# Patient Record
Sex: FEMALE | Race: OTHER | HISPANIC OR LATINO | Employment: FULL TIME | URBAN - METROPOLITAN AREA
[De-identification: names, ages, dates, MRNs, and addresses within clinical notes are randomized per-mention and may not be internally consistent; named-entity substitution may affect disease eponyms.]

---

## 2017-03-22 ENCOUNTER — APPOINTMENT (EMERGENCY)
Dept: RADIOLOGY | Facility: HOSPITAL | Age: 42
End: 2017-03-22
Payer: COMMERCIAL

## 2017-03-22 ENCOUNTER — HOSPITAL ENCOUNTER (EMERGENCY)
Facility: HOSPITAL | Age: 42
Discharge: HOME | End: 2017-03-22
Admitting: EMERGENCY MEDICINE
Payer: COMMERCIAL

## 2017-03-22 VITALS
WEIGHT: 213 LBS | SYSTOLIC BLOOD PRESSURE: 126 MMHG | DIASTOLIC BLOOD PRESSURE: 88 MMHG | BODY MASS INDEX: 38.96 KG/M2 | HEART RATE: 96 BPM | RESPIRATION RATE: 16 BRPM | TEMPERATURE: 97.6 F | OXYGEN SATURATION: 100 %

## 2017-03-22 DIAGNOSIS — M25.511 SHOULDER PAIN, RIGHT: Primary | ICD-10-CM

## 2017-03-22 PROCEDURE — 99283 EMERGENCY DEPT VISIT LOW MDM: CPT

## 2017-03-22 PROCEDURE — 73030 X-RAY EXAM OF SHOULDER: CPT

## 2017-03-22 RX ORDER — NAPROXEN 500 MG/1
500 TABLET ORAL 2 TIMES DAILY WITH MEALS
Qty: 20 TABLET | Refills: 0 | Status: SHIPPED | OUTPATIENT
Start: 2017-03-22 | End: 2017-07-10 | Stop reason: ALTCHOICE

## 2017-03-24 ENCOUNTER — ALLSCRIPTS OFFICE VISIT (OUTPATIENT)
Dept: OTHER | Facility: OTHER | Age: 42
End: 2017-03-24

## 2017-03-24 DIAGNOSIS — M75.81 OTHER SHOULDER LESIONS, RIGHT SHOULDER: ICD-10-CM

## 2017-03-24 DIAGNOSIS — M75.21 BICIPITAL TENDINITIS OF RIGHT SHOULDER: ICD-10-CM

## 2017-03-28 ENCOUNTER — TRANSCRIBE ORDERS (OUTPATIENT)
Dept: ADMINISTRATIVE | Facility: HOSPITAL | Age: 42
End: 2017-03-28

## 2017-03-28 DIAGNOSIS — M75.21 BICIPITAL TENDINITIS OF RIGHT SHOULDER: Primary | ICD-10-CM

## 2017-03-28 DIAGNOSIS — M77.8 TENDINITIS OF RIGHT SHOULDER: ICD-10-CM

## 2017-03-28 DIAGNOSIS — S39.92XA BACK INJURY, INITIAL ENCOUNTER: ICD-10-CM

## 2017-04-21 ENCOUNTER — APPOINTMENT (OUTPATIENT)
Dept: URGENT CARE | Age: 42
End: 2017-04-21
Payer: OTHER MISCELLANEOUS

## 2017-04-21 ENCOUNTER — HOSPITAL ENCOUNTER (OUTPATIENT)
Dept: RADIOLOGY | Age: 42
Discharge: HOME | End: 2017-04-21
Payer: OTHER MISCELLANEOUS

## 2017-04-21 DIAGNOSIS — S39.92XA BACK INJURY, INITIAL ENCOUNTER: ICD-10-CM

## 2017-04-21 PROCEDURE — 73502 X-RAY EXAM HIP UNI 2-3 VIEWS: CPT

## 2017-04-21 PROCEDURE — G0383 LEV 4 HOSP TYPE B ED VISIT: HCPCS | Performed by: PREVENTIVE MEDICINE

## 2017-04-21 PROCEDURE — 72100 X-RAY EXAM L-S SPINE 2/3 VWS: CPT

## 2017-04-21 PROCEDURE — 72220 X-RAY EXAM SACRUM TAILBONE: CPT

## 2017-04-21 PROCEDURE — 99284 EMERGENCY DEPT VISIT MOD MDM: CPT | Performed by: PREVENTIVE MEDICINE

## 2017-04-22 ENCOUNTER — APPOINTMENT (OUTPATIENT)
Dept: URGENT CARE | Age: 42
End: 2017-04-22
Payer: OTHER MISCELLANEOUS

## 2017-04-22 PROCEDURE — 99213 OFFICE O/P EST LOW 20 MIN: CPT | Performed by: PREVENTIVE MEDICINE

## 2017-04-28 ENCOUNTER — APPOINTMENT (OUTPATIENT)
Dept: URGENT CARE | Age: 42
End: 2017-04-28
Payer: OTHER MISCELLANEOUS

## 2017-04-28 PROCEDURE — 99213 OFFICE O/P EST LOW 20 MIN: CPT | Performed by: PREVENTIVE MEDICINE

## 2017-05-08 ENCOUNTER — APPOINTMENT (OUTPATIENT)
Dept: URGENT CARE | Age: 42
End: 2017-05-08
Payer: OTHER MISCELLANEOUS

## 2017-05-08 PROCEDURE — 99213 OFFICE O/P EST LOW 20 MIN: CPT | Performed by: PREVENTIVE MEDICINE

## 2017-06-01 ENCOUNTER — HOSPITAL ENCOUNTER (OUTPATIENT)
Dept: RADIOLOGY | Age: 42
Discharge: HOME | End: 2017-06-01
Attending: PREVENTIVE MEDICINE
Payer: OTHER MISCELLANEOUS

## 2017-06-01 ENCOUNTER — APPOINTMENT (OUTPATIENT)
Dept: URGENT CARE | Age: 42
End: 2017-06-01
Payer: OTHER MISCELLANEOUS

## 2017-06-01 ENCOUNTER — TRANSCRIBE ORDERS (OUTPATIENT)
Dept: URGENT CARE | Age: 42
End: 2017-06-01

## 2017-06-01 DIAGNOSIS — T14.90XA INJURY: ICD-10-CM

## 2017-06-01 DIAGNOSIS — T14.90XA INJURY: Primary | ICD-10-CM

## 2017-06-01 PROCEDURE — 72040 X-RAY EXAM NECK SPINE 2-3 VW: CPT

## 2017-06-01 PROCEDURE — 99213 OFFICE O/P EST LOW 20 MIN: CPT | Performed by: PREVENTIVE MEDICINE

## 2017-06-20 ENCOUNTER — APPOINTMENT (OUTPATIENT)
Dept: URGENT CARE | Age: 42
End: 2017-06-20
Payer: OTHER MISCELLANEOUS

## 2017-06-20 PROCEDURE — 99213 OFFICE O/P EST LOW 20 MIN: CPT | Performed by: PREVENTIVE MEDICINE

## 2017-07-10 ENCOUNTER — HOSPITAL ENCOUNTER (EMERGENCY)
Facility: HOSPITAL | Age: 42
Discharge: HOME | End: 2017-07-10
Attending: EMERGENCY MEDICINE | Admitting: EMERGENCY MEDICINE
Payer: OTHER MISCELLANEOUS

## 2017-07-10 VITALS
TEMPERATURE: 98 F | WEIGHT: 210.1 LBS | RESPIRATION RATE: 16 BRPM | HEART RATE: 88 BPM | BODY MASS INDEX: 38.43 KG/M2 | SYSTOLIC BLOOD PRESSURE: 140 MMHG | OXYGEN SATURATION: 95 % | DIASTOLIC BLOOD PRESSURE: 63 MMHG

## 2017-07-10 DIAGNOSIS — M54.50 ACUTE LOW BACK PAIN: Primary | ICD-10-CM

## 2017-07-10 PROCEDURE — 96374 THER/PROPH/DIAG INJ IV PUSH: CPT

## 2017-07-10 PROCEDURE — 96372 THER/PROPH/DIAG INJ SC/IM: CPT

## 2017-07-10 PROCEDURE — 99283 EMERGENCY DEPT VISIT LOW MDM: CPT

## 2017-07-10 RX ORDER — LIDOCAINE 50 MG/G
1 PATCH TOPICAL ONCE
Status: DISCONTINUED | OUTPATIENT
Start: 2017-07-10 | End: 2017-07-10 | Stop reason: HOSPADM

## 2017-07-10 RX ORDER — DIAZEPAM 5 MG/1
10 TABLET ORAL ONCE
Status: COMPLETED | OUTPATIENT
Start: 2017-07-10 | End: 2017-07-10

## 2017-07-10 RX ORDER — TOPIRAMATE 25 MG/1
25 CAPSULE, COATED PELLETS ORAL 2 TIMES DAILY
COMMUNITY
End: 2018-11-27

## 2017-07-10 RX ORDER — KETOROLAC TROMETHAMINE 30 MG/ML
30 INJECTION, SOLUTION INTRAMUSCULAR; INTRAVENOUS ONCE
Status: COMPLETED | OUTPATIENT
Start: 2017-07-10 | End: 2017-07-10

## 2017-07-10 RX ADMIN — KETOROLAC TROMETHAMINE 30 MG: 30 INJECTION, SOLUTION INTRAMUSCULAR at 10:06

## 2017-07-10 RX ADMIN — DIAZEPAM 10 MG: 5 TABLET ORAL at 10:11

## 2017-07-10 RX ADMIN — HYDROMORPHONE HYDROCHLORIDE 1 MG: 1 INJECTION, SOLUTION INTRAMUSCULAR; INTRAVENOUS; SUBCUTANEOUS at 10:08

## 2017-07-10 RX ADMIN — LIDOCAINE 1 PATCH: 50 PATCH CUTANEOUS at 10:09

## 2017-07-10 RX ADMIN — HYDROMORPHONE HYDROCHLORIDE 1 MG: 1 INJECTION, SOLUTION INTRAMUSCULAR; INTRAVENOUS; SUBCUTANEOUS at 12:18

## 2018-01-11 ENCOUNTER — HOSPITAL ENCOUNTER (EMERGENCY)
Facility: HOSPITAL | Age: 43
Discharge: HOME | End: 2018-01-11
Attending: EMERGENCY MEDICINE | Admitting: EMERGENCY MEDICINE
Payer: COMMERCIAL

## 2018-01-11 ENCOUNTER — APPOINTMENT (EMERGENCY)
Dept: CT IMAGING | Facility: HOSPITAL | Age: 43
End: 2018-01-11
Payer: COMMERCIAL

## 2018-01-11 VITALS
WEIGHT: 217 LBS | DIASTOLIC BLOOD PRESSURE: 60 MMHG | HEART RATE: 96 BPM | TEMPERATURE: 97.7 F | SYSTOLIC BLOOD PRESSURE: 126 MMHG | BODY MASS INDEX: 39.69 KG/M2 | OXYGEN SATURATION: 97 % | RESPIRATION RATE: 18 BRPM

## 2018-01-11 DIAGNOSIS — D72.829 LEUKOCYTOSIS: ICD-10-CM

## 2018-01-11 DIAGNOSIS — K59.00 CONSTIPATION: Primary | ICD-10-CM

## 2018-01-11 LAB
ALBUMIN SERPL BCP-MCNC: 3.9 G/DL (ref 3.5–5)
ALP SERPL-CCNC: 103 U/L (ref 46–116)
ALT SERPL W P-5'-P-CCNC: 30 U/L (ref 12–78)
ANION GAP SERPL CALCULATED.3IONS-SCNC: 8 MMOL/L (ref 4–13)
APTT PPP: 26 SECONDS (ref 23–35)
AST SERPL W P-5'-P-CCNC: 13 U/L (ref 5–45)
BACTERIA UR QL AUTO: ABNORMAL /HPF
BASOPHILS # BLD MANUAL: 0 THOUSAND/UL (ref 0–0.1)
BASOPHILS NFR MAR MANUAL: 0 % (ref 0–1)
BILIRUB SERPL-MCNC: 0.14 MG/DL (ref 0.2–1)
BILIRUB UR QL STRIP: NEGATIVE
BUN SERPL-MCNC: 17 MG/DL (ref 5–25)
CALCIUM SERPL-MCNC: 9.4 MG/DL (ref 8.3–10.1)
CHLORIDE SERPL-SCNC: 104 MMOL/L (ref 100–108)
CLARITY UR: CLEAR
CO2 SERPL-SCNC: 27 MMOL/L (ref 21–32)
COLOR UR: YELLOW
CREAT SERPL-MCNC: 0.73 MG/DL (ref 0.6–1.3)
EOSINOPHIL # BLD MANUAL: 0.18 THOUSAND/UL (ref 0–0.4)
EOSINOPHIL NFR BLD MANUAL: 1 % (ref 0–6)
ERYTHROCYTE [DISTWIDTH] IN BLOOD BY AUTOMATED COUNT: 13.5 % (ref 11.6–15.1)
EXT PREG TEST URINE: NEGATIVE
GFR SERPL CREATININE-BSD FRML MDRD: 103 ML/MIN/1.73SQ M
GLUCOSE SERPL-MCNC: 100 MG/DL (ref 65–140)
GLUCOSE UR STRIP-MCNC: NEGATIVE MG/DL
HCT VFR BLD AUTO: 44.4 % (ref 34.8–46.1)
HGB BLD-MCNC: 14.9 G/DL (ref 11.5–15.4)
HGB UR QL STRIP.AUTO: ABNORMAL
INR PPP: 0.95 (ref 0.86–1.16)
KETONES UR STRIP-MCNC: NEGATIVE MG/DL
LACTATE SERPL-SCNC: 1.4 MMOL/L (ref 0.5–2)
LEUKOCYTE ESTERASE UR QL STRIP: NEGATIVE
LIPASE SERPL-CCNC: 206 U/L (ref 73–393)
LYMPHOCYTES # BLD AUTO: 18 % (ref 14–44)
LYMPHOCYTES # BLD AUTO: 3.22 THOUSAND/UL (ref 0.6–4.47)
MCH RBC QN AUTO: 29.1 PG (ref 26.8–34.3)
MCHC RBC AUTO-ENTMCNC: 33.6 G/DL (ref 31.4–37.4)
MCV RBC AUTO: 87 FL (ref 82–98)
MONOCYTES # BLD AUTO: 0.89 THOUSAND/UL (ref 0–1.22)
MONOCYTES NFR BLD: 5 % (ref 4–12)
NEUTROPHILS # BLD MANUAL: 13.58 THOUSAND/UL (ref 1.85–7.62)
NEUTS SEG NFR BLD AUTO: 76 % (ref 43–75)
NITRITE UR QL STRIP: NEGATIVE
NON-SQ EPI CELLS URNS QL MICRO: ABNORMAL /HPF
NRBC BLD AUTO-RTO: 0 /100 WBCS
PH UR STRIP.AUTO: 5.5 [PH] (ref 4.5–8)
PLATELET # BLD AUTO: 379 THOUSANDS/UL (ref 149–390)
PLATELET BLD QL SMEAR: ADEQUATE
PMV BLD AUTO: 10.4 FL (ref 8.9–12.7)
POTASSIUM SERPL-SCNC: 3.6 MMOL/L (ref 3.5–5.3)
PROT SERPL-MCNC: 7.7 G/DL (ref 6.4–8.2)
PROT UR STRIP-MCNC: NEGATIVE MG/DL
PROTHROMBIN TIME: 12.7 SECONDS (ref 12.1–14.4)
RBC # BLD AUTO: 5.12 MILLION/UL (ref 3.81–5.12)
RBC #/AREA URNS AUTO: ABNORMAL /HPF
RBC MORPH BLD: NORMAL
SODIUM SERPL-SCNC: 139 MMOL/L (ref 136–145)
SP GR UR STRIP.AUTO: >=1.03 (ref 1–1.03)
TOTAL CELLS COUNTED SPEC: 100
UROBILINOGEN UR QL STRIP.AUTO: 0.2 E.U./DL
WBC # BLD AUTO: 17.87 THOUSAND/UL (ref 4.31–10.16)
WBC #/AREA URNS AUTO: ABNORMAL /HPF

## 2018-01-11 PROCEDURE — 36415 COLL VENOUS BLD VENIPUNCTURE: CPT | Performed by: EMERGENCY MEDICINE

## 2018-01-11 PROCEDURE — 85730 THROMBOPLASTIN TIME PARTIAL: CPT | Performed by: EMERGENCY MEDICINE

## 2018-01-11 PROCEDURE — 81025 URINE PREGNANCY TEST: CPT | Performed by: EMERGENCY MEDICINE

## 2018-01-11 PROCEDURE — 85007 BL SMEAR W/DIFF WBC COUNT: CPT | Performed by: EMERGENCY MEDICINE

## 2018-01-11 PROCEDURE — 80053 COMPREHEN METABOLIC PANEL: CPT | Performed by: EMERGENCY MEDICINE

## 2018-01-11 PROCEDURE — 74177 CT ABD & PELVIS W/CONTRAST: CPT

## 2018-01-11 PROCEDURE — 96361 HYDRATE IV INFUSION ADD-ON: CPT

## 2018-01-11 PROCEDURE — 83605 ASSAY OF LACTIC ACID: CPT | Performed by: EMERGENCY MEDICINE

## 2018-01-11 PROCEDURE — 83690 ASSAY OF LIPASE: CPT | Performed by: EMERGENCY MEDICINE

## 2018-01-11 PROCEDURE — 99284 EMERGENCY DEPT VISIT MOD MDM: CPT

## 2018-01-11 PROCEDURE — 85027 COMPLETE CBC AUTOMATED: CPT | Performed by: EMERGENCY MEDICINE

## 2018-01-11 PROCEDURE — 85610 PROTHROMBIN TIME: CPT | Performed by: EMERGENCY MEDICINE

## 2018-01-11 PROCEDURE — 81001 URINALYSIS AUTO W/SCOPE: CPT

## 2018-01-11 PROCEDURE — 81002 URINALYSIS NONAUTO W/O SCOPE: CPT | Performed by: EMERGENCY MEDICINE

## 2018-01-11 PROCEDURE — 96360 HYDRATION IV INFUSION INIT: CPT

## 2018-01-11 RX ORDER — MAGNESIUM CARB/ALUMINUM HYDROX 105-160MG
296 TABLET,CHEWABLE ORAL ONCE
Qty: 296 ML | Refills: 0 | Status: SHIPPED | OUTPATIENT
Start: 2018-01-11 | End: 2018-11-27

## 2018-01-11 RX ADMIN — SODIUM CHLORIDE 1000 ML: 0.9 INJECTION, SOLUTION INTRAVENOUS at 16:11

## 2018-01-11 RX ADMIN — IOHEXOL 100 ML: 350 INJECTION, SOLUTION INTRAVENOUS at 17:24

## 2018-01-11 NOTE — ED ATTENDING ATTESTATION
Rainer Rosario DO, saw and evaluated the patient  I have discussed the patient with the resident/non-physician practitioner and agree with the resident's/non-physician practitioner's findings, Plan of Care, and MDM as documented in the resident's/non-physician practitioner's note, except where noted  All available labs and Radiology studies were reviewed  At this point I agree with the current assessment done in the Emergency Department  I have conducted an independent evaluation of this patient a history and physical is as follows:      Critical Care Time  CritCare Time    Procedures 20-year-old female presents to emergency depart with a 2 week history of constipation  Patient states that she has not had a bowel movement at all in the last 2 weeks  She complains of crampy abdominal pain and nausea but no vomiting  No fevers or chills  She has a history of multiple abdominal surgeries and feels that her rectal surgery in 2015 May have caused her issues with constipation  She did try over-the-counter meds without relief  On exam patient is awake and alert and in no distress  Mucous membranes are moist  Heart is regular without murmur  Lungs are clear  Abdomen is obese, soft with diffuse mild tenderness without rebound or guarding  No hernia  Bowel sounds are decreased  Skin is warm and dry, normal color no rash

## 2018-01-11 NOTE — ED PROVIDER NOTES
History  Chief Complaint   Patient presents with    Constipation     pt reports no BM for 2 weeks, had some type of rectal procedure done and doctor referred here for possible obstruction  pt c/o pain and nausea  Patient is a 72-year-old female with a past medical history significant for multiple bladder sling, posterior colporrhaphy and return perineorraphy, status post appendectomy and cholecystectomy who presents with constipation x2 weeks  Patient reports that over the last 2 weeks she has been unable to pass gas and have a bowel movement  Also reports increasing abdominal distension with worsening discomfort  Reports the pain as generalized, pressure, 10/10 intensity, nonradiating, constant and worsening  Reports that earlier today, she thought she felt a lump coming from her vagina and she thought that was her stool pushing out words  Patient has tried multiple stool softeners, laxatives with no relief  Complains of nausea, but no vomiting  Has been unable to eat, but is able to tolerate fluids  Assessment and plan: Constipation  R/o obstruction  Labs to evaluate for leukocytosis, anemia, electrolyte abnormalities, LFTs  CT abd/pelvis  If no obstruction, will give soap suds enema and then give bowel prep for home  Prior to Admission Medications   Prescriptions Last Dose Informant Patient Reported? Taking? LORazepam (ATIVAN) 0 5 mg tablet   Yes No   Sig: Take 0 5 mg by mouth as needed     albuterol (PROVENTIL HFA,VENTOLIN HFA) 90 mcg/act inhaler   Yes No   Sig: Inhale 2 puffs every 6 (six) hours as needed for wheezing Indications: patient unsure of correct dosage/frequency  gabapentin (NEURONTIN) 300 mg capsule   Yes No   Sig: Take 300 mg by mouth 3 (three) times a day Indications: patient unsure of correct frequency  oxyCODONE-acetaminophen (PERCOCET) 5-325 mg per tablet   No No   Sig: Take 1 tablet by mouth every 4 (four) hours as needed for moderate pain   Max Daily Amount: 6 tablets   topiramate (TOPAMAX) 25 mg sprinkle capsule   Yes No   Sig: Take 25 mg by mouth 2 (two) times a day      Facility-Administered Medications: None       Past Medical History:   Diagnosis Date    Asthma     Migraine     Psychiatric disorder     depression, anxiety    Seizures ([facility] [address] )        Past Surgical History:   Procedure Laterality Date    APPENDECTOMY      BLADDER SURGERY      CHOLECYSTECTOMY      HYSTERECTOMY         History reviewed  No pertinent family history  I have reviewed and agree with the history as documented  Social History   Substance Use Topics    Smoking status: Current Every Day Smoker     Packs/day: 0 25     Types: Cigarettes    Smokeless tobacco: Never Used    Alcohol use No        Review of Systems   Constitutional: Positive for appetite change  Negative for chills and fever  HENT: Negative for congestion and rhinorrhea  Eyes: Negative for photophobia and visual disturbance  Respiratory: Negative for cough and shortness of breath  Cardiovascular: Negative for chest pain and palpitations  Gastrointestinal: Positive for abdominal distention, abdominal pain, constipation and nausea  Negative for anal bleeding, blood in stool, diarrhea and vomiting  Genitourinary: Negative for dysuria and hematuria  Musculoskeletal: Positive for back pain  Negative for neck pain and neck stiffness  Skin: Negative for pallor and rash  Neurological: Negative for dizziness, weakness, light-headedness, numbness and headaches         Physical Exam  ED Triage Vitals [01/11/18 1548]   Temperature Pulse Respirations Blood Pressure SpO2   97 7 °F (36 5 °C) 102 18 139/71 98 %      Temp Source Heart Rate Source Patient Position - Orthostatic VS BP Location FiO2 (%)   Temporal -- -- -- --      Pain Score       Worst Possible Pain           Orthostatic Vital Signs  Vitals:    01/11/18 1548 01/11/18 1745 01/11/18 1800 01/11/18 1815   BP: 139/71 126/60     Pulse: 102  90 96 Physical Exam   Constitutional: She is oriented to person, place, and time  She appears well-developed and well-nourished  No distress  HENT:   Head: Normocephalic and atraumatic  Right Ear: External ear normal    Left Ear: External ear normal    Nose: Nose normal    Mouth/Throat: Oropharynx is clear and moist  No oropharyngeal exudate  Eyes: Conjunctivae and EOM are normal  Pupils are equal, round, and reactive to light  Neck: Normal range of motion  Neck supple  Cardiovascular: Regular rhythm, normal heart sounds and intact distal pulses  Exam reveals no gallop and no friction rub  No murmur heard  Mildly tachycardic   Pulmonary/Chest: Effort normal and breath sounds normal  No respiratory distress  She has no wheezes  She has no rales  She exhibits no tenderness  Abdominal: Soft  Bowel sounds are normal  She exhibits distension (minimal amount of distention)  She exhibits no mass  There is tenderness (minimally TTP diffusely)  There is no rebound and no guarding  No hernia  Genitourinary:   Genitourinary Comments: No vaginal prolapse   Musculoskeletal: Normal range of motion  She exhibits no edema or tenderness  Neurological: She is alert and oriented to person, place, and time  She has normal reflexes  Moves all 4 extremities    Skin: Skin is warm and dry  Capillary refill takes less than 2 seconds  No rash noted  She is not diaphoretic  No erythema  No pallor  Psychiatric: She has a normal mood and affect  Her behavior is normal    Nursing note and vitals reviewed        ED Medications  Medications   sodium chloride 0 9 % bolus 1,000 mL (0 mL Intravenous Stopped 1/11/18 1845)   iohexol (OMNIPAQUE) 350 MG/ML injection (MULTI-DOSE) 100 mL (100 mL Intravenous Given 1/11/18 1724)       Diagnostic Studies  Results Reviewed     Procedure Component Value Units Date/Time    Lactic acid x2 Q2H [[id number]]  (Normal) Collected:  01/11/18 1653    Lab Status:  Final result Specimen:  Blood from Hand, Left Updated:  01/11/18 1721     LACTIC ACID 1 4 mmol/L     Narrative:         Result may be elevated if tourniquet was used during collection  Protime-INR [[id number]]  (Normal) Collected:  01/11/18 1650    Lab Status:  Final result Specimen:  Blood from Arm, Right Updated:  01/11/18 1720     Protime 12 7 seconds      INR 0 95    APTT [[id number]]  (Normal) Collected:  01/11/18 1650    Lab Status:  Final result Specimen:  Blood from Arm, Right Updated:  01/11/18 1720     PTT 26 seconds     Narrative: Therapeutic Heparin Range = 60-90 seconds    POCT pregnancy, urine [[id number]]  (Normal) Resulted:  01/11/18 1656    Lab Status:  Final result Updated:  01/11/18 1656     EXT PREG TEST UR (Ref: Negative) NEGATIVE    CBC and differential [[id number]]  (Abnormal) Collected:  01/11/18 1610    Lab Status:  Final result Specimen:  Blood from Arm, Right Updated:  01/11/18 1649     WBC 17 87 (H) Thousand/uL      RBC 5 12 Million/uL      Hemoglobin 14 9 g/dL      Hematocrit 44 4 %      MCV 87 fL      MCH 29 1 pg      MCHC 33 6 g/dL      RDW 13 5 %      MPV 10 4 fL      Platelets 654 Thousands/uL      nRBC 0 /100 WBCs     Narrative: This is an appended report  These results have been appended to a previously verified report      POCT urinalysis dipstick [[id number]]  (Abnormal) Resulted:  01/11/18 1648    Lab Status:  Final result Specimen:  Urine Updated:  01/11/18 1648    Comprehensive metabolic panel [[id number]]  (Abnormal) Collected:  01/11/18 1610    Lab Status:  Final result Specimen:  Blood from Arm, Right Updated:  01/11/18 1636     Sodium 139 mmol/L      Potassium 3 6 mmol/L      Chloride 104 mmol/L      CO2 27 mmol/L      Anion Gap 8 mmol/L      BUN 17 mg/dL      Creatinine 0 73 mg/dL      Glucose 100 mg/dL      Calcium 9 4 mg/dL      AST 13 U/L      ALT 30 U/L      Alkaline Phosphatase 103 U/L      Total Protein 7 7 g/dL      Albumin 3 9 g/dL      Total Bilirubin 0 14 (L) mg/dL      eGFR 103 ml/min/1 73sq m     Narrative:         National Kidney Disease Education Program recommendations are as follows:  GFR calculation is accurate only with a steady state creatinine  Chronic Kidney disease less than 60 ml/min/1 73 sq  meters  Kidney failure less than 15 ml/min/1 73 sq  meters  Lipase [[id number]]  (Normal) Collected:  01/11/18 1610    Lab Status:  Final result Specimen:  Blood from Arm, Right Updated:  01/11/18 1636     Lipase 206 u/L     Urine Microscopic [[id number]]  (Abnormal) Collected:  01/11/18 1557    Lab Status:  Final result Specimen:  Urine from Urine, Clean Catch Updated:  01/11/18 1629     RBC, UA None Seen /hpf      WBC, UA 0-1 (A) /hpf      Epithelial Cells Occasional /hpf      Bacteria, UA Occasional /hpf     ED Urine Macroscopic [[id number]]  (Abnormal) Collected:  01/11/18 1557    Lab Status:  Final result Specimen:  Urine Updated:  01/11/18 1559     Color, UA Yellow     Clarity, UA Clear     pH, UA 5 5     Leukocytes, UA Negative     Nitrite, UA Negative     Protein, UA Negative mg/dl      Glucose, UA Negative mg/dl      Ketones, UA Negative mg/dl      Urobilinogen, UA 0 2 E U /dl      Bilirubin, UA Negative     Blood, UA Small (A)     Specific Gravity, UA >=1 030    Narrative:       [facility] RESULT                 CT abdomen pelvis with contrast   Final Result by Nadya Boston MD (01/11 1745)      No acute abdominal or pelvic abnormality  No bowel obstruction  Workstation performed: [id number]               Procedures  Procedures      Phone Consults  ED Phone Contact    ED Course  ED Course as of Jan 12 0013   Thu Jan 11, 2018   6394 Patient reassessed s/p soap suds enema  She was able to have a large BM and feels much improved  Will discharge with mag citrate prescription and discussed return precautions with patient                            Initial Sepsis Screening     Row Name 01/11/18 2409                Is the patient's history suggestive of a new or worsening infection? (!)  Yes (Proceed)  -LK        Suspected source of infection acute abdominal infection  -LK        Are two or more of the following signs & symptoms of infection both present and new to the patient? (!)  Yes (Proceed)  -LK        Indicate SIRS criteria Tachycardia > 90 bpm;Leukocytosis (WBC > 60620 IJL)  -LK        If the answer is yes to both questions, suspicion of sepsis is present  --        If severe sepsis is present AND tissue hypoperfusion perists in the hour after fluid resuscitation or lactate > 4, the patient meets criteria for SEPTIC SHOCK  --        Are any of the following organ dysfunction criteria present within 6 hours of suspected infection and SIRS criteria that are NOT considered to be chronic conditions? (!)  Yes  -LK        Organ dysfunction  --        Date of presentation of severe sepsis  --        Time of presentation of severe sepsis  --        Tissue hypoperfusion persists in the hour after crystalloid fluid administration, evidenced, by either:  --        Was hypotension present within one hour of the conclusion of crystalloid fluid administration?  --        Date of presentation of septic shock  --        Time of presentation of septic shock  --          User Key  (r) = Recorded By, (t) = Taken By, (c) = Cosigned By    [facility] [address] Name Provider Type    RAGHAVENDRA Jo DO Physician                  MDM  CritCare Time    Disposition  Final diagnoses:   Constipation   Leukocytosis     Time reflects when diagnosis was documented in both MDM as applicable and the Disposition within this note     Time User Action Codes Description Comment    1/11/2018  6:51 PM Kt Suazo [K59 00] Constipation     1/11/2018  6:52 PM Breann Hunter [[id number]] Leukocytosis       ED Disposition     ED Disposition Condition Comment    Discharge  [address] discharge to home/self care      Condition at discharge: Good        Follow-up Information     Follow up With Specialties Details Why Contact Info Additional Information    Washington Lao MD Internal Medicine Schedule an appointment as soon as possible for a visit in 3 days for re-evaluation [facility] [address] Emergency Medicine Go to for re-evaluation, As needed, If symptoms worsen [address] [place] [address], [address], [place], [address]        Discharge Medication List as of 1/11/2018  6:53 PM      START taking these medications    Details   magnesium citrate (CITROMA) 1 745 g/30 mL oral solution Take 296 mL by mouth once for 1 dose, Starting Thu 1/11/2018, Print         CONTINUE these medications which have NOT CHANGED    Details   albuterol (PROVENTIL HFA,VENTOLIN HFA) 90 mcg/act inhaler Inhale 2 puffs every 6 (six) hours as needed for wheezing Indications: patient unsure of correct dosage/frequency  , Until Discontinued, Historical Med      gabapentin (NEURONTIN) 300 mg capsule Take 300 mg by mouth 3 (three) times a day Indications: patient unsure of correct frequency  , Until Discontinued, Historical Med      LORazepam (ATIVAN) 0 5 mg tablet Take 0 5 mg by mouth as needed  , Historical Med      oxyCODONE-acetaminophen (PERCOCET) 5-325 mg per tablet Take 1 tablet by mouth every 4 (four) hours as needed for moderate pain  Max Daily Amount: 6 tablets, Starting 5/8/2016, Until Discontinued, Print      topiramate (TOPAMAX) 25 mg sprinkle capsule Take 25 mg by mouth 2 (two) times a day, Historical Med           No discharge procedures on file  ED Provider  Attending physically available and evaluated Glenisshawna Priscilla HAGEN managed the patient along with the ED Attending      Electronically Signed by         Rhianna Birmingham DO  01/12/18 0013

## 2018-01-11 NOTE — SEPSIS NOTE
Sepsis Note   Radha Zapata 40 y o  female MRN: [id number]  Unit/Bed#: ED 20 Encounter: [id number]            Initial Sepsis Screening     Row Name 01/11/18 4385                Is the patient's history suggestive of a new or worsening infection? (!)  Yes (Proceed)  -LK        Suspected source of infection acute abdominal infection  -LK        Are two or more of the following signs & symptoms of infection both present and new to the patient? (!)  Yes (Proceed)  -LK        Indicate SIRS criteria Tachycardia > 90 bpm;Leukocytosis (WBC > [address]7768 IJL)  -LK        If the answer is yes to both questions, suspicion of sepsis is present  --        If severe sepsis is present AND tissue hypoperfusion perists in the hour after fluid resuscitation or lactate > 4, the patient meets criteria for SEPTIC SHOCK  --        Are any of the following organ dysfunction criteria present within 6 hours of suspected infection and SIRS criteria that are NOT considered to be chronic conditions?  (!)  Yes  -LK        Organ dysfunction  --        Date of presentation of severe sepsis  --        Time of presentation of severe sepsis  --        Tissue hypoperfusion persists in the hour after crystalloid fluid administration, evidenced, by either:  --        Was hypotension present within one hour of the conclusion of crystalloid fluid administration?  --        Date of presentation of septic shock  --        Time of presentation of septic shock  --          User Key  (r) = Recorded By, (t) = Taken By, (c) = Cosigned By    [address] Name Provider Type    RAGHAVENDRA [place],  Physician

## 2018-01-11 NOTE — DISCHARGE INSTRUCTIONS
Leukocytosis   WHAT YOU NEED TO KNOW:   Leukocytosis is a condition that causes you to have too many white blood cells (WBC)  WBCs are part of your immune system and help fight infections and diseases  DISCHARGE INSTRUCTIONS:   Medicines:   · Medicines  may be given to decrease inflammation or treat an infection  You may also be given medicine to decrease acid levels in your body or urine  · Take your medicine as directed  Contact your healthcare provider if you think your medicine is not helping or if you have side effects  Tell him of her if you are allergic to any medicine  Keep a list of the medicines, vitamins, and herbs you take  Include the amounts, and when and why you take them  Bring the list or the pill bottles to follow-up visits  Carry your medicine list with you in case of an emergency  Follow up with your healthcare provider as directed: You may need more tests or treatment  You may also be referred to a specialist  Write down your questions so you remember to ask them during your visits  Do not smoke: If you smoke, it is never too late to quit  Ask for information about how to stop smoking if you need help  Contact your healthcare provider or specialist if:   · You have a fever  · You bruise or bleed easily  · You are nauseated, lose weight without trying, or have a poor appetite  · You feel weak, tired, or sick  · You are a man and you have a painful erection that lasts longer than usual      · You have questions or concerns about your condition or care  Return to the emergency department if:   · You have any of the following signs of a stroke:     ¨ Part of your face droops or is numb    ¨ Weakness in an arm or leg    ¨ Confusion or difficulty speaking    ¨ Dizziness, a severe headache, or vision loss    · You have chest pain or trouble breathing  · You have bleeding that does not stop  · You have new pain or tingling in your arms, legs, or abdomen      · You have sudden back pain  © 2017 [address]  Information is for End User's use only and may not be sold, redistributed or otherwise used for commercial purposes  All illustrations and images included in CareNotes® are the copyrighted property of A [employer] M , [employer]  or [employer]  The above information is an  only  It is not intended as medical advice for individual conditions or treatments  Talk to your doctor, nurse or pharmacist before following any medical regimen to see if it is safe and effective for you  Constipation   WHAT YOU NEED TO KNOW:   Constipation is when you have hard, dry bowel movements, or you go longer than usual between bowel movements  DISCHARGE INSTRUCTIONS:   Return to the emergency department if:   · You have blood in your bowel movements  · You have a fever and abdominal pain with the constipation  Contact your healthcare provider if:   · Your constipation gets worse  · You start to vomit  · You have questions or concerns about your condition or care  Medicines:   · Medicine or a fiber supplement  may help make your bowel movement softer  A laxative may help relax and loosen your intestines to help you have a bowel movement  You may also be given medicine to increase fluid in your intestines  The fluid may help move bowel movements through your intestines  · Take your medicine as directed  Contact your healthcare provider if you think your medicine is not helping or if you have side effects  Tell him of her if you are allergic to any medicine  Keep a list of the medicines, vitamins, and herbs you take  Include the amounts, and when and why you take them  Bring the list or the pill bottles to follow-up visits  Carry your medicine list with you in case of an emergency  Manage your constipation:   · Drink liquids as directed  You may need to drink extra liquids to help soften and move your bowels   Ask how much liquid to drink each day and which liquids are best for you  · Eat high-fiber foods  This may help decrease constipation by adding bulk to your bowel movements  High-fiber foods include fruit, vegetables, whole-grain breads and cereals, and beans  Your healthcare provider or dietitian can help you create a high-fiber meal plan  · Exercise regularly  Regular physical activity can help stimulate your intestines  Ask which exercises are best for you  · Schedule a time each day to have a bowel movement  This may help train your body to have regular bowel movements  Bend forward while you are on the toilet to help move the bowel movement out  Sit on the toilet for at least 10 minutes, even if you do not have a bowel movement  Follow up with your healthcare provider as directed:  Write down your questions so you remember to ask them during your visits  © 2017 [address] [employer] Information is for End User's use only and may not be sold, redistributed or otherwise used for commercial purposes  All illustrations and images included in CareNotes® are the copyrighted property of A D [employer] M , Inc  or [address]  The above information is an  only  It is not intended as medical advice for individual conditions or treatments  Talk to your doctor, nurse or pharmacist before following any medical regimen to see if it is safe and effective for you

## 2018-01-14 VITALS
BODY MASS INDEX: 39.93 KG/M2 | HEIGHT: 62 IN | DIASTOLIC BLOOD PRESSURE: 85 MMHG | WEIGHT: 217 LBS | HEART RATE: 85 BPM | SYSTOLIC BLOOD PRESSURE: 135 MMHG

## 2018-06-07 LAB — PREGNANCY TEST URINE (HISTORICAL): NEGATIVE

## 2018-09-04 PROBLEM — F17.200 TOBACCO DEPENDENCE SYNDROME: Status: ACTIVE | Noted: 2018-09-04

## 2018-09-04 PROBLEM — Z98.84 S/P LAPAROSCOPIC SLEEVE GASTRECTOMY: Status: ACTIVE | Noted: 2018-09-04

## 2018-09-04 PROBLEM — K91.2 POSTSURGICAL MALABSORPTION: Status: ACTIVE | Noted: 2018-09-04

## 2018-09-04 PROBLEM — K59.00 CONSTIPATION: Status: ACTIVE | Noted: 2018-04-13

## 2018-09-04 RX ORDER — MELOXICAM 7.5 MG/1
7.5 TABLET ORAL
COMMUNITY
End: 2018-12-03 | Stop reason: HOSPADM

## 2018-09-04 RX ORDER — CARISOPRODOL 350 MG/1
TABLET ORAL 3 TIMES DAILY
COMMUNITY
End: 2018-11-27

## 2018-09-04 RX ORDER — LACTULOSE 10 G/10G
10 SOLUTION ORAL
COMMUNITY
End: 2018-11-27

## 2018-09-04 RX ORDER — NICOTINE 21 MG/24HR
1 PATCH, TRANSDERMAL 24 HOURS TRANSDERMAL EVERY 24 HOURS
COMMUNITY
Start: 2018-08-06 | End: 2018-09-05

## 2018-09-04 RX ORDER — NICOTINE POLACRILEX 2 MG
5 GUM BUCCAL
COMMUNITY
End: 2018-12-03 | Stop reason: HOSPADM

## 2018-09-04 RX ORDER — OYSTER SHELL CALCIUM WITH VITAMIN D 500; 200 MG/1; [IU]/1
1 TABLET, FILM COATED ORAL
COMMUNITY
End: 2018-11-27

## 2018-09-04 RX ORDER — OMEPRAZOLE 40 MG/1
40 CAPSULE, DELAYED RELEASE ORAL
COMMUNITY
Start: 2018-07-10 | End: 2018-11-27

## 2018-09-04 RX ORDER — HYDROMORPHONE HYDROCHLORIDE 2 MG/1
TABLET ORAL
COMMUNITY
Start: 2018-01-26 | End: 2018-11-27

## 2018-09-04 NOTE — PROGRESS NOTES
Assessment/Plan:    S/P laparoscopic sleeve gastrectomy  -s/p Vertical Sleeve Gastrectomy with Dr Piper De La Fuente on 01/22/18  Patient is doing well with her weight loss  EWL is 49 8%, which places the patient ahead of schedule for expected post surgical weight loss at this time  · They are tolerating a regular diet without issues  · The patient attempts to eat protein first, but is not always getting 60g of protein daily  · Reports following the 30/60 minute rule with liquids  Limited fluid intake - drinking about 40 ounces of fluid per day, and on occasion will drink carbonated beverages  Advised increasing fluid intake to at least 64oz  daily  · Ulcer Risk assessment: Elevated risk  · The patient is avoiding NSAIDS, smoking daily, and reports drinking occasional alcohol  Discussion about strict AVOIDANCE of alcohol, NSAIDS, and nicotine of all forms   · Limited structured exercise  Discussion about increasing exercise and physical activity as tolerated  Postsurgical malabsorption  -At risk for malabsorption of vitamins/minerals secondary to malabsorption and restriction of intake from bariatric surgery  -NOT Currently taking adequate postop bariatric surgery vitamin supplementation: takes no vitamins d/t taste intolerance; has tried capsules and gummies and disliked both  -Last set of bariatric labs completed on February 2018 and showed very low vitamin D (9), low B1  -Rx  For Vitamin D 50,000IU 2x/week with food  -Advised patient to f/u with RD for additional support but to try liquid vitamins/mineral supplements  Patient agreeable to this plan  -Next set of bariatric labs ordered for approximately 2 weeks  -Patient received education about the importance of adhering to a lifelong supplementation regimen to avoid vitamin/mineral deficiencies     Constipation  -s/p hemorrhoidectomy April 2018  -Likely r/t chronic narcotic use for pain management    Advised patient to:  -Increase fluids to at least 64oz  Fluids daily  -Slowly increase fiber rich non-starchy vegetables as tolerated  -Trial of probiotics (food source and supplements discussed)  -Prunes or prune juice  -Increase physical activity as tolerated  -Use colace and/or senna prn    Tobacco dependence syndrome  -Patient reports smoking 4 cigarettes daily and uses nicotine patches and gum  -Advised patient f/u with PCP for additional support  -Stressed the importance of absolute smoking cessation  Patient ready and actively trying to quit   -Advised her to continue with omeprazole daily while smoking       Diagnoses and all orders for this visit:    S/P laparoscopic sleeve gastrectomy  -     ergocalciferol (VITAMIN D2) 50,000 units; Take 1 capsule (50,000 Units total) by mouth 2 (two) times a week with meals  -     CBC and differential; Future  -     Comprehensive metabolic panel; Future  -     Copper Level; Future  -     Ferritin; Future  -     Folate; Future  -     Iron; Future  -     PTH, intact; Future  -     Vitamin A; Future  -     Vitamin B1, whole blood; Future  -     Vitamin B12; Future  -     Vitamin D 25 hydroxy; Future  -     Zinc; Future    Postsurgical malabsorption  -     ergocalciferol (VITAMIN D2) 50,000 units; Take 1 capsule (50,000 Units total) by mouth 2 (two) times a week with meals  -     CBC and differential; Future  -     Comprehensive metabolic panel; Future  -     Copper Level; Future  -     Ferritin; Future  -     Folate; Future  -     Iron; Future  -     PTH, intact; Future  -     Vitamin A; Future  -     Vitamin B1, whole blood; Future  -     Vitamin B12; Future  -     Vitamin D 25 hydroxy; Future  -     Zinc; Future    Constipation, unspecified constipation type    Vitamin D deficiency  -     ergocalciferol (VITAMIN D2) 50,000 units;  Take 1 capsule (50,000 Units total) by mouth 2 (two) times a week with meals    Tobacco dependence syndrome    Other orders  -     bisacodyl (ALOPHEN) 5 mg EC tablet; take 2 tablet by oral route  every day as needed  -     Biotin 1 MG CAPS; Take 5 mg by mouth  -     calcium-vitamin D (OSCAL 500 + D) 500 mg-200 units per tablet; Take 1 tablet by mouth  -     cyanocobalamin ([employer]S VITAMIN B-12) 1000 MCG tablet; Take 1,000 mcg by mouth  -     HYDROmorphone (DILAUDID) 2 mg tablet; take 1 tablet by oral route  every 5 - 6 hours as needed  -     lactulose (CEPHULAC) 10 g packet; Take 10 g by mouth  -     meloxicam (MOBIC) 7 5 mg tablet; Take 7 5 mg by mouth  -     Multiple Vitamins-Minerals (MULTIVITAMIN ADULT EXTRA C PO); Take 1 tablet by mouth  -     nicotine (NICODERM CQ) 14 mg/24hr TD 24 hr patch; Place 1 patch on the skin every 24 hours  -     omeprazole (PriLOSEC) 40 MG capsule; Take 40 mg by mouth  -     carisoprodol (SOMA) 350 mg tablet; 3 (three) times a day  -     methocarbamol (ROBAXIN) 500 mg tablet; methocarbamol 500 mg tablet  -     nicotine polacrilex (COMMIT) 2 MG lozenge; nicotine (polacrilex) 2 mg buccal lozenge  -     Omeprazole 20 MG TBEC; omeprazole 20 mg capsule,delayed release  -     oxyCODONE (ROXICODONE) 10 MG TABS; Per pt 15mg  -     traMADol (ULTRAM) 50 mg tablet; every 6 (six) hours          Subjective:      Patient ID: Makenzie López is a 40 y o  female  -s/p Vertical Sleeve Gastrectomy with Dr Deena Pineda on 01/22/18  Presents to the office today for routine follow up  Tolerating diet without issues; denies N/V, dysphagia, reflux  Overall doing Well with weight loss  Admits to smoking and not taking vitamins  Initial: 214 1lbs  Current: 162 2lbs  EWL: 49 8% (Weight loss is on schedule at this post surgical period )  [place]: current  Current BMI is Body mass index is 29 67 kg/m²  The following portions of the patient's history were reviewed and updated as appropriate: allergies, current medications, past family history, past medical history, past social history, past surgical history and problem list     Review of Systems   Constitutional: Negative for chills and fever   Unexpected weight change: planned weight loss  HENT: Negative for trouble swallowing  Respiratory: Negative for cough and shortness of breath  Cardiovascular: Negative for chest pain and palpitations  Gastrointestinal: Positive for constipation  Negative for abdominal pain, diarrhea, nausea and vomiting  Neurological: Negative for dizziness  Psychiatric/Behavioral:        Anxiety and depression - seen by PCP         Objective:      /78 (BP Location: Left arm, Patient Position: Sitting, Cuff Size: Adult)   Pulse 95   Temp 97 8 °F (36 6 °C)   Ht 5' 2" (1 575 m)   Wt 73 6 kg (162 lb 3 2 oz)   BMI 29 67 kg/m²           Physical Exam   Constitutional: She is oriented to person, place, and time  She appears well-developed and well-nourished  No distress  HENT:   Head: Normocephalic and atraumatic  Eyes: Pupils are equal, round, and reactive to light  No scleral icterus  Cardiovascular: Normal rate, regular rhythm and normal heart sounds  Pulmonary/Chest: Effort normal and breath sounds normal  No respiratory distress  Abdominal: Soft  Bowel sounds are normal  She exhibits no distension  There is no tenderness  No incisional hernias appreciated   Musculoskeletal: She exhibits no edema  Neurological: She is alert and oriented to person, place, and time  Skin: Skin is warm and dry  Psychiatric: She has a normal mood and affect  Nursing note and vitals reviewed  GOALS:   · Continued/Maintain healthy weight loss with good nutrition intakes  · Adequate hydration with at least 64oz  fluid intake  · Normal vitamin and mineral levels  · Exercise as tolerated  · Quit smoking! Continue with omeprazole daily while smoking  · Start taking vitamins, especially vitamin D prescription (take with food) 2x/week  Try liquid vitamins and calcium     BARRIERS: none identified    · Follow-up in 6 months   We kindly ask that your arrive 15 minutes before your scheduled appointment time with your provider to allow our staff to room you, get your vital signs and update your chart  · Follow diet as discussed  · Get lab work done in the next 2 weeks  You have been given a lab slip today  Please call the office if you need a replacement  It is recommended to check with your insurance BEFORE getting labs done to make sure they are covered by your policy  Also, please check with your PCP and other providers before getting labs to avoid duplicate labs  Make sure to HOLD any multivitamins that may contain biotin and any biotin supplements FOR 5 DAYS before any labs since it can affect the results  · Follow vitamin and mineral recommendations as reviewed with you  · Call our office if you have any problems with abdominal pain especially associated with fever, chills, nausea, vomiting or any other concerns  · All  Post-bariatric surgery patients should be aware that very small quantities of any alcohol can cause impairment and it is very possible not to feel the effect  The effect can be in the system for several hours  It is also a stomach irritant  · It is advised to AVOID alcohol, Nonsteroidal antiinflammatory drugs (NSAIDS) and nicotine of all forms   Any of these can cause stomach irritation/pain

## 2018-09-04 NOTE — ASSESSMENT & PLAN NOTE
-s/p Vertical Sleeve Gastrectomy with Dr Ollie Jara on 01/22/18  Patient is doing well with her weight loss  EWL is 49 8%, which places the patient ahead of schedule for expected post surgical weight loss at this time  · They are tolerating a regular diet without issues  · The patient attempts to eat protein first, but is not always getting 60g of protein daily  · Reports following the 30/60 minute rule with liquids  Limited fluid intake - drinking about 40 ounces of fluid per day, and on occasion will drink carbonated beverages  Advised increasing fluid intake to at least 64oz  daily  · Ulcer Risk assessment: Elevated risk  · The patient is avoiding NSAIDS, smoking daily, and reports drinking occasional alcohol  Discussion about strict AVOIDANCE of alcohol, NSAIDS, and nicotine of all forms   · Limited structured exercise  Discussion about increasing exercise and physical activity as tolerated

## 2018-09-04 NOTE — ASSESSMENT & PLAN NOTE
-At risk for malabsorption of vitamins/minerals secondary to malabsorption and restriction of intake from bariatric surgery  -NOT Currently taking adequate postop bariatric surgery vitamin supplementation: takes no vitamins d/t taste intolerance; has tried capsules and gummies and disliked both  -Last set of bariatric labs completed on February 2018 and showed very low vitamin D (9), low B1  -Rx  For Vitamin D 50,000IU 2x/week with food  -Advised patient to f/u with RD for additional support but to try liquid vitamins/mineral supplements  Patient agreeable to this plan    -Next set of bariatric labs ordered for approximately 2 weeks  -Patient received education about the importance of adhering to a lifelong supplementation regimen to avoid vitamin/mineral deficiencies

## 2018-09-04 NOTE — ASSESSMENT & PLAN NOTE
-s/p hemorrhoidectomy April 2018  -Likely r/t chronic narcotic use for pain management    Advised patient to:  -Increase fluids to at least 64oz   Fluids daily  -Slowly increase fiber rich non-starchy vegetables as tolerated  -Trial of probiotics (food source and supplements discussed)  -Prunes or prune juice  -Increase physical activity as tolerated  -Use colace and/or senna prn

## 2018-09-05 ENCOUNTER — OFFICE VISIT (OUTPATIENT)
Dept: BARIATRICS | Facility: CLINIC | Age: 43
End: 2018-09-05
Payer: COMMERCIAL

## 2018-09-05 VITALS
SYSTOLIC BLOOD PRESSURE: 130 MMHG | DIASTOLIC BLOOD PRESSURE: 78 MMHG | TEMPERATURE: 97.8 F | BODY MASS INDEX: 29.85 KG/M2 | WEIGHT: 162.2 LBS | HEART RATE: 95 BPM | HEIGHT: 62 IN

## 2018-09-05 DIAGNOSIS — E55.9 VITAMIN D DEFICIENCY: ICD-10-CM

## 2018-09-05 DIAGNOSIS — Z98.84 S/P LAPAROSCOPIC SLEEVE GASTRECTOMY: Primary | ICD-10-CM

## 2018-09-05 DIAGNOSIS — K91.2 POSTSURGICAL MALABSORPTION: ICD-10-CM

## 2018-09-05 DIAGNOSIS — K59.00 CONSTIPATION, UNSPECIFIED CONSTIPATION TYPE: ICD-10-CM

## 2018-09-05 DIAGNOSIS — F17.200 TOBACCO DEPENDENCE SYNDROME: ICD-10-CM

## 2018-09-05 PROCEDURE — 99214 OFFICE O/P EST MOD 30 MIN: CPT | Performed by: PHYSICIAN ASSISTANT

## 2018-09-05 RX ORDER — ERGOCALCIFEROL 1.25 MG/1
50000 CAPSULE ORAL
Qty: 24 CAPSULE | Refills: 0 | Status: SHIPPED | OUTPATIENT
Start: 2018-09-06 | End: 2018-11-27

## 2018-09-05 RX ORDER — OXYCODONE HYDROCHLORIDE 10 MG/1
TABLET ORAL
COMMUNITY
End: 2018-12-03 | Stop reason: HOSPADM

## 2018-09-05 RX ORDER — TRAMADOL HYDROCHLORIDE 50 MG/1
TABLET ORAL EVERY 6 HOURS
COMMUNITY
End: 2018-11-27

## 2018-09-05 RX ORDER — METHOCARBAMOL 500 MG/1
TABLET, FILM COATED ORAL
COMMUNITY
End: 2018-11-27

## 2018-09-05 RX ORDER — NICOTINE POLACRILEX 4 MG/1
GUM, CHEWING ORAL
COMMUNITY
End: 2018-11-27

## 2018-09-05 RX ORDER — POLYETHYLENE GLYCOL 3350 17 G
POWDER IN PACKET (EA) ORAL
COMMUNITY
End: 2018-11-27

## 2018-09-05 NOTE — PATIENT INSTRUCTIONS
GOALS:   · Continued/Maintain healthy weight loss with good nutrition intakes  · Adequate hydration with at least 64oz  fluid intake  · Normal vitamin and mineral levels  · Exercise as tolerated  · Quit smoking! Continue with omeprazole daily while smoking  · Start taking vitamins, especially vitamin D prescription (take with food) 2x/week  Try liquid vitamins and calcium     BARRIERS: none identified    · Follow-up in 6 months  We kindly ask that your arrive 15 minutes before your scheduled appointment time with your provider to allow our staff to room you, get your vital signs and update your chart  · Follow diet as discussed  · Get lab work done in the next 2 weeks  You have been given a lab slip today  Please call the office if you need a replacement  It is recommended to check with your insurance BEFORE getting labs done to make sure they are covered by your policy  Also, please check with your PCP and other providers before getting labs to avoid duplicate labs  Make sure to HOLD any multivitamins that may contain biotin and any biotin supplements FOR 5 DAYS before any labs since it can affect the results  · Follow vitamin and mineral recommendations as reviewed with you  · Call our office if you have any problems with abdominal pain especially associated with fever, chills, nausea, vomiting or any other concerns  · All  Post-bariatric surgery patients should be aware that very small quantities of any alcohol can cause impairment and it is very possible not to feel the effect  The effect can be in the system for several hours  It is also a stomach irritant  · It is advised to AVOID alcohol, Nonsteroidal antiinflammatory drugs (NSAIDS) and nicotine of all forms   Any of these can cause stomach irritation/pain

## 2018-09-05 NOTE — ASSESSMENT & PLAN NOTE
-Patient reports smoking 4 cigarettes daily and uses nicotine patches and gum  -Advised patient f/u with PCP for additional support  -Stressed the importance of absolute smoking cessation   Patient ready and actively trying to quit   -Advised her to continue with omeprazole daily while smoking

## 2018-09-10 ENCOUNTER — HOSPITAL ENCOUNTER (EMERGENCY)
Facility: HOSPITAL | Age: 43
Discharge: HOME | End: 2018-09-10
Attending: EMERGENCY MEDICINE | Admitting: EMERGENCY MEDICINE
Payer: COMMERCIAL

## 2018-09-10 ENCOUNTER — APPOINTMENT (EMERGENCY)
Dept: RADIOLOGY | Facility: HOSPITAL | Age: 43
End: 2018-09-10
Payer: COMMERCIAL

## 2018-09-10 VITALS
TEMPERATURE: 97.9 F | HEIGHT: 62 IN | OXYGEN SATURATION: 100 % | HEART RATE: 99 BPM | WEIGHT: 167.11 LBS | SYSTOLIC BLOOD PRESSURE: 152 MMHG | RESPIRATION RATE: 18 BRPM | BODY MASS INDEX: 30.75 KG/M2 | DIASTOLIC BLOOD PRESSURE: 100 MMHG

## 2018-09-10 DIAGNOSIS — S61.219A FINGER LACERATION: Primary | ICD-10-CM

## 2018-09-10 PROCEDURE — 90715 TDAP VACCINE 7 YRS/> IM: CPT

## 2018-09-10 PROCEDURE — 90471 IMMUNIZATION ADMIN: CPT

## 2018-09-10 PROCEDURE — 73140 X-RAY EXAM OF FINGER(S): CPT

## 2018-09-10 PROCEDURE — 99283 EMERGENCY DEPT VISIT LOW MDM: CPT

## 2018-09-10 RX ADMIN — TETANUS TOXOID, REDUCED DIPHTHERIA TOXOID AND ACELLULAR PERTUSSIS VACCINE, ADSORBED 0.5 ML: 5; 2.5; 8; 8; 2.5 SUSPENSION INTRAMUSCULAR at 20:27

## 2018-09-10 NOTE — ED PROVIDER NOTES
History  Chief Complaint   Patient presents with   Kerrie Gorman broke on door when patient was pushing on it, lac to right hand     This is a 41-year-old female patient who went to close her glass door and the glass broke lacerating the base of her right 3rd digit palmar side  It is superficial bleeding is controlled  Does not appear to have foreign body but will have x-ray  Last tetanus was greater than 5 years will be updated today  No numbness or tingling pain with range of motion but able no apparent tendon injury of the finger  Prior to Admission Medications   Prescriptions Last Dose Informant Patient Reported? Taking? Biotin 1 MG CAPS   Yes No   Sig: Take 5 mg by mouth   HYDROmorphone (DILAUDID) 2 mg tablet   Yes No   Sig: take 1 tablet by oral route  every 5 - 6 hours as needed   LORazepam (ATIVAN) 0 5 mg tablet   Yes No   Sig: Take 0 5 mg by mouth as needed     Multiple Vitamins-Minerals (MULTIVITAMIN ADULT EXTRA C PO)   Yes No   Sig: Take 1 tablet by mouth   Omeprazole 20 MG TBEC   Yes No   Sig: omeprazole 20 mg capsule,delayed release   albuterol (PROVENTIL HFA,VENTOLIN HFA) 90 mcg/act inhaler   Yes No   Sig: Inhale 2 puffs every 6 (six) hours as needed for wheezing Indications: patient unsure of correct dosage/frequency  bisacodyl (ALOPHEN) 5 mg EC tablet   Yes No   Sig: take 2 tablet by oral route  every day as needed   calcium-vitamin D (OSCAL 500 + D) 500 mg-200 units per tablet   Yes No   Sig: Take 1 tablet by mouth   carisoprodol (SOMA) 350 mg tablet   Yes No   Sig: 3 (three) times a day   cyanocobalamin (CVS VITAMIN B-12) 1000 MCG tablet   Yes No   Sig: Take 1,000 mcg by mouth   ergocalciferol (VITAMIN D2) 50,000 units   No No   Sig: Take 1 capsule (50,000 Units total) by mouth 2 (two) times a week with meals   gabapentin (NEURONTIN) 300 mg capsule   Yes No   Sig: Take 300 mg by mouth 3 (three) times a day Indications: patient unsure of correct frequency     lactulose (CEPHULAC) 10 g packet   Yes No   Sig: Take 10 g by mouth   magnesium citrate (CITROMA) 1 745 g/30 mL oral solution   No No   Sig: Take 296 mL by mouth once for 1 dose   meloxicam (MOBIC) 7 5 mg tablet   Yes No   Sig: Take 7 5 mg by mouth   methocarbamol (ROBAXIN) 500 mg tablet   Yes No   Sig: methocarbamol 500 mg tablet   nicotine polacrilex (COMMIT) 2 MG lozenge   Yes No   Sig: nicotine (polacrilex) 2 mg buccal lozenge   omeprazole (PriLOSEC) 40 MG capsule   Yes No   Sig: Take 40 mg by mouth   oxyCODONE (ROXICODONE) 10 MG TABS   Yes No   Sig: Per pt 15mg   topiramate (TOPAMAX) 25 mg sprinkle capsule   Yes No   Sig: Take 25 mg by mouth 2 (two) times a day   traMADol (ULTRAM) 50 mg tablet   Yes No   Sig: every 6 (six) hours      Facility-Administered Medications: None       Past Medical History:   Diagnosis Date    Asthma     Migraine     Psychiatric disorder     depression, anxiety    Seizures ([facility] [address] )        Past Surgical History:   Procedure Laterality Date    APPENDECTOMY      BLADDER SURGERY      CHOLECYSTECTOMY      GASTRECTOMY      HYSTERECTOMY         Family History   Problem Relation Age of Onset    Diabetes Mother         MELLITUS    Diabetes Father         MELLITUS    Diabetes Other         MELLITUS     I have reviewed and agree with the history as documented  Social History   Substance Use Topics    Smoking status: Former Smoker     Packs/day: 0 00    Smokeless tobacco: Never Used      Comment: on nicotine patch    Alcohol use Yes      Comment: ocassionally        Review of Systems   All other systems reviewed and are negative  Physical Exam  Physical Exam   Constitutional: She appears well-developed and well-nourished  HENT:   Head: Normocephalic and atraumatic  Right Ear: External ear normal    Left Ear: External ear normal    Nose: Nose normal    Mouth/Throat: Oropharynx is clear and moist    Eyes: Conjunctivae are normal  Pupils are equal, round, and reactive to light     Neck: Normal range of motion  Neck supple  Cardiovascular: Normal rate and regular rhythm  Pulmonary/Chest: Effort normal and breath sounds normal    Abdominal: Soft  Bowel sounds are normal  There is no tenderness  Musculoskeletal: Normal range of motion  Hands:  Neurological: She is alert  Skin: Skin is warm  Psychiatric: She has a normal mood and affect  Her behavior is normal    Nursing note and vitals reviewed  Vital Signs  ED Triage Vitals [09/10/18 1955]   Temperature Pulse Respirations Blood Pressure SpO2   97 9 °F (36 6 °C) 99 18 152/100 100 %      Temp Source Heart Rate Source Patient Position - Orthostatic VS BP Location FiO2 (%)   Temporal Monitor Standing Left arm --      Pain Score       8           Vitals:    09/10/18 1955   BP: 152/100   Pulse: 99   Patient Position - Orthostatic VS: Standing       Visual Acuity      ED Medications  Medications   tetanus-diphtheria-acellular pertussis (BOOSTRIX) IM injection 0 5 mL (0 5 mL Intramuscular Given 9/10/18 2027)       Diagnostic Studies  Results Reviewed     None                 XR finger third digit-middle RIGHT    (Results Pending)              Procedures  Lac Repair  Date/Time: 9/10/2018 8:35 PM  Performed by: Matilda Hemphill  Authorized by: Matilda Hemphill   Consent: Verbal consent obtained  Consent given by: patient  Patient understanding: patient states understanding of the procedure being performed  Patient identity confirmed: verbally with patient  Location: right base of 3rd digit  Laceration length: 0 5 cm  Foreign bodies: no foreign bodies  Tendon involvement: none  Nerve involvement: none  Vascular damage: no    Sedation:  Patient sedated: no      Procedure Details:  Irrigation solution: saline  Amount of cleaning: standard  Skin closure: glue  Technique: simple  Approximation: close  Approximation difficulty: simple  Dressing: band aid    Patient tolerance: Patient tolerated the procedure well with no immediate complications             Phone Contacts  ED Phone Contact    ED Course                               MDM  CritCare Time    Disposition  Final diagnoses:   Finger laceration     Time reflects when diagnosis was documented in both MDM as applicable and the Disposition within this note     Time User Action Codes Description Comment    9/10/2018  8:37 PM Sukh Anant Mendez Add [[id number]] Finger laceration       ED Disposition     ED Disposition Condition Comment    Discharge  [address] discharge to home/self care  Condition at discharge: Good        Follow-up Information     Follow up With Specialties Details Why Contact Info    Janey Medel MD Internal Medicine Schedule an appointment as soon as possible for a visit  [address] [place] [id number]  [phone]            Patient's Medications   Discharge Prescriptions    No medications on file     No discharge procedures on file      ED Provider  Electronically Signed by           Manish Abebe PA-C  09/10/18 2037       Manish Abebe PA-C  09/10/18 2038

## 2018-09-11 NOTE — DISCHARGE INSTRUCTIONS
Skin Adhesive Care   WHAT YOU NEED TO KNOW:   Skin adhesive is medical glue used to close wounds  It is a substitute for staples and stitches  Skin adhesive wound closures take less time and do not require anesthesia  You have less pain and a lower risk of infection than with staples or stitches  Skin adhesive will fall off after the wound is healed  DISCHARGE INSTRUCTIONS:   Self-care:   · Keep your wound clean and dry  for 1 to 5 days  You can shower 24 hours after the skin adhesive is applied  Lightly pat your wound dry after you shower  · Do not soak  your wound in water, such as in a bath or hot tub  · Do not scrub  your wound or pick at the adhesive  This can make your wound reopen  · Do not apply ointments  to your wound  These include antibiotic and other ointments that contain petroleum jelly  These products will remove skin adhesive and reopen your wound  Follow up with your healthcare provider as directed:  Write down your questions so you remember to ask them during your visits  Contact your healthcare provider if:   · You have a fever  · Your wound is red and warm to touch  · You have questions or concerns about your condition or care  Return to the emergency department if:   · Your wound has fluid draining from it  · Your wound opens  © 2017 [address] Information is for End User's use only and may not be sold, redistributed or otherwise used for commercial purposes  All illustrations and images included in CareNotes® are the copyrighted property of [employer]  or Mat Leong  The above information is an  only  It is not intended as medical advice for individual conditions or treatments  Talk to your doctor, nurse or pharmacist before following any medical regimen to see if it is safe and effective for you

## 2018-11-27 ENCOUNTER — HOSPITAL ENCOUNTER (INPATIENT)
Facility: HOSPITAL | Age: 43
LOS: 6 days | Discharge: HOME | DRG: 753 | End: 2018-12-03
Attending: EMERGENCY MEDICINE | Admitting: PSYCHIATRY & NEUROLOGY
Payer: COMMERCIAL

## 2018-11-27 DIAGNOSIS — Z98.84 S/P LAPAROSCOPIC SLEEVE GASTRECTOMY: ICD-10-CM

## 2018-11-27 DIAGNOSIS — F32.A DEPRESSION WITH SUICIDAL IDEATION: ICD-10-CM

## 2018-11-27 DIAGNOSIS — F31.81 BIPOLAR 2 DISORDER (HCC): Primary | Chronic | ICD-10-CM

## 2018-11-27 DIAGNOSIS — R45.851 DEPRESSION WITH SUICIDAL IDEATION: ICD-10-CM

## 2018-11-27 PROBLEM — Z86.69 HISTORY OF MIGRAINE: Status: ACTIVE | Noted: 2018-11-27

## 2018-11-27 PROBLEM — F32.9 MAJOR DEPRESSIVE DISORDER: Status: ACTIVE | Noted: 2018-11-27

## 2018-11-27 PROBLEM — M54.50 LOW BACK PAIN: Status: ACTIVE | Noted: 2018-11-27

## 2018-11-27 LAB
ALBUMIN SERPL BCP-MCNC: 4.7 G/DL (ref 3–5.2)
ALP SERPL-CCNC: 101 U/L (ref 43–122)
ALT SERPL W P-5'-P-CCNC: 27 U/L (ref 9–52)
AMPHETAMINES SERPL QL SCN: NEGATIVE
ANION GAP SERPL CALCULATED.3IONS-SCNC: 11 MMOL/L (ref 5–14)
APAP SERPL-MCNC: <10 UG/ML (ref 10–30)
AST SERPL W P-5'-P-CCNC: 25 U/L (ref 14–36)
BACTERIA UR QL AUTO: ABNORMAL /HPF
BARBITURATES UR QL: NEGATIVE
BASOPHILS # BLD AUTO: 0.1 THOUSANDS/ΜL (ref 0–0.1)
BASOPHILS NFR BLD AUTO: 1 % (ref 0–1)
BENZODIAZ UR QL: NEGATIVE
BILIRUB SERPL-MCNC: 0.2 MG/DL
BILIRUB UR QL STRIP: NEGATIVE
BUN SERPL-MCNC: 9 MG/DL (ref 5–25)
CALCIUM SERPL-MCNC: 9.9 MG/DL (ref 8.4–10.2)
CHLORIDE SERPL-SCNC: 108 MMOL/L (ref 97–108)
CLARITY UR: CLEAR
CO2 SERPL-SCNC: 28 MMOL/L (ref 22–30)
COCAINE UR QL: NEGATIVE
COLOR UR: YELLOW
CREAT SERPL-MCNC: 0.79 MG/DL (ref 0.6–1.2)
EOSINOPHIL # BLD AUTO: 0.1 THOUSAND/ΜL (ref 0–0.4)
EOSINOPHIL NFR BLD AUTO: 1 % (ref 0–6)
ERYTHROCYTE [DISTWIDTH] IN BLOOD BY AUTOMATED COUNT: 16.6 %
ETHANOL SERPL-MCNC: 217 MG/DL (ref 0–10)
ETHANOL SERPL-MCNC: 84 MG/DL (ref 0–10)
EXT PREG TEST URINE: NEGATIVE
GFR SERPL CREATININE-BSD FRML MDRD: 93 ML/MIN/1.73SQ M
GLUCOSE SERPL-MCNC: 108 MG/DL (ref 70–99)
GLUCOSE UR STRIP-MCNC: NEGATIVE MG/DL
HCT VFR BLD AUTO: 46.2 % (ref 36–46)
HGB BLD-MCNC: 14.6 G/DL (ref 12–16)
HGB UR QL STRIP.AUTO: 25
KETONES UR STRIP-MCNC: ABNORMAL MG/DL
LEUKOCYTE ESTERASE UR QL STRIP: NEGATIVE
LYMPHOCYTES # BLD AUTO: 3.7 THOUSANDS/ΜL (ref 0.5–4)
LYMPHOCYTES NFR BLD AUTO: 32 % (ref 20–50)
MCH RBC QN AUTO: 25.6 PG (ref 26–34)
MCHC RBC AUTO-ENTMCNC: 31.5 G/DL (ref 31–36)
MCV RBC AUTO: 81 FL (ref 80–100)
METHADONE UR QL: NEGATIVE
MONOCYTES # BLD AUTO: 0.4 THOUSAND/ΜL (ref 0.2–0.9)
MONOCYTES NFR BLD AUTO: 4 % (ref 1–10)
MUCOUS THREADS UR QL AUTO: ABNORMAL
NEUTROPHILS # BLD AUTO: 7 THOUSANDS/ΜL (ref 1.8–7.8)
NEUTS SEG NFR BLD AUTO: 62 % (ref 45–65)
NITRITE UR QL STRIP: NEGATIVE
NON-SQ EPI CELLS URNS QL MICRO: ABNORMAL /HPF
OPIATES UR QL SCN: NEGATIVE
OTHER STN SPEC: ABNORMAL
PCP UR QL: NEGATIVE
PH UR STRIP.AUTO: 6 [PH] (ref 4.5–8)
PLATELET # BLD AUTO: 524 THOUSANDS/UL (ref 150–450)
PMV BLD AUTO: 8.2 FL (ref 8.9–12.7)
POTASSIUM SERPL-SCNC: 4.8 MMOL/L (ref 3.6–5)
PROT SERPL-MCNC: 7.9 G/DL (ref 5.9–8.4)
PROT UR STRIP-MCNC: ABNORMAL MG/DL
RBC # BLD AUTO: 5.69 MILLION/UL (ref 4–5.2)
RBC #/AREA URNS AUTO: ABNORMAL /HPF
SALICYLATES SERPL-MCNC: <1 MG/DL (ref 10–30)
SODIUM SERPL-SCNC: 147 MMOL/L (ref 137–147)
SP GR UR STRIP.AUTO: 1.02 (ref 1–1.04)
THC UR QL: NEGATIVE
UROBILINOGEN UA: NEGATIVE MG/DL
WBC # BLD AUTO: 11.3 THOUSAND/UL (ref 4.5–11)
WBC #/AREA URNS AUTO: ABNORMAL /HPF

## 2018-11-27 PROCEDURE — 99253 IP/OBS CNSLTJ NEW/EST LOW 45: CPT | Performed by: INTERNAL MEDICINE

## 2018-11-27 PROCEDURE — 80053 COMPREHEN METABOLIC PANEL: CPT | Performed by: EMERGENCY MEDICINE

## 2018-11-27 PROCEDURE — 81001 URINALYSIS AUTO W/SCOPE: CPT | Performed by: EMERGENCY MEDICINE

## 2018-11-27 PROCEDURE — 85025 COMPLETE CBC W/AUTO DIFF WBC: CPT | Performed by: EMERGENCY MEDICINE

## 2018-11-27 PROCEDURE — 80329 ANALGESICS NON-OPIOID 1 OR 2: CPT | Performed by: EMERGENCY MEDICINE

## 2018-11-27 PROCEDURE — 99285 EMERGENCY DEPT VISIT HI MDM: CPT

## 2018-11-27 PROCEDURE — 96372 THER/PROPH/DIAG INJ SC/IM: CPT

## 2018-11-27 PROCEDURE — 81025 URINE PREGNANCY TEST: CPT | Performed by: EMERGENCY MEDICINE

## 2018-11-27 PROCEDURE — 36415 COLL VENOUS BLD VENIPUNCTURE: CPT | Performed by: EMERGENCY MEDICINE

## 2018-11-27 PROCEDURE — 80307 DRUG TEST PRSMV CHEM ANLYZR: CPT | Performed by: EMERGENCY MEDICINE

## 2018-11-27 PROCEDURE — 80320 DRUG SCREEN QUANTALCOHOLS: CPT | Performed by: EMERGENCY MEDICINE

## 2018-11-27 RX ORDER — MELATONIN
1000 2 TIMES DAILY
COMMUNITY
End: 2018-12-03 | Stop reason: HOSPADM

## 2018-11-27 RX ORDER — NICOTINE 21 MG/24HR
1 PATCH, TRANSDERMAL 24 HOURS TRANSDERMAL DAILY
Status: DISCONTINUED | OUTPATIENT
Start: 2018-11-28 | End: 2018-11-29

## 2018-11-27 RX ORDER — LANOLIN ALCOHOL/MO/W.PET/CERES
250 CREAM (GRAM) TOPICAL
Status: DISCONTINUED | OUTPATIENT
Start: 2018-11-27 | End: 2018-11-27 | Stop reason: SINTOL

## 2018-11-27 RX ORDER — LANOLIN ALCOHOL/MO/W.PET/CERES
6 CREAM (GRAM) TOPICAL
Status: DISCONTINUED | OUTPATIENT
Start: 2018-11-27 | End: 2018-12-03 | Stop reason: HOSPADM

## 2018-11-27 RX ORDER — ALBUTEROL SULFATE 90 UG/1
2 AEROSOL, METERED RESPIRATORY (INHALATION) EVERY 6 HOURS PRN
Status: DISCONTINUED | OUTPATIENT
Start: 2018-11-27 | End: 2018-12-03 | Stop reason: HOSPADM

## 2018-11-27 RX ORDER — OLANZAPINE 10 MG/1
10 INJECTION, POWDER, LYOPHILIZED, FOR SOLUTION INTRAMUSCULAR ONCE
Status: COMPLETED | OUTPATIENT
Start: 2018-11-27 | End: 2018-11-27

## 2018-11-27 RX ORDER — BENZTROPINE MESYLATE 1 MG/1
1 TABLET ORAL EVERY 4 HOURS PRN
Status: DISCONTINUED | OUTPATIENT
Start: 2018-11-27 | End: 2018-12-03 | Stop reason: HOSPADM

## 2018-11-27 RX ORDER — OLANZAPINE 5 MG/1
5 TABLET ORAL EVERY 4 HOURS PRN
Status: DISCONTINUED | OUTPATIENT
Start: 2018-11-27 | End: 2018-12-03 | Stop reason: HOSPADM

## 2018-11-27 RX ORDER — SUMATRIPTAN 6 MG/.5ML
6 INJECTION, SOLUTION SUBCUTANEOUS ONCE AS NEEDED
Status: DISCONTINUED | OUTPATIENT
Start: 2018-11-27 | End: 2018-12-03 | Stop reason: HOSPADM

## 2018-11-27 RX ORDER — MELOXICAM 15 MG/1
7.5 TABLET ORAL DAILY
Status: DISCONTINUED | OUTPATIENT
Start: 2018-11-28 | End: 2018-12-03 | Stop reason: HOSPADM

## 2018-11-27 RX ORDER — PANTOPRAZOLE SODIUM 40 MG/1
40 TABLET, DELAYED RELEASE ORAL
Status: DISCONTINUED | OUTPATIENT
Start: 2018-11-28 | End: 2018-12-03 | Stop reason: HOSPADM

## 2018-11-27 RX ORDER — OXYCODONE HYDROCHLORIDE 5 MG/1
5 TABLET ORAL EVERY 6 HOURS PRN
Status: DISCONTINUED | OUTPATIENT
Start: 2018-11-27 | End: 2018-12-03 | Stop reason: HOSPADM

## 2018-11-27 RX ORDER — LORAZEPAM 0.5 MG/1
0.5 TABLET ORAL ONCE
Status: DISCONTINUED | OUTPATIENT
Start: 2018-11-27 | End: 2018-12-03 | Stop reason: HOSPADM

## 2018-11-27 RX ORDER — OLANZAPINE 10 MG/1
10 INJECTION, POWDER, LYOPHILIZED, FOR SOLUTION INTRAMUSCULAR EVERY 4 HOURS PRN
Status: DISCONTINUED | OUTPATIENT
Start: 2018-11-27 | End: 2018-12-03 | Stop reason: HOSPADM

## 2018-11-27 RX ORDER — TRAZODONE HYDROCHLORIDE 50 MG/1
50 TABLET ORAL
Status: DISCONTINUED | OUTPATIENT
Start: 2018-11-27 | End: 2018-12-03 | Stop reason: HOSPADM

## 2018-11-27 RX ORDER — MULTIVIT WITH IRON,MINERALS
15 LIQUID (ML) ORAL DAILY
Status: DISCONTINUED | OUTPATIENT
Start: 2018-11-28 | End: 2018-12-03 | Stop reason: HOSPADM

## 2018-11-27 RX ORDER — DIPHENHYDRAMINE HYDROCHLORIDE 50 MG/ML
50 INJECTION INTRAMUSCULAR; INTRAVENOUS EVERY 4 HOURS PRN
Status: DISCONTINUED | OUTPATIENT
Start: 2018-11-27 | End: 2018-12-03 | Stop reason: HOSPADM

## 2018-11-27 RX ORDER — OXYCODONE HYDROCHLORIDE 15 MG/1
15 TABLET ORAL EVERY 4 HOURS PRN
COMMUNITY
Start: 2018-11-27 | End: 2018-12-03 | Stop reason: HOSPADM

## 2018-11-27 RX ORDER — OXYCODONE HYDROCHLORIDE 5 MG/1
15 TABLET ORAL EVERY 4 HOURS PRN
Status: DISCONTINUED | OUTPATIENT
Start: 2018-11-27 | End: 2018-12-01

## 2018-11-27 RX ORDER — OMEPRAZOLE 40 MG/1
40 CAPSULE, DELAYED RELEASE ORAL 2 TIMES DAILY
COMMUNITY
Start: 2018-11-27 | End: 2018-12-03 | Stop reason: HOSPADM

## 2018-11-27 RX ORDER — SUMATRIPTAN 50 MG/1
100 TABLET, FILM COATED ORAL 2 TIMES DAILY PRN
Status: DISCONTINUED | OUTPATIENT
Start: 2018-11-27 | End: 2018-12-03 | Stop reason: HOSPADM

## 2018-11-27 RX ORDER — MELATONIN
1000 2 TIMES DAILY
Status: DISCONTINUED | OUTPATIENT
Start: 2018-11-27 | End: 2018-12-03 | Stop reason: HOSPADM

## 2018-11-27 RX ORDER — ACETAMINOPHEN 325 MG/1
650 TABLET ORAL EVERY 6 HOURS PRN
Status: DISCONTINUED | OUTPATIENT
Start: 2018-11-27 | End: 2018-12-03 | Stop reason: HOSPADM

## 2018-11-27 RX ORDER — HYDROXYZINE 50 MG/1
50 TABLET, FILM COATED ORAL EVERY 4 HOURS PRN
Status: DISCONTINUED | OUTPATIENT
Start: 2018-11-27 | End: 2018-12-03 | Stop reason: HOSPADM

## 2018-11-27 RX ORDER — IBUPROFEN 400 MG/1
400 TABLET ORAL EVERY 6 HOURS PRN
Status: DISCONTINUED | OUTPATIENT
Start: 2018-11-27 | End: 2018-12-01

## 2018-11-27 RX ORDER — HYDROCHLOROTHIAZIDE 25 MG/1
25 TABLET ORAL DAILY
COMMUNITY
End: 2018-12-03 | Stop reason: HOSPADM

## 2018-11-27 RX ORDER — SUMATRIPTAN 100 MG/1
100 TABLET, FILM COATED ORAL 2 TIMES DAILY PRN
COMMUNITY
Start: 2018-11-27 | End: 2018-12-03 | Stop reason: HOSPADM

## 2018-11-27 RX ORDER — HYDROCHLOROTHIAZIDE 25 MG/1
25 TABLET ORAL DAILY
Status: DISCONTINUED | OUTPATIENT
Start: 2018-11-28 | End: 2018-12-03 | Stop reason: HOSPADM

## 2018-11-27 RX ADMIN — WATER: 1 INJECTION INTRAMUSCULAR; INTRAVENOUS; SUBCUTANEOUS at 07:44

## 2018-11-27 RX ADMIN — OLANZAPINE 10 MG: 10 INJECTION, POWDER, LYOPHILIZED, FOR SOLUTION INTRAMUSCULAR at 07:40
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
## 2018-11-27 NOTE — ED NOTES
Insurance Authorization:   Phone call placed to [employer]   Phone number: [phone]     Spoke to Saira Alberts      2  days approved  Level of care: *inpatient mental health  Review on **Wednes 11/28  Authorization # *to be given on admission

## 2018-11-27 NOTE — ED NOTES
Patient met with Dr Teena Martin and agreed to sign a 201 for inpatient admission  She was agreeable  Contacted her  Valentine Montano ([phone]) and notified him of admission

## 2018-11-27 NOTE — ASSESSMENT & PLAN NOTE
Patient lower lumbar pain work related injury on disability seen and followed by pain management with a orthopedic surgeon planning for back surgery  Patient multiple pain management and also muscle relaxant  She is to quit smoking to go for back surgery she is still smoking  Also complain some numbness and tingling in the lower extremity  Does improve with medication

## 2018-11-27 NOTE — ED NOTES
Patient is accepted at 1600  Patient is accepted by Diane Ibarra, covering for Dr Beverly Blanco   ent may go to the floor at **  Nurse report is to be called to [phone] prior to patient transfer

## 2018-11-27 NOTE — ASSESSMENT & PLAN NOTE
Patient described she has a depressed anemia does not drink daily basis but because of that he drank do abnormal behavior brought in ER and admitted in psych unit management per psychiatrist discussed with the patient with alcohol abuse denied drinking no evidence of DTs management per psychiatrist
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
## 2018-11-27 NOTE — ASSESSMENT & PLAN NOTE
Patient long history of migraine takes daily basis medication also pain does not really then takes amitriptyline injection  Will continue her p o   Medication

## 2018-11-27 NOTE — ASSESSMENT & PLAN NOTE
Patient had laparoscopy sleeve gastrectomy at [facility] lost okay denied any GI symptoms taking vitamins 75 lb tolerating food

## 2018-11-27 NOTE — ED NOTES
Patient was hospitalized in the past, maybe 20 yrs ago, but not currently on any medications      She is currently not in treatment and is hesitant to take psych meds  that make her feel groggy

## 2018-11-28 PROBLEM — F31.81 BIPOLAR 2 DISORDER (HCC): Status: ACTIVE | Noted: 2018-11-28

## 2018-11-28 PROBLEM — F33.2 MDD (MAJOR DEPRESSIVE DISORDER), RECURRENT SEVERE, WITHOUT PSYCHOSIS (HCC): Status: ACTIVE | Noted: 2018-11-28

## 2018-11-28 RX ORDER — LORAZEPAM 1 MG/1
2 TABLET ORAL EVERY 4 HOURS PRN
Status: DISCONTINUED | OUTPATIENT
Start: 2018-11-28 | End: 2018-11-30

## 2018-11-28 RX ORDER — LORAZEPAM 1 MG/1
1 TABLET ORAL EVERY 4 HOURS PRN
Status: DISCONTINUED | OUTPATIENT
Start: 2018-11-28 | End: 2018-11-30

## 2018-11-28 RX ORDER — CARBAMAZEPINE 200 MG/1
100 TABLET ORAL 2 TIMES DAILY
Status: DISCONTINUED | OUTPATIENT
Start: 2018-11-28 | End: 2018-11-29

## 2018-11-28 RX ADMIN — OXYCODONE HYDROCHLORIDE 15 MG: 5 TABLET ORAL at 08:41

## 2018-11-28 RX ADMIN — MULTIVITAMIN 15 ML: LIQUID ORAL at 08:43

## 2018-11-28 RX ADMIN — OXYCODONE HYDROCHLORIDE 5 MG: 5 TABLET ORAL at 16:39

## 2018-11-28 RX ADMIN — TRAZODONE HYDROCHLORIDE 50 MG: 50 TABLET ORAL at 21:14

## 2018-11-28 RX ADMIN — MELOXICAM 7.5 MG: 15 TABLET ORAL at 08:42

## 2018-11-28 RX ADMIN — HYDROXYZINE HYDROCHLORIDE 50 MG: 50 TABLET, FILM COATED ORAL at 23:30

## 2018-11-28 RX ADMIN — OXYCODONE HYDROCHLORIDE 15 MG: 5 TABLET ORAL at 21:13

## 2018-11-28 RX ADMIN — VITAMIN D, TAB 1000IU (100/BT) 1000 UNITS: 25 TAB at 08:42

## 2018-11-28 RX ADMIN — HYDROCHLOROTHIAZIDE 25 MG: 25 TABLET ORAL at 08:43

## 2018-11-28 RX ADMIN — CARBAMAZEPINE 100 MG: 200 TABLET ORAL at 17:03

## 2018-11-28 RX ADMIN — HYDROXYZINE HYDROCHLORIDE 50 MG: 50 TABLET, FILM COATED ORAL at 12:19

## 2018-11-28 RX ADMIN — PANTOPRAZOLE SODIUM 40 MG: 40 TABLET, DELAYED RELEASE ORAL at 06:46

## 2018-11-28 RX ADMIN — CARBAMAZEPINE 100 MG: 200 TABLET ORAL at 08:46

## 2018-11-28 RX ADMIN — VITAMIN D, TAB 1000IU (100/BT) 1000 UNITS: 25 TAB at 17:04

## 2018-11-28 NOTE — SOCIAL WORK
Worker called pt's    with no questions, just hoping pt is alright   plans to visit and bring some belongings tonight
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
## 2018-11-28 NOTE — PLAN OF CARE
DISCHARGE PLANNING     Discharge to home or other facility with appropriate resources Progressing        Ineffective Coping     Cooperates with admission process Progressing     Identifies ineffective coping skills Progressing     Identifies healthy coping skills Progressing     Demonstrates healthy coping skills Progressing     Participates in unit activities Progressing     Patient/Family participate in treatment and DC plans Progressing     Patient/Family verbalizes awareness of resources Progressing     Understands least restrictive measures Progressing     Free from restraint events Progressing        Risk for Violence/Aggression Toward Others     Treatment Goal: Refrain from acts of violence/aggression during length of stay, and demonstrate improved impulse control at the time of discharge Progressing     Verbalize thoughts and feelings Progressing     Refrain from harming others Progressing     Refrain from destructive acts on the environment or property Progressing     Control angry outbursts Progressing     Attend and participate in unit activities, including therapeutic, recreational, and educational groups Progressing     Identify appropriate positive anger management techniques Progressing
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
## 2018-11-28 NOTE — PLAN OF CARE
Problem: SELF CARE DEFICIT  Goal: Return ADL status to a safe level of function  INTERVENTIONS:  María Elena Dos Santos MPS, ATR-BC  - Encourage to attend and participate in art therapy   - Provide means incorporate new coping strategies and self care  - Explore concepts of alternative perspective, awareness  and healthy expression through discussion and exercising properties of art materials and process      Outcome: Progressing  Despite language barrier, patient began attending art therapy with limited participation; attentive
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
## 2018-11-28 NOTE — PLAN OF CARE
Problem: DISCHARGE PLANNING  Goal: Discharge to home or other facility with appropriate resources  INTERVENTIONS:  - Identify barriers to discharge w/patient and caregiver  - Deny SI, AH and depression  Symptoms will resolve or diminish upon discharge  - Comply with meds, attend 75% of group therapy, and identify positive coping skills  - Arrange for needed discharge resources and transportation as appropriate  - Identify discharge learning needs (meds, outpatient mental health services)  - Arrange for interpretive services to assist at discharge as needed  - Refer to Case Management Department for coordinating discharge planning if the patient needs post-hospital services based on physician/advanced practitioner order or complex needs related to functional status, cognitive ability, or social support system   Outcome: Progressing  Worker contacted pt's    with no questions, just hoping pt is okay   visiting pt tonight and bringing some belongings

## 2018-11-28 NOTE — PROGRESS NOTES
11/28/18 1000   Activity/Group Checklist   Group Other (Comment)  (Surviving Trauma / Education, Open Discussion)   Attendance Attended   Attendance Duration (min) Greater than 60   Interactions Interacted appropriately  (Despite language barrier, patient was attentive)   Affect/Mood Appropriate   Goals Achieved Able to listen to others

## 2018-11-28 NOTE — PROGRESS NOTES
Clinical Pharmacy Note: Carbamazepine    Marilyn Valle is a 44 y o  female who presents with auditory hallucinations and homicidal ideation  Assessment/Plan:    Carbamazepine indication: seizure disorder  Andrew Adrian is currently taking 100 mg carbamazepine tablet twice daily  Last carbamazepine level concentration is UNKNOWN  It is recommended to take steady state trough after 4 days of therapy (12/2)    Patient specific Drug Interactions: Thiazide and Thiazide-Like Diuretics may enhance the adverse/toxic effect of CarBAMazepine  Specifically, there may be an increased risk for hyponatremia  Severity Moderate Reliability Rating Fair     Pharmacy Recommendations:  Please assess steady state level, may be ordered for 12/2 with AM labs  Patient is at particular risk of hyponatremia, please monitor  CBC should be repeated with elevated platelets, WBC and RDW  Monitoring:    Therapeutic carbamazepine levels studied for epilepsy are 4-12 mg/L  It is recommended to target this serum range to prevent toxicity and achieve symptom resolvement  Common side effects include: upset stomach, dry mouth, constipation, dizziness, drowsiness  Carbamazepine may cause hyponatremia and agranulocytosis so monitor baseline and weekly sodium levels and complete blood count  Assess for worsening depressive symptoms, mild rash, and severe rash with blistering of the skin and mucus membranes  If a severe rash occurs, discontinue carbamazepine and assess for Patel-Thomas Syndrome  Carbamazepine has several drug interactions and may decrease the effectiveness of: oral contraceptives, antipsychotics, anticonvulsants, calcium channel blockers, benzodiazepines, antibiotics, statins, HIV medications, anticoagulations, tricyclic antidepressants, and MAO-Is  Recommend to reassess carbamazepine level if liver function or mental status changes      Carbamazepine:  No results found for: CARBAMAZEPIN    Sodium:    0  Lab Value Date/Time   SODIUM 147 11/27/2018 0751   SODIUM 139 01/11/2018 1610   SODIUM 140 01/14/2016 2026       Complete Blood Count:    0  Lab Value Date/Time   WBC 11 30 (H) 11/27/2018 0751   WBC 17 87 (H) 01/11/2018 1610   WBC 14 36 (H) 01/14/2016 2026   WBC 8 18 07/11/2015 0615   WBC 11 20 (H) 07/10/2015 0316   WBC 11 48 (H) 09/14/2014 1410       0  Lab Value Date/Time   HGB 14 6 11/27/2018 0751   HGB 14 9 01/11/2018 1610   HGB 16 0 (H) 01/14/2016 2026   HGB 13 5 07/11/2015 0615   HGB 14 2 07/10/2015 0316   HGB 13 2 09/14/2014 1410       0  Lab Value Date/Time   HCT 46 2 (H) 11/27/2018 0751   HCT 44 4 01/11/2018 1610   HCT 47 1 (H) 01/14/2016 2026   HCT 41 2 07/11/2015 0615   HCT 41 9 07/10/2015 0316   HCT 40 4 09/14/2014 1410       0  Lab Value Date/Time    (H) 11/27/2018 0751    01/11/2018 1610    01/14/2016 2026    07/11/2015 0615    07/10/2015 0316    09/14/2014 1410       0  Lab Value Date/Time   MCV 81 11/27/2018 0751   MCV 87 01/11/2018 1610   MCV 86 1 01/14/2016 2026   MCV 85 07/11/2015 0615   MCV 85 07/10/2015 0316   MCV 87 09/14/2014 1410       0  Lab Value Date/Time   MCH 25 6 (L) 11/27/2018 0751   MCH 29 1 01/11/2018 1610   MCH 29 3 01/14/2016 2026   MCH 28 0 07/11/2015 0615   MCH 28 6 07/10/2015 0316   MCH 28 4 09/14/2014 1410       0  Lab Value Date/Time   MCHC 31 5 11/27/2018 0751   MCHC 33 6 01/11/2018 1610   MCHC 34 0 01/14/2016 2026   MCHC 32 8 07/11/2015 0615   MCHC 33 9 07/10/2015 0316   MCHC 32 7 09/14/2014 1410       0  Lab Value Date/Time   RDW 16 6 (H) 11/27/2018 0751   RDW 13 5 01/11/2018 1610   RDW 14 1 01/14/2016 2026   RDW 14 8 07/11/2015 0615   RDW 14 8 07/10/2015 0316   RDW 14 7 09/14/2014 1410       0  Lab Value Date/Time   NEUTROABS 7 00 11/27/2018 0751   NEUTROABS 8 77 (H) 01/14/2016 2026   NEUTROABS 6 94 07/10/2015 0316   NEUTROABS 6 74 03/26/2014 1339       0  Lab Value Date/Time   LYMPHSABS 3 70 11/27/2018 0751   LYMPHSABS 4 21 01/14/2016 2026 LYMPHSABS 3 36 07/10/2015 0316   LYMPHSABS 3 21 03/26/2014 1339       0  Lab Value Date/Time   MONOSABS 0 40 11/27/2018 0751   MONOSABS 1 04 01/14/2016 2026   MONOSABS 0 56 07/10/2015 0316   MONOSABS 0 54 03/26/2014 1339       0  Lab Value Date/Time   EOSABS 0 10 11/27/2018 0751   EOSABS 0 18 01/11/2018 1610   EOSABS 0 20 01/14/2016 2026   EOSABS 0 22 07/10/2015 0316   EOSABS 0 11 03/26/2014 1339       0  Lab Value Date/Time   BASOSABS 0 10 11/27/2018 0751   BASOSABS 0 00 01/11/2018 1610   BASOSABS 0 09 01/14/2016 2026   BASOSABS 0 11 (H) 07/10/2015 0316   BASOSABS 0 11 (H) 03/26/2014 1339         Liver Function:    0  Lab Value Date/Time   ALB 4 7 11/27/2018 0751   ALB 3 9 01/11/2018 1610   ALB 3 1 (L) 07/11/2015 0615   ALB 3 2 (L) 07/10/2015 0316   ALB 3 3 (L) 09/14/2014 1410       0  Lab Value Date/Time   TBILI 0 20 11/27/2018 0751   TBILI 0 14 (L) 01/11/2018 1610       0  Lab Value Date/Time   AST 25 11/27/2018 0751   AST 13 01/11/2018 1610    (H) 07/11/2015 0615   AST 71 (H) 07/10/2015 0316   AST 63 (H) 09/14/2014 1410       0  Lab Value Date/Time   ALT 27 11/27/2018 0751   ALT 30 01/11/2018 1610    (H) 07/11/2015 0615   ALT 76 (H) 07/10/2015 0316    (H) 09/14/2014 1410       0  Lab Value Date/Time   INR 0 95 01/11/2018 1650       Pharmacy will continue to follow patient with team     Electronically signed by: Jose Aldana, Pharmacist

## 2018-11-28 NOTE — NURSING NOTE
Received patient at 1900 and patient has appeared to be sleeping since beginning of shift  Patient did not get up for evening snack but staff reports that she ate dinner  Will continue to monitor closely on assault and suicide precautions

## 2018-11-28 NOTE — DISCHARGE INSTR - OTHER ORDERS
If you are experiencing a mental health emergency, you may call the [id number] [facility] 24 hours a day, 7 days per week at [phone]  In [facility], call [phone]  When you need someone to listen, the [facility] is available for 16 hours a day, 7 days a week, from the time of 7-10am and 2pm-2am   It is not available from the hours of 2am-6am and 10am-2pm  A representative can be reached at [phone]

## 2018-11-29 RX ORDER — CARBAMAZEPINE 200 MG/1
200 TABLET ORAL 2 TIMES DAILY
Status: DISCONTINUED | OUTPATIENT
Start: 2018-11-29 | End: 2018-11-30

## 2018-11-29 RX ADMIN — MELOXICAM 7.5 MG: 15 TABLET ORAL at 08:56

## 2018-11-29 RX ADMIN — VITAMIN D, TAB 1000IU (100/BT) 1000 UNITS: 25 TAB at 17:23

## 2018-11-29 RX ADMIN — HYDROCHLOROTHIAZIDE 25 MG: 25 TABLET ORAL at 08:57

## 2018-11-29 RX ADMIN — PANTOPRAZOLE SODIUM 40 MG: 40 TABLET, DELAYED RELEASE ORAL at 06:38

## 2018-11-29 RX ADMIN — VITAMIN D, TAB 1000IU (100/BT) 1000 UNITS: 25 TAB at 08:57

## 2018-11-29 RX ADMIN — OLANZAPINE 5 MG: 5 TABLET, FILM COATED ORAL at 15:21

## 2018-11-29 RX ADMIN — CARBAMAZEPINE 100 MG: 200 TABLET ORAL at 08:58

## 2018-11-29 RX ADMIN — NICOTINE POLACRILEX 2 MG: 2 GUM, CHEWING ORAL at 09:16

## 2018-11-29 RX ADMIN — OXYCODONE HYDROCHLORIDE 15 MG: 5 TABLET ORAL at 15:21

## 2018-11-29 RX ADMIN — CARBAMAZEPINE 200 MG: 200 TABLET ORAL at 17:23

## 2018-11-29 RX ADMIN — NICOTINE POLACRILEX 2 MG: 2 GUM, CHEWING ORAL at 15:21

## 2018-11-29 RX ADMIN — OXYCODONE HYDROCHLORIDE 5 MG: 5 TABLET ORAL at 09:01

## 2018-11-29 NOTE — PLAN OF CARE
Problem: Ineffective Coping  Goal: Cooperates with admission process  Interventions:   - Complete admission process   Outcome: Progressing    Goal: Identifies ineffective coping skills  Outcome: Progressing    Goal: Identifies healthy coping skills  Outcome: Progressing    Goal: Demonstrates healthy coping skills  Outcome: Progressing    Goal: Participates in unit activities  Interventions:  - Provide therapeutic environment   - Provide required programming   - Redirect inappropriate behaviors    Outcome: Progressing    Goal: Patient/Family participate in treatment and DC plans  Interventions:  - Provide therapeutic environment   Outcome: Progressing    Goal: Patient/Family verbalizes awareness of resources  Outcome: Progressing    Goal: Understands least restrictive measures  Interventions:  - Utilize least restrictive behavior   Outcome: Progressing    Goal: Free from restraint events  - Utilize least restrictive measures   - Provide behavioral interventions   - Redirect inappropriate behaviors    Outcome: Progressing      Problem: Risk for Violence/Aggression Toward Others  Goal: Treatment Goal: Refrain from acts of violence/aggression during length of stay, and demonstrate improved impulse control at the time of discharge  Outcome: Progressing    Goal: Verbalize thoughts and feelings  Interventions:  - Assess and re-assess patient's level of risk, every waking shift  - Engage patient in 1:1 interactions, daily, for a minimum of 15 minutes   - Allow patient to express feelings and frustrations in a safe and non-threatening manner   - Establish rapport/trust with patient    Outcome: Progressing    Goal: Refrain from harming others  Outcome: Progressing    Goal: Refrain from destructive acts on the environment or property  Outcome: Progressing    Goal: Control angry outbursts  Interventions:  - Monitor patient closely, per order  - Ensure early verbal de-escalation  - Monitor prn medication needs  - Set reasonable/therapeutic limits, outline behavioral expectations, and consequences   - Provide a non-threatening milieu, utilizing the least restrictive interventions    Outcome: Progressing    Goal: Attend and participate in unit activities, including therapeutic, recreational, and educational groups  Interventions:  - Provide therapeutic and educational activities daily, encourage attendance and participation, and document same in the medical record    Outcome: Progressing    Goal: Identify appropriate positive anger management techniques  Interventions:  - Offer anger management and coping skills groups   - Staff will provide positive feedback for appropriate anger control   Outcome: Progressing      Problem: DISCHARGE PLANNING  Goal: Discharge to home or other facility with appropriate resources  INTERVENTIONS:  - Identify barriers to discharge w/patient and caregiver  - Deny SI, AH and depression  Symptoms will resolve or diminish upon discharge  - Comply with meds, attend 75% of group therapy, and identify positive coping skills  - Arrange for needed discharge resources and transportation as appropriate  - Identify discharge learning needs (meds, outpatient mental health services)  - Arrange for interpretive services to assist at discharge as needed  - Refer to Case Management Department for coordinating discharge planning if the patient needs post-hospital services based on physician/advanced practitioner order or complex needs related to functional status, cognitive ability, or social support system   Outcome: Progressing      Comments: Patient has been cooperative with treatment with no angry outbursts

## 2018-11-29 NOTE — PROGRESS NOTES
Psychiatry Progress Note    Subjective: Interval History     Patient reports that she continues to have ongoing depression and anxiety  Patient also reports that she is feeling irritable and angry  Patient reports that she is trying to be optimistic about her recovery however still hopeless at times  Patient denying any thoughts to harm herself or anyone else this time  Patient denying any psychosis  Patient reports that she slept well last evening  Patient reports that she tolerated the initiation of Tegretol with no adverse effects        Current medications:    Current Facility-Administered Medications:     acetaminophen (TYLENOL) tablet 650 mg, 650 mg, Oral, Q6H PRN, Satnam Rucker PA-C    albuterol (PROVENTIL HFA,VENTOLIN HFA) inhaler 2 puff, 2 puff, Inhalation, Q6H PRN, Merry Jackson MD    benztropine (COGENTIN) tablet 1 mg, 1 mg, Oral, Q4H PRN, Satnam Rucker PA-C    carBAMazepine (TEGretol) tablet 200 mg, 200 mg, Oral, BID, Satnam Rucker PA-C    cholecalciferol (VITAMIN D3) tablet 1,000 Units, 1,000 Units, Oral, BID, Merry Jackson MD, 1,000 Units at 11/29/18 0857    diphenhydrAMINE (BENADRYL) injection 50 mg, 50 mg, Intramuscular, Q4H PRN, Satnam Rucker PA-C    hydrochlorothiazide (HYDRODIURIL) tablet 25 mg, 25 mg, Oral, Daily, Merry Jackson MD, 25 mg at 11/29/18 0857    hydrOXYzine HCL (ATARAX) tablet 50 mg, 50 mg, Oral, Q4H PRN, Satnam Rucker PA-C, 50 mg at 11/28/18 2330    ibuprofen (MOTRIN) tablet 400 mg, 400 mg, Oral, Q6H PRN, Satnam Rucker PA-C    LORazepam (ATIVAN) tablet 0 5 mg, 0 5 mg, Oral, Once, Jyotsna Salguero MD, Stopped at 11/27/18 0800    LORazepam (ATIVAN) tablet 1 mg, 1 mg, Oral, Q4H PRN, Satnam Rucker PA-C    LORazepam (ATIVAN) tablet 2 mg, 2 mg, Oral, Q4H PRN, Satnam Rucker PA-C    melatonin tablet 6 mg, 6 mg, Oral, HS PRN, Satnam Rucker PA-C    meloxicam (MOBIC) tablet 7 5 mg, 7 5 mg, Oral, Daily, Merry Jackson, MD, 7 5 mg at 11/29/18 0856    multivitamin with iron-minerals liquid 15 mL, 15 mL, Oral, Daily, Nadiya Parnell MD, 15 mL at 11/28/18 0843    nicotine (NICODERM CQ) 21 mg/24 hr TD 24 hr patch 1 patch, 1 patch, Transdermal, Daily, Twan Banerjee PA-C    nicotine polacrilex (NICORETTE) gum 2 mg, 2 mg, Oral, Q4H PRN, Twan Banerjee PA-C, 2 mg at 11/29/18 0916    OLANZapine (ZyPREXA) IM injection 10 mg, 10 mg, Intramuscular, Q4H PRN, Twan Banerjee PA-C    OLANZapine (ZyPREXA) tablet 5 mg, 5 mg, Oral, Q4H PRN, Twan Banerjee PA-C    oxyCODONE (ROXICODONE) IR tablet 15 mg, 15 mg, Oral, Q4H PRN, Nadiya Parnell MD, 15 mg at 11/28/18 2113    oxyCODONE (ROXICODONE) IR tablet 5 mg, 5 mg, Oral, Q6H PRN, Nadiya Parnell MD, 5 mg at 11/29/18 0901    pantoprazole (PROTONIX) EC tablet 40 mg, 40 mg, Oral, Early Morning, Nadiya Parnell MD, 40 mg at 11/29/18 2717    SUMAtriptan (IMITREX) subcutaneous injection 6 mg, 6 mg, Subcutaneous, Once PRN, Ronel Morelos MD    SUMAtriptan (IMITREX) tablet 100 mg, 100 mg, Oral, BID PRN, Nadiya Parnell MD    traZODone (DESYREL) tablet 50 mg, 50 mg, Oral, HS PRN, Twan Banerjee PA-C, 50 mg at 11/28/18 2114    Current Problem List:    Patient Active Problem List   Diagnosis    S/P laparoscopic sleeve gastrectomy    Postsurgical malabsorption    Constipation    Tobacco dependence syndrome    Low back pain    History of migraine    Major depressive disorder    MDD (major depressive disorder), recurrent severe, without psychosis ([facility] [address] [facility]    [facility]       Problem list reviewed 11/29/18     Objective:     Vital Signs:  Vitals:    11/29/18 0100 11/29/18 0811 11/29/18 0814 11/29/18 0824   BP: 91/56 120/83 115/76 114/76   BP Location:  Left arm Left arm Left arm   Pulse: 68 80 80 (!) 108   Resp:  16  16   Temp:  (!) 97 3 °F (36 3 °C)  97 9 °F (36 6 °C)   TempSrc:  Temporal  Temporal   SpO2:  98%  98%   Weight:       Height: Appearance:  age appropriate and casually dressed   Behavior:  normal   Speech:  normal volume   Mood:  depressed   Affect:  constricted   Thought Process:  normal   Thought Content:  normal   Perceptual Disturbances: None   Risk Potential: none   Sensorium:  person, place and time   Cognition:  intact   Consciousness:  alert and awake    Attention: attention span and concentration were age appropriate   Intellect: average   Insight:  limited   Judgment: limited      Motor Activity: no abnormal movements       Behavior over the last 24 hours:  unchanged  Sleep: normal  Appetite: normal  Medication side effects: No  ROS: no complaints      I/O Past 24 hours:  No intake/output data recorded  No intake/output data recorded  Labs:  Reviewed 11/29/18    Assessment / Plan:     [facility]    Recommended Treatment:      Medication changes:  1) increase Tegretol 200 mg b i d     Non-pharmacological treatments  1) Continue with group therapy, milieu therapy and occupational therapy  Safety  1) Safety/communication plan established targeting dynamic risk factors above  2) Risks, benefits, and possible side effects of medications explained to patient and patient verbalizes understanding  Counseling / Coordination of Care    Total floor / unit time spent today 20 minutes  Greater than 50% of total time was spent with the patient and / or family counseling and / or coordination of care  A description of the counseling / coordination of care  Patient's Rights, confidentiality and exceptions to confidentiality, use of automated medical record, [facility] staff access to medical record, and consent to treatment reviewed      Eliana Wilcox PA-C

## 2018-11-29 NOTE — SOCIAL WORK
Worker contacted [employer] regarding [facility]  Worker left message for Vanita Lisa (GED program counselor) and for Wili Marquis (GED )

## 2018-11-29 NOTE — PROGRESS NOTES
Went to see patient to discuss the literature on codependency but she was sleeping  Called [facility]ENN[facility] again to schedule her aftercare and received voicemail again

## 2018-11-29 NOTE — PLAN OF CARE
Ineffective Coping     Cooperates with admission process Progressing     Identifies ineffective coping skills Progressing     Identifies healthy coping skills Progressing     Demonstrates healthy coping skills Progressing     Participates in unit activities Progressing     Patient/Family participate in treatment and DC plans Progressing     Patient/Family verbalizes awareness of resources Progressing     Understands least restrictive measures Progressing     Free from restraint events Progressing        Risk for Violence/Aggression Toward Others     Treatment Goal: Refrain from acts of violence/aggression during length of stay, and demonstrate improved impulse control at the time of discharge Progressing     Verbalize thoughts and feelings Progressing     Refrain from harming others Progressing     Refrain from destructive acts on the environment or property Progressing     Control angry outbursts Progressing     Attend and participate in unit activities, including therapeutic, recreational, and educational groups Progressing     Identify appropriate positive anger management techniques Progressing          Risk for Violence/Aggression Toward Others     Treatment Goal: Refrain from acts of violence/aggression during length of stay, and demonstrate improved impulse control at the time of discharge Progressing     Verbalize thoughts and feelings Progressing     Refrain from harming others Progressing     Refrain from destructive acts on the environment or property Progressing     Control angry outbursts Progressing     Attend and participate in unit activities, including therapeutic, recreational, and educational groups Progressing     Identify appropriate positive anger management techniques Progressing        Patient is working toward goals  She is med compliant and is visible on the unit  She was observed interaction with her peers  Patient has not been attending groups   Staff will continue to monitor for her safety and responses to treatment plan

## 2018-11-29 NOTE — PLAN OF CARE
Problem: DISCHARGE PLANNING  Goal: Discharge to home or other facility with appropriate resources  INTERVENTIONS:  - Identify barriers to discharge w/patient and caregiver  - Deny SI, AH and depression  Symptoms will resolve or diminish upon discharge  - Comply with meds, attend 75% of group therapy, and identify positive coping skills  - Arrange for needed discharge resources and transportation as appropriate  - Identify discharge learning needs (meds, outpatient mental health services)  - Arrange for interpretive services to assist at discharge as needed  - Refer to Case Management Department for coordinating discharge planning if the patient needs post-hospital services based on physician/advanced practitioner order or complex needs related to functional status, cognitive ability, or social support system   Outcome: Progressing  Worker called [address] [facility]   Left voicemail for GED program counselor and GED  regarding pt's enrollment in the program

## 2018-11-29 NOTE — NURSING NOTE
Received patient at 1900  Patient was cooperative and highly social with her peers during the evening  Patient reported anxiety but reported that symptoms had improved significantly since taking her medication earlier  Patient denied currently having any suicidal ideations  Patient was compliant with medications  Patient reported ongoing 7/10 back pain and was administered PRN Roxicodone 15 mg (3 tablets) at 2113  Patient also administered PRN Trazadone 50 mg for sleep at 2114  Patient administered PRN Atarax 50 mg for itching at 2330  Will continue to monitor closely on suicide and seizure precautions

## 2018-11-29 NOTE — PROGRESS NOTES
Called and left a message at [facility] to please return my call to schedule her aftercare appointment

## 2018-11-29 NOTE — NURSING NOTE
Patient was awake until ~0000 socializing with a male peer  Patient was administered PRN Atarax 50 mg for anxiety and itching at 2330  Patient has appeared to sleep through the remainder of the night  Will continue to monitor closely on suicide and seizure precautions

## 2018-11-30 PROBLEM — M54.16 LUMBAR RADICULOPATHY: Status: ACTIVE | Noted: 2017-08-31

## 2018-11-30 PROBLEM — M48.061 SPINAL STENOSIS OF LUMBAR REGION: Status: ACTIVE | Noted: 2017-10-12

## 2018-11-30 PROBLEM — M75.21 BICIPITAL TENDONITIS OF SHOULDER, RIGHT: Status: ACTIVE | Noted: 2017-03-24

## 2018-11-30 PROBLEM — M48.061 LUMBAR STENOSIS: Status: ACTIVE | Noted: 2017-08-31

## 2018-11-30 PROBLEM — F52.9 FEMALE SEXUAL DYSFUNCTION: Status: ACTIVE | Noted: 2017-09-11

## 2018-11-30 PROBLEM — M79.2 NEURALGIA: Status: ACTIVE | Noted: 2018-11-30

## 2018-11-30 PROBLEM — F31.81 BIPOLAR 2 DISORDER (HCC): Chronic | Status: ACTIVE | Noted: 2018-11-28

## 2018-11-30 PROBLEM — M75.81 TENDINITIS OF RIGHT ROTATOR CUFF: Status: ACTIVE | Noted: 2017-03-24

## 2018-11-30 PROBLEM — E66.01 MORBID OBESITY (HCC): Status: ACTIVE | Noted: 2017-08-17

## 2018-11-30 PROBLEM — Z87.442 HISTORY OF KIDNEY STONES: Status: ACTIVE | Noted: 2017-04-07

## 2018-11-30 PROBLEM — F33.2 MDD (MAJOR DEPRESSIVE DISORDER), RECURRENT SEVERE, WITHOUT PSYCHOSIS (HCC): Chronic | Status: ACTIVE | Noted: 2018-11-28

## 2018-11-30 PROBLEM — L25.9 CONTACT DERMATITIS: Status: ACTIVE | Noted: 2018-11-30

## 2018-11-30 PROBLEM — N32.81 OAB (OVERACTIVE BLADDER): Status: ACTIVE | Noted: 2017-09-06

## 2018-11-30 RX ORDER — CARBAMAZEPINE 200 MG/1
200 TABLET ORAL 3 TIMES DAILY
Status: DISCONTINUED | OUTPATIENT
Start: 2018-11-30 | End: 2018-12-03 | Stop reason: HOSPADM

## 2018-11-30 RX ORDER — BISACODYL 10 MG
10 SUPPOSITORY, RECTAL RECTAL DAILY PRN
Status: DISCONTINUED | OUTPATIENT
Start: 2018-11-30 | End: 2018-12-02

## 2018-11-30 RX ADMIN — PANTOPRAZOLE SODIUM 40 MG: 40 TABLET, DELAYED RELEASE ORAL at 06:34

## 2018-11-30 RX ADMIN — CARBAMAZEPINE 200 MG: 200 TABLET ORAL at 08:34

## 2018-11-30 RX ADMIN — OXYCODONE HYDROCHLORIDE 5 MG: 5 TABLET ORAL at 01:41

## 2018-11-30 RX ADMIN — OXYCODONE HYDROCHLORIDE 15 MG: 5 TABLET ORAL at 08:37

## 2018-11-30 RX ADMIN — OXYCODONE HYDROCHLORIDE 15 MG: 5 TABLET ORAL at 17:30

## 2018-11-30 RX ADMIN — MELOXICAM 7.5 MG: 15 TABLET ORAL at 08:33

## 2018-11-30 RX ADMIN — BISACODYL 5 MG: 5 TABLET, COATED ORAL at 08:39

## 2018-11-30 RX ADMIN — NICOTINE POLACRILEX 2 MG: 2 GUM, CHEWING ORAL at 19:48

## 2018-11-30 RX ADMIN — OXYCODONE HYDROCHLORIDE 15 MG: 5 TABLET ORAL at 21:26

## 2018-11-30 RX ADMIN — CARBAMAZEPINE 200 MG: 200 TABLET ORAL at 21:26

## 2018-11-30 RX ADMIN — HYDROCHLOROTHIAZIDE 25 MG: 25 TABLET ORAL at 08:34

## 2018-11-30 RX ADMIN — VITAMIN D, TAB 1000IU (100/BT) 1000 UNITS: 25 TAB at 17:25

## 2018-11-30 RX ADMIN — NICOTINE POLACRILEX 2 MG: 2 GUM, CHEWING ORAL at 08:51

## 2018-11-30 RX ADMIN — CARBAMAZEPINE 200 MG: 200 TABLET ORAL at 16:19

## 2018-11-30 RX ADMIN — MULTIVITAMIN 15 ML: LIQUID ORAL at 08:36

## 2018-11-30 RX ADMIN — SUMATRIPTAN SUCCINATE 100 MG: 50 TABLET ORAL at 08:36

## 2018-11-30 RX ADMIN — VITAMIN D, TAB 1000IU (100/BT) 1000 UNITS: 25 TAB at 08:34

## 2018-11-30 NOTE — PLAN OF CARE
Problem: Ineffective Coping  Goal: Cooperates with admission process  Interventions:   - Complete admission process   Outcome: Progressing    Goal: Identifies ineffective coping skills  Outcome: Progressing    Goal: Identifies healthy coping skills  Outcome: Progressing    Goal: Demonstrates healthy coping skills  Outcome: Progressing    Goal: Participates in unit activities  Interventions:  - Provide therapeutic environment   - Provide required programming   - Redirect inappropriate behaviors    Outcome: Progressing    Goal: Patient/Family participate in treatment and DC plans  Interventions:  - Provide therapeutic environment   Outcome: Progressing    Goal: Patient/Family verbalizes awareness of resources  Outcome: Progressing    Goal: Understands least restrictive measures  Interventions:  - Utilize least restrictive behavior   Outcome: Progressing    Goal: Free from restraint events  - Utilize least restrictive measures   - Provide behavioral interventions   - Redirect inappropriate behaviors    Outcome: Progressing      Problem: Risk for Violence/Aggression Toward Others  Goal: Treatment Goal: Refrain from acts of violence/aggression during length of stay, and demonstrate improved impulse control at the time of discharge  Outcome: Progressing    Goal: Verbalize thoughts and feelings  Interventions:  - Assess and re-assess patient's level of risk, every waking shift  - Engage patient in 1:1 interactions, daily, for a minimum of 15 minutes   - Allow patient to express feelings and frustrations in a safe and non-threatening manner   - Establish rapport/trust with patient    Outcome: Progressing    Goal: Refrain from harming others  Outcome: Progressing    Goal: Refrain from destructive acts on the environment or property  Outcome: Progressing    Goal: Control angry outbursts  Interventions:  - Monitor patient closely, per order  - Ensure early verbal de-escalation  - Monitor prn medication needs  - Set reasonable/therapeutic limits, outline behavioral expectations, and consequences   - Provide a non-threatening milieu, utilizing the least restrictive interventions    Outcome: Progressing    Goal: Attend and participate in unit activities, including therapeutic, recreational, and educational groups  Interventions:  - Provide therapeutic and educational activities daily, encourage attendance and participation, and document same in the medical record    Outcome: Progressing    Goal: Identify appropriate positive anger management techniques  Interventions:  - Offer anger management and coping skills groups   - Staff will provide positive feedback for appropriate anger control   Outcome: Progressing      Comments: Patient has been cooperative and social and verbalizes feels  Patient not endorsing any suicidal ideations

## 2018-11-30 NOTE — SOCIAL WORK
Nathaly Nayak from [facility] returned worker's call  Pt may stay in current session if pt returns to class Tuesday or Wednesday  If pt is unable to return to class early next week, pt will have to resume session late January  Nathaly Nayak reports hospitalization letter is unnecessary

## 2018-11-30 NOTE — PROGRESS NOTES
Patient is showing no signs of alcohol withdrawal at this time  She has her ups and  Downs   Attends groups once in a while

## 2018-11-30 NOTE — PROGRESS NOTES
Pt attended positive coping skills group  Pt blunt affect and slight negativity/irritabity in expression  Pt respectful of peers  Pt engaged in positive coping skills discussion  Pt able to identify positive coping skills and listened  Continue to provide therapeutic group support

## 2018-11-30 NOTE — PROGRESS NOTES
Psychiatry Progress Note    Subjective: Interval History     Patient reports that she did not sleep well last evening  Patient upset she reports she woke up and found her roommate standing over her watching her sleep  Patient expressing that this made her feel uncomfortable  Patient reports that she continues to have ongoing mood lability  Patient reports that she is still feeling depressed and angry yesterday  Patient reports that she feels she needs to dosing of medication during the afternoon  Patient reports that her agitation was causing her to have thoughts of wanting to harm staff yesterday she felt that they were being disrespectful  Patient denying any thoughts to harm any specific person at this time  Patient denying any thoughts to harm herself  Patient with no psychosis  Patient with report of constipation today  Reports that she is not had a bowel movement in the past 3 days since her admission        Current medications:    Current Facility-Administered Medications:     acetaminophen (TYLENOL) tablet 650 mg, 650 mg, Oral, Q6H PRN, Ashly Woodson PA-C    albuterol (PROVENTIL HFA,VENTOLIN HFA) inhaler 2 puff, 2 puff, Inhalation, Q6H PRN, Sally Ruvalcaba MD    benztropine (COGENTIN) tablet 1 mg, 1 mg, Oral, Q4H PRN, Ashly Woodson PA-C    bisacodyl (DULCOLAX) EC tablet 5 mg, 5 mg, Oral, Once, Ashly Woodson PA-C    bisacodyl (DULCOLAX) rectal suppository 10 mg, 10 mg, Rectal, Daily PRN, Ashly Woodson PA-C    carBAMazepine (TEGretol) tablet 200 mg, 200 mg, Oral, TID, Ashly Woodson PA-C    cholecalciferol (VITAMIN D3) tablet 1,000 Units, 1,000 Units, Oral, BID, Sally Ruvalcaba MD, 1,000 Units at 11/29/18 1723    diphenhydrAMINE (BENADRYL) injection 50 mg, 50 mg, Intramuscular, Q4H PRN, Ashly Woodson PA-C    hydrochlorothiazide (HYDRODIURIL) tablet 25 mg, 25 mg, Oral, Daily, Sally Ruvalcaba MD, 25 mg at 11/29/18 1957    hydrOXYzine HCL (ATARAX) tablet 50 mg, 50 mg, Oral, Q4H PRN, MARS Batista-C, 50 mg at 11/28/18 2330    ibuprofen (MOTRIN) tablet 400 mg, 400 mg, Oral, Q6H PRN, CARLOS BatistaC    LORazepam (ATIVAN) tablet 0 5 mg, 0 5 mg, Oral, Once, Rita Blas MD, Stopped at 11/27/18 0800    melatonin tablet 6 mg, 6 mg, Oral, HS PRN, CARLOS BatistaC    meloxicam (MOBIC) tablet 7 5 mg, 7 5 mg, Oral, Daily, Tao Mohan MD, 7 5 mg at 11/29/18 0856    multivitamin with iron-minerals liquid 15 mL, 15 mL, Oral, Daily, Tao Mohan MD, 15 mL at 11/28/18 0843    nicotine polacrilex (NICORETTE) gum 2 mg, 2 mg, Oral, Q4H PRN, CARLOS BatistaC, 2 mg at 11/29/18 1521    OLANZapine (ZyPREXA) IM injection 10 mg, 10 mg, Intramuscular, Q4H PRN, CARLOS BatistaC    OLANZapine (ZyPREXA) tablet 5 mg, 5 mg, Oral, Q4H PRN, MARS Batista-C, 5 mg at 11/29/18 1521    oxyCODONE (ROXICODONE) IR tablet 15 mg, 15 mg, Oral, Q4H PRN, Tao Mohan MD, 15 mg at 11/29/18 1521    oxyCODONE (ROXICODONE) IR tablet 5 mg, 5 mg, Oral, Q6H PRN, Tao Mohan MD, 5 mg at 11/30/18 0141    pantoprazole (PROTONIX) EC tablet 40 mg, 40 mg, Oral, Early Morning, Tao Mohan MD, 40 mg at 11/30/18 0634    SUMAtriptan (IMITREX) subcutaneous injection 6 mg, 6 mg, Subcutaneous, Once PRN, Ronel Morelos MD    SUMAtriptan (IMITREX) tablet 100 mg, 100 mg, Oral, BID PRN, Tao Mohan MD    traZODone (DESYREL) tablet 50 mg, 50 mg, Oral, HS PRN, Jamey Cobos PA-C, 50 mg at 11/28/18 2110    Current Problem List:    Patient Active Problem List   Diagnosis    S/P laparoscopic sleeve gastrectomy    Postsurgical malabsorption    Constipation    Tobacco dependence syndrome    Low back pain    History of migraine    Major depressive disorder    MDD (major depressive disorder), recurrent severe, without psychosis ([facility] [address] [facility]    Bipolar 2 disorder ([facility] )       Problem list reviewed 11/30/18     Objective:     Vital Signs:  Vitals: 11/29/18 1430 11/29/18 2030 11/30/18 0726 11/30/18 0727   BP: 118/78 111/76 109/76 120/74   BP Location:   Left arm Left arm   Pulse: 102 80 75 82   Resp:   16 16   Temp:   (!) 97 2 °F (36 2 °C)    TempSrc:   Temporal    SpO2:   98%    Weight:       Height:             Appearance:  age appropriate and casually dressed   Behavior:  normal   Speech:  normal volume   Mood:  depressed   Affect:  constricted   Thought Process:  normal   Thought Content:  normal   Perceptual Disturbances: None   Risk Potential: none   Sensorium:  person, place and time   Cognition:  intact   Consciousness:  alert and awake    Attention: attention span and concentration were age appropriate   Intellect: average   Insight:  limited   Judgment: limited      Motor Activity: no abnormal movements       Behavior over the last 24 hours:  unchanged  Sleep: normal  Appetite: normal  Medication side effects: No  ROS: no complaints      I/O Past 24 hours:  No intake/output data recorded  No intake/output data recorded  Labs:  Reviewed 11/30/18    Assessment / Plan:     [facility]    Recommended Treatment:      Medication changes:  1) increase Tegretol 200 mg t i d   Trial Dulcolax  Non-pharmacological treatments  1) Continue with group therapy, milieu therapy and occupational therapy  Safety  1) Safety/communication plan established targeting dynamic risk factors above  2) Risks, benefits, and possible side effects of medications explained to patient and patient verbalizes understanding  Counseling / Coordination of Care    Total floor / unit time spent today 20 minutes  Greater than 50% of total time was spent with the patient and / or family counseling and / or coordination of care  A description of the counseling / coordination of care       Patient's Rights, confidentiality and exceptions to confidentiality, use of automated medical record, [facility] staff access to medical record, and consent to treatment reviewed      Nancie Momin PA-C

## 2018-11-30 NOTE — NURSING NOTE
Patient appeared to sleep until ~0130  Patient got out of bed to report 8/10 back pain  Patient was administered PRN Roxicodone 5 mg for pain at 0141 by another nurse  Patient returned to bed and has appeared to sleep for the remainder of the night  Patient has demonstrated no signs or symptoms of acute alcohol withdrawal  Will continue to monitor closely on suicide and seizure precautions

## 2018-11-30 NOTE — NURSING NOTE
Received patient at 1900  Patient was cooperative and social with peers during the evening  Patient reported that she feels her anxiety symptoms are poorly controlled by her scheduled medications  Patient reported that PRN Zyprexa 5 mg that she had earlier helped her feel better and reported currently feeling "mellow " Patient reported depression but denied SI/HI  Patient was compliant with her medications  Patient has appeared to be sleeping for past hour  Will continue to monitor closely on suicide and seizure precautions

## 2018-11-30 NOTE — PLAN OF CARE
DISCHARGE PLANNING     Discharge to home or other facility with appropriate resources Progressing        Ineffective Coping     Cooperates with admission process Progressing     Identifies ineffective coping skills Progressing     Identifies healthy coping skills Progressing     Demonstrates healthy coping skills Progressing     Participates in unit activities Progressing     Patient/Family participate in treatment and DC plans Progressing     Patient/Family verbalizes awareness of resources Progressing     Understands least restrictive measures Progressing     Free from restraint events Progressing        Risk for Violence/Aggression Toward Others     Treatment Goal: Refrain from acts of violence/aggression during length of stay, and demonstrate improved impulse control at the time of discharge Progressing     Verbalize thoughts and feelings Progressing     Refrain from harming others Progressing     Refrain from destructive acts on the environment or property Progressing     Control angry outbursts Progressing     Attend and participate in unit activities, including therapeutic, recreational, and educational groups Progressing     Identify appropriate positive anger management techniques Progressing        SELF CARE DEFICIT     Return ADL status to a safe level of function Progressing

## 2018-11-30 NOTE — PLAN OF CARE
Problem: DISCHARGE PLANNING  Goal: Discharge to home or other facility with appropriate resources  INTERVENTIONS:  - Identify barriers to discharge w/patient and caregiver  - Deny SI, AH and depression  Symptoms will resolve or diminish upon discharge  - Comply with meds, attend 75% of group therapy, and identify positive coping skills  - Arrange for needed discharge resources and transportation as appropriate  - Identify discharge learning needs (meds, outpatient mental health services)  - Arrange for interpretive services to assist at discharge as needed  - Refer to Case Management Department for coordinating discharge planning if the patient needs post-hospital services based on physician/advanced practitioner order or complex needs related to functional status, cognitive ability, or social support system   Outcome: Progressing  Worker spoke with Ashley Rios from [facility]  She explained that if pt can resume classes on Tuesday or Wednesday she will be able to continue with current session  If pt is unable to return early next week, pt will have to resume at the end of January  Ashley Rios reports a hospitalization letter is unnecessary

## 2018-11-30 NOTE — PROGRESS NOTES
Patient  Is  Med and meal  Compliant    She attends groups and is social  With her peers  At times she expresses that she still don't feel right    Patient is always in the halls or is seen watching T V  visible    Most of the time

## 2018-12-01 PROCEDURE — 99232 SBSQ HOSP IP/OBS MODERATE 35: CPT | Performed by: PSYCHIATRY & NEUROLOGY

## 2018-12-01 RX ORDER — SODIUM PHOSPHATE, DIBASIC AND SODIUM PHOSPHATE, MONOBASIC 7; 19 G/133ML; G/133ML
1 ENEMA RECTAL ONCE AS NEEDED
Status: COMPLETED | OUTPATIENT
Start: 2018-12-01 | End: 2018-12-01

## 2018-12-01 RX ORDER — OXYCODONE HYDROCHLORIDE 5 MG/1
15 TABLET ORAL EVERY 4 HOURS PRN
Status: DISCONTINUED | OUTPATIENT
Start: 2018-12-01 | End: 2018-12-03 | Stop reason: HOSPADM

## 2018-12-01 RX ADMIN — MELOXICAM 7.5 MG: 15 TABLET ORAL at 08:08

## 2018-12-01 RX ADMIN — CARBAMAZEPINE 200 MG: 200 TABLET ORAL at 08:09

## 2018-12-01 RX ADMIN — TRAZODONE HYDROCHLORIDE 50 MG: 50 TABLET ORAL at 01:10

## 2018-12-01 RX ADMIN — VITAMIN D, TAB 1000IU (100/BT) 1000 UNITS: 25 TAB at 08:07

## 2018-12-01 RX ADMIN — SUMATRIPTAN SUCCINATE 100 MG: 50 TABLET ORAL at 12:40

## 2018-12-01 RX ADMIN — TRAZODONE HYDROCHLORIDE 50 MG: 50 TABLET ORAL at 20:23

## 2018-12-01 RX ADMIN — SODIUM PHOSPHATE, DIBASIC AND SODIUM PHOSPHATE, MONOBASIC 1 ENEMA: 7; 19 ENEMA RECTAL at 17:31

## 2018-12-01 RX ADMIN — OXYCODONE HYDROCHLORIDE 5 MG: 5 TABLET ORAL at 08:39

## 2018-12-01 RX ADMIN — OXYCODONE HYDROCHLORIDE 15 MG: 5 TABLET ORAL at 17:21

## 2018-12-01 RX ADMIN — PANTOPRAZOLE SODIUM 40 MG: 40 TABLET, DELAYED RELEASE ORAL at 05:49

## 2018-12-01 RX ADMIN — CARBAMAZEPINE 200 MG: 200 TABLET ORAL at 16:00

## 2018-12-01 RX ADMIN — OXYCODONE HYDROCHLORIDE 15 MG: 5 TABLET ORAL at 12:42

## 2018-12-01 RX ADMIN — OXYCODONE HYDROCHLORIDE 15 MG: 5 TABLET ORAL at 22:51

## 2018-12-01 RX ADMIN — NICOTINE POLACRILEX 2 MG: 2 GUM, CHEWING ORAL at 08:40

## 2018-12-01 RX ADMIN — NICOTINE POLACRILEX 2 MG: 2 GUM, CHEWING ORAL at 01:12

## 2018-12-01 RX ADMIN — VITAMIN D, TAB 1000IU (100/BT) 1000 UNITS: 25 TAB at 16:01

## 2018-12-01 RX ADMIN — NICOTINE POLACRILEX 2 MG: 2 GUM, CHEWING ORAL at 17:21

## 2018-12-01 RX ADMIN — CARBAMAZEPINE 200 MG: 200 TABLET ORAL at 20:23

## 2018-12-01 RX ADMIN — MULTIVITAMIN 15 ML: LIQUID ORAL at 08:09

## 2018-12-01 RX ADMIN — NICOTINE POLACRILEX 2 MG: 2 GUM, CHEWING ORAL at 12:46

## 2018-12-01 RX ADMIN — NICOTINE POLACRILEX 2 MG: 2 GUM, CHEWING ORAL at 21:35

## 2018-12-01 NOTE — PROGRESS NOTES
Patient has been visible on the unit  Med and meal compliant  Social with select peers  Will continue to monitor

## 2018-12-01 NOTE — PROGRESS NOTES
Pt remains pleasant and cooperative throughout the day  Pt is med/meal compliant  Pt is insisted that she be ordered dulcolax PO because she says she has not had a bowel movement since admission  Pt is ordered dulcolax suppository was says that it does not work for her  She says she will also be okay with getting an enema  No other issues to report  Will continue to monitor

## 2018-12-01 NOTE — PROGRESS NOTES
12/01/18 1400   Activity/Group Checklist   Group Other (Comment)  (Art Therapy Group / OPEN STUDIO)   Attendance Attended   Attendance Duration (min) 46-60   Interactions Interacted appropriately   Affect/Mood Appropriate   Goals Achieved Able to listen to others; Able to engage in interactions

## 2018-12-01 NOTE — PROGRESS NOTES
Progress Note - [address] 44 y o  female MRN: [id number]   Unit/Bed#: [facility] 377-01 Encounter: [id number]    Behavior over the last 24 hours: slowly improving  Lexi Guy still feels anxious and is somatically preoccupied , but otherwise reports that depressive symptoms are less prominent  Calm and cooperative during the session  Compliant with medications  Attends group therapy  Social with peers    Sleep: slept better  Appetite: decreased  Medication side effects: No   ROS: reports constipation and vomiting, denies any shortness of breath or chest pain    Mental Status Evaluation:    Appearance:  dressed in hospital attire   Behavior:  pleasant, cooperative   Speech:  normal rate and volume   Mood:  anxious, less depressed   Affect:  brighter   Thought Process:  organized, goal directed   Associations: intact associations   Thought Content:  no overt delusions   Perceptual Disturbances: no auditory hallucinations, no visual hallucinations   Risk Potential: Suicidal ideation - None at present  Homicidal ideation - None at present  Potential for aggression - Not at present   Sensorium:  oriented to person, place and time/date   Memory:  recent and remote memory grossly intact   Consciousness:  alert and awake   Attention: attention span and concentration are improving   Insight:  improving and moderate   Judgment: improving and moderate   Gait/Station: normal gait/station and normal balance   Motor Activity: no abnormal movements     Vital signs in last 24 hours:    Temp:  [96 9 °F (36 1 °C)] 96 9 °F (36 1 °C)  HR:  [87-95] 95  Resp:  [18] 18  BP: (105-107)/(73-83) 107/83    Laboratory results:  I have personally reviewed all pertinent laboratory/tests results      Most Recent Labs:   Lab Results   Component Value Date    WBC 11 30 (H) 11/27/2018    RBC 5 69 (H) 11/27/2018    HGB 14 6 11/27/2018    HCT 46 2 (H) 11/27/2018     (H) 11/27/2018    RDW 16 6 (H) 11/27/2018    NEUTROABS 7 00 11/27/2018    SODIUM 147 11/27/2018    K 4 8 11/27/2018     11/27/2018    CO2 28 11/27/2018    BUN 9 11/27/2018    CREATININE 0 79 11/27/2018    GLUC 108 (H) 11/27/2018    CALCIUM 9 9 11/27/2018    AST 25 11/27/2018    ALT 27 11/27/2018    ALKPHOS 101 11/27/2018    TP 7 9 11/27/2018    ALB 4 7 11/27/2018    TBILI 0 20 11/27/2018   Drug Screen:   Lab Results   Component Value Date    AMPMETHUR Negative 11/27/2018    BARBTUR Negative 11/27/2018    BDZUR Negative 11/27/2018    THCUR Negative 11/27/2018    COCAINEUR Negative 11/27/2018    METHADONEUR Negative 11/27/2018    OPIATEUR Negative 11/27/2018    PCPUR Negative 11/27/2018       Progress Toward Goals: progressing, still anxious, less depressed, not suicidal    Assessment/Plan   Principal Problem:    Bipolar 2 disorder ([facility] [address] )  Active Problems:    S/P laparoscopic sleeve gastrectomy    Low back pain    History of migraine    Recommended Treatment:     Planned medication and treatment changes: All current active medications have been reviewed    Encourage group therapy, milieu therapy and occupational therapy  Behavioral Health checks every 7 minutes  Check Tegretol level, CBC/diff, lipid profile, hemoglobin A1C, TSH and RPR in 2 days     Continue current medications:    Current Facility-Administered Medications:  acetaminophen 650 mg Oral Q6H PRN Zygmunt Harrisburg, PA-C   albuterol 2 puff Inhalation Q6H PRN Ron Oliva MD   benztropine 1 mg Oral Q4H PRN Zygmunt Harrisburg, PA-C   bisacodyl 10 mg Rectal Daily PRN Zygmunt Harrisburg, PA-C   carBAMazepine 200 mg Oral TID Zygmunt Harrisburg, PA-C   cholecalciferol 1,000 Units Oral BID Ron Oliva MD   diphenhydrAMINE 50 mg Intramuscular Q4H PRN Zygmunt Harrisburg, PA-C   hydrochlorothiazide 25 mg Oral Daily Ron Oliva MD   hydrOXYzine HCL 50 mg Oral Q4H PRN Zygmunt Harrisburg, PA-C   ibuprofen 400 mg Oral Q6H PRN Zygmunt Harrisburg, PA-C   LORazepam 0 5 mg Oral Once Franchesca Reyes MD   melatonin 6 mg Oral HS PRN Birdaramis Rucker, PA-C   meloxicam 7 5 mg Oral Daily Merry Jackson MD   multivitamin with iron-minerals 15 mL Oral Daily Merry Jackson MD   nicotine polacrilex 2 mg Oral Q4H PRN Birdaramis Sanzack, PA-C   OLANZapine 10 mg Intramuscular Q4H PRN Birdaramis Sanzack, PA-C   OLANZapine 5 mg Oral Q4H PRN Satnam Rucker, PA-C   oxyCODONE 15 mg Oral Q4H PRN Merry Jackson MD   oxyCODONE 5 mg Oral Q6H PRN Merry Jackson MD   pantoprazole 40 mg Oral Early Morning Merry Jackson MD   SUMAtriptan 6 mg Subcutaneous Once PRN Ronel Cho MD   SUMAtriptan 100 mg Oral BID PRN Merry Jackson MD   traZODone 50 mg Oral HS PRN Satnam Rucker, PA-C       Risks / Benefits of Treatment:    Risks, benefits, and possible side effects of medications explained to patient and patient verbalizes understanding and agreement for treatment  Risks of medications in pregnancy explained if female patient  Patient verbalizes understanding and agrees to notify her doctor if she becomes pregnant  Counseling / Coordination of Care:    Patient's progress reviewed with nursing staff  Medications, treatment progress and treatment plan reviewed with patient      Jason Moreno MD 12/01/18

## 2018-12-01 NOTE — NURSING NOTE
Patient woke up at 0110 requesting her Trazadone due to the fact a new roommate was admitted late and disturbed her sleep  Minimally upset but agreed to go back to sleep with her ep aid  Patient in bed at this time and appears to be sleeping  Eyes closed and chest movements noted  Offered no complaints  Patient did not wake up to request any PRN medications during the night

## 2018-12-01 NOTE — PROGRESS NOTES
Patient threatening another female peer on the unit due to "her ass hanging out of her pants " Advised pt that this is inappropriate  Will monitor

## 2018-12-01 NOTE — PLAN OF CARE
Problem: Ineffective Coping  Goal: Cooperates with admission process  Interventions:   - Complete admission process   Outcome: Progressing    Goal: Identifies ineffective coping skills  Outcome: Progressing    Goal: Identifies healthy coping skills  Outcome: Progressing    Goal: Demonstrates healthy coping skills  Outcome: Progressing    Goal: Participates in unit activities  Interventions:  - Provide therapeutic environment   - Provide required programming   - Redirect inappropriate behaviors    Outcome: Progressing    Goal: Patient/Family participate in treatment and DC plans  Interventions:  - Provide therapeutic environment   Outcome: Progressing    Goal: Patient/Family verbalizes awareness of resources  Outcome: Progressing    Goal: Understands least restrictive measures  Interventions:  - Utilize least restrictive behavior   Outcome: Progressing    Goal: Free from restraint events  - Utilize least restrictive measures   - Provide behavioral interventions   - Redirect inappropriate behaviors    Outcome: Progressing      Problem: Risk for Violence/Aggression Toward Others  Goal: Treatment Goal: Refrain from acts of violence/aggression during length of stay, and demonstrate improved impulse control at the time of discharge  Outcome: Progressing    Goal: Verbalize thoughts and feelings  Interventions:  - Assess and re-assess patient's level of risk, every waking shift  - Engage patient in 1:1 interactions, daily, for a minimum of 15 minutes   - Allow patient to express feelings and frustrations in a safe and non-threatening manner   - Establish rapport/trust with patient    Outcome: Progressing    Goal: Refrain from harming others  Outcome: Progressing    Goal: Refrain from destructive acts on the environment or property  Outcome: Progressing    Goal: Control angry outbursts  Interventions:  - Monitor patient closely, per order  - Ensure early verbal de-escalation  - Monitor prn medication needs  - Set reasonable/therapeutic limits, outline behavioral expectations, and consequences   - Provide a non-threatening milieu, utilizing the least restrictive interventions    Outcome: Progressing    Goal: Attend and participate in unit activities, including therapeutic, recreational, and educational groups  Interventions:  - Provide therapeutic and educational activities daily, encourage attendance and participation, and document same in the medical record    Outcome: Progressing    Goal: Identify appropriate positive anger management techniques  Interventions:  - Offer anger management and coping skills groups   - Staff will provide positive feedback for appropriate anger control   Outcome: Progressing      Comments: Patient withdrawn to her room upon approach  Patient reports vomiting times 1 this morning  Unwitnessed  Patient denies anxiety, depression, S/HI,A/VH and reports 7/10 back pain and requested her PRN Oxy at this time  Given as requested  Patient has a 72 hour notice expiring 12/4/18  Patient expressing want to "leave today" Patient advised to speak with the doctor this morning  Will continue to monitor and provide therapeutic support

## 2018-12-02 PROCEDURE — 99232 SBSQ HOSP IP/OBS MODERATE 35: CPT | Performed by: PSYCHIATRY & NEUROLOGY

## 2018-12-02 RX ORDER — LANOLIN ALCOHOL/MO/W.PET/CERES
3 CREAM (GRAM) TOPICAL
Status: DISCONTINUED | OUTPATIENT
Start: 2018-12-02 | End: 2018-12-03 | Stop reason: HOSPADM

## 2018-12-02 RX ORDER — DOCUSATE SODIUM 100 MG/1
100 CAPSULE, LIQUID FILLED ORAL 2 TIMES DAILY
Status: DISCONTINUED | OUTPATIENT
Start: 2018-12-02 | End: 2018-12-02

## 2018-12-02 RX ADMIN — OXYCODONE HYDROCHLORIDE 15 MG: 5 TABLET ORAL at 21:06

## 2018-12-02 RX ADMIN — CARBAMAZEPINE 200 MG: 200 TABLET ORAL at 21:05

## 2018-12-02 RX ADMIN — OXYCODONE HYDROCHLORIDE 15 MG: 5 TABLET ORAL at 08:30

## 2018-12-02 RX ADMIN — BISACODYL 10 MG: 5 TABLET, COATED ORAL at 12:23

## 2018-12-02 RX ADMIN — VITAMIN D, TAB 1000IU (100/BT) 1000 UNITS: 25 TAB at 07:23

## 2018-12-02 RX ADMIN — NICOTINE POLACRILEX 2 MG: 2 GUM, CHEWING ORAL at 14:36

## 2018-12-02 RX ADMIN — OXYCODONE HYDROCHLORIDE 15 MG: 5 TABLET ORAL at 16:17

## 2018-12-02 RX ADMIN — MELOXICAM 7.5 MG: 15 TABLET ORAL at 08:03

## 2018-12-02 RX ADMIN — CARBAMAZEPINE 200 MG: 200 TABLET ORAL at 16:16

## 2018-12-02 RX ADMIN — NICOTINE POLACRILEX 2 MG: 2 GUM, CHEWING ORAL at 08:04

## 2018-12-02 RX ADMIN — PANTOPRAZOLE SODIUM 40 MG: 40 TABLET, DELAYED RELEASE ORAL at 06:53

## 2018-12-02 RX ADMIN — CARBAMAZEPINE 200 MG: 200 TABLET ORAL at 08:03

## 2018-12-02 RX ADMIN — OXYCODONE HYDROCHLORIDE 5 MG: 5 TABLET ORAL at 12:23

## 2018-12-02 RX ADMIN — SUMATRIPTAN SUCCINATE 100 MG: 50 TABLET ORAL at 08:31

## 2018-12-02 RX ADMIN — VITAMIN D, TAB 1000IU (100/BT) 1000 UNITS: 25 TAB at 16:16

## 2018-12-02 RX ADMIN — MELATONIN TAB 3 MG 3 MG: 3 TAB at 21:05

## 2018-12-02 RX ADMIN — NICOTINE POLACRILEX 2 MG: 2 GUM, CHEWING ORAL at 21:07

## 2018-12-02 NOTE — PLAN OF CARE
Problem: Ineffective Coping  Goal: Cooperates with admission process  Interventions:   - Complete admission process   Outcome: Progressing    Goal: Identifies ineffective coping skills  Outcome: Progressing    Goal: Identifies healthy coping skills  Outcome: Progressing    Goal: Demonstrates healthy coping skills  Outcome: Progressing    Goal: Participates in unit activities  Interventions:  - Provide therapeutic environment   - Provide required programming   - Redirect inappropriate behaviors    Outcome: Progressing  Attends groups  Goal: Patient/Family participate in treatment and DC plans  Interventions:  - Provide therapeutic environment   Outcome: Progressing    Goal: Patient/Family verbalizes awareness of resources  Outcome: Progressing    Goal: Understands least restrictive measures  Interventions:  - Utilize least restrictive behavior   Outcome: Progressing    Goal: Free from restraint events  - Utilize least restrictive measures   - Provide behavioral interventions   - Redirect inappropriate behaviors    Outcome: Progressing      Problem: Risk for Violence/Aggression Toward Others  Goal: Treatment Goal: Refrain from acts of violence/aggression during length of stay, and demonstrate improved impulse control at the time of discharge  Outcome: Progressing    Goal: Verbalize thoughts and feelings  Interventions:  - Assess and re-assess patient's level of risk, every waking shift  - Engage patient in 1:1 interactions, daily, for a minimum of 15 minutes   - Allow patient to express feelings and frustrations in a safe and non-threatening manner   - Establish rapport/trust with patient    Outcome: Progressing    Goal: Refrain from harming others  Outcome: Progressing    Goal: Refrain from destructive acts on the environment or property  Outcome: Progressing    Goal: Control angry outbursts  Interventions:  - Monitor patient closely, per order  - Ensure early verbal de-escalation  - Monitor prn medication needs  - Set reasonable/therapeutic limits, outline behavioral expectations, and consequences   - Provide a non-threatening milieu, utilizing the least restrictive interventions    Outcome: Progressing    Goal: Attend and participate in unit activities, including therapeutic, recreational, and educational groups  Interventions:  - Provide therapeutic and educational activities daily, encourage attendance and participation, and document same in the medical record    Outcome: Progressing  Attends groups  Goal: Identify appropriate positive anger management techniques  Interventions:  - Offer anger management and coping skills groups   - Staff will provide positive feedback for appropriate anger control   Outcome: Progressing      Comments: Patient reports 2/4 anxiety, 4/10 depression due "to the weather" and 8/10 neck and back pain  Denies S/HI, A/VH at this time  Patient requested imitrex for headache, Oxy for back pain and nicotine gum replacement  Given as requested  Med and meal compliant  Needy and demanding at times  Patient has a 72 hour pending expiration 12/4/18  Visible and social on the unit  Attended spirituality group this morning  Will continue to monitor and provide therapeutic support

## 2018-12-02 NOTE — NURSING NOTE
Patient reported she only had a small bm after her enema and would like tablets to help her go  Patient in bed at this time and appears to be sleeping  Eyes closed and chest movements noted  Offered no complaints  Patient did not wake up to request any PRN medications during the night

## 2018-12-02 NOTE — NURSING NOTE
Patient requested her enema after visiting hours was over and did it independently  Patient pleasant and cooperative with staff  Patient reported she will request additional prune juice and let RN know if she has any results tonight

## 2018-12-02 NOTE — PROGRESS NOTES
Progress Note - [address] 44 y o  female MRN: [id number]   Unit/Bed#: [facility] 377-01 Encounter: [id number]    Behavior over the last 24 hours: limited improvement  [facility] states she feels less depressed, but still has anxiety symptoms and somatic complaints  She also states she feels irritated and "aggravated" today  Compliant with medications  Attends some groups  Signed 72 hour notice yesterday  Sleep: slept off and on, frequent awakenings  Appetite: decreased  Medication side effects: No   ROS: reports constipation and headache, vomiting is resolved, denies any shortness of breath or chest pain    Mental Status Evaluation:    Appearance:  casually dressed   Behavior:  cooperative   Speech:  normal rate and volume   Mood:  anxious, somewhat irritable   Affect:  constricted   Thought Process:  organized, goal directed   Associations: intact associations   Thought Content:  no overt delusions   Perceptual Disturbances: no auditory hallucinations, no visual hallucinations   Risk Potential: Suicidal ideation - None  Homicidal ideation - None  Potential for aggression - Not at present   Sensorium:  oriented to person, place and time/date   Memory:  recent and remote memory grossly intact   Consciousness:  alert and awake   Attention: attention span and concentration appear shorter than expected for age   Insight:  partial   Judgment: partial   Gait/Station: normal gait/station and normal balance   Motor Activity: no abnormal movements     Vital signs in last 24 hours:    Temp:  [97 4 °F (36 3 °C)] 97 4 °F (36 3 °C)  HR:  [89-93] 93  Resp:  [18] 18  BP: (109-115)/(53-64) 115/53    Laboratory results:  I have personally reviewed all pertinent laboratory/tests results      Progress Toward Goals: limited progress, still anxious, somewhat irritable, not suicidal, working on coping skills    Assessment/Plan   Principal Problem:    Bipolar 2 disorder ([facility] )  Active Problems:    S/P laparoscopic sleeve gastrectomy    Low back pain    History of migraine    Recommended Treatment:     Planned medication and treatment changes: All current active medications have been reviewed  Encourage group therapy, milieu therapy and occupational therapy  Behavioral Health checks every 7 minutes  Will observe if safe for discharge once 72 hour notice expires  Add Melatonin 3 mg at bedtime to help with sleep  Dulcolax PRN added for constipation  Check Tegretol level, CBC/diff, lipid profile, hemoglobin A1C, TSH and RPR in the morning     Continue all other medications:    Current Facility-Administered Medications:  acetaminophen 650 mg Oral Q6H PRN Luvenia Mustache, PA-C   albuterol 2 puff Inhalation Q6H PRN Tao Mohan MD   benztropine 1 mg Oral Q4H PRN Luvenia Mustache, PA-C   bisacodyl 10 mg Oral Daily PRN Ayaka Mckeon MD   carBAMazepine 200 mg Oral TID Luvenia Mustache, PA-C   cholecalciferol 1,000 Units Oral BID Tao Mohan MD   diphenhydrAMINE 50 mg Intramuscular Q4H PRN Luvenia Mustache, PA-C   hydrochlorothiazide 25 mg Oral Daily Tao Mohan MD   hydrOXYzine HCL 50 mg Oral Q4H PRN Luvenia Mustache, PA-C   LORazepam 0 5 mg Oral Once Rita Blas MD   melatonin 3 mg Oral HS Ayaka Mckeon MD   melatonin 6 mg Oral HS PRN Luvenia Mustache, PA-C   meloxicam 7 5 mg Oral Daily Tao Mohan MD   multivitamin with iron-minerals 15 mL Oral Daily Tao Mohan MD   nicotine polacrilex 2 mg Oral Q4H PRN Luvenia Mustache, PA-C   OLANZapine 10 mg Intramuscular Q4H PRN Luvenia Mustache, PA-C   OLANZapine 5 mg Oral Q4H PRN Luvenia Mustache, PA-C   oxyCODONE 15 mg Oral Q4H PRN Ayaka Mckeon MD   oxyCODONE 5 mg Oral Q6H PRN Tao Mohan MD   pantoprazole 40 mg Oral Early Morning Tao Mohan MD   SUMAtriptan 6 mg Subcutaneous Once PRN Ronel Morelos MD   SUMAtriptan 100 mg Oral BID PRN Tao Mohan MD   traZODone 50 mg Oral HS PRN Luvenia Mustache, PA-C Risks / Benefits of Treatment:    Risks, benefits, and possible side effects of medications explained to patient and patient verbalizes understanding and agreement for treatment  Counseling / Coordination of Care:    Patient's progress reviewed with nursing staff  Medications, treatment progress and treatment plan reviewed with patient  Medication changes discussed with patient      Daniela Daley MD 12/02/18

## 2018-12-03 VITALS
SYSTOLIC BLOOD PRESSURE: 116 MMHG | OXYGEN SATURATION: 98 % | BODY MASS INDEX: 29.63 KG/M2 | TEMPERATURE: 97 F | DIASTOLIC BLOOD PRESSURE: 79 MMHG | WEIGHT: 161 LBS | RESPIRATION RATE: 16 BRPM | HEART RATE: 89 BPM | HEIGHT: 62 IN

## 2018-12-03 LAB
BASOPHILS # BLD AUTO: 0.1 THOUSANDS/ΜL (ref 0–0.1)
BASOPHILS NFR BLD AUTO: 1 % (ref 0–1)
CARBAMAZEPINE SERPL-MCNC: 10.1 UG/ML (ref 4–12)
CHOLEST SERPL-MCNC: 179 MG/DL
EOSINOPHIL # BLD AUTO: 0.1 THOUSAND/ΜL (ref 0–0.4)
EOSINOPHIL NFR BLD AUTO: 2 % (ref 0–6)
ERYTHROCYTE [DISTWIDTH] IN BLOOD BY AUTOMATED COUNT: 16.5 %
EST. AVERAGE GLUCOSE BLD GHB EST-MCNC: 120 MG/DL
HBA1C MFR BLD: 5.8 % (ref 4.2–6.3)
HCT VFR BLD AUTO: 40.3 % (ref 36–46)
HDLC SERPL-MCNC: 46 MG/DL (ref 40–59)
HGB BLD-MCNC: 12.6 G/DL (ref 12–16)
LDLC SERPL CALC-MCNC: 115 MG/DL
LYMPHOCYTES # BLD AUTO: 3.1 THOUSANDS/ΜL (ref 0.5–4)
LYMPHOCYTES NFR BLD AUTO: 46 % (ref 20–50)
MCH RBC QN AUTO: 25.6 PG (ref 26–34)
MCHC RBC AUTO-ENTMCNC: 31.1 G/DL (ref 31–36)
MCV RBC AUTO: 82 FL (ref 80–100)
MONOCYTES # BLD AUTO: 0.5 THOUSAND/ΜL (ref 0.2–0.9)
MONOCYTES NFR BLD AUTO: 8 % (ref 1–10)
NEUTROPHILS # BLD AUTO: 2.9 THOUSANDS/ΜL (ref 1.8–7.8)
NEUTS SEG NFR BLD AUTO: 43 % (ref 45–65)
NONHDLC SERPL-MCNC: 133 MG/DL
PLATELET # BLD AUTO: 405 THOUSANDS/UL (ref 150–450)
PMV BLD AUTO: 8.5 FL (ref 8.9–12.7)
RBC # BLD AUTO: 4.91 MILLION/UL (ref 4–5.2)
TRIGL SERPL-MCNC: 88 MG/DL
TSH SERPL DL<=0.05 MIU/L-ACNC: 0.68 UIU/ML (ref 0.47–4.68)
WBC # BLD AUTO: 6.7 THOUSAND/UL (ref 4.5–11)

## 2018-12-03 PROCEDURE — 86592 SYPHILIS TEST NON-TREP QUAL: CPT | Performed by: PSYCHIATRY & NEUROLOGY

## 2018-12-03 PROCEDURE — 83036 HEMOGLOBIN GLYCOSYLATED A1C: CPT | Performed by: PSYCHIATRY & NEUROLOGY

## 2018-12-03 PROCEDURE — 85025 COMPLETE CBC W/AUTO DIFF WBC: CPT | Performed by: PSYCHIATRY & NEUROLOGY

## 2018-12-03 PROCEDURE — 84443 ASSAY THYROID STIM HORMONE: CPT | Performed by: PSYCHIATRY & NEUROLOGY

## 2018-12-03 PROCEDURE — 80061 LIPID PANEL: CPT | Performed by: PSYCHIATRY & NEUROLOGY

## 2018-12-03 PROCEDURE — 80156 ASSAY CARBAMAZEPINE TOTAL: CPT | Performed by: PHYSICIAN ASSISTANT

## 2018-12-03 RX ORDER — MELOXICAM 7.5 MG/1
7.5 TABLET ORAL DAILY
Qty: 15 TABLET | Refills: 1 | Status: SHIPPED | OUTPATIENT
Start: 2018-12-03

## 2018-12-03 RX ORDER — CARBAMAZEPINE 200 MG/1
200 TABLET ORAL 3 TIMES DAILY
Qty: 45 TABLET | Refills: 1 | Status: SHIPPED | OUTPATIENT
Start: 2018-12-03

## 2018-12-03 RX ORDER — PANTOPRAZOLE SODIUM 40 MG/1
40 TABLET, DELAYED RELEASE ORAL
Qty: 15 TABLET | Refills: 1 | Status: SHIPPED | OUTPATIENT
Start: 2018-12-04 | End: 2019-01-02 | Stop reason: HOSPADM

## 2018-12-03 RX ORDER — HYDROCHLOROTHIAZIDE 25 MG/1
25 TABLET ORAL DAILY
Qty: 15 TABLET | Refills: 1 | Status: SHIPPED | OUTPATIENT
Start: 2018-12-03

## 2018-12-03 RX ADMIN — PANTOPRAZOLE SODIUM 40 MG: 40 TABLET, DELAYED RELEASE ORAL at 06:57

## 2018-12-03 RX ADMIN — OXYCODONE HYDROCHLORIDE 15 MG: 5 TABLET ORAL at 11:09

## 2018-12-03 RX ADMIN — BISACODYL 10 MG: 5 TABLET, COATED ORAL at 08:48

## 2018-12-03 RX ADMIN — NICOTINE POLACRILEX 2 MG: 2 GUM, CHEWING ORAL at 08:14

## 2018-12-03 RX ADMIN — MELOXICAM 7.5 MG: 15 TABLET ORAL at 08:02

## 2018-12-03 RX ADMIN — VITAMIN D, TAB 1000IU (100/BT) 1000 UNITS: 25 TAB at 08:02

## 2018-12-03 RX ADMIN — CARBAMAZEPINE 200 MG: 200 TABLET ORAL at 08:02

## 2018-12-03 RX ADMIN — SUMATRIPTAN SUCCINATE 100 MG: 50 TABLET ORAL at 11:07

## 2018-12-03 RX ADMIN — HYDROCHLOROTHIAZIDE 25 MG: 25 TABLET ORAL at 08:10

## 2018-12-03 NOTE — PLAN OF CARE
Problem: DISCHARGE PLANNING  Goal: Discharge to home or other facility with appropriate resources  INTERVENTIONS:  - Identify barriers to discharge w/patient and caregiver  - Deny SI, AH and depression   Symptoms will resolve or diminish upon discharge  - Comply with meds, attend 75% of group therapy, and identify positive coping skills  - Arrange for needed discharge resources and transportation as appropriate  - Identify discharge learning needs (meds, outpatient mental health services)  - Arrange for interpretive services to assist at discharge as needed  - Refer to Case Management Department for coordinating discharge planning if the patient needs post-hospital services based on physician/advanced practitioner order or complex needs related to functional status, cognitive ability, or social support system   Outcome: Progressing  Worker scheduled outpatient intake at [facility] on 12/6 at 9:30AM

## 2018-12-03 NOTE — PLAN OF CARE
Problem: DISCHARGE PLANNING  Goal: Discharge to home or other facility with appropriate resources  INTERVENTIONS:  - Identify barriers to discharge w/patient and caregiver  - Deny SI, AH and depression  Symptoms will resolve or diminish upon discharge  - Comply with meds, attend 75% of group therapy, and identify positive coping skills  - Arrange for needed discharge resources and transportation as appropriate  - Identify discharge learning needs (meds, outpatient mental health services)  - Arrange for interpretive services to assist at discharge as needed  - Refer to Case Management Department for coordinating discharge planning if the patient needs post-hospital services based on physician/advanced practitioner order or complex needs related to functional status, cognitive ability, or social support system   Outcome: Completed Date Met: 12/03/18  Pt to be discharged today  Pt denies SI/HI, AH/VH  Pt oriented x3  Pt to return home  Pt will be picked up by her   Pt to follow up with Ka[facility] on 12/6 at 9:30AM  Pt's meds filled at [address] [facility]

## 2018-12-03 NOTE — NURSING NOTE
Patient visible and cooperative all evening  Patient pleasant upon approach and comes to staff to have her needs met  Patient did report she has not gone to the bathroom to have a bowel movement other than a small hard stool yesterday   She reported feeling nauseous but was passing flatus and was hoping to go in the am

## 2018-12-03 NOTE — DISCHARGE SUMMARY
Psychiatric Discharge Summary    Medical Record Number: [id number]  Encounter: [id number]    Discharge diagnosis:  Bipolar 2 disorder ([place] [address] )    Secondary diagnoses:  Problem List     * (Principal)Bipolar 2 disorder ([place] [address] ) (Chronic)    S/P laparoscopic sleeve gastrectomy    Postsurgical malabsorption    Constipation    Tobacco dependence syndrome    Low back pain    History of migraine    Bicipital tendonitis of shoulder, right    Bowel dysfunction    Overview Signed 11/30/2018 10:00 PM by Delilah Harris MD     Last Assessment & Plan:   Reports she is having trouble with bowel movements, has gone 2-3 weeks without one    She has tried multiple treatments for constipation, has not seen any improvement    Scheduled to have a colonoscopy next month to further evaluate         Chronic lumbar radiculopathy    Chronic vaginitis    Overview Signed 11/30/2018 10:00 PM by Delilah Harris MD     Last Assessment & Plan:   Vaginal affirm done today    Patient will follow up with GYN about treatment of BV         Contact dermatitis    Convulsions ([facility] )    Overview Signed 11/30/2018 10:00 PM by Delilah Harris MD     Last Assessment & Plan:   Febrile seizure ([address] 106)         Female sexual dysfunction    History of kidney stones    Hypertonicity of bladder    Overview Signed 11/30/2018 10:00 PM by Delilah Harris MD     Last Assessment & Plan:   Pt has consultation with [facility] scheduled for may 2016  Until then, she was rec'd to restart ditropan for the urge component of her incontinence  I again urged her to undergo lifestyle modifications  Today during the visit she had a 20 oz Redbull  I rec'd her to cut down on caffeine and smoking  She agrees  I also rec'd her to maintain her appt with [facility]  Incomplete emptying of bladder    Overview Signed 11/30/2018 10:00 PM by Delilah Harris MD     Last Assessment & Plan:   Normal PVR today           Lumbar radiculopathy    Overview Signed 11/30/2018 10:00 PM by Vidal Lee MD     Overview:   Added automatically from request for surgery [id number]         Lumbar stenosis    Overview Signed 11/30/2018 10:00 PM by Vidal Lee MD     Overview:   Added automatically from request for surgery [id number]         Migraine with aura and without status migrainosus, not intractable    Mild intermittent asthma without complication    Mixed urge and stress incontinence    Overview Signed 11/30/2018 10:00 PM by Vidal Lee MD     Overview:   S/p sling for stress aspect of incontinence    Last Assessment & Plan:   Patient catheterized for urine specimen and PVR  Testing procedure reviewed with risks and benefits  Consent signed  UDS completed using aseptic technique  Urine dip: negative  Urine pregnancy test: n/a    Initial uroflow: 42 9cc            Catheterized: 20cc  Pessary: n/a  UDS  Instilled: 176cc                  Voided: 116 9cc  Negative detrusor overactivity  Detrusor pressure bob to a high of 83 3 cm/H20 during micturition  Positive stress tests  Able to void with catheter in place  Patient advised to schedule CT urogram to further evaluate her kidneys in the setting of elevated detrusor pressures noted during micturition, she was to complete this for [facility] workup as well which was never performed           Morbid obesity ([address] )    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    Neuralgia    OAB (overactive bladder)    Overview Signed 11/30/2018 10:00 PM by Vidal Lee MD     Last Assessment & Plan:   Recent worsening of urgency in the past week, we will follow up the urine tests    Patient reports a recent back injury before the onset of her symptoms    Patient advised to decrease bladder irritants and monitor fluid intake    Will reevaluate her symptoms when her urine is clear and her colonoscopy is done, if she is still experiencing bothersome OAB symptoms she will consider restarting oxybutynin or another OAB medication either during the day or at night    Return to the office in 3 months for a follow up/medication check with [facility]         Spinal stenosis of lumbar region    Overview Signed 11/30/2018 10:00 PM by Deya Carroll MD     Overview:   Added automatically from request for surgery [id number]         Tendinitis of right rotator cuff    Overview Signed 11/30/2018 10:00 PM by Deya Carroll MD     Transitioned From: Shoulder pain         Vitamin D deficiency          HPI:     Patient is a 71-year-old female with history of bipolar disorder who was admitted to the [address] [place] [address] on a 201 status due to mood lability and depression  Patient was having increased episodes of agitation and depression  Patient was starting to have suicidal thoughts  As result patient was deemed medically stable was then transferred for inpatient psychiatric treatment  Brief Hospital Course: Following admission to the unit patient was initiated on Tegretol to help stabilize her mood  Patient was tolerating the initiation of medication well however patient was reporting that only b i d  Dosing was not effective enough and decreasing her irritability  Patient was however indicating decrease in her depressive symptoms and have was no longer having any suicidal thoughts  Patient's Tegretol dosing was up titrated to 200 mg t i d  And this was found effective to help stabilize her mood  Patient was no longer having any mood lability  Patient was no longer irritable or depressed  Patient was with an appropriate affect  Patient was continuing to show no improvement  Patient was with no psychosis  Patient was with good sleep and appetite  Patient did report constipation during her hospitalization however does report a long history of experiencing such secondary to a hemorrhoidectomy  Patient was provided supportive measures  Patient signed a 72 hour notice    On December 3, 2018 patient was evaluated and deemed appropriate for discharge on this date to continue her care on an outpatient basis  Patient was educated on her psychotropic medications are regards to medications in the uses as well as potential adverse effects  Patient verbalizes consent  Patient was assessed at the time of discharge was deemed to be a low suicide risk  Patient was identifying that her family or her protective factors  Patient consented that she had any exacerbation of her symptoms she would merely contact Emergency Medical Services or return back to emergency department        Condition on discharge:     Improved    Medications Upon Discharge:       Current Facility-Administered Medications:     carBAMazepine (TEGretol) tablet 200 mg, 200 mg, Oral, TID, Jenni Vega PA-C, 200 mg at 12/02/18 2105    hydrochlorothiazide (HYDRODIURIL) tablet 25 mg, 25 mg, Oral, Daily, Juliano Magallon MD, 25 mg at 11/30/18 0834    meloxicam (MOBIC) tablet 7 5 mg, 7 5 mg, Oral, Daily, Juliano Magallon MD, 7 5 mg at 12/02/18 0803    pantoprazole (PROTONIX) EC tablet 40 mg, 40 mg, Oral, Early Morning, Juliano Magallon MD, 40 mg at 12/03/18 [facility]

## 2018-12-03 NOTE — PROGRESS NOTES
12/03/18 1000   Activity/Group Checklist   Group (Recovery group)   Attendance Attended   Attendance Duration (min) 46-60   Interactions Interacted appropriately   Affect/Mood Appropriate   Goals Achieved Verbalized increased hopefulness; Able to manage/cope with feelings; Able to reflect/comment on own behavior;Able to engage in interactions; Able to listen to others; Able to self-disclose; Identified distorted thoughts/beliefs   Patient came to group late on resentments the number one killer of addicts  She independently did her resentment list identifying herself and   She noted that she now recognizes she has a problem with alcohol which she did not believe before treatment  She also feels abandoned by her   She plans to utilize her aftercare provider for counseling

## 2018-12-03 NOTE — SOCIAL WORK
Pt to be discharged today  Pt denies SI/HI, AH/VH  Pt oriented x3  Pt to follow up with Kaity Katz on 12/6 at 9:30AM  Pt to return home  Pt's  will pick pt up  Pt completed discharge safety crisis plan and signed discharge tx plan  Pt's meds filled at [facility]  14-Jun-2017 10:48

## 2018-12-03 NOTE — PROGRESS NOTES
Patient irritable with a staff member this morning  Patient redirectable  Patient "looking forward to discharge today " Med and meal compliant  Will continue to monitor and provide therapeutic support,

## 2018-12-03 NOTE — NURSING NOTE
Patient and Vlda Watts reviewed discharge notes and patient aware of next dose due and outpatient appointments scheduled  Patient denies all psych symptoms at time of discharge  Denies SI  Patient has all paperwork, medications and belongings in possession at time of discharge  Patient being discharged to home and and is traveling by private vehicle  And is being discharged to home  Escorted off the unit by unit staff

## 2018-12-04 LAB — RPR SER QL: NORMAL

## 2019-01-02 ENCOUNTER — HOSPITAL ENCOUNTER (EMERGENCY)
Facility: HOSPITAL | Age: 44
Discharge: HOME | End: 2019-01-02
Attending: EMERGENCY MEDICINE | Admitting: EMERGENCY MEDICINE
Payer: COMMERCIAL

## 2019-01-02 ENCOUNTER — APPOINTMENT (EMERGENCY)
Dept: CT IMAGING | Facility: HOSPITAL | Age: 44
End: 2019-01-02
Payer: COMMERCIAL

## 2019-01-02 VITALS
TEMPERATURE: 97.7 F | OXYGEN SATURATION: 99 % | DIASTOLIC BLOOD PRESSURE: 78 MMHG | WEIGHT: 159.44 LBS | BODY MASS INDEX: 29.16 KG/M2 | RESPIRATION RATE: 18 BRPM | HEART RATE: 87 BPM | SYSTOLIC BLOOD PRESSURE: 112 MMHG

## 2019-01-02 DIAGNOSIS — K52.9 GASTROENTERITIS: Primary | ICD-10-CM

## 2019-01-02 DIAGNOSIS — N39.0 UTI (URINARY TRACT INFECTION): ICD-10-CM

## 2019-01-02 LAB
ALBUMIN SERPL BCP-MCNC: 4.4 G/DL (ref 3–5.2)
ALP SERPL-CCNC: 94 U/L (ref 43–122)
ALT SERPL W P-5'-P-CCNC: 33 U/L (ref 9–52)
ANION GAP SERPL CALCULATED.3IONS-SCNC: 9 MMOL/L (ref 5–14)
AST SERPL W P-5'-P-CCNC: 29 U/L (ref 14–36)
BACTERIA UR QL AUTO: ABNORMAL /HPF
BASOPHILS # BLD AUTO: 0 THOUSANDS/ΜL (ref 0–0.1)
BASOPHILS NFR BLD AUTO: 1 % (ref 0–1)
BILIRUB SERPL-MCNC: 0.4 MG/DL
BILIRUB UR QL STRIP: ABNORMAL
BUN SERPL-MCNC: 12 MG/DL (ref 5–25)
CALCIUM SERPL-MCNC: 9.4 MG/DL (ref 8.4–10.2)
CHLORIDE SERPL-SCNC: 104 MMOL/L (ref 97–108)
CLARITY UR: ABNORMAL
CO2 SERPL-SCNC: 27 MMOL/L (ref 22–30)
COLOR UR: ABNORMAL
CREAT SERPL-MCNC: 0.55 MG/DL (ref 0.6–1.2)
EOSINOPHIL # BLD AUTO: 0 THOUSAND/ΜL (ref 0–0.4)
EOSINOPHIL NFR BLD AUTO: 1 % (ref 0–6)
ERYTHROCYTE [DISTWIDTH] IN BLOOD BY AUTOMATED COUNT: 17.4 %
EXT PREG TEST URINE: NEGATIVE
GFR SERPL CREATININE-BSD FRML MDRD: 116 ML/MIN/1.73SQ M
GLUCOSE SERPL-MCNC: 99 MG/DL (ref 70–99)
GLUCOSE UR STRIP-MCNC: NEGATIVE MG/DL
HCT VFR BLD AUTO: 45 % (ref 36–46)
HGB BLD-MCNC: 14.5 G/DL (ref 12–16)
HGB UR QL STRIP.AUTO: 50
KETONES UR STRIP-MCNC: ABNORMAL MG/DL
LEUKOCYTE ESTERASE UR QL STRIP: 25
LIPASE SERPL-CCNC: 49 U/L (ref 23–300)
LYMPHOCYTES # BLD AUTO: 1 THOUSANDS/ΜL (ref 0.5–4)
LYMPHOCYTES NFR BLD AUTO: 22 % (ref 25–45)
MCH RBC QN AUTO: 26.3 PG (ref 26–34)
MCHC RBC AUTO-ENTMCNC: 32.3 G/DL (ref 31–36)
MCV RBC AUTO: 81 FL (ref 80–100)
MONOCYTES # BLD AUTO: 0.2 THOUSAND/ΜL (ref 0.2–0.9)
MONOCYTES NFR BLD AUTO: 5 % (ref 1–10)
MUCOUS THREADS UR QL AUTO: ABNORMAL
NEUTROPHILS # BLD AUTO: 3.4 THOUSANDS/ΜL (ref 1.8–7.8)
NEUTS SEG NFR BLD AUTO: 72 % (ref 45–65)
NITRITE UR QL STRIP: NEGATIVE
NON-SQ EPI CELLS URNS QL MICRO: ABNORMAL /HPF
PH UR STRIP.AUTO: 6 [PH] (ref 4.5–8)
PLATELET # BLD AUTO: 282 THOUSANDS/UL (ref 150–450)
PMV BLD AUTO: 8.5 FL (ref 8.9–12.7)
POTASSIUM SERPL-SCNC: 4 MMOL/L (ref 3.6–5)
PROT SERPL-MCNC: 7.5 G/DL (ref 5.9–8.4)
PROT UR STRIP-MCNC: ABNORMAL MG/DL
RBC # BLD AUTO: 5.52 MILLION/UL (ref 4–5.2)
RBC #/AREA URNS AUTO: ABNORMAL /HPF
SODIUM SERPL-SCNC: 140 MMOL/L (ref 137–147)
SP GR UR STRIP.AUTO: 1.02 (ref 1–1.04)
TROPONIN I SERPL-MCNC: <0.01 NG/ML (ref 0–0.03)
UROBILINOGEN UA: 8 MG/DL
WBC # BLD AUTO: 4.7 THOUSAND/UL (ref 4.5–11)
WBC #/AREA URNS AUTO: ABNORMAL /HPF

## 2019-01-02 PROCEDURE — 81001 URINALYSIS AUTO W/SCOPE: CPT | Performed by: PHYSICIAN ASSISTANT

## 2019-01-02 PROCEDURE — 96374 THER/PROPH/DIAG INJ IV PUSH: CPT

## 2019-01-02 PROCEDURE — 36415 COLL VENOUS BLD VENIPUNCTURE: CPT | Performed by: PHYSICIAN ASSISTANT

## 2019-01-02 PROCEDURE — 99284 EMERGENCY DEPT VISIT MOD MDM: CPT

## 2019-01-02 PROCEDURE — 96375 TX/PRO/DX INJ NEW DRUG ADDON: CPT

## 2019-01-02 PROCEDURE — 81025 URINE PREGNANCY TEST: CPT | Performed by: PHYSICIAN ASSISTANT

## 2019-01-02 PROCEDURE — 74177 CT ABD & PELVIS W/CONTRAST: CPT

## 2019-01-02 PROCEDURE — 83690 ASSAY OF LIPASE: CPT | Performed by: PHYSICIAN ASSISTANT

## 2019-01-02 PROCEDURE — 84484 ASSAY OF TROPONIN QUANT: CPT | Performed by: PHYSICIAN ASSISTANT

## 2019-01-02 PROCEDURE — 81003 URINALYSIS AUTO W/O SCOPE: CPT | Performed by: PHYSICIAN ASSISTANT

## 2019-01-02 PROCEDURE — 93005 ELECTROCARDIOGRAM TRACING: CPT

## 2019-01-02 PROCEDURE — 80053 COMPREHEN METABOLIC PANEL: CPT | Performed by: PHYSICIAN ASSISTANT

## 2019-01-02 PROCEDURE — 85025 COMPLETE CBC W/AUTO DIFF WBC: CPT | Performed by: PHYSICIAN ASSISTANT

## 2019-01-02 PROCEDURE — 96361 HYDRATE IV INFUSION ADD-ON: CPT

## 2019-01-02 RX ORDER — NITROFURANTOIN 25; 75 MG/1; MG/1
100 CAPSULE ORAL 2 TIMES DAILY
Qty: 10 CAPSULE | Refills: 0 | Status: SHIPPED | OUTPATIENT
Start: 2019-01-02

## 2019-01-02 RX ORDER — SODIUM CHLORIDE 9 MG/ML
250 INJECTION, SOLUTION INTRAVENOUS CONTINUOUS
Status: DISCONTINUED | OUTPATIENT
Start: 2019-01-02 | End: 2019-01-02 | Stop reason: HOSPADM

## 2019-01-02 RX ORDER — ONDANSETRON 4 MG/1
4 TABLET, FILM COATED ORAL EVERY 6 HOURS
Qty: 12 TABLET | Refills: 0 | Status: SHIPPED | OUTPATIENT
Start: 2019-01-02

## 2019-01-02 RX ORDER — OXYCODONE HYDROCHLORIDE 15 MG/1
15 TABLET, FILM COATED, EXTENDED RELEASE ORAL EVERY 12 HOURS SCHEDULED
COMMUNITY

## 2019-01-02 RX ORDER — FAMOTIDINE 20 MG/1
20 TABLET, FILM COATED ORAL 2 TIMES DAILY
Qty: 30 TABLET | Refills: 0 | Status: SHIPPED | OUTPATIENT
Start: 2019-01-02

## 2019-01-02 RX ORDER — AMITRIPTYLINE HYDROCHLORIDE 100 MG/1
100 TABLET, FILM COATED ORAL
COMMUNITY

## 2019-01-02 RX ORDER — ONDANSETRON 2 MG/ML
4 INJECTION INTRAMUSCULAR; INTRAVENOUS ONCE
Status: COMPLETED | OUTPATIENT
Start: 2019-01-02 | End: 2019-01-02

## 2019-01-02 RX ORDER — SUCRALFATE 1 G/1
1 TABLET ORAL 4 TIMES DAILY
Qty: 40 TABLET | Refills: 0 | Status: SHIPPED | OUTPATIENT
Start: 2019-01-02

## 2019-01-02 RX ORDER — OMEPRAZOLE 40 MG/1
40 CAPSULE, DELAYED RELEASE ORAL DAILY
COMMUNITY

## 2019-01-02 RX ADMIN — SODIUM CHLORIDE 250 ML/HR: 0.9 INJECTION, SOLUTION INTRAVENOUS at 14:00

## 2019-01-02 RX ADMIN — MORPHINE SULFATE 2 MG: 2 INJECTION, SOLUTION INTRAMUSCULAR; INTRAVENOUS at 14:37

## 2019-01-02 RX ADMIN — FAMOTIDINE 20 MG: 10 INJECTION INTRAVENOUS at 14:32

## 2019-01-02 RX ADMIN — IOHEXOL 50 ML: 240 INJECTION, SOLUTION INTRATHECAL; INTRAVASCULAR; INTRAVENOUS; ORAL at 15:02

## 2019-01-02 RX ADMIN — IOHEXOL 100 ML: 350 INJECTION, SOLUTION INTRAVENOUS at 17:33

## 2019-01-02 RX ADMIN — ONDANSETRON HYDROCHLORIDE 4 MG: 2 INJECTION, SOLUTION INTRAMUSCULAR; INTRAVENOUS at 14:01

## 2019-01-02 NOTE — ED PROVIDER NOTES
History  Chief Complaint   Patient presents with    Abdominal Pain       History provided by:  Patient   used: No    Medical Problem   Location:  Pt with upset stomach nausea vomiting diarrhea stomach cramping   Severity:  Moderate  Onset quality:  Gradual  Duration:  2 days  Timing:  Constant  Progression:  Unchanged  Chronicity:  New  Associated symptoms: abdominal pain, diarrhea, nausea and vomiting    Associated symptoms: no chest pain, no congestion, no cough, no ear pain, no fatigue, no fever, no headaches, no loss of consciousness, no myalgias, no rash, no rhinorrhea, no shortness of breath, no sore throat and no wheezing        Prior to Admission Medications   Prescriptions Last Dose Informant Patient Reported? Taking? Multiple Vitamins-Minerals (MULTIVITAMIN ADULT EXTRA C PO)   Yes Yes   Sig: Take 1 tablet by mouth   albuterol (PROVENTIL HFA,VENTOLIN HFA) 90 mcg/act inhaler   Yes No   Sig: Inhale 2 puffs every 6 (six) hours as needed for wheezing Indications: patient unsure of correct dosage/frequency     amitriptyline (ELAVIL) 100 mg tablet  Self Yes Yes   Sig: Take 100 mg by mouth daily at bedtime   carBAMazepine (TEGretol) 200 mg tablet Not Taking at Unknown time  No No   Sig: Take 1 tablet (200 mg total) by mouth 3 (three) times a day   Patient not taking: Reported on 1/2/2019    cholecalciferol 1000 units tablet   No Yes   Sig: Take 1 tablet (1,000 Units total) by mouth 2 (two) times a day   hydrochlorothiazide (HYDRODIURIL) 25 mg tablet Not Taking at Unknown time  No No   Sig: Take 1 tablet (25 mg total) by mouth daily   Patient not taking: Reported on 1/2/2019    meloxicam (MOBIC) 7 5 mg tablet   No Yes   Sig: Take 1 tablet (7 5 mg total) by mouth daily   omeprazole (PriLOSEC) 40 MG capsule  Self Yes Yes   Sig: Take 40 mg by mouth daily   oxyCODONE (OxyCONTIN) 15 mg 12 hr tablet  Self Yes Yes   Sig: Take 15 mg by mouth every 12 (twelve) hours      Facility-Administered Medications: None       Past Medical History:   Diagnosis Date    Asthma     Bipolar disorder ([address] )     Migraine     Psychiatric disorder     PTSD (post-traumatic stress disorder)     Seizures (HCC)        Past Surgical History:   Procedure Laterality Date    APPENDECTOMY      BLADDER SURGERY      CHOLECYSTECTOMY      GASTRECTOMY      gastric sleeve    GASTRIC RESTRICTION SURGERY      gastric sleeve in Jan 22 2018    HYSTERECTOMY      TUBAL LIGATION         Family History   Problem Relation Age of Onset    Diabetes Mother         MELLITUS    Diabetes Father         MELLITUS    Diabetes Other         MELLITUS     I have reviewed and agree with the history as documented  Social History   Substance Use Topics    Smoking status: Current Every Day Smoker    Smokeless tobacco: Never Used      Comment: on nicotine patch    Alcohol use Yes      Comment: ocassionally        Review of Systems   Constitutional: Negative  Negative for fatigue and fever  HENT: Negative  Negative for congestion, ear pain, rhinorrhea and sore throat  Eyes: Negative  Respiratory: Negative  Negative for cough, shortness of breath and wheezing  Cardiovascular: Negative  Negative for chest pain  Gastrointestinal: Positive for abdominal pain, diarrhea, nausea and vomiting  Endocrine: Negative  Genitourinary: Negative  Musculoskeletal: Negative  Negative for myalgias  Skin: Negative  Negative for rash  Allergic/Immunologic: Negative  Neurological: Negative  Negative for loss of consciousness and headaches  Hematological: Negative  Psychiatric/Behavioral: Negative  All other systems reviewed and are negative  Physical Exam  Physical Exam   Constitutional: She is oriented to person, place, and time  She appears well-developed and well-nourished  HENT:   Head: Normocephalic and atraumatic     Right Ear: External ear normal    Left Ear: External ear normal    Nose: Nose normal  Mouth/Throat: Oropharynx is clear and moist    Eyes: Pupils are equal, round, and reactive to light  Conjunctivae and EOM are normal    Neck: Normal range of motion  Neck supple  Cardiovascular: Normal rate, regular rhythm, normal heart sounds and intact distal pulses  Pulmonary/Chest: Effort normal and breath sounds normal    Abdominal: Soft  Bowel sounds are normal  There is tenderness  There is no rebound and no guarding  midepigatric and suprapubic tenderness    Musculoskeletal: Normal range of motion  Neurological: She is alert and oriented to person, place, and time  Psychiatric: She has a normal mood and affect  Her behavior is normal    Nursing note and vitals reviewed        Vital Signs  ED Triage Vitals [01/02/19 1315]   Temperature Pulse Respirations Blood Pressure SpO2   97 7 °F (36 5 °C) 78 20 123/88 98 %      Temp Source Heart Rate Source Patient Position - Orthostatic VS BP Location FiO2 (%)   Tympanic Monitor Sitting Left arm --      Pain Score       8           Vitals:    01/02/19 1315 01/02/19 1439 01/02/19 1722 01/02/19 1846   BP: 123/88 126/75 120/76 112/78   Pulse: 78 79 88 87   Patient Position - Orthostatic VS: Sitting Lying Lying Lying       Visual Acuity      ED Medications  Medications   ondansetron (ZOFRAN) injection 4 mg (4 mg Intravenous Given 1/2/19 1401)   morphine injection 2 mg (2 mg Intravenous Given 1/2/19 1437)   famotidine (PEPCID) injection 20 mg (20 mg Intravenous Given 1/2/19 1432)   iohexol (OMNIPAQUE) 240 MG/ML solution 50 mL (50 mL Oral Given 1/2/19 1502)   iohexol (OMNIPAQUE) 350 MG/ML injection (MULTI-DOSE) 100 mL (100 mL Intravenous Given 1/2/19 1733)       Diagnostic Studies  Results Reviewed     Procedure Component Value Units Date/Time    Troponin I [[id number]]  (Normal) Collected:  01/02/19 1400    Lab Status:  Final result Specimen:  Blood from Arm, Right Updated:  01/02/19 1434     Troponin I <0 01 ng/mL     Lipase [[id number]]  (Normal) Collected: 01/02/19 1400    Lab Status:  Final result Specimen:  Blood from Arm, Right Updated:  01/02/19 1425     Lipase 49 u/L     Comprehensive metabolic panel [[id number]]  (Abnormal) Collected:  01/02/19 1400    Lab Status:  Final result Specimen:  Blood from Arm, Right Updated:  01/02/19 1425     Sodium 140 mmol/L      Potassium 4 0 mmol/L      Chloride 104 mmol/L      CO2 27 mmol/L      ANION GAP 9 mmol/L      BUN 12 mg/dL      Creatinine 0 55 (L) mg/dL      Glucose 99 mg/dL      Calcium 9 4 mg/dL      AST 29 U/L      ALT 33 U/L      Alkaline Phosphatase 94 U/L      Total Protein 7 5 g/dL      Albumin 4 4 g/dL      Total Bilirubin 0 40 mg/dL      eGFR 116 ml/min/1 73sq m     Narrative:         National Kidney Disease Education Program recommendations are as follows:  GFR calculation is accurate only with a steady state creatinine  Chronic Kidney disease less than 60 ml/min/1 73 sq  meters  Kidney failure less than 15 ml/min/1 73 sq  meters      Urine Microscopic [[id number]]  (Abnormal) Collected:  01/02/19 1400    Lab Status:  Final result Specimen:  Urine from Urine, Clean Catch Updated:  01/02/19 1421     RBC, UA 4-10 (A) /hpf      WBC, UA 1-2 (A) /hpf      Epithelial Cells Moderate (A) /hpf      Bacteria, UA Occasional /hpf      MUCUS THREADS Moderate (A)    UA w Reflex to Microscopic w Reflex to Culture [[id number]]  (Abnormal) Collected:  01/02/19 1400    Lab Status:  Final result Specimen:  Urine from Urine, Clean Catch Updated:  01/02/19 1415     Color, UA Brown (A)     Clarity, UA Slightly Cloudy (A)     Specific Gravity, UA 1 020     pH, UA 6 0     Leukocytes, UA 25 0 (A)     Nitrite, UA Negative     Protein, UA 30 (1+) (A) mg/dl      Glucose, UA Negative mg/dl      Ketones, UA 5 (Trace) (A) mg/dl      Bilirubin, UA 1 mg/dL (A)     Blood, UA 50 0 (A)     UROBILINOGEN UA 8 0 (A) mg/dL     CBC and differential [[id number]]  (Abnormal) Collected:  01/02/19 1400    Lab Status:  Final result Specimen:  Blood from Arm, Right Updated:  01/02/19 1414     WBC 4 70 Thousand/uL      RBC 5 52 (H) Million/uL      Hemoglobin 14 5 g/dL      Hematocrit 45 0 %      MCV 81 fL      MCH 26 3 pg      MCHC 32 3 g/dL      RDW 17 4 (H) %      MPV 8 5 (L) fL      Platelets 316 Thousands/uL      Neutrophils Relative 72 (H) %      Lymphocytes Relative 22 (L) %      Monocytes Relative 5 %      Eosinophils Relative 1 %      Basophils Relative 1 %      Neutrophils Absolute 3 40 Thousands/µL      Lymphocytes Absolute 1 00 Thousands/µL      Monocytes Absolute 0 20 Thousand/µL      Eosinophils Absolute 0 00 Thousand/µL      Basophils Absolute 0 00 Thousands/µL     Stool Enteric Bacterial Panel by PCR [[id number]] Updated:  01/02/19 1406    Lab Status:  No result Specimen:  Stool from Rectum     POCT pregnancy, urine [[id number]]  (Normal) Resulted:  01/02/19 1344    Lab Status:  Edited Result - FINAL Updated:  01/02/19 1345     EXT PREG TEST UR (Ref: Negative) negative                 CT abdomen pelvis with contrast   Final Result by Gunnar Fontaine MD (01/02 1744)      No acute intra-abdominal abnormality  Workstation performed: [id number]                    Procedures  Procedures       Phone Contacts  ED Phone Contact    ED Course                               MDM  CritCare Time    Disposition  Final diagnoses:   Gastroenteritis   UTI (urinary tract infection)     Time reflects when diagnosis was documented in both MDM as applicable and the Disposition within this note     Time User Action Codes Description Comment    1/2/2019  6:32 PM Jerrylevon Goodman  Add [K52 9] Gastroenteritis     1/2/2019  6:32 PM La Carrera Latosha  Add [N39 0] UTI (urinary tract infection)       ED Disposition     ED Disposition Condition Comment    Discharge  Michelle Soriano discharge to home/self care      Condition at discharge: Good        Follow-up Information     Follow up With Specialties Details Why [address] MD Family Medicine Schedule an appointment as soon as possible for a visit  [address] [facility] [address]             Discharge Medication List as of 1/2/2019  6:33 PM      START taking these medications    Details   famotidine (PEPCID) 20 mg tablet Take 1 tablet (20 mg total) by mouth 2 (two) times a day, Starting Wed 1/2/2019, Print      nitrofurantoin (MACROBID) 100 mg capsule Take 1 capsule (100 mg total) by mouth 2 (two) times a day, Starting Wed 1/2/2019, Print      ondansetron (ZOFRAN) 4 mg tablet Take 1 tablet (4 mg total) by mouth every 6 (six) hours, Starting Wed 1/2/2019, Print      sucralfate (CARAFATE) 1 g tablet Take 1 tablet (1 g total) by mouth 4 (four) times a day, Starting Wed 1/2/2019, Print         CONTINUE these medications which have NOT CHANGED    Details   amitriptyline (ELAVIL) 100 mg tablet Take 100 mg by mouth daily at bedtime, Historical Med      cholecalciferol 1000 units tablet Take 1 tablet (1,000 Units total) by mouth 2 (two) times a day, Starting Mon 12/3/2018, Print      meloxicam (MOBIC) 7 5 mg tablet Take 1 tablet (7 5 mg total) by mouth daily, Starting Mon 12/3/2018, Print      Multiple Vitamins-Minerals (MULTIVITAMIN ADULT EXTRA C PO) Take 1 tablet by mouth, Historical Med      omeprazole (PriLOSEC) 40 MG capsule Take 40 mg by mouth daily, Historical Med      oxyCODONE (OxyCONTIN) 15 mg 12 hr tablet Take 15 mg by mouth every 12 (twelve) hours, Historical Med      albuterol (PROVENTIL HFA,VENTOLIN HFA) 90 mcg/act inhaler Inhale 2 puffs every 6 (six) hours as needed for wheezing Indications: patient unsure of correct dosage/frequency  , Until Discontinued, Historical Med      carBAMazepine (TEGretol) 200 mg tablet Take 1 tablet (200 mg total) by mouth 3 (three) times a day, Starting Mon 12/3/2018, Print      hydrochlorothiazide (HYDRODIURIL) 25 mg tablet Take 1 tablet (25 mg total) by mouth daily, Starting Mon 12/3/2018, Print           No discharge procedures on file      ED Provider  Electronically Signed by           Camille Blanco PA-C  01/02/19 2379

## 2019-01-02 NOTE — DISCHARGE INSTRUCTIONS
Gastroenteritis   WHAT YOU NEED TO KNOW:   Gastroenteritis, or stomach flu, is an infection of the stomach and intestines  DISCHARGE INSTRUCTIONS:   Call 911 for any of the following:   · You have trouble breathing or a very fast pulse  Seek care immediately if:   · You see blood in your diarrhea  · You cannot stop vomiting  · You have not urinated for 12 hours  · You feel like you are going to faint  Contact your healthcare provider if:   · You have a fever  · You continue to vomit or have diarrhea, even after treatment  · You see worms in your diarrhea  · Your mouth or eyes are dry  You are not urinating as much or as often  · You have questions or concerns about your condition or care  Medicines:   · Medicines  may be given to stop vomiting or diarrhea, decrease abdominal cramps, or treat an infection  · Take your medicine as directed  Contact your healthcare provider if you think your medicine is not helping or if you have side effects  Tell him or her if you are allergic to any medicine  Keep a list of the medicines, vitamins, and herbs you take  Include the amounts, and when and why you take them  Bring the list or the pill bottles to follow-up visits  Carry your medicine list with you in case of an emergency  Manage your symptoms:   · Drink liquids as directed  Ask your healthcare provider how much liquid to drink each day, and which liquids are best for you  You may also need to drink an oral rehydration solution (ORS)  An ORS has the right amounts of sugar, salt, and minerals in water to replace body fluids  · Eat bland foods  When you feel hungry, begin eating soft, bland foods  Examples are bananas, clear soup, potatoes, and applesauce  Do not have dairy products, alcohol, sugary drinks, or drinks with caffeine until you feel better  · Rest as much as possible  Slowly start to do more each day when you begin to feel better    Prevent the spread of gastroenteritis:  Gastroenteritis can spread easily  Keep yourself, your family, and your surroundings clean to help prevent the spread of gastroenteritis:  · Wash your hands often  Use soap and water  Wash your hands after you use the bathroom, change a child's diapers, or sneeze  Wash your hands before you prepare or eat food  · Clean surfaces and do laundry often  Wash your clothes and towels separately from the rest of the laundry  Clean surfaces in your home with antibacterial  or bleach  · Clean food thoroughly and cook safely  Wash raw vegetables before you cook  Cook meat, fish, and eggs fully  Do not use the same dishes for raw meat as you do for other foods  Refrigerate any leftover food immediately  · Be aware when you camp or travel  Drink only clean water  Do not drink from rivers or lakes unless you purify or boil the water first  When you travel, drink bottled water and do not add ice  Do not eat fruit that has not been peeled  Do not eat raw fish or meat that is not fully cooked  Follow up with your healthcare provider as directed:  Write down your questions so you remember to ask them during your visits  © 2017 [address] Information is for End User's use only and may not be sold, redistributed or otherwise used for commercial purposes  All illustrations and images included in CareNotes® are the copyrighted property of [employer]  or [employer]  The above information is an  only  It is not intended as medical advice for individual conditions or treatments  Talk to your doctor, nurse or pharmacist before following any medical regimen to see if it is safe and effective for you  Urinary Tract Infection in Women, Ambulatory Care   GENERAL INFORMATION:   A urinary tract infection (UTI)  is caused by bacteria that get inside your urinary tract  Most bacteria that enter your urinary tract are expelled when you urinate   If the bacteria stay in your urinary tract, you may get an infection  Your urinary tract includes your kidneys, ureters, bladder, and urethra  Urine is made in your kidneys, and it flows from the ureters to the bladder  Urine leaves the bladder through the urethra  A UTI is more common in your lower urinary tract, which includes your bladder and urethra  Common symptoms include the following:   · Urinating more often or waking from sleep to urinate    · Pain or burning when you urinate    · Pain or pressure in your lower abdomen     · Urine that smells bad    · Blood in your urine    · Leaking urine  Seek immediate care for the following symptoms:   · Urinating very little or not at all    · Vomiting    · Shaking chills with a fever    · Side or back pain that gets worse  Treatment for a UTI  may include medicines to treat a bacterial infection  You may also need medicines to decrease pain and burning, or decrease the urge to urinate often  Prevent a UTI:   · Urinate when you feel the urge  Do not hold your urine  Urinate as soon as you feel you have to  · Drink liquids as directed  Ask how much liquid to drink each day and which liquids are best for you  You may need to drink more fluids than usual to help flush out the bacteria  Do not drink alcohol, caffeine, and citrus juices  These can irritate your bladder and increase your symptoms  · Apply heat  on your abdomen for 20 to 30 minutes every 2 hours for as many days as directed  Heat helps decrease discomfort and pressure in your bladder  Follow up with your healthcare provider as directed:  Write down your questions so you remember to ask them during your visits  CARE AGREEMENT:   You have the right to help plan your care  Learn about your health condition and how it may be treated  Discuss treatment options with your caregivers to decide what care you want to receive  You always have the right to refuse treatment   The above information is an  only  It is not intended as medical advice for individual conditions or treatments  Talk to your doctor, nurse or pharmacist before following any medical regimen to see if it is safe and effective for you  © 2014 [address] is for End User's use only and may not be sold, redistributed or otherwise used for commercial purposes  All illustrations and images included in CareNotes® are the copyrighted property of [employer]  or [employer]

## 2019-01-02 NOTE — ED TRIAGE NOTES
Reports she had gastric sleeve done in Jan 22,2018 done by Justyna Lopez  Reports since Monday she has been having abd pain, n/v/d   Denies fevers but reports chills

## 2019-01-03 LAB
ATRIAL RATE: 93 BPM
P AXIS: 58 DEGREES
PR INTERVAL: 132 MS
QRS AXIS: 56 DEGREES
QRSD INTERVAL: 88 MS
QT INTERVAL: 356 MS
QTC INTERVAL: 442 MS
T WAVE AXIS: 54 DEGREES
VENTRICULAR RATE: 93 BPM

## 2019-01-03 PROCEDURE — 93010 ELECTROCARDIOGRAM REPORT: CPT | Performed by: INTERNAL MEDICINE

## 2019-06-24 ENCOUNTER — APPOINTMENT (EMERGENCY)
Dept: CT IMAGING | Facility: HOSPITAL | Age: 44
End: 2019-06-24
Payer: COMMERCIAL

## 2019-06-24 ENCOUNTER — APPOINTMENT (EMERGENCY)
Dept: RADIOLOGY | Facility: HOSPITAL | Age: 44
End: 2019-06-24
Payer: COMMERCIAL

## 2019-06-24 ENCOUNTER — HOSPITAL ENCOUNTER (EMERGENCY)
Facility: HOSPITAL | Age: 44
Discharge: HOME | End: 2019-06-24
Attending: EMERGENCY MEDICINE | Admitting: EMERGENCY MEDICINE
Payer: COMMERCIAL

## 2019-06-24 VITALS
RESPIRATION RATE: 14 BRPM | TEMPERATURE: 97.7 F | HEART RATE: 70 BPM | OXYGEN SATURATION: 99 % | WEIGHT: 152 LBS | BODY MASS INDEX: 27.8 KG/M2 | SYSTOLIC BLOOD PRESSURE: 120 MMHG | DIASTOLIC BLOOD PRESSURE: 72 MMHG

## 2019-06-24 DIAGNOSIS — M54.9 BACK PAIN: ICD-10-CM
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
## 2019-06-24 DIAGNOSIS — R55 SYNCOPE: Primary | ICD-10-CM

## 2019-06-24 DIAGNOSIS — S63.501A SPRAIN OF RIGHT WRIST, INITIAL ENCOUNTER: ICD-10-CM

## 2019-06-24 DIAGNOSIS — S33.5XXA SPRAIN OF LOW BACK, INITIAL ENCOUNTER: ICD-10-CM

## 2019-06-24 LAB
ALBUMIN SERPL BCP-MCNC: 4.4 G/DL (ref 3–5.2)
ALP SERPL-CCNC: 76 U/L (ref 43–122)
ALT SERPL W P-5'-P-CCNC: 26 U/L (ref 9–52)
ANION GAP SERPL CALCULATED.3IONS-SCNC: 7 MMOL/L (ref 5–14)
APTT PPP: 25 SECONDS (ref 23–34)
AST SERPL W P-5'-P-CCNC: 22 U/L (ref 14–36)
BACTERIA UR QL AUTO: ABNORMAL /HPF
BASOPHILS # BLD AUTO: 0.1 THOUSANDS/ΜL (ref 0–0.1)
BASOPHILS NFR BLD AUTO: 1 % (ref 0–1)
BILIRUB SERPL-MCNC: 0.5 MG/DL
BILIRUB UR QL STRIP: NEGATIVE
BUN SERPL-MCNC: 16 MG/DL (ref 5–25)
CALCIUM SERPL-MCNC: 10 MG/DL (ref 8.4–10.2)
CHLORIDE SERPL-SCNC: 104 MMOL/L (ref 97–108)
CK SERPL-CCNC: 65 U/L (ref 30–135)
CLARITY UR: ABNORMAL
CO2 SERPL-SCNC: 30 MMOL/L (ref 22–30)
COLOR UR: ABNORMAL
CREAT SERPL-MCNC: 0.76 MG/DL (ref 0.6–1.2)
EOSINOPHIL # BLD AUTO: 0.1 THOUSAND/ΜL (ref 0–0.4)
EOSINOPHIL NFR BLD AUTO: 1 % (ref 0–6)
ERYTHROCYTE [DISTWIDTH] IN BLOOD BY AUTOMATED COUNT: 16.4 %
ETHANOL SERPL-MCNC: <10 MG/DL (ref 0–10)
EXT PREG TEST URINE: NEGATIVE
EXT. CONTROL ED NAV: NORMAL
GFR SERPL CREATININE-BSD FRML MDRD: 97 ML/MIN/1.73SQ M
GLUCOSE SERPL-MCNC: 80 MG/DL (ref 70–99)
GLUCOSE UR STRIP-MCNC: NEGATIVE MG/DL
HCT VFR BLD AUTO: 43.3 % (ref 36–46)
HGB BLD-MCNC: 14.1 G/DL (ref 12–16)
HGB UR QL STRIP.AUTO: 150
INR PPP: 1.04 (ref 0.89–1.1)
KETONES UR STRIP-MCNC: NEGATIVE MG/DL
LACTATE SERPL-SCNC: 1.9 MMOL/L (ref 0.7–2)
LACTATE SERPL-SCNC: 2.4 MMOL/L (ref 0.7–2)
LEUKOCYTE ESTERASE UR QL STRIP: 25
LYMPHOCYTES # BLD AUTO: 2 THOUSANDS/ΜL (ref 0.5–4)
LYMPHOCYTES NFR BLD AUTO: 17 % (ref 25–45)
MAGNESIUM SERPL-MCNC: 2.1 MG/DL (ref 1.6–2.3)
MCH RBC QN AUTO: 26.8 PG (ref 26–34)
MCHC RBC AUTO-ENTMCNC: 32.5 G/DL (ref 31–36)
MCV RBC AUTO: 82 FL (ref 80–100)
MONOCYTES # BLD AUTO: 0.7 THOUSAND/ΜL (ref 0.2–0.9)
MONOCYTES NFR BLD AUTO: 6 % (ref 1–10)
MUCOUS THREADS UR QL AUTO: ABNORMAL
NEUTROPHILS # BLD AUTO: 8.7 THOUSANDS/ΜL (ref 1.8–7.8)
NEUTS SEG NFR BLD AUTO: 75 % (ref 45–65)
NITRITE UR QL STRIP: NEGATIVE
NON-SQ EPI CELLS URNS QL MICRO: ABNORMAL /HPF
PH UR STRIP.AUTO: 6 [PH]
PLATELET # BLD AUTO: 416 THOUSANDS/UL (ref 150–450)
PMV BLD AUTO: 8.2 FL (ref 8.9–12.7)
POTASSIUM SERPL-SCNC: 4.4 MMOL/L (ref 3.6–5)
PROLACTIN SERPL-MCNC: 4.6 NG/ML
PROT SERPL-MCNC: 7.6 G/DL (ref 5.9–8.4)
PROT UR STRIP-MCNC: ABNORMAL MG/DL
PROTHROMBIN TIME: 11 SECONDS (ref 9.5–11.6)
RBC # BLD AUTO: 5.26 MILLION/UL (ref 4–5.2)
RBC #/AREA URNS AUTO: ABNORMAL /HPF
SODIUM SERPL-SCNC: 141 MMOL/L (ref 137–147)
SP GR UR STRIP.AUTO: 1.02 (ref 1–1.04)
TROPONIN I SERPL-MCNC: <0.01 NG/ML (ref 0–0.03)
TROPONIN I SERPL-MCNC: <0.01 NG/ML (ref 0–0.03)
UROBILINOGEN UA: NEGATIVE MG/DL
WBC # BLD AUTO: 11.6 THOUSAND/UL (ref 4.5–11)
WBC #/AREA URNS AUTO: ABNORMAL /HPF

## 2019-06-24 PROCEDURE — 82550 ASSAY OF CK (CPK): CPT | Performed by: EMERGENCY MEDICINE

## 2019-06-24 PROCEDURE — 85610 PROTHROMBIN TIME: CPT | Performed by: EMERGENCY MEDICINE

## 2019-06-24 PROCEDURE — 85025 COMPLETE CBC W/AUTO DIFF WBC: CPT | Performed by: EMERGENCY MEDICINE

## 2019-06-24 PROCEDURE — 36415 COLL VENOUS BLD VENIPUNCTURE: CPT | Performed by: EMERGENCY MEDICINE

## 2019-06-24 PROCEDURE — 96361 HYDRATE IV INFUSION ADD-ON: CPT

## 2019-06-24 PROCEDURE — 93005 ELECTROCARDIOGRAM TRACING: CPT

## 2019-06-24 PROCEDURE — 84484 ASSAY OF TROPONIN QUANT: CPT | Performed by: EMERGENCY MEDICINE

## 2019-06-24 PROCEDURE — 99284 EMERGENCY DEPT VISIT MOD MDM: CPT | Performed by: EMERGENCY MEDICINE

## 2019-06-24 PROCEDURE — 96374 THER/PROPH/DIAG INJ IV PUSH: CPT

## 2019-06-24 PROCEDURE — 85730 THROMBOPLASTIN TIME PARTIAL: CPT | Performed by: EMERGENCY MEDICINE

## 2019-06-24 PROCEDURE — 81025 URINE PREGNANCY TEST: CPT | Performed by: EMERGENCY MEDICINE

## 2019-06-24 PROCEDURE — 80320 DRUG SCREEN QUANTALCOHOLS: CPT | Performed by: EMERGENCY MEDICINE

## 2019-06-24 PROCEDURE — 99285 EMERGENCY DEPT VISIT HI MDM: CPT

## 2019-06-24 PROCEDURE — 73090 X-RAY EXAM OF FOREARM: CPT

## 2019-06-24 PROCEDURE — 83605 ASSAY OF LACTIC ACID: CPT | Performed by: EMERGENCY MEDICINE

## 2019-06-24 PROCEDURE — 70486 CT MAXILLOFACIAL W/O DYE: CPT

## 2019-06-24 PROCEDURE — 71260 CT THORAX DX C+: CPT

## 2019-06-24 PROCEDURE — 74177 CT ABD & PELVIS W/CONTRAST: CPT

## 2019-06-24 PROCEDURE — 70450 CT HEAD/BRAIN W/O DYE: CPT

## 2019-06-24 PROCEDURE — 81001 URINALYSIS AUTO W/SCOPE: CPT | Performed by: EMERGENCY MEDICINE

## 2019-06-24 PROCEDURE — 72125 CT NECK SPINE W/O DYE: CPT

## 2019-06-24 PROCEDURE — 84146 ASSAY OF PROLACTIN: CPT | Performed by: EMERGENCY MEDICINE

## 2019-06-24 PROCEDURE — 83735 ASSAY OF MAGNESIUM: CPT | Performed by: EMERGENCY MEDICINE

## 2019-06-24 PROCEDURE — 80053 COMPREHEN METABOLIC PANEL: CPT | Performed by: EMERGENCY MEDICINE

## 2019-06-24 RX ORDER — LIDOCAINE 50 MG/G
1 PATCH TOPICAL DAILY
Qty: 4 PATCH | Refills: 0 | Status: SHIPPED | OUTPATIENT
Start: 2019-06-24 | End: 2019-06-28

## 2019-06-24 RX ORDER — FENTANYL CITRATE 50 UG/ML
50 INJECTION, SOLUTION INTRAMUSCULAR; INTRAVENOUS
Status: DISCONTINUED | OUTPATIENT
Start: 2019-06-24 | End: 2019-06-24 | Stop reason: HOSPADM

## 2019-06-24 RX ORDER — OXYCODONE HYDROCHLORIDE AND ACETAMINOPHEN 5; 325 MG/1; MG/1
1 TABLET ORAL EVERY 8 HOURS PRN
Qty: 10 TABLET | Refills: 0 | Status: SHIPPED | OUTPATIENT
Start: 2019-06-24 | End: 2019-06-27

## 2019-06-24 RX ORDER — DIAZEPAM 5 MG/1
5 TABLET ORAL ONCE
Status: COMPLETED | OUTPATIENT
Start: 2019-06-24 | End: 2019-06-24

## 2019-06-24 RX ORDER — DIAZEPAM 5 MG/1
5 TABLET ORAL 2 TIMES DAILY
Qty: 8 TABLET | Refills: 0 | Status: SHIPPED | OUTPATIENT
Start: 2019-06-24 | End: 2019-06-28

## 2019-06-24 RX ORDER — KETOROLAC TROMETHAMINE 30 MG/ML
30 INJECTION, SOLUTION INTRAMUSCULAR; INTRAVENOUS ONCE
Status: COMPLETED | OUTPATIENT
Start: 2019-06-24 | End: 2019-06-24

## 2019-06-24 RX ADMIN — SODIUM CHLORIDE 1000 ML: 9 INJECTION, SOLUTION INTRAVENOUS at 12:08

## 2019-06-24 RX ADMIN — IOHEXOL 100 ML: 350 INJECTION, SOLUTION INTRAVENOUS at 12:39

## 2019-06-24 RX ADMIN — KETOROLAC TROMETHAMINE 30 MG: 30 INJECTION, SOLUTION INTRAMUSCULAR; INTRAVENOUS at 11:23

## 2019-06-24 RX ADMIN — SODIUM CHLORIDE 1000 ML: 9 INJECTION, SOLUTION INTRAVENOUS at 11:23

## 2019-06-24 RX ADMIN — DIAZEPAM 5 MG: 5 TABLET ORAL at 13:43

## 2019-06-25 LAB
ATRIAL RATE: 76 BPM
ATRIAL RATE: 88 BPM
P AXIS: 51 DEGREES
P AXIS: 61 DEGREES
PR INTERVAL: 130 MS
PR INTERVAL: 130 MS
QRS AXIS: 58 DEGREES
QRS AXIS: 69 DEGREES
QRSD INTERVAL: 80 MS
QRSD INTERVAL: 88 MS
QT INTERVAL: 342 MS
QT INTERVAL: 382 MS
QTC INTERVAL: 413 MS
QTC INTERVAL: 429 MS
T WAVE AXIS: 34 DEGREES
T WAVE AXIS: 35 DEGREES
VENTRICULAR RATE: 76 BPM
VENTRICULAR RATE: 88 BPM

## 2019-06-25 PROCEDURE — 93010 ELECTROCARDIOGRAM REPORT: CPT | Performed by: INTERNAL MEDICINE

## 2019-09-09 ENCOUNTER — HOSPITAL ENCOUNTER (EMERGENCY)
Facility: HOSPITAL | Age: 44
Discharge: HOME | End: 2019-09-10
Attending: EMERGENCY MEDICINE | Admitting: EMERGENCY MEDICINE
Payer: COMMERCIAL

## 2019-09-09 DIAGNOSIS — H00.019 STYE EXTERNAL: ICD-10-CM

## 2019-09-09 DIAGNOSIS — G43.909 MIGRAINE: Primary | ICD-10-CM

## 2019-09-09 PROCEDURE — 99283 EMERGENCY DEPT VISIT LOW MDM: CPT

## 2019-09-09 RX ORDER — TETRACAINE HYDROCHLORIDE 5 MG/ML
1 SOLUTION OPHTHALMIC ONCE
Status: DISCONTINUED | OUTPATIENT
Start: 2019-09-10 | End: 2019-09-10

## 2019-09-10 VITALS
HEART RATE: 66 BPM | WEIGHT: 160 LBS | DIASTOLIC BLOOD PRESSURE: 56 MMHG | BODY MASS INDEX: 29.26 KG/M2 | OXYGEN SATURATION: 97 % | RESPIRATION RATE: 16 BRPM | TEMPERATURE: 97.6 F | SYSTOLIC BLOOD PRESSURE: 104 MMHG

## 2019-09-10 LAB
BACTERIA UR QL AUTO: ABNORMAL /HPF
BILIRUB UR QL STRIP: NEGATIVE
CAOX CRY URNS QL MICRO: ABNORMAL /HPF
CARBAMAZEPINE SERPL-MCNC: <3 UG/ML (ref 4–12)
CLARITY UR: ABNORMAL
COLOR UR: YELLOW
EXT PREG TEST URINE: NEGATIVE
EXT. CONTROL ED NAV: NORMAL
GLUCOSE UR STRIP-MCNC: NEGATIVE MG/DL
HGB UR QL STRIP.AUTO: 25
KETONES UR STRIP-MCNC: NEGATIVE MG/DL
LEUKOCYTE ESTERASE UR QL STRIP: NEGATIVE
MUCOUS THREADS UR QL AUTO: ABNORMAL
NITRITE UR QL STRIP: NEGATIVE
NON-SQ EPI CELLS URNS QL MICRO: ABNORMAL /HPF
PH UR STRIP.AUTO: 5 [PH]
PROT UR STRIP-MCNC: NEGATIVE MG/DL
RBC #/AREA URNS AUTO: ABNORMAL /HPF
SP GR UR STRIP.AUTO: 1.03 (ref 1–1.04)
UROBILINOGEN UA: 1 MG/DL
WBC #/AREA URNS AUTO: ABNORMAL /HPF

## 2019-09-10 PROCEDURE — 81001 URINALYSIS AUTO W/SCOPE: CPT | Performed by: EMERGENCY MEDICINE

## 2019-09-10 PROCEDURE — 96374 THER/PROPH/DIAG INJ IV PUSH: CPT

## 2019-09-10 PROCEDURE — 80156 ASSAY CARBAMAZEPINE TOTAL: CPT | Performed by: EMERGENCY MEDICINE

## 2019-09-10 PROCEDURE — 81003 URINALYSIS AUTO W/O SCOPE: CPT | Performed by: EMERGENCY MEDICINE

## 2019-09-10 PROCEDURE — 36415 COLL VENOUS BLD VENIPUNCTURE: CPT | Performed by: EMERGENCY MEDICINE

## 2019-09-10 PROCEDURE — 99284 EMERGENCY DEPT VISIT MOD MDM: CPT | Performed by: EMERGENCY MEDICINE

## 2019-09-10 PROCEDURE — 96375 TX/PRO/DX INJ NEW DRUG ADDON: CPT

## 2019-09-10 PROCEDURE — 96361 HYDRATE IV INFUSION ADD-ON: CPT

## 2019-09-10 PROCEDURE — 81025 URINE PREGNANCY TEST: CPT | Performed by: EMERGENCY MEDICINE

## 2019-09-10 RX ORDER — METOCLOPRAMIDE HYDROCHLORIDE 5 MG/ML
10 INJECTION INTRAMUSCULAR; INTRAVENOUS ONCE
Status: COMPLETED | OUTPATIENT
Start: 2019-09-10 | End: 2019-09-10

## 2019-09-10 RX ORDER — KETOROLAC TROMETHAMINE 30 MG/ML
15 INJECTION, SOLUTION INTRAMUSCULAR; INTRAVENOUS ONCE
Status: COMPLETED | OUTPATIENT
Start: 2019-09-10 | End: 2019-09-10

## 2019-09-10 RX ORDER — BUTALBITAL, ACETAMINOPHEN AND CAFFEINE 50; 325; 40 MG/1; MG/1; MG/1
1 TABLET ORAL ONCE
Status: COMPLETED | OUTPATIENT
Start: 2019-09-10 | End: 2019-09-10

## 2019-09-10 RX ORDER — DIPHENHYDRAMINE HYDROCHLORIDE 50 MG/ML
25 INJECTION INTRAMUSCULAR; INTRAVENOUS ONCE
Status: COMPLETED | OUTPATIENT
Start: 2019-09-10 | End: 2019-09-10

## 2019-09-10 RX ADMIN — DIPHENHYDRAMINE HYDROCHLORIDE 25 MG: 50 INJECTION, SOLUTION INTRAMUSCULAR; INTRAVENOUS at 01:44

## 2019-09-10 RX ADMIN — SODIUM CHLORIDE 1000 ML: 0.9 INJECTION, SOLUTION INTRAVENOUS at 01:43

## 2019-09-10 RX ADMIN — METOCLOPRAMIDE 10 MG: 5 INJECTION, SOLUTION INTRAMUSCULAR; INTRAVENOUS at 01:44

## 2019-09-10 RX ADMIN — KETOROLAC TROMETHAMINE 15 MG: 30 INJECTION, SOLUTION INTRAMUSCULAR at 01:43

## 2019-09-10 RX ADMIN — BUTALBITAL, ACETAMINOPHEN, AND CAFFEINE 1 TABLET: 50; 325; 40 TABLET ORAL at 01:44

## 2019-09-10 NOTE — ED PROVIDER NOTES
History  Chief Complaint   Patient presents with    Headache     x3 days  pt reports N/V and photosensitivity with HA   left eye also swollen and painful  42-year-old female past medical history of migraines currently on amitriptyline, does use sumatriptan as abortive therapy presents for evaluation of 3 days of typical migraine which started gradually, she states that the migraine started behind her left eye, radiated to the back of the head, she did take her amitriptyline and tried using sumatriptan injection yesterday the continued to have pain  Associated with photophobia, nausea and intermittent blurry vision which is typical for her migraines otherwise no weakness numbness or tingling  No double vision  No trouble ambulating  No fevers or chills, neck pain  Patient also noted swelling on her left upper eyelid this morning she has had styes in the past with similar presentation  No vision changes currently  No drainage from the swelling  Did not take any medications prior to arrival          Prior to Admission Medications   Prescriptions Last Dose Informant Patient Reported? Taking? Multiple Vitamins-Minerals (MULTIVITAMIN ADULT EXTRA C PO)   Yes No   Sig: Take 1 tablet by mouth   albuterol (PROVENTIL HFA,VENTOLIN HFA) 90 mcg/act inhaler   Yes No   Sig: Inhale 2 puffs every 6 (six) hours as needed for wheezing Indications: patient unsure of correct dosage/frequency     amitriptyline (ELAVIL) 100 mg tablet  Self Yes No   Sig: Take 100 mg by mouth daily at bedtime   carBAMazepine (TEGretol) 200 mg tablet   No No   Sig: Take 1 tablet (200 mg total) by mouth 3 (three) times a day   Patient not taking: Reported on 1/2/2019    cholecalciferol 1000 units tablet   No No   Sig: Take 1 tablet (1,000 Units total) by mouth 2 (two) times a day   diazepam (VALIUM) 5 mg tablet   No No   Sig: Take 1 tablet (5 mg total) by mouth 2 (two) times a day for 4 days   famotidine (PEPCID) 20 mg tablet   No No   Sig: Take 1 tablet (20 mg total) by mouth 2 (two) times a day   hydrochlorothiazide (HYDRODIURIL) 25 mg tablet   No No   Sig: Take 1 tablet (25 mg total) by mouth daily   Patient not taking: Reported on 1/2/2019    lidocaine (LIDODERM) 5 %   No No   Sig: Apply 1 patch topically daily for 4 days Remove & Discard patch within 12 hours or as directed by MD   meloxicam (MOBIC) 7 5 mg tablet   No No   Sig: Take 1 tablet (7 5 mg total) by mouth daily   nitrofurantoin (MACROBID) 100 mg capsule   No No   Sig: Take 1 capsule (100 mg total) by mouth 2 (two) times a day   omeprazole (PriLOSEC) 40 MG capsule  Self Yes No   Sig: Take 40 mg by mouth daily   ondansetron (ZOFRAN) 4 mg tablet   No No   Sig: Take 1 tablet (4 mg total) by mouth every 6 (six) hours   oxyCODONE (OxyCONTIN) 15 mg 12 hr tablet  Self Yes No   Sig: Take 15 mg by mouth every 12 (twelve) hours   sucralfate (CARAFATE) 1 g tablet   No No   Sig: Take 1 tablet (1 g total) by mouth 4 (four) times a day      Facility-Administered Medications: None       Past Medical History:   Diagnosis Date    Asthma     Bipolar disorder (HCC)     Migraine     Psychiatric disorder     PTSD (post-traumatic stress disorder)     Seizures (HCC)        Past Surgical History:   Procedure Laterality Date    APPENDECTOMY      BLADDER SURGERY      CHOLECYSTECTOMY      GASTRECTOMY      gastric sleeve    GASTRIC RESTRICTION SURGERY      gastric sleeve in Jan 22 2018    HYSTERECTOMY      TUBAL LIGATION         Family History   Problem Relation Age of Onset    Diabetes Mother         MELLITUS    Diabetes Father         MELLITUS    Diabetes Other         MELLITUS     I have reviewed and agree with the history as documented      Social History     Tobacco Use    Smoking status: Current Every Day Smoker     Packs/day: 0 50    Smokeless tobacco: Never Used   Substance Use Topics    Alcohol use: Yes     Comment: ocassionally    Drug use: No        Review of Systems   Constitutional: Negative for appetite change and fever  HENT: Negative for rhinorrhea and sore throat  Eyes: Positive for photophobia  Negative for visual disturbance  Eyelid swelling   Respiratory: Negative for cough, chest tightness and wheezing  Cardiovascular: Negative for chest pain, palpitations and leg swelling  Gastrointestinal: Positive for nausea and vomiting  Negative for abdominal distention, abdominal pain, blood in stool, constipation and diarrhea  Genitourinary: Negative for dysuria, flank pain, frequency, hematuria and urgency  Musculoskeletal: Negative for back pain  Skin: Negative for rash  Neurological: Positive for light-headedness  Negative for dizziness, weakness and headaches  All other systems reviewed and are negative  Physical Exam  Physical Exam   Constitutional: She is oriented to person, place, and time  She appears well-developed and well-nourished  HENT:   Head: Normocephalic and atraumatic  Eyes: Pupils are equal, round, and reactive to light  EOM are normal    Conjunctiva clear bilaterally, pupils equal round and reactive, no nystagmus, there is tender erythematous nodule on the mid portion of the left upper lid otherwise no ecchymosis, no discharge, no obvious foreign body   Neck: Normal range of motion  Neck supple  Cardiovascular: Normal rate and regular rhythm  Exam reveals no gallop and no friction rub  No murmur heard  Pulmonary/Chest: Effort normal  She has no wheezes  She has no rales  She exhibits no tenderness  Abdominal: Soft  She exhibits no distension and no mass  There is no rebound and no guarding  Neurological: She is alert and oriented to person, place, and time  No cranial nerve deficit or sensory deficit  She exhibits normal muscle tone  Coordination normal    Muscle strength 5/5 bilateral upper lower extremity, normal speech normal gait   Skin: Skin is warm and dry  Psychiatric: She has a normal mood and affect     Nursing note and vitals reviewed        Vital Signs  ED Triage Vitals [09/09/19 2337]   Temperature Pulse Respirations Blood Pressure SpO2   97 6 °F (36 4 °C) 94 14 122/78 98 %      Temp Source Heart Rate Source Patient Position - Orthostatic VS BP Location FiO2 (%)   Tympanic Monitor Sitting Left arm --      Pain Score       --           Vitals:    09/09/19 2337 09/10/19 0308   BP: 122/78 104/56   Pulse: 94 66   Patient Position - Orthostatic VS: Sitting Lying         Visual Acuity      ED Medications  Medications   sodium chloride 0 9 % bolus 1,000 mL (0 mL Intravenous Stopped 9/10/19 0308)   metoclopramide (REGLAN) injection 10 mg (10 mg Intravenous Given 9/10/19 0144)   diphenhydrAMINE (BENADRYL) injection 25 mg (25 mg Intravenous Given 9/10/19 0144)   butalbital-acetaminophen-caffeine (FIORICET,ESGIC) -40 mg per tablet 1 tablet (1 tablet Oral Given 9/10/19 0144)   ketorolac (TORADOL) injection 15 mg (15 mg Intravenous Given 9/10/19 0143)       Diagnostic Studies  Results Reviewed     Procedure Component Value Units Date/Time    POCT pregnancy, urine [[id number]]  (Normal) Resulted:  09/10/19 0130    Lab Status:  Final result Updated:  09/10/19 0233     EXT PREG TEST UR (Ref: Negative) Negative     Control Valid    Carbamazepine level, total [[id number]]  (Abnormal) Collected:  09/10/19 0155    Lab Status:  Final result Specimen:  Blood from Arm, Right Updated:  09/10/19 0215     Carbamazepine Lvl <3 0 ug/mL     Urine Microscopic [[id number]]  (Abnormal) Collected:  09/10/19 0153    Lab Status:  Final result Specimen:  Urine, Clean Catch Updated:  09/10/19 0214     RBC, UA 0-1 /hpf      WBC, UA 1-2 /hpf      Epithelial Cells Occasional /hpf      Bacteria, UA Occasional /hpf      Ca Oxalate Cyn, UA Occasional /hpf      MUCUS THREADS Occasional    UA w Reflex to Microscopic w Reflex to Culture [[id number]]  (Abnormal) Collected:  09/10/19 0153    Lab Status:  Final result Specimen:  Urine, Clean Catch Updated:  09/10/19 0403 Color, UA Yellow     Clarity, UA Slightly Cloudy     Specific Stella, UA 1 030     pH, UA 5 0     Leukocytes, UA Negative     Nitrite, UA Negative     Protein, UA Negative mg/dl      Glucose, UA Negative mg/dl      Ketones, UA Negative mg/dl      Bilirubin, UA Negative     Blood, UA 25 0     UROBILINOGEN UA 1 0 mg/dL                  No orders to display              Procedures  Procedures       ED Course  ED Course as of Sep 10 0349   Tue Sep 10, 2019   0238 Resting comfortably, no acute complaints      0238 CARBAMAZEPINE LEVEL(!): <3 0   0258 Resting comfortably, no acute distress                                  MDM  Number of Diagnoses or Management Options  Diagnosis management comments: 52-year-old female with past medical history of migraines presents for evaluation of typical migraine symptoms, she also has a stye on her left eye, will check visual acuity, treat symptomatically and re-evaluate      Disposition  Final diagnoses:   Migraine   Stye external     Time reflects when diagnosis was documented in both MDM as applicable and the Disposition within this note     Time User Action Codes Description Comment    9/10/2019  2:58 AM Kenji Vidal Add [G43 909] Migraine     9/10/2019  2:58 AM Kenji Vidal Add [C96 [id number]] Stye external       ED Disposition     ED Disposition Condition Date/Time Comment    Discharge Stable Tue Sep 10, 2019  2:58 AM Genna Raya discharge to home/self care              Follow-up Information     Follow up With Specialties Details Why Contact Info    Navneet Alexander MD Internal Medicine Schedule an appointment as soon as possible for a visit in 3 days  [address] [place] [address]  [phone]      [facility] Emergency Department Emergency Medicine  If symptoms worsen [address] [address] [phone]          Patient's Medications   Discharge Prescriptions    No medications on file     No discharge procedures on file     ED Provider  Electronically Signed by           Saurav Rasheed MD  09/10/19 1373

## 2019-09-10 NOTE — DISCHARGE INSTRUCTIONS
Please follow-up with the primary care provider for further care, if symptoms worsen please return to the emergency department    Please use warm compresses for your stye, if redness or pain increases or you developed visual changes please return to the emergency department

## 2019-09-15 ENCOUNTER — APPOINTMENT (EMERGENCY)
Dept: RADIOLOGY | Facility: HOSPITAL | Age: 44
End: 2019-09-15
Payer: COMMERCIAL

## 2019-09-15 ENCOUNTER — HOSPITAL ENCOUNTER (EMERGENCY)
Facility: HOSPITAL | Age: 44
Discharge: HOME | End: 2019-09-15
Attending: EMERGENCY MEDICINE | Admitting: EMERGENCY MEDICINE
Payer: COMMERCIAL

## 2019-09-15 VITALS
SYSTOLIC BLOOD PRESSURE: 145 MMHG | OXYGEN SATURATION: 96 % | RESPIRATION RATE: 20 BRPM | TEMPERATURE: 98.7 F | DIASTOLIC BLOOD PRESSURE: 86 MMHG | HEART RATE: 98 BPM

## 2019-09-15 DIAGNOSIS — J06.9 VIRAL UPPER RESPIRATORY TRACT INFECTION: Primary | ICD-10-CM

## 2019-09-15 PROCEDURE — 93005 ELECTROCARDIOGRAM TRACING: CPT

## 2019-09-15 PROCEDURE — 71046 X-RAY EXAM CHEST 2 VIEWS: CPT

## 2019-09-15 PROCEDURE — 99285 EMERGENCY DEPT VISIT HI MDM: CPT

## 2019-09-15 PROCEDURE — 99285 EMERGENCY DEPT VISIT HI MDM: CPT | Performed by: EMERGENCY MEDICINE

## 2019-09-15 RX ORDER — PROMETHAZINE HYDROCHLORIDE AND CODEINE PHOSPHATE 6.25; 1 MG/5ML; MG/5ML
5 SYRUP ORAL EVERY 4 HOURS PRN
Qty: 120 ML | Refills: 0 | Status: SHIPPED | OUTPATIENT
Start: 2019-09-15 | End: 2019-09-25

## 2019-09-15 NOTE — ED PROVIDER NOTES
History  Chief Complaint   Patient presents with    Cough     cares for patient who was admitted for bronchitis  states has developed cough, uri s/s and constant chest pain, worse w/cough  sore throat  taking nitrofurantoin for uti   states did not complete it "because I felt better, then I started taking it again to see if it would help my cough"    Chest Pain     59-year-old female with a history of asthma, bipolar depression, migraine headaches presents to the emergency department with a 3 day history of cough  She states the cough has become productive yellow sputum  No fevers or chills  She also has nasal congestion  She has chest pain when she coughs  She states she has been taking over-the-counter remedies without relief  She was exposed to someone ill with similar symptoms  History provided by:  Patient   used: No    Cough   Cough characteristics:  Productive  Sputum characteristics:  Yellow  Severity:  Unable to specify  Onset quality:  Gradual  Duration:  3 days  Timing:  Intermittent  Progression:  Unchanged  Chronicity:  New  Smoker: yes    Context: sick contacts    Relieved by:  Nothing  Worsened by:  Nothing  Ineffective treatments:  Cough suppressants  Associated symptoms: no chest pain, no chills, no diaphoresis, no ear fullness, no ear pain, no eye discharge, no fever, no headaches, no myalgias, no rash, no rhinorrhea, no shortness of breath, no sinus congestion, no sore throat and no wheezing        Prior to Admission Medications   Prescriptions Last Dose Informant Patient Reported? Taking? Multiple Vitamins-Minerals (MULTIVITAMIN ADULT EXTRA C PO)   Yes No   Sig: Take 1 tablet by mouth   albuterol (PROVENTIL HFA,VENTOLIN HFA) 90 mcg/act inhaler   Yes No   Sig: Inhale 2 puffs every 6 (six) hours as needed for wheezing Indications: patient unsure of correct dosage/frequency     amitriptyline (ELAVIL) 100 mg tablet  Self Yes No   Sig: Take 100 mg by mouth daily at bedtime   carBAMazepine (TEGretol) 200 mg tablet   No No   Sig: Take 1 tablet (200 mg total) by mouth 3 (three) times a day   Patient not taking: Reported on 1/2/2019    cholecalciferol 1000 units tablet   No No   Sig: Take 1 tablet (1,000 Units total) by mouth 2 (two) times a day   diazepam (VALIUM) 5 mg tablet   No No   Sig: Take 1 tablet (5 mg total) by mouth 2 (two) times a day for 4 days   famotidine (PEPCID) 20 mg tablet   No No   Sig: Take 1 tablet (20 mg total) by mouth 2 (two) times a day   hydrochlorothiazide (HYDRODIURIL) 25 mg tablet   No No   Sig: Take 1 tablet (25 mg total) by mouth daily   Patient not taking: Reported on 1/2/2019    lidocaine (LIDODERM) 5 %   No No   Sig: Apply 1 patch topically daily for 4 days Remove & Discard patch within 12 hours or as directed by MD   meloxicam (MOBIC) 7 5 mg tablet   No No   Sig: Take 1 tablet (7 5 mg total) by mouth daily   nitrofurantoin (MACROBID) 100 mg capsule   No No   Sig: Take 1 capsule (100 mg total) by mouth 2 (two) times a day   omeprazole (PriLOSEC) 40 MG capsule  Self Yes No   Sig: Take 40 mg by mouth daily   ondansetron (ZOFRAN) 4 mg tablet   No No   Sig: Take 1 tablet (4 mg total) by mouth every 6 (six) hours   oxyCODONE (OxyCONTIN) 15 mg 12 hr tablet  Self Yes No   Sig: Take 15 mg by mouth every 12 (twelve) hours   sucralfate (CARAFATE) 1 g tablet   No No   Sig: Take 1 tablet (1 g total) by mouth 4 (four) times a day      Facility-Administered Medications: None       Past Medical History:   Diagnosis Date    Asthma     Bipolar disorder (HCC)     Migraine     Psychiatric disorder     PTSD (post-traumatic stress disorder)     Seizures (HCC)        Past Surgical History:   Procedure Laterality Date    APPENDECTOMY      BLADDER SURGERY      CHOLECYSTECTOMY      GASTRECTOMY      gastric sleeve    GASTRIC RESTRICTION SURGERY      gastric sleeve in Jan 22 2018    HYSTERECTOMY      TUBAL LIGATION         Family History   Problem Relation Age of Onset    Diabetes Mother         MELLITUS    Diabetes Father         MELLITUS    Diabetes Other         MELLITUS     I have reviewed and agree with the history as documented  Social History     Tobacco Use    Smoking status: Current Every Day Smoker     Packs/day: 0 50    Smokeless tobacco: Never Used   Substance Use Topics    Alcohol use: Yes     Comment: ocassionally    Drug use: No        Review of Systems   Constitutional: Negative  Negative for chills, diaphoresis, fatigue and fever  HENT: Negative  Negative for congestion, ear pain, rhinorrhea and sore throat  Eyes: Negative  Negative for discharge, redness and itching  Respiratory: Negative  Negative for apnea, cough, chest tightness, shortness of breath and wheezing  Cardiovascular: Negative for chest pain, palpitations and leg swelling  Gastrointestinal: Negative  Negative for abdominal pain  Endocrine: Negative  Genitourinary: Negative  Negative for flank pain, frequency and urgency  Musculoskeletal: Negative  Negative for back pain and myalgias  Skin: Negative  Negative for rash  Allergic/Immunologic: Negative  Neurological: Negative  Negative for dizziness, syncope, weakness, light-headedness, numbness and headaches  Hematological: Negative  All other systems reviewed and are negative  Physical Exam  Physical Exam   Constitutional: She is oriented to person, place, and time  She appears well-developed and well-nourished  Non-toxic appearance  She does not have a sickly appearance  She does not appear ill  No distress  HENT:   Head: Normocephalic and atraumatic  Right Ear: Tympanic membrane and external ear normal    Left Ear: Tympanic membrane and external ear normal    Nose: Nose normal    Mouth/Throat: Oropharynx is clear and moist  No oropharyngeal exudate  Eyes: Pupils are equal, round, and reactive to light  Conjunctivae and EOM are normal  Right eye exhibits no discharge   Left eye exhibits hordeolum  Left eye exhibits no discharge  No scleral icterus  Neck: Normal range of motion  Neck supple  No JVD present  No tracheal deviation present  No thyromegaly present  Cardiovascular: Normal rate, regular rhythm and normal heart sounds  Exam reveals no gallop and no friction rub  No murmur heard  Pulmonary/Chest: Effort normal and breath sounds normal  No stridor  No respiratory distress  She has no wheezes  She has no rales  She exhibits no tenderness  Abdominal: Soft  Bowel sounds are normal  She exhibits no distension and no mass  There is no tenderness  No hernia  Lymphadenopathy:     She has no cervical adenopathy  Neurological: She is alert and oriented to person, place, and time  She has normal reflexes  She exhibits normal muscle tone  Skin: Skin is warm and dry  No rash noted  She is not diaphoretic  No erythema  No pallor  Psychiatric: She has a normal mood and affect  Nursing note and vitals reviewed  Vital Signs  ED Triage Vitals [09/15/19 1332]   Temperature Pulse Respirations Blood Pressure SpO2   98 7 °F (37 1 °C) 98 20 145/86 96 %      Temp src Heart Rate Source Patient Position - Orthostatic VS BP Location FiO2 (%)   -- -- -- -- --      Pain Score       --           Vitals:    09/15/19 1332   BP: 145/86   Pulse: 98         Visual Acuity      ED Medications  Medications - No data to display    Diagnostic Studies  Results Reviewed     None                 XR chest 2 views    (Results Pending)              Procedures  Procedures       ED Course                               MDM  Number of Diagnoses or Management Options  Diagnosis management comments: 42-year-old female presents with 3 day history of URI symptoms  No fevers  She has chest pain when she coughs only  She has been taking over-the-counter remedies without relief  She was exposed to someone with similar symptoms  On exam she does not appear acutely ill but does have a dry cough in the room  Her lungs are clear  She does have a stye to the left upper lid  Will do chest x-ray to rule out pneumonia  Will treat symptomatically for acute viral URI  Amount and/or Complexity of Data Reviewed  Tests in the radiology section of CPT®: ordered and reviewed        Disposition  Final diagnoses:   None     ED Disposition     None      Follow-up Information    None         Patient's Medications   Discharge Prescriptions    No medications on file     No discharge procedures on file      ED Provider  Electronically Signed by           Larissa Vergara DO  09/16/19 9627

## 2019-09-15 NOTE — ED PROCEDURE NOTE
PROCEDURE  ECG 12 Lead Documentation Only  Date/Time: 9/15/2019 2:15 PM  Performed by: Arthur Cat DO  Authorized by: Arthur Cat DO     Indications / Diagnosis:  Chest pain with cough  ECG reviewed by me, the ED Provider: yes    Patient location:  ED  Interpretation:     Interpretation: normal    Rate:     ECG rate assessment: normal    Rhythm:     Rhythm: sinus rhythm    Ectopy:     Ectopy: none    Conduction:     Conduction: normal    ST segments:     ST segments:  Normal         Arthur Cat DO  09/15/19 1415

## 2019-09-16 LAB
ATRIAL RATE: 93 BPM
P AXIS: 63 DEGREES
PR INTERVAL: 138 MS
QRS AXIS: 69 DEGREES
QRSD INTERVAL: 92 MS
QT INTERVAL: 350 MS
QTC INTERVAL: 435 MS
T WAVE AXIS: 37 DEGREES
VENTRICULAR RATE: 93 BPM

## 2019-09-16 PROCEDURE — 93010 ELECTROCARDIOGRAM REPORT: CPT | Performed by: INTERNAL MEDICINE

## 2019-09-17 ENCOUNTER — APPOINTMENT (EMERGENCY)
Dept: RADIOLOGY | Facility: HOSPITAL | Age: 44
End: 2019-09-17
Payer: COMMERCIAL

## 2019-09-17 ENCOUNTER — HOSPITAL ENCOUNTER (EMERGENCY)
Facility: HOSPITAL | Age: 44
Discharge: HOME | End: 2019-09-18
Attending: EMERGENCY MEDICINE | Admitting: EMERGENCY MEDICINE
Payer: COMMERCIAL

## 2019-09-17 DIAGNOSIS — J40 BRONCHITIS: ICD-10-CM

## 2019-09-17 DIAGNOSIS — H00.019 HORDEOLUM EXTERNUM: Primary | ICD-10-CM

## 2019-09-17 LAB
ANION GAP SERPL CALCULATED.3IONS-SCNC: 6 MMOL/L (ref 5–14)
BASOPHILS # BLD AUTO: 0.1 THOUSANDS/ΜL (ref 0–0.1)
BASOPHILS NFR BLD AUTO: 1 % (ref 0–1)
BUN SERPL-MCNC: 14 MG/DL (ref 5–25)
CALCIUM SERPL-MCNC: 9.1 MG/DL (ref 8.4–10.2)
CHLORIDE SERPL-SCNC: 105 MMOL/L (ref 97–108)
CO2 SERPL-SCNC: 28 MMOL/L (ref 22–30)
CREAT SERPL-MCNC: 0.57 MG/DL (ref 0.6–1.2)
EOSINOPHIL # BLD AUTO: 0.2 THOUSAND/ΜL (ref 0–0.4)
EOSINOPHIL NFR BLD AUTO: 2 % (ref 0–6)
ERYTHROCYTE [DISTWIDTH] IN BLOOD BY AUTOMATED COUNT: 15.9 %
GFR SERPL CREATININE-BSD FRML MDRD: 115 ML/MIN/1.73SQ M
GLUCOSE SERPL-MCNC: 91 MG/DL (ref 70–99)
HCT VFR BLD AUTO: 39.6 % (ref 36–46)
HGB BLD-MCNC: 12.9 G/DL (ref 12–16)
LYMPHOCYTES # BLD AUTO: 3.5 THOUSANDS/ΜL (ref 0.5–4)
LYMPHOCYTES NFR BLD AUTO: 35 % (ref 25–45)
MCH RBC QN AUTO: 27.6 PG (ref 26–34)
MCHC RBC AUTO-ENTMCNC: 32.4 G/DL (ref 31–36)
MCV RBC AUTO: 85 FL (ref 80–100)
MONOCYTES # BLD AUTO: 0.5 THOUSAND/ΜL (ref 0.2–0.9)
MONOCYTES NFR BLD AUTO: 5 % (ref 1–10)
NEUTROPHILS # BLD AUTO: 5.6 THOUSANDS/ΜL (ref 1.8–7.8)
NEUTS SEG NFR BLD AUTO: 57 % (ref 45–65)
PLATELET # BLD AUTO: 385 THOUSANDS/UL (ref 150–450)
PMV BLD AUTO: 8.3 FL (ref 8.9–12.7)
POTASSIUM SERPL-SCNC: 4.2 MMOL/L (ref 3.6–5)
RBC # BLD AUTO: 4.66 MILLION/UL (ref 4–5.2)
SODIUM SERPL-SCNC: 139 MMOL/L (ref 137–147)
WBC # BLD AUTO: 9.8 THOUSAND/UL (ref 4.5–11)

## 2019-09-17 PROCEDURE — 85025 COMPLETE CBC W/AUTO DIFF WBC: CPT | Performed by: EMERGENCY MEDICINE

## 2019-09-17 PROCEDURE — 80048 BASIC METABOLIC PNL TOTAL CA: CPT | Performed by: EMERGENCY MEDICINE

## 2019-09-17 PROCEDURE — 71046 X-RAY EXAM CHEST 2 VIEWS: CPT

## 2019-09-17 PROCEDURE — 99285 EMERGENCY DEPT VISIT HI MDM: CPT | Performed by: EMERGENCY MEDICINE

## 2019-09-17 PROCEDURE — 99284 EMERGENCY DEPT VISIT MOD MDM: CPT

## 2019-09-17 PROCEDURE — 94640 AIRWAY INHALATION TREATMENT: CPT

## 2019-09-17 PROCEDURE — 36415 COLL VENOUS BLD VENIPUNCTURE: CPT | Performed by: EMERGENCY MEDICINE

## 2019-09-17 PROCEDURE — 96374 THER/PROPH/DIAG INJ IV PUSH: CPT

## 2019-09-17 RX ORDER — BENZONATATE 100 MG/1
100 CAPSULE ORAL ONCE
Status: COMPLETED | OUTPATIENT
Start: 2019-09-17 | End: 2019-09-17

## 2019-09-17 RX ORDER — ALBUTEROL SULFATE 90 UG/1
2 AEROSOL, METERED RESPIRATORY (INHALATION) ONCE
Status: COMPLETED | OUTPATIENT
Start: 2019-09-17 | End: 2019-09-17

## 2019-09-17 RX ORDER — KETOROLAC TROMETHAMINE 30 MG/ML
15 INJECTION, SOLUTION INTRAMUSCULAR; INTRAVENOUS ONCE
Status: COMPLETED | OUTPATIENT
Start: 2019-09-17 | End: 2019-09-17

## 2019-09-17 RX ORDER — ALBUTEROL SULFATE 2.5 MG/3ML
5 SOLUTION RESPIRATORY (INHALATION) ONCE
Status: COMPLETED | OUTPATIENT
Start: 2019-09-17 | End: 2019-09-17

## 2019-09-17 RX ADMIN — ALBUTEROL SULFATE 5 MG: 2.5 SOLUTION RESPIRATORY (INHALATION) at 23:33

## 2019-09-17 RX ADMIN — KETOROLAC TROMETHAMINE 15 MG: 30 INJECTION, SOLUTION INTRAMUSCULAR at 23:40

## 2019-09-17 RX ADMIN — ALBUTEROL SULFATE 2 PUFF: 90 AEROSOL, METERED RESPIRATORY (INHALATION) at 23:31

## 2019-09-17 RX ADMIN — BENZONATATE 100 MG: 100 CAPSULE ORAL at 23:30

## 2019-09-18 VITALS
BODY MASS INDEX: 29.15 KG/M2 | HEART RATE: 94 BPM | TEMPERATURE: 97.5 F | OXYGEN SATURATION: 99 % | RESPIRATION RATE: 20 BRPM | DIASTOLIC BLOOD PRESSURE: 101 MMHG | SYSTOLIC BLOOD PRESSURE: 146 MMHG | WEIGHT: 159.4 LBS

## 2019-09-18 RX ORDER — AZITHROMYCIN 250 MG/1
TABLET, FILM COATED ORAL
Qty: 6 TABLET | Refills: 0 | Status: SHIPPED | OUTPATIENT
Start: 2019-09-18 | End: 2019-09-22

## 2019-09-18 RX ORDER — FLUTICASONE PROPIONATE 50 MCG
1 SPRAY, SUSPENSION (ML) NASAL DAILY
Qty: 16 G | Refills: 0 | Status: SHIPPED | OUTPATIENT
Start: 2019-09-18

## 2019-09-18 RX ORDER — ALBUTEROL SULFATE 90 UG/1
2 AEROSOL, METERED RESPIRATORY (INHALATION) EVERY 4 HOURS PRN
Qty: 1 INHALER | Refills: 0 | Status: SHIPPED | OUTPATIENT
Start: 2019-09-18

## 2019-09-18 RX ORDER — AZITHROMYCIN 250 MG/1
500 TABLET, FILM COATED ORAL ONCE
Status: COMPLETED | OUTPATIENT
Start: 2019-09-18 | End: 2019-09-18

## 2019-09-18 RX ORDER — BENZONATATE 100 MG/1
100 CAPSULE ORAL 2 TIMES DAILY PRN
Qty: 14 CAPSULE | Refills: 0 | Status: SHIPPED | OUTPATIENT
Start: 2019-09-18

## 2019-09-18 RX ORDER — ERYTHROMYCIN 5 MG/G
0.5 OINTMENT OPHTHALMIC ONCE
Status: COMPLETED | OUTPATIENT
Start: 2019-09-18 | End: 2019-09-18

## 2019-09-18 RX ORDER — ALBUTEROL SULFATE 2.5 MG/3ML
2.5 SOLUTION RESPIRATORY (INHALATION) EVERY 6 HOURS PRN
Qty: 75 ML | Refills: 0 | Status: SHIPPED | OUTPATIENT
Start: 2019-09-18

## 2019-09-18 RX ADMIN — ERYTHROMYCIN 0.5 INCH: 5 OINTMENT OPHTHALMIC at 00:58

## 2019-09-18 RX ADMIN — AZITHROMYCIN 500 MG: 250 TABLET, FILM COATED ORAL at 00:58

## 2019-09-18 RX ADMIN — DEXAMETHASONE SODIUM PHOSPHATE 10 MG: 10 INJECTION, SOLUTION INTRAMUSCULAR; INTRAVENOUS at 00:58

## 2019-09-18 NOTE — DISCHARGE INSTRUCTIONS
Please follow up with Ophthalmology for further care, if symptoms worsen please return to the emergency department    Please follow-up with the primary care provider for further care, if symptoms worsen please return to the emergency department

## 2019-09-18 NOTE — ED PROVIDER NOTES
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 who was recently diagnosed with bronchitis and pneumonia  No fevers at home though does complain of chills and has been taking ibuprofen and Tylenol for pain  Afebrile in the emergency department  Also complaining of chest wall pain around her entire chest wall which is worse with cough, no pleuritic chest pain  She also has a stye on her left upper eyelid which has been there for approximately 1 and half weeks, has been increasing in size, was advised to use warm soaks and a warmed potato to place over the stye and now states that it got larger  She states that it bothers her because she can see in her field of vision  Otherwise no eye pain, no pain with ocular movements, no drainage from the stye          Prior to Admission Medications   Prescriptions Last Dose Informant Patient Reported? Taking? Multiple Vitamins-Minerals (MULTIVITAMIN ADULT EXTRA C PO)   Yes No   Sig: Take 1 tablet by mouth   albuterol (PROVENTIL HFA,VENTOLIN HFA) 90 mcg/act inhaler   Yes No   Sig: Inhale 2 puffs every 6 (six) hours as needed for wheezing Indications: patient unsure of correct dosage/frequency     amitriptyline (ELAVIL) 100 mg tablet  Self Yes No   Sig: Take 100 mg by mouth daily at bedtime   carBAMazepine (TEGretol) 200 mg tablet   No No   Sig: Take 1 tablet (200 mg total) by mouth 3 (three) times a day   Patient not taking: Reported on 1/2/2019    cholecalciferol 1000 units tablet   No No   Sig: Take 1 tablet (1,000 Units total) by mouth 2 (two) times a day   diazepam (VALIUM) 5 mg tablet   No No   Sig: Take 1 tablet (5 mg total) by mouth 2 (two) times a day for 4 days   famotidine (PEPCID) 20 mg tablet   No No   Sig: Take 1 tablet (20 mg total) by mouth 2 (two) times a day   hydrochlorothiazide (HYDRODIURIL) 25 mg tablet   No No   Sig: Take 1 tablet (25 mg total) by mouth daily   Patient not taking: Reported on 1/2/2019    lidocaine (LIDODERM) 5 %   No No   Sig: Apply 1 patch topically daily for 4 days Remove & Discard patch within 12 hours or as directed by MD   meloxicam (MOBIC) 7 5 mg tablet   No No   Sig: Take 1 tablet (7 5 mg total) by mouth daily   nitrofurantoin (MACROBID) 100 mg capsule   No No   Sig: Take 1 capsule (100 mg total) by mouth 2 (two) times a day   omeprazole (PriLOSEC) 40 MG capsule  Self Yes No   Sig: Take 40 mg by mouth daily   ondansetron (ZOFRAN) 4 mg tablet   No No   Sig: Take 1 tablet (4 mg total) by mouth every 6 (six) hours   oxyCODONE (OxyCONTIN) 15 mg 12 hr tablet  Self Yes No   Sig: Take 15 mg by mouth every 12 (twelve) hours   promethazine-codeine (PHENERGAN WITH CODEINE) 6 25-10 mg/5 mL syrup   No No   Sig: Take 5 mL by mouth every 4 (four) hours as needed for cough for up to 10 days   sucralfate (CARAFATE) 1 g tablet   No No   Sig: Take 1 tablet (1 g total) by mouth 4 (four) times a day      Facility-Administered Medications: None       Past Medical History:   Diagnosis Date    Asthma     Bipolar disorder (HCC)     Migraine     Psychiatric disorder     PTSD (post-traumatic stress disorder)     Seizures (HCC)        Past Surgical History:   Procedure Laterality Date    APPENDECTOMY      BLADDER SURGERY      CHOLECYSTECTOMY      GASTRECTOMY      gastric sleeve    GASTRIC RESTRICTION SURGERY      gastric sleeve in Jan 22 2018    HYSTERECTOMY      TUBAL LIGATION         Family History   Problem Relation Age of Onset    Diabetes Mother         MELLITUS  Diabetes Father         MELLITUS    Diabetes Other         MELLITUS     I have reviewed and agree with the history as documented  Social History     Tobacco Use    Smoking status: Current Every Day Smoker     Packs/day: 0 50    Smokeless tobacco: Never Used   Substance Use Topics    Alcohol use: Yes     Comment: ocassionally    Drug use: No        Review of Systems   Constitutional: Positive for chills  Negative for appetite change and fever  HENT: Positive for congestion, rhinorrhea and sore throat  Eyes: Negative for photophobia and visual disturbance  Respiratory: Positive for cough, shortness of breath and wheezing  Negative for chest tightness  Cardiovascular: Positive for chest pain  Negative for palpitations and leg swelling  Gastrointestinal: Negative for abdominal distention, abdominal pain, blood in stool, constipation and diarrhea  Genitourinary: Negative for dysuria, flank pain, frequency, hematuria and urgency  Musculoskeletal: Negative for back pain  Skin: Negative for rash  Neurological: Negative for dizziness, weakness and headaches  All other systems reviewed and are negative  Physical Exam  Physical Exam   Constitutional: She is oriented to person, place, and time  She appears well-developed and well-nourished  HENT:   Head: Normocephalic and atraumatic  Mild pharyngeal erythema without exudate    Tenderness to palpation over frontal and maxillary sinuses left greater than right    Hordeolum  of moderate size at the eyelid margin of the upper left eyelid  No erythema, no conjunctival injection, extraocular movements intact   Eyes: Pupils are equal, round, and reactive to light  EOM are normal    Neck: Normal range of motion  Neck supple  Cardiovascular: Normal rate and regular rhythm  Exam reveals no gallop and no friction rub  No murmur heard  Pulmonary/Chest: Effort normal  She has wheezes  She has no rales  She exhibits no tenderness     Right greater than left   Abdominal: Soft  She exhibits no distension and no mass  There is no tenderness  There is no rebound and no guarding  Neurological: She is alert and oriented to person, place, and time  No cranial nerve deficit  Skin: Skin is warm and dry  Psychiatric: She has a normal mood and affect  Nursing note and vitals reviewed        Vital Signs  ED Triage Vitals   Temperature Pulse Respirations Blood Pressure SpO2   09/17/19 2237 09/17/19 2237 09/17/19 2237 09/17/19 2237 09/17/19 2237   97 5 °F (36 4 °C) 96 19 (!) 159/109 100 %      Temp Source Heart Rate Source Patient Position - Orthostatic VS BP Location FiO2 (%)   09/17/19 2237 09/18/19 0111 09/18/19 0111 09/17/19 2237 --   Tympanic Monitor Sitting Left arm       Pain Score       09/17/19 2237       6           Vitals:    09/17/19 2237 09/18/19 0111   BP: (!) 159/109 (!) 146/101   Pulse: 96 94   Patient Position - Orthostatic VS:  Sitting         Visual Acuity      ED Medications  Medications   benzonatate (TESSALON PERLES) capsule 100 mg (100 mg Oral Given 9/17/19 2330)   ketorolac (TORADOL) injection 15 mg (15 mg Intravenous Given 9/17/19 2340)   albuterol (PROVENTIL HFA,VENTOLIN HFA) inhaler 2 puff (2 puffs Inhalation Given 9/17/19 2331)   albuterol inhalation solution 5 mg (5 mg Nebulization Given 9/17/19 2333)   azithromycin (ZITHROMAX) tablet 500 mg (500 mg Oral Given 9/18/19 0058)   erythromycin (ILOTYCIN) 0 5 % ophthalmic ointment 0 5 inch (0 5 inches Left Eye Given 9/18/19 0058)   dexamethasone 10 mg/mL oral liquid 10 mg 1 mL (10 mg Oral Given 9/18/19 0058)       Diagnostic Studies  Results Reviewed     Procedure Component Value Units Date/Time    Basic metabolic panel [[id number]]  (Abnormal) Collected:  09/17/19 2339    Lab Status:  Final result Specimen:  Blood from Arm, Right Updated:  09/17/19 2358     Sodium 139 mmol/L      Potassium 4 2 mmol/L      Chloride 105 mmol/L      CO2 28 mmol/L      ANION GAP 6 mmol/L      BUN 14 mg/dL Creatinine 0 57 mg/dL      Glucose 91 mg/dL      Calcium 9 1 mg/dL      eGFR 115 ml/min/1 73sq m     Narrative:       National Kidney Disease Foundation guidelines for Chronic Kidney Disease (CKD):     Stage 1 with normal or high GFR (GFR > 90 mL/min/1 73 square meters)    Stage 2 Mild CKD (GFR = 60-89 mL/min/1 73 square meters)    Stage 3A Moderate CKD (GFR = 45-59 mL/min/1 73 square meters)    Stage 3B Moderate CKD (GFR = 30-44 mL/min/1 73 square meters)    Stage 4 Severe CKD (GFR = 15-29 mL/min/1 73 square meters)    Stage 5 End Stage CKD (GFR <15 mL/min/1 73 square meters)  Note: GFR calculation is accurate only with a steady state creatinine    CBC and differential [[id number]]  (Abnormal) Collected:  09/17/19 2339    Lab Status:  Final result Specimen:  Blood from Arm, Right Updated:  09/17/19 2354     WBC 9 80 Thousand/uL      RBC 4 66 Million/uL      Hemoglobin 12 9 g/dL      Hematocrit 39 6 %      MCV 85 fL      MCH 27 6 pg      MCHC 32 4 g/dL      RDW 15 9 %      MPV 8 3 fL      Platelets 681 Thousands/uL      Neutrophils Relative 57 %      Lymphocytes Relative 35 %      Monocytes Relative 5 %      Eosinophils Relative 2 %      Basophils Relative 1 %      Neutrophils Absolute 5 60 Thousands/µL      Lymphocytes Absolute 3 50 Thousands/µL      Monocytes Absolute 0 50 Thousand/µL      Eosinophils Absolute 0 20 Thousand/µL      Basophils Absolute 0 10 Thousands/µL                  XR chest 2 views   ED Interpretation by Beryl Mcardle, MD (09/17 2349)   Developing infiltrate in RML                  Procedures  Procedures       ED Course                               MDM  Number of Diagnoses or Management Options  Diagnosis management comments: 79-year-old female with URI symptoms, cough, congestion, wheezing, stye will check blood work, repeat x-rays, will treat symptomatically with albuterol, Tessalon given patient's continued symptoms will place empirically on antibiotics and Flonase      Disposition  Final diagnoses:   Hordeolum externum   Bronchitis     Time reflects when diagnosis was documented in both MDM as applicable and the Disposition within this note     Time User Action Codes Description Comment    9/18/2019 12:40 AM Isael Hunter [[id number]] Hordeolum externum     9/18/2019 12:40 AM Isael Hunter [[id number]] Bronchitis       ED Disposition     ED Disposition Condition Date/Time Comment    Discharge Stable Wed Sep 18, 2019 12:40 AM Stacy Aggarwal discharge to home/self care  Follow-up Information     Follow up With Specialties Details Why [address] [place] [address] Emergency Department Emergency Medicine  If symptoms worsen [address] [id number]  [address] [place] [address],  [phone], MD Internal Medicine Schedule an appointment as soon as possible for a visit in 2 days  [address] [facility] [address] [facility]      Rossy Howard Ophthalmology Schedule an appointment as soon as possible for a visit   [address] [facility] [address] [facility] [address]  Mongi Anam            Discharge Medication List as of 9/18/2019 12:44 AM      START taking these medications    Details   !! albuterol (PROVENTIL HFA,VENTOLIN HFA) 90 mcg/act inhaler Inhale 2 puffs every 4 (four) hours as needed for wheezing, Starting Wed 9/18/2019, Normal      azithromycin (ZITHROMAX Z-DEMETRA) 250 mg tablet Take 2 tablets today then 1 tablet daily x 4 days, Print      benzonatate (TESSALON PERLES) 100 mg capsule Take 1 capsule (100 mg total) by mouth 2 (two) times a day as needed for cough, Starting Wed 9/18/2019, Print      fluticasone (FLONASE) 50 mcg/act nasal spray 1 spray into each nostril daily, Starting Wed 9/18/2019, Normal       !! - Potential duplicate medications found  Please discuss with provider        CONTINUE these medications which have NOT CHANGED    Details   !! albuterol (PROVENTIL HFA,VENTOLIN HFA) 90 mcg/act inhaler Inhale 2 puffs every 6 (six) hours as needed for wheezing Indications: patient unsure of correct dosage/frequency  , Until Discontinued, Historical Med      amitriptyline (ELAVIL) 100 mg tablet Take 100 mg by mouth daily at bedtime, Historical Med      carBAMazepine (TEGretol) 200 mg tablet Take 1 tablet (200 mg total) by mouth 3 (three) times a day, Starting Mon 12/3/2018, Print      cholecalciferol 1000 units tablet Take 1 tablet (1,000 Units total) by mouth 2 (two) times a day, Starting Mon 12/3/2018, Print      diazepam (VALIUM) 5 mg tablet Take 1 tablet (5 mg total) by mouth 2 (two) times a day for 4 days, Starting Mon 6/24/2019, Until Fri 6/28/2019, Print      famotidine (PEPCID) 20 mg tablet Take 1 tablet (20 mg total) by mouth 2 (two) times a day, Starting Wed 1/2/2019, Print      hydrochlorothiazide (HYDRODIURIL) 25 mg tablet Take 1 tablet (25 mg total) by mouth daily, Starting Mon 12/3/2018, Print      lidocaine (LIDODERM) 5 % Apply 1 patch topically daily for 4 days Remove & Discard patch within 12 hours or as directed by MD, Starting Mon 6/24/2019, Until Fri 6/28/2019, Print      meloxicam (MOBIC) 7 5 mg tablet Take 1 tablet (7 5 mg total) by mouth daily, Starting Mon 12/3/2018, Print      Multiple Vitamins-Minerals (MULTIVITAMIN ADULT EXTRA C PO) Take 1 tablet by mouth, Historical Med      nitrofurantoin (MACROBID) 100 mg capsule Take 1 capsule (100 mg total) by mouth 2 (two) times a day, Starting Wed 1/2/2019, Print      omeprazole (PriLOSEC) 40 MG capsule Take 40 mg by mouth daily, Historical Med      ondansetron (ZOFRAN) 4 mg tablet Take 1 tablet (4 mg total) by mouth every 6 (six) hours, Starting Wed 1/2/2019, Print      oxyCODONE (OxyCONTIN) 15 mg 12 hr tablet Take 15 mg by mouth every 12 (twelve) hours, Historical Med      promethazine-codeine (PHENERGAN WITH CODEINE) 6 25-10 mg/5 mL syrup Take 5 mL by mouth every 4 (four) hours as needed for cough for up to 10 days, Starting Sun 9/15/2019, Until Wed 9/25/2019, Print sucralfate (CARAFATE) 1 g tablet Take 1 tablet (1 g total) by mouth 4 (four) times a day, Starting Wed 1/2/2019, Print       !! - Potential duplicate medications found  Please discuss with provider  No discharge procedures on file      ED Provider  Electronically Signed by           Larissa Lares MD  09/18/19 3635

## 2019-10-24 ENCOUNTER — APPOINTMENT (EMERGENCY)
Dept: CT IMAGING | Facility: HOSPITAL | Age: 44
End: 2019-10-24
Payer: COMMERCIAL

## 2019-10-24 ENCOUNTER — HOSPITAL ENCOUNTER (EMERGENCY)
Facility: HOSPITAL | Age: 44
Discharge: HOME | End: 2019-10-24
Attending: EMERGENCY MEDICINE | Admitting: EMERGENCY MEDICINE
Payer: COMMERCIAL

## 2019-10-24 VITALS
DIASTOLIC BLOOD PRESSURE: 90 MMHG | HEART RATE: 93 BPM | SYSTOLIC BLOOD PRESSURE: 120 MMHG | TEMPERATURE: 96.9 F | WEIGHT: 158.38 LBS | RESPIRATION RATE: 20 BRPM | BODY MASS INDEX: 28.97 KG/M2 | OXYGEN SATURATION: 100 %

## 2019-10-24 DIAGNOSIS — R10.9 ABDOMINAL PAIN: Primary | ICD-10-CM

## 2019-10-24 DIAGNOSIS — N39.0 UTI (URINARY TRACT INFECTION): ICD-10-CM

## 2019-10-24 LAB
ALBUMIN SERPL BCP-MCNC: 4.1 G/DL (ref 3–5.2)
ALP SERPL-CCNC: 72 U/L (ref 43–122)
ALT SERPL W P-5'-P-CCNC: 22 U/L (ref 9–52)
ANION GAP SERPL CALCULATED.3IONS-SCNC: 6 MMOL/L (ref 5–14)
AST SERPL W P-5'-P-CCNC: 18 U/L (ref 14–36)
BACTERIA UR QL AUTO: ABNORMAL /HPF
BILIRUB SERPL-MCNC: 0.2 MG/DL
BILIRUB UR QL STRIP: NEGATIVE
BUN SERPL-MCNC: 17 MG/DL (ref 5–25)
CALCIUM SERPL-MCNC: 9.4 MG/DL (ref 8.4–10.2)
CAOX CRY URNS QL MICRO: ABNORMAL /HPF
CHLORIDE SERPL-SCNC: 104 MMOL/L (ref 97–108)
CLARITY UR: CLEAR
CO2 SERPL-SCNC: 30 MMOL/L (ref 22–30)
COLOR UR: ABNORMAL
CREAT SERPL-MCNC: 0.65 MG/DL (ref 0.6–1.2)
ERYTHROCYTE [DISTWIDTH] IN BLOOD BY AUTOMATED COUNT: 15.1 %
EXT PREG TEST URINE: NEGATIVE
EXT. CONTROL ED NAV: NORMAL
GFR SERPL CREATININE-BSD FRML MDRD: 110 ML/MIN/1.73SQ M
GLUCOSE SERPL-MCNC: 93 MG/DL (ref 70–99)
GLUCOSE UR STRIP-MCNC: NEGATIVE MG/DL
HCT VFR BLD AUTO: 41.1 % (ref 36–46)
HGB BLD-MCNC: 13.6 G/DL (ref 12–16)
HGB UR QL STRIP.AUTO: 25
KETONES UR STRIP-MCNC: ABNORMAL MG/DL
LEUKOCYTE ESTERASE UR QL STRIP: 25
LIPASE SERPL-CCNC: 165 U/L (ref 23–300)
MCH RBC QN AUTO: 28.2 PG (ref 26–34)
MCHC RBC AUTO-ENTMCNC: 33.1 G/DL (ref 31–36)
MCV RBC AUTO: 85 FL (ref 80–100)
MUCOUS THREADS UR QL AUTO: ABNORMAL
NITRITE UR QL STRIP: NEGATIVE
NON-SQ EPI CELLS URNS QL MICRO: ABNORMAL /HPF
PH UR STRIP.AUTO: 5 [PH]
PLATELET # BLD AUTO: 381 THOUSANDS/UL (ref 150–450)
PMV BLD AUTO: 8.6 FL (ref 8.9–12.7)
POTASSIUM SERPL-SCNC: 4.5 MMOL/L (ref 3.6–5)
PROT SERPL-MCNC: 7.2 G/DL (ref 5.9–8.4)
PROT UR STRIP-MCNC: NEGATIVE MG/DL
RBC # BLD AUTO: 4.81 MILLION/UL (ref 4–5.2)
RBC #/AREA URNS AUTO: ABNORMAL /HPF
SODIUM SERPL-SCNC: 140 MMOL/L (ref 137–147)
SP GR UR STRIP.AUTO: 1.02 (ref 1–1.04)
UROBILINOGEN UA: 1 MG/DL
WBC # BLD AUTO: 13.2 THOUSAND/UL (ref 4.5–11)
WBC #/AREA URNS AUTO: ABNORMAL /HPF

## 2019-10-24 PROCEDURE — 80053 COMPREHEN METABOLIC PANEL: CPT | Performed by: PHYSICIAN ASSISTANT

## 2019-10-24 PROCEDURE — 81025 URINE PREGNANCY TEST: CPT | Performed by: PHYSICIAN ASSISTANT

## 2019-10-24 PROCEDURE — 81003 URINALYSIS AUTO W/O SCOPE: CPT | Performed by: PHYSICIAN ASSISTANT

## 2019-10-24 PROCEDURE — 96375 TX/PRO/DX INJ NEW DRUG ADDON: CPT

## 2019-10-24 PROCEDURE — 74177 CT ABD & PELVIS W/CONTRAST: CPT

## 2019-10-24 PROCEDURE — 96374 THER/PROPH/DIAG INJ IV PUSH: CPT

## 2019-10-24 PROCEDURE — 83690 ASSAY OF LIPASE: CPT | Performed by: PHYSICIAN ASSISTANT

## 2019-10-24 PROCEDURE — 96361 HYDRATE IV INFUSION ADD-ON: CPT

## 2019-10-24 PROCEDURE — 36415 COLL VENOUS BLD VENIPUNCTURE: CPT | Performed by: PHYSICIAN ASSISTANT

## 2019-10-24 PROCEDURE — 99284 EMERGENCY DEPT VISIT MOD MDM: CPT | Performed by: PHYSICIAN ASSISTANT

## 2019-10-24 PROCEDURE — 99284 EMERGENCY DEPT VISIT MOD MDM: CPT

## 2019-10-24 PROCEDURE — 81001 URINALYSIS AUTO W/SCOPE: CPT | Performed by: PHYSICIAN ASSISTANT

## 2019-10-24 RX ORDER — SODIUM CHLORIDE 9 MG/ML
250 INJECTION, SOLUTION INTRAVENOUS CONTINUOUS
Status: DISCONTINUED | OUTPATIENT
Start: 2019-10-24 | End: 2019-10-25 | Stop reason: HOSPADM

## 2019-10-24 RX ORDER — CEPHALEXIN 500 MG/1
500 CAPSULE ORAL EVERY 8 HOURS SCHEDULED
Qty: 30 CAPSULE | Refills: 0 | Status: SHIPPED | OUTPATIENT
Start: 2019-10-24 | End: 2019-10-24 | Stop reason: SDUPTHER

## 2019-10-24 RX ORDER — ONDANSETRON 2 MG/ML
4 INJECTION INTRAMUSCULAR; INTRAVENOUS ONCE
Status: COMPLETED | OUTPATIENT
Start: 2019-10-24 | End: 2019-10-24

## 2019-10-24 RX ORDER — MORPHINE SULFATE 4 MG/ML
4 INJECTION, SOLUTION INTRAMUSCULAR; INTRAVENOUS ONCE
Status: COMPLETED | OUTPATIENT
Start: 2019-10-24 | End: 2019-10-24

## 2019-10-24 RX ORDER — CEPHALEXIN 500 MG/1
500 CAPSULE ORAL EVERY 8 HOURS SCHEDULED
Qty: 30 CAPSULE | Refills: 0 | Status: SHIPPED | OUTPATIENT
Start: 2019-10-24 | End: 2019-11-03

## 2019-10-24 RX ADMIN — MORPHINE SULFATE 4 MG: 4 INJECTION INTRAVENOUS at 20:19

## 2019-10-24 RX ADMIN — IOHEXOL 100 ML: 350 INJECTION, SOLUTION INTRAVENOUS at 21:18

## 2019-10-24 RX ADMIN — SODIUM CHLORIDE 250 ML/HR: 0.9 INJECTION, SOLUTION INTRAVENOUS at 20:19

## 2019-10-24 RX ADMIN — ONDANSETRON 4 MG: 2 INJECTION INTRAMUSCULAR; INTRAVENOUS at 20:19

## 2019-10-24 NOTE — ED PROVIDER NOTES
History  Chief Complaint   Patient presents with    Abdominal Pain       Medical Problem   Location:  Pt with abdomen pain for 4-5 days pt saw blood with bowel movement   Severity:  Mild  Onset quality:  Gradual  Duration:  4 days  Timing:  Intermittent  Progression:  Unchanged  Chronicity:  Recurrent  Context:  Pt with appy and gb removed   Associated symptoms: abdominal pain        Prior to Admission Medications   Prescriptions Last Dose Informant Patient Reported? Taking? Multiple Vitamins-Minerals (MULTIVITAMIN ADULT EXTRA C PO)   Yes No   Sig: Take 1 tablet by mouth   albuterol (2 5 mg/3 mL) 0 083 % nebulizer solution   No No   Sig: Take 1 vial (2 5 mg total) by nebulization every 6 (six) hours as needed for wheezing or shortness of breath   albuterol (PROVENTIL HFA,VENTOLIN HFA) 90 mcg/act inhaler   Yes No   Sig: Inhale 2 puffs every 6 (six) hours as needed for wheezing Indications: patient unsure of correct dosage/frequency     albuterol (PROVENTIL HFA,VENTOLIN HFA) 90 mcg/act inhaler   No No   Sig: Inhale 2 puffs every 4 (four) hours as needed for wheezing   amitriptyline (ELAVIL) 100 mg tablet  Self Yes No   Sig: Take 100 mg by mouth daily at bedtime   benzonatate (TESSALON PERLES) 100 mg capsule   No No   Sig: Take 1 capsule (100 mg total) by mouth 2 (two) times a day as needed for cough   carBAMazepine (TEGretol) 200 mg tablet   No No   Sig: Take 1 tablet (200 mg total) by mouth 3 (three) times a day   Patient not taking: Reported on 2019    cholecalciferol 1000 units tablet   No No   Sig: Take 1 tablet (1,000 Units total) by mouth 2 (two) times a day   diazepam (VALIUM) 5 mg tablet   No No   Sig: Take 1 tablet (5 mg total) by mouth 2 (two) times a day for 4 days   famotidine (PEPCID) 20 mg tablet   No No   Sig: Take 1 tablet (20 mg total) by mouth 2 (two) times a day   fluticasone (FLONASE) 50 mcg/act nasal spray   No No   Si spray into each nostril daily   hydrochlorothiazide (HYDRODIURIL) 25 mg tablet   No No   Sig: Take 1 tablet (25 mg total) by mouth daily   Patient not taking: Reported on 1/2/2019    lidocaine (LIDODERM) 5 %   No No   Sig: Apply 1 patch topically daily for 4 days Remove & Discard patch within 12 hours or as directed by MD   meloxicam (MOBIC) 7 5 mg tablet   No No   Sig: Take 1 tablet (7 5 mg total) by mouth daily   nitrofurantoin (MACROBID) 100 mg capsule   No No   Sig: Take 1 capsule (100 mg total) by mouth 2 (two) times a day   omeprazole (PriLOSEC) 40 MG capsule  Self Yes No   Sig: Take 40 mg by mouth daily   ondansetron (ZOFRAN) 4 mg tablet   No No   Sig: Take 1 tablet (4 mg total) by mouth every 6 (six) hours   oxyCODONE (OxyCONTIN) 15 mg 12 hr tablet  Self Yes No   Sig: Take 15 mg by mouth every 12 (twelve) hours   sucralfate (CARAFATE) 1 g tablet   No No   Sig: Take 1 tablet (1 g total) by mouth 4 (four) times a day      Facility-Administered Medications: None       Past Medical History:   Diagnosis Date    Asthma     Bipolar disorder (HCC)     Migraine     Psychiatric disorder     PTSD (post-traumatic stress disorder)     Seizures (HCC)        Past Surgical History:   Procedure Laterality Date    APPENDECTOMY      BLADDER SURGERY      CHOLECYSTECTOMY      GASTRECTOMY      gastric sleeve    GASTRIC RESTRICTION SURGERY      gastric sleeve in Jan 22 2018    HEMORROIDECTOMY      HYSTERECTOMY      TUBAL LIGATION         Family History   Problem Relation Age of Onset    Diabetes Mother         MELLITUS    Diabetes Father         MELLITUS    Diabetes Other         MELLITUS     I have reviewed and agree with the history as documented  Social History     Tobacco Use    Smoking status: Current Every Day Smoker     Packs/day: 0 50    Smokeless tobacco: Never Used   Substance Use Topics    Alcohol use: Yes     Comment: ocassionally    Drug use: No        Review of Systems   Constitutional: Negative  HENT: Negative  Eyes: Negative  Respiratory: Negative  Cardiovascular: Negative  Gastrointestinal: Positive for abdominal pain  Endocrine: Negative  Genitourinary: Negative  Musculoskeletal: Negative  Skin: Negative  Allergic/Immunologic: Negative  Neurological: Negative  Hematological: Negative  Psychiatric/Behavioral: Negative  All other systems reviewed and are negative  Physical Exam  Physical Exam   Constitutional: She appears well-developed and well-nourished  HENT:   Head: Normocephalic  Eyes: Pupils are equal, round, and reactive to light  EOM are normal    Cardiovascular: Normal rate, regular rhythm, normal heart sounds and intact distal pulses  Pulmonary/Chest: Effort normal and breath sounds normal    Abdominal: Normal appearance and bowel sounds are normal  There is generalized tenderness  Genitourinary:   Genitourinary Comments: Brown stool  guiac Positive   No melena    Vitals reviewed        Vital Signs  ED Triage Vitals [10/24/19 1915]   Temperature Pulse Respirations Blood Pressure SpO2   (!) 96 9 °F (36 1 °C) 98 20 147/70 98 %      Temp Source Heart Rate Source Patient Position - Orthostatic VS BP Location FiO2 (%)   Tympanic Monitor Lying Left arm --      Pain Score       7           Vitals:    10/24/19 1915 10/24/19 2020   BP: 147/70 119/63   Pulse: 98 86   Patient Position - Orthostatic VS: Lying Lying         Visual Acuity      ED Medications  Medications   sodium chloride 0 9 % infusion (0 mL/hr Intravenous Stopped 10/24/19 2204)   ondansetron (ZOFRAN) injection 4 mg (4 mg Intravenous Given 10/24/19 2019)   morphine (PF) 4 mg/mL injection 4 mg (4 mg Intravenous Given 10/24/19 2019)   iohexol (OMNIPAQUE) 350 MG/ML injection (MULTI-DOSE) 100 mL (100 mL Intravenous Given 10/24/19 2118)       Diagnostic Studies  Results Reviewed     Procedure Component Value Units Date/Time    Comprehensive metabolic panel [[id number]]  (Normal) Collected:  10/24/19 2032    Lab Status:  Final result Specimen:  Blood from Arm, Right Updated:  10/24/19 2054     Sodium 140 mmol/L      Potassium 4 5 mmol/L      Chloride 104 mmol/L      CO2 30 mmol/L      ANION GAP 6 mmol/L      BUN 17 mg/dL      Creatinine 0 65 mg/dL      Glucose 93 mg/dL      Calcium 9 4 mg/dL      AST 18 U/L      ALT 22 U/L      Alkaline Phosphatase 72 U/L      Total Protein 7 2 g/dL      Albumin 4 1 g/dL      Total Bilirubin 0 20 mg/dL      eGFR 110 ml/min/1 73sq m     Narrative:       National Kid[employer] Foundation guidelines for Chronic Kidney Disease (CKD):     Stage 1 with normal or high GFR (GFR > 90 mL/min/1 73 square meters)    Stage 2 Mild CKD (GFR = 60-89 mL/min/1 73 square meters)    Stage 3A Moderate CKD (GFR = 45-59 mL/min/1 73 square meters)    Stage 3B Moderate CKD (GFR = 30-44 mL/min/1 73 square meters)    Stage 4 Severe CKD (GFR = 15-29 mL/min/1 73 square meters)    Stage 5 End Stage CKD (GFR <15 mL/min/1 73 square meters)  Note: GFR calculation is accurate only with a steady state creatinine    Lipase [[id number]]  (Normal) Collected:  10/24/19 2032    Lab Status:  Final result Specimen:  Blood from Arm, Right Updated:  10/24/19 2054     Lipase 165 u/L     CBC and differential [[id number]]  (Abnormal) Collected:  10/24/19 2038    Lab Status:  Final result Specimen:  Blood from Arm, Right Updated:  10/24/19 2050     WBC 13 20 Thousand/uL      RBC 4 81 Million/uL      Hemoglobin 13 6 g/dL      Hematocrit 41 1 %      MCV 85 fL      MCH 28 2 pg      MCHC 33 1 g/dL      RDW 15 1 %      MPV 8 6 fL      Platelets 064 Thousands/uL     Urine Microscopic [[id number]]  (Abnormal) Collected:  10/24/19 1942    Lab Status:  Final result Specimen:  Urine, Clean Catch Updated:  10/24/19 2023     RBC, UA 4-10 /hpf      WBC, UA 4-10 /hpf      Epithelial Cells Occasional /hpf      Bacteria, UA None Seen /hpf      Ca Oxalate Cyn, UA Occasional /hpf      MUCUS THREADS Occasional    POCT pregnancy, urine [[id number]]  (Normal) Resulted:  10/24/19 1942    Lab Status: Final result Updated:  10/24/19 2018     EXT PREG TEST UR (Ref: Negative) negative     Control valid    UA w Reflex to Microscopic w Reflex to Culture [[id number]]  (Abnormal) Collected:  10/24/19 1942    Lab Status:  Final result Specimen:  Urine, Clean Catch Updated:  10/24/19 1954     Color, UA Brenda     Clarity, UA Clear     Specific Gravity, UA 1 025     pH, UA 5 0     Leukocytes, UA 25 0     Nitrite, UA Negative     Protein, UA Negative mg/dl      Glucose, UA Negative mg/dl      Ketones, UA 5 (Trace) mg/dl      Bilirubin, UA Negative     Blood, UA 25 0     UROBILINOGEN UA 1 0 mg/dL                  CT abdomen pelvis with contrast   Final Result by Megan Russo MD (10/24 2150)      No bowel dilatation is seen to suggest obstruction  The common bile duct demonstrates some mild enlargement as compared to the prior study although still within normal range in a patient is status post cholecystectomy  This could be correlated with LFTs  The study was marked in [facility] for immediate notification  Workstation performed: [id number]                    Procedures  Procedures       ED Course                               MDM    Disposition  Final diagnoses:   Abdominal pain   UTI (urinary tract infection)     Time reflects when diagnosis was documented in both MDM as applicable and the Disposition within this note     Time User Action Codes Description Comment    10/24/2019  9:58 PM Yuan Black  Add [R10 9] Abdominal pain     10/24/2019  9:58 PM Yuan Black  Add [N39 0] UTI (urinary tract infection)       ED Disposition     ED Disposition Condition Date/Time Comment    Discharge Stable Thu Oct 24, 2019  9:58 PM Chelsie Moses discharge to home/self care              Follow-up Information     Follow up With Specialties Details Why [id number] Pedro Strong MD Family Medicine   [address] [place] [address]  [phone]            Current Discharge Medication List      START taking these medications    Details   cephalexin (KEFLEX) 500 mg capsule Take 1 capsule (500 mg total) by mouth every 8 (eight) hours for 10 days  Qty: 30 capsule, Refills: 0    Associated Diagnoses: Abdominal pain; UTI (urinary tract infection)         CONTINUE these medications which have NOT CHANGED    Details   albuterol (2 5 mg/3 mL) 0 083 % nebulizer solution Take 1 vial (2 5 mg total) by nebulization every 6 (six) hours as needed for wheezing or shortness of breath  Qty: 75 mL, Refills: 0    Associated Diagnoses: Bronchitis      !! albuterol (PROVENTIL HFA,VENTOLIN HFA) 90 mcg/act inhaler Inhale 2 puffs every 6 (six) hours as needed for wheezing Indications: patient unsure of correct dosage/frequency  !! albuterol (PROVENTIL HFA,VENTOLIN HFA) 90 mcg/act inhaler Inhale 2 puffs every 4 (four) hours as needed for wheezing  Qty: 1 Inhaler, Refills: 0    Comments: Substitution to a formulary equivalent within the same pharmaceutical class is authorized    Associated Diagnoses: Bronchitis      amitriptyline (ELAVIL) 100 mg tablet Take 100 mg by mouth daily at bedtime      benzonatate (TESSALON PERLES) 100 mg capsule Take 1 capsule (100 mg total) by mouth 2 (two) times a day as needed for cough  Qty: 14 capsule, Refills: 0    Associated Diagnoses: Bronchitis      carBAMazepine (TEGretol) 200 mg tablet Take 1 tablet (200 mg total) by mouth 3 (three) times a day  Qty: 45 tablet, Refills: 1    Associated Diagnoses: Bipolar 2 disorder (HCC)      cholecalciferol 1000 units tablet Take 1 tablet (1,000 Units total) by mouth 2 (two) times a day  Qty: 30 tablet, Refills: 1    Associated Diagnoses: Bipolar 2 disorder (HCC)      diazepam (VALIUM) 5 mg tablet Take 1 tablet (5 mg total) by mouth 2 (two) times a day for 4 days  Qty: 8 tablet, Refills: 0    Associated Diagnoses: Back pain; Sprain of low back, initial encounter; Sprain of right wrist, initial encounter      famotidine (PEPCID) 20 mg tablet Take 1 tablet (20 mg total) by mouth 2 (two) times a day  Qty: 30 tablet, Refills: 0    Associated Diagnoses: Gastroenteritis; UTI (urinary tract infection)      fluticasone (FLONASE) 50 mcg/act nasal spray 1 spray into each nostril daily  Qty: 16 g, Refills: 0    Associated Diagnoses: Bronchitis      hydrochlorothiazide (HYDRODIURIL) 25 mg tablet Take 1 tablet (25 mg total) by mouth daily  Qty: 15 tablet, Refills: 1    Associated Diagnoses: Bipolar 2 disorder (HCC)      lidocaine (LIDODERM) 5 % Apply 1 patch topically daily for 4 days Remove & Discard patch within 12 hours or as directed by MD  Qty: 4 patch, Refills: 0    Associated Diagnoses: Back pain; Sprain of low back, initial encounter; Sprain of right wrist, initial encounter      meloxicam (MOBIC) 7 5 mg tablet Take 1 tablet (7 5 mg total) by mouth daily  Qty: 15 tablet, Refills: 1    Associated Diagnoses: Bipolar 2 disorder (HCC)      Multiple Vitamins-Minerals (MULTIVITAMIN ADULT EXTRA C PO) Take 1 tablet by mouth      nitrofurantoin (MACROBID) 100 mg capsule Take 1 capsule (100 mg total) by mouth 2 (two) times a day  Qty: 10 capsule, Refills: 0    Associated Diagnoses: Gastroenteritis; UTI (urinary tract infection)      omeprazole (PriLOSEC) 40 MG capsule Take 40 mg by mouth daily      ondansetron (ZOFRAN) 4 mg tablet Take 1 tablet (4 mg total) by mouth every 6 (six) hours  Qty: 12 tablet, Refills: 0    Associated Diagnoses: Gastroenteritis; UTI (urinary tract infection)      oxyCODONE (OxyCONTIN) 15 mg 12 hr tablet Take 15 mg by mouth every 12 (twelve) hours      sucralfate (CARAFATE) 1 g tablet Take 1 tablet (1 g total) by mouth 4 (four) times a day  Qty: 40 tablet, Refills: 0    Associated Diagnoses: Gastroenteritis; UTI (urinary tract infection)       ! ! - Potential duplicate medications found  Please discuss with provider  No discharge procedures on file      ED Provider  Electronically Signed by           Penny Mejia PA-C  10/24/19 8481

## 2019-10-24 NOTE — ED TRIAGE NOTES
I have a big issue  I have appt to see a specialist for my intestine  I took a picture yesterday of the blood in my stool  Im having pain in my back and stomach  I called my doctor and they told me to go to the ER  Reports its cramping pain  No fevers  Reports nausea  Denies vomiting  Denies diarrhea  Reports soft stool  Reports lower abd cramps and shoots into lower back   Denies burning with urination but reports odor and cloudiness of urine

## 2020-02-18 NOTE — PROGRESS NOTES
11/30/18 1345   Activity/Group Checklist   Group Other (Comment)  ([facility] / [facility])   Attendance Attended   Attendance Duration (min) Greater than 60   Interactions Interacted appropriately   Affect/Mood Appropriate   Goals Achieved Identified feelings; Increased hopefulness; Displayed empathy;Identified distorted thoughts/beliefs; Able to listen to others; Able to engage in interactions; Able to reflect/comment on own behavior;Able to manage/cope with feelings; Able to recieve feedback; Able to give feedback to another  (Able to engage art materials and gain insight)     Patient able to identify personal conflict in choosing to creatively address the work she has to do rather than focus on the goal  She was able to gain insight regarding how her avoidance of the issues is a personal barrier in her recovery  Patient appears motivated and willing to challenge self; reaches out to others and have a mature discussion  Chief Complaint   Patient presents with   • Follow-up     CHF clinic fu       Accompanied By: alone  :  services used: No      History of Present Illness  HPI    Admit 9/26-10/2/19 [facility] for Afib, HTN, no recent ER or hospitalizations  Echo done EF 53%, previous echo 9/2018 EF 35-40%, nonischemic CM, pt had stress test 1/2019 which was nl EF and no reversible ischemia.    Now on Eliquis 5 mg BID, use to be on warfarin, for Afib, which was started 9/2018  Saw  in Oct and again Nov, fu 3 mths, was to increase metoprolol to 100 mg daily, but pt was concerned to do it, so stayed on 50 mg daily, along with his CCB, furosemide and losartan.  Was to get BMP done 1-2 wks after increasing diuretic but pt not taking furosemide BID as directed, only taking 1 tab daily. BP was low-nl and HR 56 in Nov, so cardiology advised to stay on metoprolol 50 mg daily and stop diltiazem.  Pt staggers losartan and metoprolol.  Concerned with balance, taking Vit B to help and doses reduced in past for losartan also due to lightheadedness, CCB also d/c  Concerned with getting gout again with diuretic, takes furosemide only once a day, no gout attacks  No regular exercise, walks a bit when going out to socialize, lightheadedness prevents much activity  Lives with parents, doesn't use kitchen, doesn't have access to it?, eats out, pt needs to ask for no added salt or choose smaller portions, trying to eat more fruits and vegetables with less sodium   Drinks about 64 oz a day, also some oj and tomato juice  Doesn't monitor wt at home, scale only goes up to 350 lbs, wt up about 2-3 lb in office since Dec  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 But would consider sleep eval, referral placed already, pt was notified, but pt having insurance change in March and wants to wait until that occurs  Pt hasn't fu with behavioral health, not sure what provider coverage will be when insurance change  Pt has BP cuff at home, checks intermittently HR 80-90 and /80    CHF   Presents for follow-up visit. The disease course has been stable. Associated symptoms include edema (chronic, doesn't always remember to elevate or sleeps in chair), fatigue (no regular exercise) and shortness of breath (slightly worse). Pertinent negatives include no chest pain (feels 20 min after taking meds, midsternum knawing pain), orthopnea, palpitations (rare x1 only) or paroxysmal nocturnal dyspnea.         ROS  Review of Systems   Constitutional: Positive for fatigue (no regular exercise). Negative for appetite change.   Respiratory: Positive for shortness of breath (slightly worse). Negative for cough.    Cardiovascular: Negative for chest pain (feels 20 min after taking meds, midsternum knawing pain) and palpitations (rare x1 only).   Gastrointestinal: Negative for blood in stool, nausea and vomiting.   Genitourinary: Negative for hematuria.   Neurological: Positive for light-headedness (pt afraid to get off balance, improves with Vit B, has cane). Negative for dizziness and syncope (2 wks getting out bed at night, off balance and fell on hip, ).   Hematological: Does not bruise/bleed easily (has cane).        Past Medical History  Past Medical History:   Diagnosis Date   • Anxiety    • Essential (primary) hypertension    • Gout    • Venous insufficiency        Surgical History  Past Surgical History:    Procedure Laterality Date   • Shoulder surgery Right        Family History  Family History   Problem Relation Age of Onset   • Stroke Mother    • Myocardial Infarction Father        Social History  Social History     Socioeconomic History   • Marital status: Single     Spouse name: Not on file   • Number of children: Not on file   • Years of education: Not on file   • Highest education level: Not on file   Occupational History   • Not on file   Social Needs   • Financial resource strain: Not on file   • Food insecurity:     Worry: Not on file     Inability: Not on file   • Transportation needs:     Medical: Not on file     Non-medical: Not on file   Tobacco Use   • Smoking status: Never Smoker   • Smokeless tobacco: Never Used   Substance and Sexual Activity   • Alcohol use: Yes     Frequency: 4 or more times a week   • Drug use: Not Currently     Comment: used cocaine and marijuana in past   • Sexual activity: Not on file   Lifestyle   • Physical activity:     Days per week: 0 days     Minutes per session: 0 min   • Stress: Rather much   Relationships   • Social connections:     Talks on phone: Not on file     Gets together: Not on file     Attends Restorationism service: Not on file     Active member of club or organization: Not on file     Attends meetings of clubs or organizations: Not on file     Relationship status: Not on file   • Intimate partner violence:     Fear of current or ex partner: Not on file     Emotionally abused: Not on file     Physically abused: Not on file     Forced sexual activity: Not on file   Other Topics Concern   • Not on file   Social History Narrative   • Not on file       Alcohol Use: Yes               Drug Use:    Not Current*       Comment: used cocaine and marijuana in past        Allergies  ALLERGIES:  No Known Allergies    Presenting Meds  Current Outpatient Medications   Medication Sig Dispense Refill   • traMADol (ULTRAM) 50 MG tablet Take 1 tablet by mouth every 6 hours as  needed for Pain. 30 tablet 0   • QUEtiapine (SEROQUEL) 50 MG tablet Take 1 tablet by mouth at bedtime. 30 tablet 3   • losartan (COZAAR) 25 MG tablet Take 1 tablet by mouth daily. 30 tablet 11   • metoPROLOL succinate (TOPROL-XL) 100 MG 24 hr tablet Take 0.5 tablets by mouth daily.     • furosemide (LASIX) 40 MG tablet Take 1 tablet by mouth 2 times daily.  2   • ELIQUIS 5 MG Tab TAKE 1 TABLET BY MOUTH TWICE DAILY 60 tablet 5   • colchicine (COLCRYS) 0.6 MG tablet TAKE 1 TABLET BY MOUTH DAILY AS DIRECTED 30 tablet 0   • predniSONE (DELTASONE) 10 MG tablet Take 1 tablet by mouth daily. 5 tablet 0   • gabapentin (NEURONTIN) 300 MG capsule TK ONE C PO Q 8 H  2   • loratadine (CLARITIN) 10 MG tablet Take 1 tablet by mouth daily. 30 tablet 11   • fluticasone (FLONASE) 50 MCG/ACT nasal spray Spray 2 sprays in each nostril daily. 16 g 11   • betamethasone dipropionate (DIPROSONE) 0.05 % ointment Apply topically 2 times daily. 60 g 2     No current facility-administered medications for this visit.        Visit Vitals  BP 92/60 (BP Location: LUE - Left upper extremity, Patient Position: Sitting)   Pulse 80   Resp 20   Ht 5' 8\" (1.727 m)   Wt (!) 174.8 kg (385 lb 5.8 oz)   BMI 58.59 kg/m²     Wt Readings from Last 4 Encounters:   02/18/20 (!) 174.8 kg (385 lb 5.8 oz)   12/17/19 (!) 173.6 kg (382 lb 11.5 oz)   12/02/19 (!) 174.4 kg (384 lb 7.7 oz)   11/19/19 (!) 173.8 kg (383 lb 2.6 oz)     BP Readings from Last 2 Encounters:   02/18/20 92/60   12/17/19 110/80   ]  Pulse Readings from Last 2 Encounters:   02/18/20 80   12/17/19 76   ]          Physical Exam   Constitutional: He is oriented to person, place, and time. No distress.   Cardiovascular: Normal rate and regular rhythm.   Pulmonary/Chest: No respiratory distress. He has no wheezes.   Abdominal: He exhibits distension.   Morbid obese Musculoskeletal:         General: Edema present.      Comments: bilat LE +3 R>L, morbid obese, dry scaly skin     Neurological: He is  alert and oriented to person, place, and time.   Psychiatric: He has a normal mood and affect.         Results/Data    Most Recent Cardiac Diagnostics:  See HPI  9/2019 echo SS   1. Left ventricle: The cavity size is at the upper limits of     normal. Wall thickness is mildly increased. There is concentric    hypertrophy. Systolic function is at the lower limits of normal.    The estimated ejection fraction is 53%, by visual assessment.  2. Right ventricle: The cavity size is mildly dilated.  3. Right atrium: The atrium is mildly dilated.  4. Pericardium, extracardiac: A trivial pericardial effusion is     identified.  Most Recent Lab Results:   Sodium (mmol/L)   Date Value   10/25/2019 138     Potassium (mmol/L)   Date Value   10/25/2019 4.2     Glucose (mg/dL)   Date Value   10/25/2019 97     AST/SGOT (Units/L)   Date Value   09/27/2019 30     ALT/SGPT (Units/L)   Date Value   09/27/2019 32     B-TYPE NATRIURETIC PEPTIDE (pg/mL)   Date Value   07/26/2015 20     PLT (K/mcL)   Date Value   10/01/2019 196     ALK PHOSPHATASE (Units/L)   Date Value   09/27/2019 62     The 10-year ASCVD risk score (Embudo PETRA Jr., et al., 2013) is: 2.5%    Values used to calculate the score:      Age: 54 years      Sex: Male      Is Non- : No      Diabetic: No      Tobacco smoker: No      Systolic Blood Pressure: 92 mmHg      Is BP treated: Yes      HDL Cholesterol: 50 mg/dL      Total Cholesterol: 172 mg/dL  NT proBNP   Date Value Ref Range Status   09/26/2019 1,608 (H) <126 pg/mL Final   ]    HGB   Date Value Ref Range Status   10/01/2019 14.4 13.0 - 17.0 g/dL Final     HCT   Date Value Ref Range Status   10/01/2019 44.9 39.0 - 51.0 % Final       TSH   Date Value Ref Range Status   09/27/2019 2.066 0.350 - 5.000 mcUnits/mL Final   09/27/2019   Final    Findings most consistent with euthyroid state, no additional testing suggested. TSH may be normal in patients with thyroid dysfunction and pituitary disease.  Clinical correlation recommended.   09/27/2019   Final    (Reflex TSH algorithm is not recommended in hospitalized patients. A variety of drugs, as well as serious acute and chronic illnesses may alter thyroid function tests. Commonly implicated drugs include glucocorticoids, dopamine, carbamazepine, iodine,   09/27/2019  amiodarone, lithium and heparin.)  Final     BUN   Date Value Ref Range Status   10/25/2019 14 6 - 20 mg/dL Final     Creatinine   Date Value Ref Range Status   10/25/2019 0.86 0.67 - 1.17 mg/dL Final     GFR Estimate, Non  (no units)   Date Value   10/25/2019 >90     GFR Estimate,  (no units)   Date Value   10/25/2019 >90     MAGNESIUM (mg/dL)   Date Value   09/29/2019 2.0     NT proBNP (pg/mL)   Date Value   09/26/2019 1,608 (H)       CHOLESTEROL   Date Value   09/27/2019 172 mg/dL   09/27/2019 Desirable            <200   09/27/2019 Borderline High      200 to 239   09/27/2019 High                 >=240     HDL   Date Value   09/27/2019 50 mg/dL   09/27/2019 Low            <40   09/27/2019 Borderline Low 40 to 49   09/27/2019 Near Optimal   50 to 59   09/27/2019 Optimal        >=60     CHOL/HDL (no units)   Date Value   09/27/2019 3.4     TRIGLYCERIDE   Date Value   09/27/2019 93 mg/dL   09/27/2019 Normal                   <150   09/27/2019 Borderline High          150 to 199   09/27/2019 High                     200 to 499   09/27/2019 Very High                >=500     CALCULATED LDL   Date Value   09/27/2019 BORDERLINE HIGH       130-159   09/27/2019 HIGH                  160-189   09/27/2019 VERY HIGH             >=190   09/27/2019 103 mg/dL   09/27/2019 OPTIMAL               <100   09/27/2019 NEAR OPTIMAL          100-129       Hemoglobin A1C (%)   Date Value   03/09/2017 5.6     No results found for: MALBCR    Assessment    Philipp was seen today for follow-up.    Diagnoses and all orders for this visit:    Chronic diastolic heart failure  (CMS/[facility])  Comments:  current echo EF 53%, in past 35-40%, Afib, HTN, on BB, ARB and diuretic, pt states he will now take furosemide BID  Orders:  -     CBC WITH DIFFERENTIAL; Future  -     HEPATIC FUNCTION PANEL; Future  -     THYROID STIMULATING HORMONE; Future  -     MAGNESIUM; Future    Atrial fibrillation (CMS/[facility])  Comments:  on BB, Eliquis, off of CCB  Orders:  -     CBC WITH DIFFERENTIAL; Future  -     MAGNESIUM; Future    Essential hypertension  Comments:  controlled, low-nl, some lightheadedness, improved on lower doses of BB and off of CCB, stagger meds  Orders:  -     THYROID STIMULATING HORMONE; Future    Alcohol abuse  Comments:  cessation advised, can worsen Afib, HTN, LV dysfxn    Morbid obesity with BMI of 60.0-69.9, adult (CMS/[facility])  Comments:  BMI 58, advise fu with RD for lifestyle changes, healthier eating habits, smaller portions, low sodium diet    Hyperlipidemia  Comments:  watch diet, lose wt    Chronic anticoagulation  Comments:  monitor for bleeding or bruising, falls or injuries, has cane, change position slowly, quit ETOH and vaping    Chronic edema  Comments:  leg elevaton, walking and daily exercises, take furosemide 40 mg BID as directed        Orders  Orders Placed This Encounter   • CBC with Automated Differential   • Hepatic Function Panel   • Thyroid Stimulating Hormone   • Magnesium         Time  Consult Counseling:   Total time spent with the patient was 40 minutes.  Greater than 50% of the total time was spent counseling and coordinating care.      Discussion/Summary    CHF   Current disease status  NYHA: Class III  Compensated, Continue current medication, Proper usage and SE of meds reviewed and discussed and Medical compliance with plan discussed and risks of non compliance reviewed  Med list given to pt, stagger meds to help with lightheadedness, can take metoprolol at bedtime, stay on 50 mg daily due to low-nl BP, CCB has been d/c by cardio and take losartan in AM  Take  furosemide as directed 1 tab BID, get BMP done as cardio ordered, along with other labs I ordered today  Fu cardio-gave pt number to call for appt    Dietary  Low sodium; 2 gram sodium , Read food labels for sodium content and Avoid; added salt, processed foods, canned goods, take out, fast food, chips, frozen dinners  Ask for no added salt  rec fu with RD for wt management, lifestyle changes with diet/meal planning    Fluids  Less than 2.0 L/64 ounces a day  Stay hydrated    Weights  Does patient have scale at home:  No  If weight gain of 5 lbs and 7 days call office  Needs scale for his current wt    Exercise  Reinforce daily exercise  Change position slowly, use assistive devices to prevent falls and injuries    Follow Up Plan  Recommended follow up  Cardiology, PCP-gave verbal update today, Dietician and Sleep Med-consider sleep eval to eval JODY, pt anxious about study, but can consider in home study for initial eval, pt may have insurance change in March  Call if worsening symptoms, Patient education completed : Disease process, etiology, prognosis and RTC as directed 1-2 mths or prn  Advised to stop ETOH completely and also stop vaping, which can be detrimental to his breathing and overall health.  EF has been decreased in past, and want to keep LV fxn controlled, fu with AA, and support groups, pt has list at home, also fu with insurance for behavioral health.  I do not advise substituting ETOH for another substance like marijuana.  Check BP and HR at home about 3x/wk, call if HR >100 or <60 and if systolic >140 and diastolic >80    Jb Hardwick, CNP

## 2020-10-13 NOTE — PROGRESS NOTES
12/01/18 1015   Activity/Group Checklist   Group Other (Comment)  (Art Therapy Process [facility] / [facility] [place])   Attendance Attended   Attendance Duration (min) Greater than 60   Interactions Interacted appropriately   Affect/Mood Appropriate   Goals Achieved Identified feelings; Increased hopefulness; Able to listen to others; Able to engage in interactions; Able to reflect/comment on own behavior;Able to recieve feedback; Able to give feedback to another;Able to experience relief/decrease in symptoms  (Engaged art materials and directive; chose "fulfilled")     Patient able to gain insight regarding her ability to not feel "fulfilled" despite having bought a home, considering fulfillment comes from an internal state rather than external  Patient remains motivated to gain insight and challenge self  Fentanyl started for sedation/ analgesia as Pt is breathing at max 29. Chart reviewed.

## 2020-12-11 ENCOUNTER — OFFICE VISIT (OUTPATIENT)
Dept: URGENT CARE | Age: 45
End: 2020-12-11
Payer: COMMERCIAL

## 2020-12-11 VITALS — OXYGEN SATURATION: 99 % | RESPIRATION RATE: 16 BRPM | TEMPERATURE: 97.9 F | HEART RATE: 105 BPM

## 2020-12-11 DIAGNOSIS — R51.9 ACUTE INTRACTABLE HEADACHE, UNSPECIFIED HEADACHE TYPE: Primary | ICD-10-CM

## 2020-12-11 PROCEDURE — 99213 OFFICE O/P EST LOW 20 MIN: CPT | Performed by: FAMILY MEDICINE

## 2021-08-29 PROCEDURE — 99283 EMERGENCY DEPT VISIT LOW MDM: CPT

## 2021-08-30 ENCOUNTER — HOSPITAL ENCOUNTER (EMERGENCY)
Facility: HOSPITAL | Age: 46
Discharge: HOME | End: 2021-08-30
Attending: EMERGENCY MEDICINE | Admitting: EMERGENCY MEDICINE
Payer: COMMERCIAL

## 2021-08-30 VITALS
TEMPERATURE: 96.8 F | HEART RATE: 81 BPM | WEIGHT: 170.86 LBS | BODY MASS INDEX: 31.25 KG/M2 | RESPIRATION RATE: 16 BRPM | OXYGEN SATURATION: 100 % | SYSTOLIC BLOOD PRESSURE: 116 MMHG | DIASTOLIC BLOOD PRESSURE: 70 MMHG

## 2021-08-30 DIAGNOSIS — M54.42 ACUTE LEFT-SIDED LOW BACK PAIN WITH LEFT-SIDED SCIATICA: Primary | ICD-10-CM

## 2021-08-30 PROCEDURE — 99284 EMERGENCY DEPT VISIT MOD MDM: CPT | Performed by: EMERGENCY MEDICINE

## 2021-08-30 PROCEDURE — 96372 THER/PROPH/DIAG INJ SC/IM: CPT

## 2021-08-30 RX ORDER — KETOROLAC TROMETHAMINE 10 MG/1
10 TABLET, FILM COATED ORAL EVERY 6 HOURS PRN
Qty: 10 TABLET | Refills: 0 | Status: SHIPPED | OUTPATIENT
Start: 2021-08-30

## 2021-08-30 RX ORDER — KETOROLAC TROMETHAMINE 30 MG/ML
15 INJECTION, SOLUTION INTRAMUSCULAR; INTRAVENOUS ONCE
Status: COMPLETED | OUTPATIENT
Start: 2021-08-30 | End: 2021-08-30

## 2021-08-30 RX ADMIN — KETOROLAC TROMETHAMINE 15 MG: 30 INJECTION, SOLUTION INTRAMUSCULAR; INTRAVENOUS at 00:37

## 2021-08-30 NOTE — ED PROVIDER NOTES
History  Chief Complaint   Patient presents with    Back Pain     L sided back pain that radiates from her left hip all the way down to her left foot, no bladder/bowel incont  Happened today at work  Took 5mg of oxycodone at 1800 and Valium at 1500 on 8/29, w/o relief  42 y/o W female c/o L sided lower back pain with sciatica - worsening tonight after working  She states that she is currently being followed by pain management and receiving epidural injections 2-3x/month, with most recent dose two (2) weeks ago  She states that she was at work tonight when pain worsened  She took one (1) tab of Oxycodone at 1800 along with a tab of Valium at 1500 - which did not relieve her pain  She denies any urinary issues and/or saddle anesthesia or incontinence  Patient has not had an MRI l-spine performed  She describes her pain as intermittent, radiating down LLE, and "pinching" in nature  Prior to Admission Medications   Prescriptions Last Dose Informant Patient Reported? Taking? Multiple Vitamins-Minerals (MULTIVITAMIN ADULT EXTRA C PO)   Yes No   Sig: Take 1 tablet by mouth   albuterol (2 5 mg/3 mL) 0 083 % nebulizer solution   No No   Sig: Take 1 vial (2 5 mg total) by nebulization every 6 (six) hours as needed for wheezing or shortness of breath   albuterol (PROVENTIL HFA,VENTOLIN HFA) 90 mcg/act inhaler   Yes No   Sig: Inhale 2 puffs every 6 (six) hours as needed for wheezing Indications: patient unsure of correct dosage/frequency     albuterol (PROVENTIL HFA,VENTOLIN HFA) 90 mcg/act inhaler   No No   Sig: Inhale 2 puffs every 4 (four) hours as needed for wheezing   amitriptyline (ELAVIL) 100 mg tablet  Self Yes No   Sig: Take 100 mg by mouth daily at bedtime   benzonatate (TESSALON PERLES) 100 mg capsule   No No   Sig: Take 1 capsule (100 mg total) by mouth 2 (two) times a day as needed for cough   Patient not taking: Reported on 12/11/2020   carBAMazepine (TEGretol) 200 mg tablet   No No   Sig: Take 1 tablet (200 mg total) by mouth 3 (three) times a day   Patient not taking: Reported on 2019    cholecalciferol 1000 units tablet   No No   Sig: Take 1 tablet (1,000 Units total) by mouth 2 (two) times a day   Patient not taking: Reported on 2020   diazepam (VALIUM) 5 mg tablet   No No   Sig: Take 1 tablet (5 mg total) by mouth 2 (two) times a day for 4 days   famotidine (PEPCID) 20 mg tablet   No No   Sig: Take 1 tablet (20 mg total) by mouth 2 (two) times a day   Patient not taking: Reported on 2020   fluticasone (FLONASE) 50 mcg/act nasal spray   No No   Si spray into each nostril daily   Patient not taking: Reported on 2020   hydrochlorothiazide (HYDRODIURIL) 25 mg tablet   No No   Sig: Take 1 tablet (25 mg total) by mouth daily   Patient not taking: Reported on 2019    lidocaine (LIDODERM) 5 %   No No   Sig: Apply 1 patch topically daily for 4 days Remove & Discard patch within 12 hours or as directed by MD   meloxicam (MOBIC) 7 5 mg tablet   No No   Sig: Take 1 tablet (7 5 mg total) by mouth daily   Patient not taking: Reported on 2020   nitrofurantoin (MACROBID) 100 mg capsule   No No   Sig: Take 1 capsule (100 mg total) by mouth 2 (two) times a day   Patient not taking: Reported on 2020   omeprazole (PriLOSEC) 40 MG capsule  Self Yes No   Sig: Take 40 mg by mouth daily   ondansetron (ZOFRAN) 4 mg tablet   No No   Sig: Take 1 tablet (4 mg total) by mouth every 6 (six) hours   Patient not taking: Reported on 2020   oxyCODONE (OxyCONTIN) 15 mg 12 hr tablet  Self Yes No   Sig: Take 15 mg by mouth every 12 (twelve) hours   sucralfate (CARAFATE) 1 g tablet   No No   Sig: Take 1 tablet (1 g total) by mouth 4 (four) times a day   Patient not taking: Reported on 2020      Facility-Administered Medications: None       Past Medical History:   Diagnosis Date    Asthma     Bipolar disorder (HCC)     Migraine     Psychiatric disorder     PTSD (post-traumatic stress disorder)     Seizures (HCC)        Past Surgical History:   Procedure Laterality Date    APPENDECTOMY      BLADDER SURGERY      CHOLECYSTECTOMY      GASTRECTOMY      gastric sleeve    GASTRIC RESTRICTION SURGERY      gastric sleeve in Jan 22 2018    HEMORROIDECTOMY      HYSTERECTOMY      TUBAL LIGATION         Family History   Problem Relation Age of Onset    Diabetes Mother         MELLITUS    Diabetes Father         MELLITUS    Diabetes Other         MELLITUS     I have reviewed and agree with the history as documented  E-Cigarette/Vaping     E-Cigarette/Vaping Substances     Social History     Tobacco Use    Smoking status: Current Every Day Smoker     Packs/day: 0 50    Smokeless tobacco: Never Used   Substance Use Topics    Alcohol use: Yes     Comment: ocassionally    Drug use: No       Review of Systems   Musculoskeletal: Positive for back pain and gait problem  All other systems reviewed and are negative  Physical Exam  Physical Exam  Vitals and nursing note reviewed  Constitutional:       Appearance: Normal appearance  HENT:      Head: Normocephalic and atraumatic  Right Ear: Tympanic membrane normal       Left Ear: Tympanic membrane normal       Nose: Nose normal       Mouth/Throat:      Mouth: Mucous membranes are dry  Pharynx: Oropharynx is clear  Eyes:      Conjunctiva/sclera: Conjunctivae normal       Pupils: Pupils are equal, round, and reactive to light  Cardiovascular:      Rate and Rhythm: Normal rate and regular rhythm  Pulses: Normal pulses  Heart sounds: Normal heart sounds  Pulmonary:      Effort: Pulmonary effort is normal       Breath sounds: Normal breath sounds  Abdominal:      General: Abdomen is flat  Bowel sounds are normal    Musculoskeletal:         General: Tenderness present  Cervical back: Normal range of motion and neck supple  Back:       Comments: Paraspinal tenderness to palpation  No gross deformity  Skin:     General: Skin is warm and dry  Capillary Refill: Capillary refill takes less than 2 seconds  Neurological:      General: No focal deficit present  Mental Status: She is alert and oriented to person, place, and time  Mental status is at baseline  Psychiatric:         Mood and Affect: Mood normal          Behavior: Behavior normal          Thought Content: Thought content normal          Judgment: Judgment normal          Vital Signs  ED Triage Vitals [08/30/21 0020]   Temperature Pulse Respirations Blood Pressure SpO2   (!) 96 8 °F (36 °C) 81 16 116/70 100 %      Temp Source Heart Rate Source Patient Position - Orthostatic VS BP Location FiO2 (%)   Tympanic Monitor Lying Left arm --      Pain Score       Worst Possible Pain           Vitals:    08/30/21 0020   BP: 116/70   Pulse: 81   Patient Position - Orthostatic VS: Lying         Visual Acuity      ED Medications  Medications   ketorolac (TORADOL) injection 15 mg (15 mg Intramuscular Given 8/30/21 0037)       Diagnostic Studies  Results Reviewed     None                 No orders to display              Procedures  Procedures         ED Course                             SBIRT 22yo+      Most Recent Value   SBIRT (22 yo +)   In order to provide better care to our patients, we are screening all of our patients for alcohol and drug use  Would it be okay to ask you these screening questions?   No Filed at: 08/30/2021 0022                    MDM    Disposition  Final diagnoses:   Acute left-sided low back pain with left-sided sciatica     Time reflects when diagnosis was documented in both MDM as applicable and the Disposition within this note     Time User Action Codes Description Comment    8/30/2021  1:14 AM Narciso Webster Add [M54 42] Acute left-sided low back pain with left-sided sciatica     8/30/2021  1:15 AM Narciso Webster Modify [M54 42] Acute left-sided low back pain with left-sided sciatica       ED Disposition     ED Disposition Condition Date/Time Comment    Discharge Stable Mon Aug 30, 2021  1:14 AM Alfredo Jimenes discharge to home/self care  Follow-up Information     Follow up With Specialties Details Why Contact Info    Karen Mackey MD Internal Medicine Schedule an appointment as soon as possible for a visit in 1 week As needed [address] [facility] [address]  [phone]            Patient's Medications   Discharge Prescriptions    KETOROLAC (TORADOL) 10 MG TABLET    Take 1 tablet (10 mg total) by mouth every 6 (six) hours as needed for moderate pain       Start Date: 8/30/2021 End Date: --       Order Dose: 10 mg       Quantity: 10 tablet    Refills: 0     No discharge procedures on file      PDMP Review     None          ED Provider  Electronically Signed by           Tamir Moreno DO  08/30/21 0116

## 2022-11-11 ENCOUNTER — HOSPITAL ENCOUNTER (EMERGENCY)
Facility: HOSPITAL | Age: 47
Discharge: HOME | End: 2022-11-11
Attending: EMERGENCY MEDICINE

## 2022-11-11 ENCOUNTER — APPOINTMENT (EMERGENCY)
Dept: RADIOLOGY | Facility: HOSPITAL | Age: 47
End: 2022-11-11

## 2022-11-11 VITALS
WEIGHT: 178.8 LBS | TEMPERATURE: 98.6 F | OXYGEN SATURATION: 98 % | RESPIRATION RATE: 20 BRPM | SYSTOLIC BLOOD PRESSURE: 129 MMHG | DIASTOLIC BLOOD PRESSURE: 89 MMHG | HEART RATE: 115 BPM | BODY MASS INDEX: 32.7 KG/M2

## 2022-11-11 DIAGNOSIS — S61.309A AVULSION OF FINGERNAIL, INITIAL ENCOUNTER: Primary | ICD-10-CM

## 2022-11-11 RX ORDER — LIDOCAINE HYDROCHLORIDE 10 MG/ML
5 INJECTION, SOLUTION EPIDURAL; INFILTRATION; INTRACAUDAL; PERINEURAL ONCE
Status: COMPLETED | OUTPATIENT
Start: 2022-11-11 | End: 2022-11-11

## 2022-11-11 RX ORDER — GINSENG 100 MG
1 CAPSULE ORAL ONCE
Status: COMPLETED | OUTPATIENT
Start: 2022-11-11 | End: 2022-11-11

## 2022-11-11 RX ORDER — LORAZEPAM 0.5 MG/1
0.5 TABLET ORAL ONCE
Status: COMPLETED | OUTPATIENT
Start: 2022-11-11 | End: 2022-11-11

## 2022-11-11 RX ADMIN — LIDOCAINE HYDROCHLORIDE 5 ML: 10 INJECTION, SOLUTION EPIDURAL; INFILTRATION; INTRACAUDAL at 21:59

## 2022-11-11 RX ADMIN — BACITRACIN 1 SMALL APPLICATION: 500 OINTMENT TOPICAL at 22:28

## 2022-11-11 RX ADMIN — LORAZEPAM 0.5 MG: 0.5 TABLET ORAL at 21:59
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
## 2022-11-12 NOTE — DISCHARGE INSTRUCTIONS
Please follow up with your PCP  Tylenol and ibuprofen for any pain  Keep area clean with warm, soapy water  Please return to the ER with any worsening symptoms

## 2022-12-02 ENCOUNTER — HOSPITAL ENCOUNTER (EMERGENCY)
Facility: HOSPITAL | Age: 47
Discharge: HOME | End: 2022-12-02
Attending: EMERGENCY MEDICINE

## 2022-12-02 ENCOUNTER — APPOINTMENT (EMERGENCY)
Dept: RADIOLOGY | Facility: HOSPITAL | Age: 47
End: 2022-12-02

## 2022-12-02 VITALS
SYSTOLIC BLOOD PRESSURE: 137 MMHG | DIASTOLIC BLOOD PRESSURE: 92 MMHG | TEMPERATURE: 97.2 F | OXYGEN SATURATION: 100 % | WEIGHT: 179 LBS | BODY MASS INDEX: 32.74 KG/M2 | HEART RATE: 80 BPM | RESPIRATION RATE: 20 BRPM

## 2022-12-02 DIAGNOSIS — M25.521 RIGHT ELBOW PAIN: Primary | ICD-10-CM

## 2022-12-02 RX ORDER — KETOROLAC TROMETHAMINE 30 MG/ML
30 INJECTION, SOLUTION INTRAMUSCULAR; INTRAVENOUS ONCE
Status: COMPLETED | OUTPATIENT
Start: 2022-12-02 | End: 2022-12-02

## 2022-12-02 RX ADMIN — KETOROLAC TROMETHAMINE 30 MG: 30 INJECTION, SOLUTION INTRAMUSCULAR; INTRAVENOUS at 21:03

## 2022-12-02 NOTE — Clinical Note
Diana Taylor was seen and treated in our emergency department on 12/2/2022  No restrictions            Diagnosis:     Michelle    She may return on this date: 12/04/2022    Free to return to work     If you have any questions or concerns, please don't hesitate to call        Fatemeh Howard PA-C    ______________________________           _______________          _______________  Hospital Representative                              Date                                Time

## 2022-12-02 NOTE — Clinical Note
Ale Jean was seen and treated in our emergency department on 12/2/2022  No work until cleared by Family Doctor/Orthopedics        Diagnosis:     Michelle    She may return on this date: 12/09/2022         If you have any questions or concerns, please don't hesitate to call        Tina Mitchell PA-C    ______________________________           _______________          _______________  Hospital Representative                              Date                                Time

## 2022-12-03 NOTE — ED PROVIDER NOTES
History  Chief Complaint   Patient presents with   • Arm Injury     Patient states she hit her L elbow at work on a brick wall, thought she just hit it but it continued to hurt and swell  [facility] and they told her she fractured a piece of her elbow and they put a cast on it  Patient states it continues to hurt and her hand is swollen  Tylenol at 630 pm       Patient is a 71-year-old female coming in for evaluation of right elbow pain that started last night, after she hit it on a brick wall  [facility], where took x-rays, told her she had a small avulsion fracture, however was called later, after being put in a splint, and was told that they were not as sure if there was a fracture, and she should follow up with the emergency department  Patient is in no acute distress at this time, does have sensation in her hand, but does have significant swelling of the hand  Appoint with Orthopedics in approximately 6 days      History provided by:  Patient   used: No    Arm Injury  Location:  Elbow  Elbow location:  R elbow  Injury: yes    Time since incident:  1 day  Associated symptoms: swelling and tingling    Associated symptoms: no decreased range of motion, no numbness and no stiffness        Prior to Admission Medications   Prescriptions Last Dose Informant Patient Reported? Taking? Multiple Vitamins-Minerals (MULTIVITAMIN ADULT EXTRA C PO)   Yes No   Sig: Take 1 tablet by mouth   albuterol (2 5 mg/3 mL) 0 083 % nebulizer solution   No No   Sig: Take 1 vial (2 5 mg total) by nebulization every 6 (six) hours as needed for wheezing or shortness of breath   albuterol (PROVENTIL HFA,VENTOLIN HFA) 90 mcg/act inhaler   Yes No   Sig: Inhale 2 puffs every 6 (six) hours as needed for wheezing Indications: patient unsure of correct dosage/frequency     albuterol (PROVENTIL HFA,VENTOLIN HFA) 90 mcg/act inhaler   No No   Sig: Inhale 2 puffs every 4 (four) hours as needed for wheezing   amitriptyline (ELAVIL) 100 mg tablet   Yes No   Sig: Take 100 mg by mouth daily at bedtime   benzonatate (TESSALON PERLES) 100 mg capsule   No No   Sig: Take 1 capsule (100 mg total) by mouth 2 (two) times a day as needed for cough   Patient not taking: Reported on 2020   carBAMazepine (TEGretol) 200 mg tablet   No No   Sig: Take 1 tablet (200 mg total) by mouth 3 (three) times a day   Patient not taking: Reported on 2019    cholecalciferol 1000 units tablet   No No   Sig: Take 1 tablet (1,000 Units total) by mouth 2 (two) times a day   Patient not taking: Reported on 2020   diazepam (VALIUM) 5 mg tablet   No No   Sig: Take 1 tablet (5 mg total) by mouth 2 (two) times a day for 4 days   famotidine (PEPCID) 20 mg tablet   No No   Sig: Take 1 tablet (20 mg total) by mouth 2 (two) times a day   Patient not taking: Reported on 2020   fluticasone (FLONASE) 50 mcg/act nasal spray   No No   Si spray into each nostril daily   Patient not taking: Reported on 2020   hydrochlorothiazide (HYDRODIURIL) 25 mg tablet   No No   Sig: Take 1 tablet (25 mg total) by mouth daily   Patient not taking: Reported on 2019    ketorolac (TORADOL) 10 mg tablet   No No   Sig: Take 1 tablet (10 mg total) by mouth every 6 (six) hours as needed for moderate pain   lidocaine (LIDODERM) 5 %   No No   Sig: Apply 1 patch topically daily for 4 days Remove & Discard patch within 12 hours or as directed by MD   meloxicam (MOBIC) 7 5 mg tablet   No No   Sig: Take 1 tablet (7 5 mg total) by mouth daily   Patient not taking: Reported on 2020   nitrofurantoin (MACROBID) 100 mg capsule   No No   Sig: Take 1 capsule (100 mg total) by mouth 2 (two) times a day   Patient not taking: Reported on 2020   omeprazole (PriLOSEC) 40 MG capsule   Yes No   Sig: Take 40 mg by mouth daily   ondansetron (ZOFRAN) 4 mg tablet   No No   Sig: Take 1 tablet (4 mg total) by mouth every 6 (six) hours   Patient not taking: Reported on 12/11/2020   oxyCODONE (OxyCONTIN) 15 mg 12 hr tablet   Yes No   Sig: Take 15 mg by mouth every 12 (twelve) hours   sucralfate (CARAFATE) 1 g tablet   No No   Sig: Take 1 tablet (1 g total) by mouth 4 (four) times a day   Patient not taking: Reported on 12/11/2020      Facility-Administered Medications: None       Past Medical History:   Diagnosis Date   • Asthma    • Bipolar disorder ([facility] )    • Migraine    • Psychiatric disorder    • PTSD (post-traumatic stress disorder)    • Seizures ([facility] )        Past Surgical History:   Procedure Laterality Date   • APPENDECTOMY     • BLADDER SURGERY     • CHOLECYSTECTOMY     • GASTRECTOMY      gastric sleeve   • GASTRIC RESTRICTION SURGERY      gastric sleeve in Jan 22 2018   • HEMORROIDECTOMY     • HYSTERECTOMY     • TUBAL LIGATION         Family History   Problem Relation Age of Onset   • Diabetes Mother         MELLITUS   • Diabetes Father         MELLITUS   • Diabetes Other         MELLITUS     I have reviewed and agree with the history as documented  E-Cigarette/Vaping     E-Cigarette/Vaping Substances     Social History     Tobacco Use   • Smoking status: Every Day     Packs/day: 0 50     Types: Cigarettes   • Smokeless tobacco: Never   Substance Use Topics   • Alcohol use: Yes     Comment: ocassionally   • Drug use: No       Review of Systems   Constitutional: Negative  HENT: Negative  Eyes: Negative  Respiratory: Negative  Cardiovascular: Negative  Gastrointestinal: Negative  Genitourinary: Negative  Musculoskeletal: Positive for arthralgias and joint swelling  Negative for stiffness  Skin: Negative  Neurological: Negative  Psychiatric/Behavioral: Negative  Physical Exam  Physical Exam  Vitals reviewed  Constitutional:       Appearance: Normal appearance  She is normal weight  HENT:      Head: Normocephalic and atraumatic        Right Ear: External ear normal       Left Ear: External ear normal       Nose: Nose normal    Eyes:      Conjunctiva/sclera: Conjunctivae normal    Cardiovascular:      Rate and Rhythm: Normal rate  Pulmonary:      Effort: Pulmonary effort is normal    Musculoskeletal:         General: Tenderness present  Normal range of motion  Cervical back: Normal range of motion  Comments: Tenderness on palpation of the right elbow  Patient has normal range of motion, but is painful  Normal sensation in the hand  Skin:     General: Skin is warm and dry  Neurological:      Mental Status: She is alert  Vital Signs  ED Triage Vitals   Temperature Pulse Respirations Blood Pressure SpO2   12/02/22 1947 12/02/22 1947 12/02/22 1947 12/02/22 1947 12/02/22 1947   (!) 97 2 °F (36 2 °C) 80 20 137/92 100 %      Temp Source Heart Rate Source Patient Position - Orthostatic VS BP Location FiO2 (%)   12/02/22 1947 12/02/22 1947 12/02/22 1947 12/02/22 1947 --   Tympanic Monitor Sitting Right arm       Pain Score       12/02/22 2103       7           Vitals:    12/02/22 1947   BP: 137/92   Pulse: 80   Patient Position - Orthostatic VS: Sitting         Visual Acuity      ED Medications  Medications   ketorolac (TORADOL) injection 30 mg (30 mg Intramuscular Given 12/2/22 2103)       Diagnostic Studies  Results Reviewed     None                 XR elbow 3+ vw right   Final Result by nAant Arthur MD (12/02 2036)      No acute osseous abnormality  Workstation performed: [id number]                    Procedures  Procedures         ED Course  ED Course as of 12/02/22 2131   Fri Dec 02, 2022   2043 XR elbow 3+ vw right  No acute osseous abnormality  MDM  Number of Diagnoses or Management Options  Right elbow pain: new and does not require workup  Diagnosis management comments: Patient came in with right elbow pain that started yesterday  Patient is in no acute distress at this time    I took the splint off, as x-ray read here showed no sign of fracture  Patient discharged with supportive recommendations, and recommended follow-up to Orthopedics if pain continues    Counseling: I had a detailed discussion with the patient and/or guardian regarding: the historical points, exam findings, and any diagnostic results supporting the discharge diagnosis, lab results, radiology results, discharge instructions reviewed with patient and/or family/caregiver and understanding was verbalized  Instructions given to return to the emergency department if symptoms worsen or persist, or if there are any questions or concerns that arise at home       All labs reviewed and utilized in the medical decision making process     All radiology studies independently viewed by me and interpreted by the radiologist     Portions of the record may have been created with voice recognition software   Occasional wrong word or "sound a like" substitutions may have occurred due to the inherent limitations of voice recognition software   Read the chart carefully and recognize, using context, where substitutions have occurred  Amount and/or Complexity of Data Reviewed  Tests in the radiology section of CPT®: ordered and reviewed    Risk of Complications, Morbidity, and/or Mortality  Presenting problems: minimal  Diagnostic procedures: minimal  Management options: minimal    Patient Progress  Patient progress: stable      Disposition  Final diagnoses:   Right elbow pain     Time reflects when diagnosis was documented in both MDM as applicable and the Disposition within this note     Time User Action Codes Description Comment    12/2/2022  8:53 PM Arielle Agustin Add [U97 [id number]] Right elbow pain       ED Disposition     ED Disposition   Discharge    Condition   Stable    Date/Time   Fri Dec 2, 2022  8:53 PM    Comment   Senia Matos discharge to home/self care                 Follow-up Information     Follow up With Specialties Details Why Contact Info Additional [id number] Mejia MD Internal Medicine   [address] [facility]  [phone]       [facility] Emergency Department Emergency Medicine  As needed, If symptoms worsen [id number] [id number] [address] [facility] Emergency Department          Discharge Medication List as of 12/2/2022  8:55 PM      CONTINUE these medications which have NOT CHANGED    Details   albuterol (2 5 mg/3 mL) 0 083 % nebulizer solution Take 1 vial (2 5 mg total) by nebulization every 6 (six) hours as needed for wheezing or shortness of breath, Starting Wed 9/18/2019, Print      !! albuterol (PROVENTIL HFA,VENTOLIN HFA) 90 mcg/act inhaler Inhale 2 puffs every 6 (six) hours as needed for wheezing Indications: patient unsure of correct dosage/frequency  , Until Discontinued, Historical Med      !! albuterol (PROVENTIL HFA,VENTOLIN HFA) 90 mcg/act inhaler Inhale 2 puffs every 4 (four) hours as needed for wheezing, Starting Wed 9/18/2019, Normal      amitriptyline (ELAVIL) 100 mg tablet Take 100 mg by mouth daily at bedtime, Historical Med      benzonatate (TESSALON PERLES) 100 mg capsule Take 1 capsule (100 mg total) by mouth 2 (two) times a day as needed for cough, Starting Wed 9/18/2019, Print      carBAMazepine (TEGretol) 200 mg tablet Take 1 tablet (200 mg total) by mouth 3 (three) times a day, Starting Mon 12/3/2018, Print      cholecalciferol 1000 units tablet Take 1 tablet (1,000 Units total) by mouth 2 (two) times a day, Starting Mon 12/3/2018, Print      diazepam (VALIUM) 5 mg tablet Take 1 tablet (5 mg total) by mouth 2 (two) times a day for 4 days, Starting Mon 6/24/2019, Until Fri 6/28/2019, Print      famotidine (PEPCID) 20 mg tablet Take 1 tablet (20 mg total) by mouth 2 (two) times a day, Starting Wed 1/2/2019, Print      fluticasone (FLONASE) 50 mcg/act nasal spray 1 spray into each nostril daily, Starting Wed 9/18/2019, Normal hydrochlorothiazide (HYDRODIURIL) 25 mg tablet Take 1 tablet (25 mg total) by mouth daily, Starting Mon 12/3/2018, Print      ketorolac (TORADOL) 10 mg tablet Take 1 tablet (10 mg total) by mouth every 6 (six) hours as needed for moderate pain, Starting Mon 8/30/2021, Print      lidocaine (LIDODERM) 5 % Apply 1 patch topically daily for 4 days Remove & Discard patch within 12 hours or as directed by MD, Starting Mon 6/24/2019, Until Fri 6/28/2019, Print      meloxicam (MOBIC) 7 5 mg tablet Take 1 tablet (7 5 mg total) by mouth daily, Starting Mon 12/3/2018, Print      Multiple Vitamins-Minerals (MULTIVITAMIN ADULT EXTRA C PO) Take 1 tablet by mouth, Historical Med      nitrofurantoin (MACROBID) 100 mg capsule Take 1 capsule (100 mg total) by mouth 2 (two) times a day, Starting Wed 1/2/2019, Print      omeprazole (PriLOSEC) 40 MG capsule Take 40 mg by mouth daily, Historical Med      ondansetron (ZOFRAN) 4 mg tablet Take 1 tablet (4 mg total) by mouth every 6 (six) hours, Starting Wed 1/2/2019, Print      oxyCODONE (OxyCONTIN) 15 mg 12 hr tablet Take 15 mg by mouth every 12 (twelve) hours, Historical Med      sucralfate (CARAFATE) 1 g tablet Take 1 tablet (1 g total) by mouth 4 (four) times a day, Starting Wed 1/2/2019, Print       !! - Potential duplicate medications found  Please discuss with provider  No discharge procedures on file      PDMP Review     None          ED Provider  Electronically Signed by           Annie Barlow PA-C  12/02/22 0358

## 2023-02-25 ENCOUNTER — HOSPITAL ENCOUNTER (EMERGENCY)
Facility: HOSPITAL | Age: 48
Discharge: HOME | End: 2023-02-25
Attending: EMERGENCY MEDICINE | Admitting: EMERGENCY MEDICINE

## 2023-02-25 ENCOUNTER — APPOINTMENT (EMERGENCY)
Dept: RADIOLOGY | Facility: HOSPITAL | Age: 48
End: 2023-02-25

## 2023-02-25 VITALS
HEIGHT: 62 IN | BODY MASS INDEX: 32.5 KG/M2 | WEIGHT: 176.59 LBS | OXYGEN SATURATION: 100 % | TEMPERATURE: 97.9 F | HEART RATE: 89 BPM | RESPIRATION RATE: 20 BRPM | DIASTOLIC BLOOD PRESSURE: 83 MMHG | SYSTOLIC BLOOD PRESSURE: 122 MMHG

## 2023-02-25 DIAGNOSIS — M25.521 RIGHT ELBOW PAIN: Primary | ICD-10-CM

## 2023-02-25 RX ORDER — KETOROLAC TROMETHAMINE 30 MG/ML
15 INJECTION, SOLUTION INTRAMUSCULAR; INTRAVENOUS ONCE
Status: COMPLETED | OUTPATIENT
Start: 2023-02-25 | End: 2023-02-25

## 2023-02-25 RX ORDER — ACETAMINOPHEN 325 MG/1
975 TABLET ORAL ONCE
Status: COMPLETED | OUTPATIENT
Start: 2023-02-25 | End: 2023-02-25

## 2023-02-25 RX ADMIN — KETOROLAC TROMETHAMINE 15 MG: 30 INJECTION, SOLUTION INTRAMUSCULAR; INTRAVENOUS at 01:14

## 2023-02-25 RX ADMIN — ACETAMINOPHEN 975 MG: 325 TABLET ORAL at 01:13

## 2023-02-25 NOTE — ED PROVIDER NOTES
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 Taking? Multiple Vitamins-Minerals (MULTIVITAMIN ADULT EXTRA C PO)   Yes No   Sig: Take 1 tablet by mouth   albuterol (2 5 mg/3 mL) 0 083 % nebulizer solution   No No   Sig: Take 1 vial (2 5 mg total) by nebulization every 6 (six) hours as needed for wheezing or shortness of breath   albuterol (PROVENTIL HFA,VENTOLIN HFA) 90 mcg/act inhaler   Yes No   Sig: Inhale 2 puffs every 6 (six) hours as needed for wheezing Indications: patient unsure of correct dosage/frequency     albuterol (PROVENTIL HFA,VENTOLIN HFA) 90 mcg/act inhaler   No No   Sig: Inhale 2 puffs every 4 (four) hours as needed for wheezing   amitriptyline (ELAVIL) 100 mg tablet   Yes No   Sig: Take 100 mg by mouth daily at bedtime   benzonatate (TESSALON PERLES) 100 mg capsule   No No   Sig: Take 1 capsule (100 mg total) by mouth 2 (two) times a day as needed for cough   Patient not taking: Reported on 12/11/2020   carBAMazepine (TEGretol) 200 mg tablet   No No   Sig: Take 1 tablet (200 mg total) by mouth 3 (three) times a day   Patient not taking: Reported on 1/2/2019    cholecalciferol 1000 units tablet   No No   Sig: Take 1 tablet (1,000 Units total) by mouth 2 (two) times a day   Patient not taking: Reported on 12/11/2020   diazepam (VALIUM) 5 mg tablet   No No   Sig: Take 1 tablet (5 mg total) by mouth 2 (two) times a day for 4 days   famotidine (PEPCID) 20 mg tablet   No No   Sig: Take 1 tablet (20 mg total) by mouth 2 (two) times a day   Patient not taking: Reported on 2020   fluticasone (FLONASE) 50 mcg/act nasal spray   No No   Si spray into each nostril daily   Patient not taking: Reported on 2020   hydrochlorothiazide (HYDRODIURIL) 25 mg tablet   No No   Sig: Take 1 tablet (25 mg total) by mouth daily   Patient not taking: Reported on 2019    ketorolac (TORADOL) 10 mg tablet   No No   Sig: Take 1 tablet (10 mg total) by mouth every 6 (six) hours as needed for moderate pain   lidocaine (LIDODERM) 5 %   No No   Sig: Apply 1 patch topically daily for 4 days Remove & Discard patch within 12 hours or as directed by MD   meloxicam (MOBIC) 7 5 mg tablet   No No   Sig: Take 1 tablet (7 5 mg total) by mouth daily   Patient not taking: Reported on 2020   nitrofurantoin (MACROBID) 100 mg capsule   No No   Sig: Take 1 capsule (100 mg total) by mouth 2 (two) times a day   Patient not taking: Reported on 2020   omeprazole (PriLOSEC) 40 MG capsule   Yes No   Sig: Take 40 mg by mouth daily   ondansetron (ZOFRAN) 4 mg tablet   No No   Sig: Take 1 tablet (4 mg total) by mouth every 6 (six) hours   Patient not taking: Reported on 2020   oxyCODONE (OxyCONTIN) 15 mg 12 hr tablet   Yes No   Sig: Take 15 mg by mouth every 12 (twelve) hours   sucralfate (CARAFATE) 1 g tablet   No No   Sig: Take 1 tablet (1 g total) by mouth 4 (four) times a day   Patient not taking: Reported on 2020      Facility-Administered Medications: None       Past Medical History:   Diagnosis Date   • Asthma    • Bipolar disorder ([facility] )    • Migraine    • Psychiatric disorder    • PTSD (post-traumatic stress disorder)    • Seizures ([facility] )        Past Surgical History:   Procedure Laterality Date   • APPENDECTOMY     • BLADDER SURGERY     • CHOLECYSTECTOMY     • GASTRECTOMY      gastric sleeve   • GASTRIC RESTRICTION SURGERY      gastric sleeve in Jan 22 2018   • HEMORROIDECTOMY     • HYSTERECTOMY     • TUBAL LIGATION         Family History   Problem Relation Age of Onset   • Diabetes Mother         MELLITUS   • Diabetes Father         MELLITUS   • Diabetes Other         MELLITUS     I have reviewed and agree with the history as documented  E-Cigarette/Vaping     E-Cigarette/Vaping Substances     Social History     Tobacco Use   • Smoking status: Every Day     Packs/day: 0 50     Types: Cigarettes   • Smokeless tobacco: Never   Substance Use Topics   • Alcohol use: Yes     Comment: ocassionally   • Drug use: No       Review of Systems   Constitutional: Negative for chills, fatigue and fever  HENT: Negative for sore throat  Eyes: Negative for redness and visual disturbance  Respiratory: Negative for cough and shortness of breath  Cardiovascular: Negative for chest pain  Gastrointestinal: Negative for abdominal pain, diarrhea and nausea  Genitourinary: Negative for difficulty urinating, dysuria and pelvic pain  Musculoskeletal: Positive for arthralgias  Negative for back pain  Skin: Negative for rash  Neurological: Negative for syncope, weakness and headaches  All other systems reviewed and are negative  Physical Exam  Physical Exam  Vitals and nursing note reviewed  Constitutional:       General: She is not in acute distress  HENT:      Head: Normocephalic and atraumatic  Eyes:      Conjunctiva/sclera: Conjunctivae normal    Cardiovascular:      Rate and Rhythm: Normal rate and regular rhythm  Heart sounds: Normal heart sounds  Pulmonary:      Effort: Pulmonary effort is normal  No respiratory distress  Breath sounds: Normal breath sounds  Abdominal:      General: Bowel sounds are normal       Palpations: Abdomen is soft  Tenderness: There is no abdominal tenderness     Musculoskeletal: General: Normal range of motion  Cervical back: Normal range of motion  Comments: On examination: of right elbow  Patient has full ROM  No overlying skin changes, or signs of trauma  No laxity of the joint  Distally neurovascularly in tact  Compartments soft    Tenderness on palpation of posterior aspect of right elbow       Skin:     General: Skin is warm and dry  Findings: No rash  Neurological:      Mental Status: She is alert and oriented to person, place, and time  Cranial Nerves: No cranial nerve deficit  Sensory: No sensory deficit  Motor: No abnormal muscle tone  Coordination: Coordination normal          Vital Signs  ED Triage Vitals   Temperature Pulse Respirations Blood Pressure SpO2   02/25/23 0038 02/25/23 0038 02/25/23 0038 02/25/23 0038 02/25/23 0038   97 9 °F (36 6 °C) 89 20 122/83 100 %      Temp Source Heart Rate Source Patient Position - Orthostatic VS BP Location FiO2 (%)   02/25/23 0038 02/25/23 0038 02/25/23 0038 02/25/23 0038 --   Tympanic Monitor Sitting Left arm       Pain Score       02/25/23 0113       7           Vitals:    02/25/23 0038   BP: 122/83   Pulse: 89   Patient Position - Orthostatic VS: Sitting         Visual Acuity      ED Medications  Medications   ketorolac (TORADOL) injection 15 mg (15 mg Intramuscular Given 2/25/23 0114)   acetaminophen (TYLENOL) tablet 975 mg (975 mg Oral Given 2/25/23 0113)       Diagnostic Studies  Results Reviewed     None                 XR elbow 3+ views RIGHT   ED Interpretation by Yin Prince MD (02/25 0125)   No acute osseous abnormality                 Procedures  Procedures         ED Course             MDM     Independent visualization of images: yes, personally reviewed xrays    History Provided by patient    Differential considered: sprain, strain, fracture, dislocation  Xray obtained to look for fracture or dislocation  Xray showed no acute findings  Patient offered symptomatic treatments   Discharged with PCP / ortho follow up  The patient was instructed to follow up as documented  Strict return precautions were discussed with the patient and the patient was instructed to return to the emergency department immediately if symptoms worsen  The patient/patient family member acknowledged and were in agreement with plan  Disposition  Final diagnoses:   Right elbow pain     Time reflects when diagnosis was documented in both MDM as applicable and the Disposition within this note     Time User Action Codes Description Comment    2/25/2023  1:25 AM Washington Jackman Elsa [M25 [id number]] Right elbow pain       ED Disposition     ED Disposition   Discharge    Condition   Stable    Date/Time   Sat Feb 25, 2023  1:25 AM    Comment   Ortizbk Matos discharge to home/self care  Follow-up Information     Follow up With Specialties Details Why Contact Info    Joann Guzman MD Internal Medicine Schedule an appointment as soon as possible for a visit in 3 days For follow up regarding your symptoms and recheck [facility] [address]  [phone]            Discharge Medication List as of 2/25/2023  1:26 AM      START taking these medications    Details   Diclofenac Sodium (VOLTAREN) 1 % Apply 2 g topically 4 (four) times a day, Starting Sat 2/25/2023, Normal         CONTINUE these medications which have NOT CHANGED    Details   albuterol (2 5 mg/3 mL) 0 083 % nebulizer solution Take 1 vial (2 5 mg total) by nebulization every 6 (six) hours as needed for wheezing or shortness of breath, Starting Wed 9/18/2019, Print      !! albuterol (PROVENTIL HFA,VENTOLIN HFA) 90 mcg/act inhaler Inhale 2 puffs every 6 (six) hours as needed for wheezing Indications: patient unsure of correct dosage/frequency  , Until Discontinued, Historical Med      !! albuterol (PROVENTIL HFA,VENTOLIN HFA) 90 mcg/act inhaler Inhale 2 puffs every 4 (four) hours as needed for wheezing, Starting Wed 9/18/2019, Normal      amitriptyline (ELAVIL) 100 mg tablet Take 100 mg by mouth daily at bedtime, Historical Med      benzonatate (TESSALON PERLES) 100 mg capsule Take 1 capsule (100 mg total) by mouth 2 (two) times a day as needed for cough, Starting Wed 9/18/2019, Print      carBAMazepine (TEGretol) 200 mg tablet Take 1 tablet (200 mg total) by mouth 3 (three) times a day, Starting Mon 12/3/2018, Print      cholecalciferol 1000 units tablet Take 1 tablet (1,000 Units total) by mouth 2 (two) times a day, Starting Mon 12/3/2018, Print      diazepam (VALIUM) 5 mg tablet Take 1 tablet (5 mg total) by mouth 2 (two) times a day for 4 days, Starting Mon 6/24/2019, Until Fri 6/28/2019, Print      famotidine (PEPCID) 20 mg tablet Take 1 tablet (20 mg total) by mouth 2 (two) times a day, Starting Wed 1/2/2019, Print      fluticasone (FLONASE) 50 mcg/act nasal spray 1 spray into each nostril daily, Starting Wed 9/18/2019, Normal      hydrochlorothiazide (HYDRODIURIL) 25 mg tablet Take 1 tablet (25 mg total) by mouth daily, Starting Mon 12/3/2018, Print      ketorolac (TORADOL) 10 mg tablet Take 1 tablet (10 mg total) by mouth every 6 (six) hours as needed for moderate pain, Starting Mon 8/30/2021, Print      lidocaine (LIDODERM) 5 % Apply 1 patch topically daily for 4 days Remove & Discard patch within 12 hours or as directed by MD, Starting Mon 6/24/2019, Until Fri 6/28/2019, Print      meloxicam (MOBIC) 7 5 mg tablet Take 1 tablet (7 5 mg total) by mouth daily, Starting Mon 12/3/2018, Print      Multiple Vitamins-Minerals (MULTIVITAMIN ADULT EXTRA C PO) Take 1 tablet by mouth, Historical Med      nitrofurantoin (MACROBID) 100 mg capsule Take 1 capsule (100 mg total) by mouth 2 (two) times a day, Starting Wed 1/2/2019, Print      omeprazole (PriLOSEC) 40 MG capsule Take 40 mg by mouth daily, Historical Med      ondansetron (ZOFRAN) 4 mg tablet Take 1 tablet (4 mg total) by mouth every 6 (six) hours, Starting Wed 1/2/2019, Print      oxyCODONE (OxyCONTIN) 15 mg 12 hr tablet Take 15 mg by mouth every 12 (twelve) hours, Historical Med      sucralfate (CARAFATE) 1 g tablet Take 1 tablet (1 g total) by mouth 4 (four) times a day, Starting Wed 1/2/2019, Print       !! - Potential duplicate medications found  Please discuss with provider  No discharge procedures on file      PDMP Review     None          ED Provider  Electronically Signed by           Rahat Jessica MD  02/25/23 6851

## 2023-02-25 NOTE — Clinical Note
Syeda Clemens was seen and treated in our emergency department on 2/25/2023  limit use of right arm till cleared by ortho therapy    Diagnosis: right arm pain    Michelle    She may return on this date: 02/27/2023         If you have any questions or concerns, please don't hesitate to call        Yosvany Quiroz MD    ______________________________           _______________          _______________  [facility] Representative                              Date                                Time

## 2023-03-03 ENCOUNTER — HOSPITAL ENCOUNTER (EMERGENCY)
Facility: HOSPITAL | Age: 48
Discharge: HOME | End: 2023-03-03
Attending: EMERGENCY MEDICINE

## 2023-03-03 ENCOUNTER — APPOINTMENT (EMERGENCY)
Dept: RADIOLOGY | Facility: HOSPITAL | Age: 48
End: 2023-03-03

## 2023-03-03 VITALS
OXYGEN SATURATION: 96 % | SYSTOLIC BLOOD PRESSURE: 141 MMHG | WEIGHT: 179.23 LBS | BODY MASS INDEX: 32.78 KG/M2 | HEART RATE: 99 BPM | DIASTOLIC BLOOD PRESSURE: 104 MMHG | TEMPERATURE: 97.7 F | RESPIRATION RATE: 18 BRPM

## 2023-03-03 DIAGNOSIS — M25.521 ELBOW PAIN, RIGHT: Primary | ICD-10-CM

## 2023-03-03 RX ORDER — NAPROXEN 500 MG/1
500 TABLET ORAL 2 TIMES DAILY WITH MEALS
Qty: 30 TABLET | Refills: 0 | Status: SHIPPED | OUTPATIENT
Start: 2023-03-03

## 2023-03-03 RX ORDER — ACETAMINOPHEN 500 MG
500 TABLET ORAL EVERY 6 HOURS PRN
Qty: 30 TABLET | Refills: 0 | Status: SHIPPED | OUTPATIENT
Start: 2023-03-03

## 2023-03-03 RX ORDER — ACETAMINOPHEN 325 MG/1
975 TABLET ORAL ONCE
Status: COMPLETED | OUTPATIENT
Start: 2023-03-03 | End: 2023-03-03

## 2023-03-03 RX ORDER — KETOROLAC TROMETHAMINE 30 MG/ML
15 INJECTION, SOLUTION INTRAMUSCULAR; INTRAVENOUS ONCE
Status: COMPLETED | OUTPATIENT
Start: 2023-03-03 | End: 2023-03-03

## 2023-03-03 RX ORDER — ONDANSETRON 4 MG/1
4 TABLET, ORALLY DISINTEGRATING ORAL ONCE
Status: COMPLETED | OUTPATIENT
Start: 2023-03-03 | End: 2023-03-03

## 2023-03-03 RX ORDER — ONDANSETRON 4 MG/1
4 TABLET, FILM COATED ORAL EVERY 6 HOURS
Qty: 12 TABLET | Refills: 0 | Status: SHIPPED | OUTPATIENT
Start: 2023-03-03

## 2023-03-03 RX ADMIN — KETOROLAC TROMETHAMINE 15 MG: 30 INJECTION, SOLUTION INTRAMUSCULAR; INTRAVENOUS at 14:33

## 2023-03-03 RX ADMIN — ONDANSETRON 4 MG: 4 TABLET, ORALLY DISINTEGRATING ORAL at 14:32

## 2023-03-03 RX ADMIN — ACETAMINOPHEN 325MG 975 MG: 325 TABLET ORAL at 14:34

## 2023-03-03 NOTE — ED PROVIDER NOTES
History  Chief Complaint   Patient presents with   • Elbow Pain     Pt c/o right elbow pain did have an injury on December 1st at work has been seeing a Dr with [facility] was cleared to go back to work in February but when working yesterday she was pulling laundry out of the dryer and heard a pop     Patient is a 56year old female presenting for evaluation of right elbow pain  She initially injured this elbow at work 3 months ago, had follow up with ortho, and has been attending PT  MRI at [facility] showed no fracture or ligamentous injury  Pain never fully improved especially since returning to work a few weeks ago  She was doing laundry yesterday when she felt a pop in the elbow  She has had worsening pain since  Taking ibuprofen and tylenol at home with minimal relief  Still has ROM, sensation, strength of affected extremity  Prior to Admission Medications   Prescriptions Last Dose Informant Patient Reported? Taking? Diclofenac Sodium (VOLTAREN) 1 %   No No   Sig: Apply 2 g topically 4 (four) times a day   Multiple Vitamins-Minerals (MULTIVITAMIN ADULT EXTRA C PO)   Yes No   Sig: Take 1 tablet by mouth   albuterol (2 5 mg/3 mL) 0 083 % nebulizer solution   No No   Sig: Take 1 vial (2 5 mg total) by nebulization every 6 (six) hours as needed for wheezing or shortness of breath   albuterol (PROVENTIL HFA,VENTOLIN HFA) 90 mcg/act inhaler   Yes No   Sig: Inhale 2 puffs every 6 (six) hours as needed for wheezing Indications: patient unsure of correct dosage/frequency     albuterol (PROVENTIL HFA,VENTOLIN HFA) 90 mcg/act inhaler   No No   Sig: Inhale 2 puffs every 4 (four) hours as needed for wheezing   amitriptyline (ELAVIL) 100 mg tablet   Yes No   Sig: Take 100 mg by mouth daily at bedtime   benzonatate (TESSALON PERLES) 100 mg capsule   No No   Sig: Take 1 capsule (100 mg total) by mouth 2 (two) times a day as needed for cough   Patient not taking: Reported on 12/11/2020   carBAMazepine (TEGretol) 200 mg tablet No No   Sig: Take 1 tablet (200 mg total) by mouth 3 (three) times a day   Patient not taking: Reported on 2019    cholecalciferol 1000 units tablet   No No   Sig: Take 1 tablet (1,000 Units total) by mouth 2 (two) times a day   Patient not taking: Reported on 2020   diazepam (VALIUM) 5 mg tablet   No No   Sig: Take 1 tablet (5 mg total) by mouth 2 (two) times a day for 4 days   famotidine (PEPCID) 20 mg tablet   No No   Sig: Take 1 tablet (20 mg total) by mouth 2 (two) times a day   Patient not taking: Reported on 2020   fluticasone (FLONASE) 50 mcg/act nasal spray   No No   Si spray into each nostril daily   Patient not taking: Reported on 2020   hydrochlorothiazide (HYDRODIURIL) 25 mg tablet   No No   Sig: Take 1 tablet (25 mg total) by mouth daily   Patient not taking: Reported on 2019    ketorolac (TORADOL) 10 mg tablet   No No   Sig: Take 1 tablet (10 mg total) by mouth every 6 (six) hours as needed for moderate pain   lidocaine (LIDODERM) 5 %   No No   Sig: Apply 1 patch topically daily for 4 days Remove & Discard patch within 12 hours or as directed by MD   meloxicam (MOBIC) 7 5 mg tablet   No No   Sig: Take 1 tablet (7 5 mg total) by mouth daily   Patient not taking: Reported on 2020   nitrofurantoin (MACROBID) 100 mg capsule   No No   Sig: Take 1 capsule (100 mg total) by mouth 2 (two) times a day   Patient not taking: Reported on 2020   omeprazole (PriLOSEC) 40 MG capsule   Yes No   Sig: Take 40 mg by mouth daily   ondansetron (ZOFRAN) 4 mg tablet   No No   Sig: Take 1 tablet (4 mg total) by mouth every 6 (six) hours   Patient not taking: Reported on 2020   oxyCODONE (OxyCONTIN) 15 mg 12 hr tablet   Yes No   Sig: Take 15 mg by mouth every 12 (twelve) hours   sucralfate (CARAFATE) 1 g tablet   No No   Sig: Take 1 tablet (1 g total) by mouth 4 (four) times a day   Patient not taking: Reported on 2020      Facility-Administered Medications: None Past Medical History:   Diagnosis Date   • Asthma    • Bipolar disorder ([facility] [address] [facility] )    • Migraine    • Psychiatric disorder    • PTSD (post-traumatic stress disorder)    • [facility]        Past Surgical History:   Procedure Laterality Date   • APPENDECTOMY     • BLADDER SURGERY     • CHOLECYSTECTOMY     • GASTRECTOMY      gastric sleeve   • GASTRIC RESTRICTION SURGERY      gastric sleeve in Jan 22 2018   • HEMORROIDECTOMY     • HYSTERECTOMY     • TUBAL LIGATION         Family History   Problem Relation Age of Onset   • Diabetes Mother         MELLITUS   • Diabetes Father         MELLITUS   • Diabetes Other         MELLITUS     I have reviewed and agree with the history as documented  E-Cigarette/Vaping     E-Cigarette/Vaping Substances     Social History     Tobacco Use   • Smoking status: Every Day     Packs/day: 0 50     Types: Cigarettes   • Smokeless tobacco: Never   Substance Use Topics   • Alcohol use: Yes     Comment: ocassionally   • Drug use: No       Review of Systems   Constitutional: Negative for chills and fever  HENT: Negative for congestion, ear pain and sore throat  Eyes: Negative for pain and visual disturbance  Respiratory: Negative for cough and shortness of breath  Cardiovascular: Negative for chest pain and palpitations  Gastrointestinal: Negative for abdominal pain and vomiting  Genitourinary: Negative for dysuria and hematuria  Musculoskeletal: Negative for arthralgias and back pain  Elbow pain   Skin: Negative for color change and rash  Neurological: Negative for seizures and syncope  All other systems reviewed and are negative  Physical Exam  Physical Exam  Vitals and nursing note reviewed  Constitutional:       General: She is not in acute distress  Appearance: Normal appearance  She is not ill-appearing  HENT:      Head: Normocephalic and atraumatic        Right Ear: External ear normal       Left Ear: External ear normal       Nose: Nose normal    Eyes:      General: No scleral icterus  Right eye: No discharge  Left eye: No discharge  Cardiovascular:      Rate and Rhythm: Normal rate  Pulses: Normal pulses  Pulmonary:      Effort: Pulmonary effort is normal  No respiratory distress  Musculoskeletal:         General: No deformity or signs of injury  Right shoulder: Normal       Left shoulder: Normal       Right upper arm: Normal       Left upper arm: Normal       Right elbow: No swelling or lacerations  Normal range of motion  Tenderness present in medial epicondyle and olecranon process  Left elbow: Normal       Right forearm: Normal       Left forearm: Normal       Right wrist: Normal       Left wrist: Normal       Cervical back: Normal range of motion and neck supple  Comments: Normal strength, sensation, ROM of right upper extremity  No swelling, erythema, warmth, open wounds of right elbow  Radial pulses 2+ bilaterally  Skin:     General: Skin is dry  Coloration: Skin is not jaundiced  Findings: No erythema or rash  Neurological:      General: No focal deficit present  Mental Status: She is alert and oriented to person, place, and time  Mental status is at baseline  Motor: No weakness  Gait: Gait normal    Psychiatric:         Mood and Affect: Mood normal          Behavior: Behavior normal          Thought Content:  Thought content normal          Vital Signs  ED Triage Vitals [03/03/23 1319]   Temperature Pulse Respirations Blood Pressure SpO2   97 7 °F (36 5 °C) 99 18 (!) 141/104 96 %      Temp src Heart Rate Source Patient Position - Orthostatic VS BP Location FiO2 (%)   -- -- Sitting Left arm --      Pain Score       7           Vitals:    03/03/23 1319   BP: (!) 141/104   Pulse: 99   Patient Position - Orthostatic VS: Sitting         Visual Acuity      ED Medications  Medications   ketorolac (TORADOL) injection 15 mg (15 mg Intramuscular Given 3/3/23 1433)   acetaminophen (TYLENOL) tablet 975 mg (975 mg Oral Given 3/3/23 1434)   ondansetron (ZOFRAN-ODT) dispersible tablet 4 mg (4 mg Oral Given 3/3/23 1432)       Diagnostic Studies  Results Reviewed     None                 XR elbow 3+ views RIGHT   Final Result by Bowen Mejia MD (03/03 1534)      No acute osseous abnormality  Workstation performed: [id number]                    Procedures  Procedures         ED Course                               SBIRT 20yo+    Flowsheet Row Most Recent Value   SBIRT (23 yo +)    In order to provide better care to our patients, we are screening all of our patients for alcohol and drug use  Would it be okay to ask you these screening questions? No Filed at: 03/03/2023 1340                    Medical Decision Making  Patient is a 52-year-old female presenting with acute on chronic right elbow pain  She has been followed by Ortho over the past couple months and MRI showed no fracture or ligamentous injury  She was doing laundry yesterday when she felt a pop in her elbow and has had worsening pain since  Still has full range of motion, strength, sensation of the right elbow and remaining upper extremity  Taking ibuprofen and Tylenol at home with minimal relief  Physical exam is remarkable for tenderness over the olecranon process and medial epicondyle  DDx including fracture, dislocation, ligamentous injury  Low suspicion for cellulitis, septic joint in absence of overlying erythema, warmth, fevers  Will obtain x-ray  X-ray negative for acute fractures or dislocation as interpreted by me  Discussed findings with patient and need for supportive care  Provided ambulatory referral and contact information for Ortho as she requested orthopedic referral outside of the [facility]  Instructed patient to continue taking ibuprofen and Tylenol as needed for pain  Reports pain feels best when wrapped so provided Ace bandage and sling today in ER  Provided work note      I have discussed findings and plan for discharge with the patient/caregiver  Follow up with the appropriate providers including primary care physician was discussed  Return precautions discussed with patient/caregiver as outlined in AVS  Patient/caregiver verbally expressed understanding  Patient stable at time of discharge and ambulated out of the emergency department  Amount and/or Complexity of Data Reviewed  Radiology: ordered  Risk  OTC drugs  Prescription drug management  Disposition  Final diagnoses:   Elbow pain, right     Time reflects when diagnosis was documented in both MDM as applicable and the Disposition within this note     Time User Action Codes Description Comment    3/3/2023  3:00 PM Nakitabrayan Reich Add [M25 521] Elbow pain, right     3/3/2023  3:06 PM Nakita Phillips Modify [M25 521] Elbow pain, right       ED Disposition     ED Disposition   Discharge    Condition   Stable    Date/Time   Fri Mar 3, 2023  3:00 PM    Comment   Dawit Simpson discharge to home/self care                 Follow-up Information     Follow up With Specialties Details Why Contact Info Additional Nelly Purvis 44 Orthopedic Surgery Schedule an appointment as soon as possible for a visit in 1 day  [address] [facility]  [place] [address] [facility] [address]  [address] [place] [address], [address] [facility], [address] [facility], [address]   57 Nidhi Willard MD Internal Medicine   [address] [place] [address]  [phone]             Discharge Medication List as of 3/3/2023  3:08 PM      START taking these medications    Details   acetaminophen (TYLENOL) 500 mg tablet Take 1 tablet (500 mg total) by mouth every 6 (six) hours as needed for mild pain, Starting Fri 3/3/2023, Normal      naproxen (Naprosyn) 500 mg tablet Take 1 tablet (500 mg total) by mouth 2 (two) times a day with meals, Starting Fri 3/3/2023, Normal !! ondansetron (ZOFRAN) 4 mg tablet Take 1 tablet (4 mg total) by mouth every 6 (six) hours, Starting Fri 3/3/2023, Normal       !! - Potential duplicate medications found  Please discuss with provider  CONTINUE these medications which have NOT CHANGED    Details   albuterol (2 5 mg/3 mL) 0 083 % nebulizer solution Take 1 vial (2 5 mg total) by nebulization every 6 (six) hours as needed for wheezing or shortness of breath, Starting Wed 9/18/2019, Print      !! albuterol (PROVENTIL HFA,VENTOLIN HFA) 90 mcg/act inhaler Inhale 2 puffs every 6 (six) hours as needed for wheezing Indications: patient unsure of correct dosage/frequency  , Until Discontinued, Historical Med      !! albuterol (PROVENTIL HFA,VENTOLIN HFA) 90 mcg/act inhaler Inhale 2 puffs every 4 (four) hours as needed for wheezing, Starting Wed 9/18/2019, Normal      amitriptyline (ELAVIL) 100 mg tablet Take 100 mg by mouth daily at bedtime, Historical Med      benzonatate (TESSALON PERLES) 100 mg capsule Take 1 capsule (100 mg total) by mouth 2 (two) times a day as needed for cough, Starting Wed 9/18/2019, Print      carBAMazepine (TEGretol) 200 mg tablet Take 1 tablet (200 mg total) by mouth 3 (three) times a day, Starting Mon 12/3/2018, Print      cholecalciferol 1000 units tablet Take 1 tablet (1,000 Units total) by mouth 2 (two) times a day, Starting Mon 12/3/2018, Print      diazepam (VALIUM) 5 mg tablet Take 1 tablet (5 mg total) by mouth 2 (two) times a day for 4 days, Starting Mon 6/24/2019, Until Fri 6/28/2019, Print      Diclofenac Sodium (VOLTAREN) 1 % Apply 2 g topically 4 (four) times a day, Starting Sat 2/25/2023, Normal      famotidine (PEPCID) 20 mg tablet Take 1 tablet (20 mg total) by mouth 2 (two) times a day, Starting Wed 1/2/2019, Print      fluticasone (FLONASE) 50 mcg/act nasal spray 1 spray into each nostril daily, Starting Wed 9/18/2019, Normal      hydrochlorothiazide (HYDRODIURIL) 25 mg tablet Take 1 tablet (25 mg total) by mouth daily, Starting Mon 12/3/2018, Print      ketorolac (TORADOL) 10 mg tablet Take 1 tablet (10 mg total) by mouth every 6 (six) hours as needed for moderate pain, Starting Mon 8/30/2021, Print      lidocaine (LIDODERM) 5 % Apply 1 patch topically daily for 4 days Remove & Discard patch within 12 hours or as directed by MD, Starting Mon 6/24/2019, Until Fri 6/28/2019, Print      meloxicam (MOBIC) 7 5 mg tablet Take 1 tablet (7 5 mg total) by mouth daily, Starting Mon 12/3/2018, Print      Multiple Vitamins-Minerals (MULTIVITAMIN ADULT EXTRA C PO) Take 1 tablet by mouth, Historical Med      nitrofurantoin (MACROBID) 100 mg capsule Take 1 capsule (100 mg total) by mouth 2 (two) times a day, Starting Wed 1/2/2019, Print      omeprazole (PriLOSEC) 40 MG capsule Take 40 mg by mouth daily, Historical Med      !! ondansetron (ZOFRAN) 4 mg tablet Take 1 tablet (4 mg total) by mouth every 6 (six) hours, Starting Wed 1/2/2019, Print      oxyCODONE (OxyCONTIN) 15 mg 12 hr tablet Take 15 mg by mouth every 12 (twelve) hours, Historical Med      sucralfate (CARAFATE) 1 g tablet Take 1 tablet (1 g total) by mouth 4 (four) times a day, Starting Wed 1/2/2019, Print       !! - Potential duplicate medications found  Please discuss with provider                PDMP Review     None          ED Provider  Electronically Signed by           Olga Lidia Hodge PA-C  03/03/23 2013

## 2023-03-03 NOTE — Clinical Note
Luna Oliveros was seen and treated in our emergency department on 3/3/2023  No work until cleared by Family Doctor/Orthopedics        Diagnosis:     Michelle    She may return on this date: If you have any questions or concerns, please don't hesitate to call        Amrit Tamez PA-C    ______________________________           _______________          _______________  Hospital Representative                              Date                                Time

## 2023-04-10 PROBLEM — R39.89 SUSPECTED UTI: Status: ACTIVE | Noted: 2023-04-10

## 2023-04-10 PROBLEM — M54.50 LOW BACK PAIN: Status: RESOLVED | Noted: 2018-11-27 | Resolved: 2023-04-10

## 2023-04-10 PROBLEM — E66.811 CLASS 1 OBESITY DUE TO EXCESS CALORIES WITHOUT SERIOUS COMORBIDITY WITH BODY MASS INDEX (BMI) OF 33.0 TO 33.9 IN ADULT: Chronic | Status: ACTIVE | Noted: 2017-08-17

## 2023-04-10 PROBLEM — K56.7 ILEUS (HCC): Status: ACTIVE | Noted: 2023-04-10

## 2023-04-10 PROBLEM — E66.09 CLASS 1 OBESITY DUE TO EXCESS CALORIES WITHOUT SERIOUS COMORBIDITY WITH BODY MASS INDEX (BMI) OF 33.0 TO 33.9 IN ADULT: Chronic | Status: ACTIVE | Noted: 2017-08-17

## 2023-04-10 PROBLEM — A41.9 SEPSIS SECONDARY TO UTI (HCC): Status: ACTIVE | Noted: 2023-04-10

## 2023-04-10 PROBLEM — R10.9 ABDOMINAL PAIN: Status: ACTIVE | Noted: 2023-04-10

## 2023-04-10 PROBLEM — N39.0 SEPSIS SECONDARY TO UTI (HCC): Status: ACTIVE | Noted: 2023-04-10

## 2023-04-28 ENCOUNTER — TELEPHONE (OUTPATIENT)
Dept: OBGYN CLINIC | Facility: HOSPITAL | Age: 48
End: 2023-04-28

## 2023-04-28 NOTE — TELEPHONE ENCOUNTER
Caller: Peace Holt    Doctor: Sela Sandifer    Reason for call: W/Comp Claim [employer] Claim Number [id number]  [address] [facility], Adjusters Name Bhavani Lyon Phone Number [phone], DOI 12/1/2022 Has an appt scheduled 5/22    Call back#: [phone]

## 2023-05-08 NOTE — TELEPHONE ENCOUNTER
Not sure if you needed this information for this patient or not, here is all her WC information.   If you do not need it let me know and I will Done it on my end, thanks

## 2023-05-09 ENCOUNTER — HOSPITAL ENCOUNTER (OUTPATIENT)
Facility: HOSPITAL | Age: 48
Setting detail: OBSERVATION
Discharge: HOME | End: 2023-05-11
Attending: EMERGENCY MEDICINE | Admitting: INTERNAL MEDICINE

## 2023-05-09 ENCOUNTER — APPOINTMENT (EMERGENCY)
Dept: RADIOLOGY | Facility: HOSPITAL | Age: 48
End: 2023-05-09

## 2023-05-09 DIAGNOSIS — J40 BRONCHITIS: ICD-10-CM

## 2023-05-09 DIAGNOSIS — J45.41 MODERATE PERSISTENT ASTHMA WITH EXACERBATION: Primary | ICD-10-CM

## 2023-05-09 PROBLEM — J45.21 MILD INTERMITTENT ASTHMA WITH ACUTE EXACERBATION: Status: ACTIVE | Noted: 2023-05-09

## 2023-05-09 LAB
ANION GAP SERPL CALCULATED.3IONS-SCNC: 10 MMOL/L (ref 4–13)
ATRIAL RATE: 106 BPM
BASOPHILS # BLD AUTO: 0.11 THOUSANDS/ÂΜL (ref 0–0.1)
BASOPHILS NFR BLD AUTO: 1 % (ref 0–1)
BUN SERPL-MCNC: 11 MG/DL (ref 5–25)
CALCIUM SERPL-MCNC: 9.2 MG/DL (ref 8.4–10.2)
CHLORIDE SERPL-SCNC: 108 MMOL/L (ref 96–108)
CO2 SERPL-SCNC: 24 MMOL/L (ref 21–32)
CREAT SERPL-MCNC: 0.68 MG/DL (ref 0.6–1.3)
EOSINOPHIL # BLD AUTO: 0.11 THOUSAND/ÂΜL (ref 0–0.61)
EOSINOPHIL NFR BLD AUTO: 1 % (ref 0–6)
ERYTHROCYTE [DISTWIDTH] IN BLOOD BY AUTOMATED COUNT: 14.5 % (ref 11.6–15.1)
GFR SERPL CREATININE-BSD FRML MDRD: 105 ML/MIN/1.73SQ M
GLUCOSE SERPL-MCNC: 160 MG/DL (ref 65–140)
HCT VFR BLD AUTO: 43.2 % (ref 34.8–46.1)
HGB BLD-MCNC: 14 G/DL (ref 11.5–15.4)
IMM GRANULOCYTES # BLD AUTO: 0.1 THOUSAND/UL (ref 0–0.2)
IMM GRANULOCYTES NFR BLD AUTO: 1 % (ref 0–2)
LYMPHOCYTES # BLD AUTO: 4.46 THOUSANDS/ÂΜL (ref 0.6–4.47)
LYMPHOCYTES NFR BLD AUTO: 23 % (ref 14–44)
MCH RBC QN AUTO: 26.5 PG (ref 26.8–34.3)
MCHC RBC AUTO-ENTMCNC: 32.4 G/DL (ref 31.4–37.4)
MCV RBC AUTO: 82 FL (ref 82–98)
MONOCYTES # BLD AUTO: 0.71 THOUSAND/ÂΜL (ref 0.17–1.22)
MONOCYTES NFR BLD AUTO: 4 % (ref 4–12)
NEUTROPHILS # BLD AUTO: 13.66 THOUSANDS/ÂΜL (ref 1.85–7.62)
NEUTS SEG NFR BLD AUTO: 70 % (ref 43–75)
NRBC BLD AUTO-RTO: 0 /100 WBCS
P AXIS: 59 DEGREES
PLATELET # BLD AUTO: 429 THOUSANDS/UL (ref 149–390)
PMV BLD AUTO: 9.6 FL (ref 8.9–12.7)
POTASSIUM SERPL-SCNC: 3.6 MMOL/L (ref 3.5–5.3)
PR INTERVAL: 120 MS
QRS AXIS: 62 DEGREES
QRSD INTERVAL: 92 MS
QT INTERVAL: 344 MS
QTC INTERVAL: 456 MS
RBC # BLD AUTO: 5.29 MILLION/UL (ref 3.81–5.12)
SODIUM SERPL-SCNC: 142 MMOL/L (ref 135–147)
T WAVE AXIS: 28 DEGREES
VENTRICULAR RATE: 106 BPM
WBC # BLD AUTO: 19.15 THOUSAND/UL (ref 4.31–10.16)

## 2023-05-09 RX ORDER — LEVALBUTEROL INHALATION SOLUTION 1.25 MG/3ML
1.25 SOLUTION RESPIRATORY (INHALATION) EVERY 6 HOURS PRN
Status: DISCONTINUED | OUTPATIENT
Start: 2023-05-09 | End: 2023-05-11 | Stop reason: HOSPADM

## 2023-05-09 RX ORDER — SODIUM CHLORIDE FOR INHALATION 0.9 %
3 VIAL, NEBULIZER (ML) INHALATION ONCE
Status: COMPLETED | OUTPATIENT
Start: 2023-05-09 | End: 2023-05-09

## 2023-05-09 RX ORDER — ONDANSETRON 2 MG/ML
4 INJECTION INTRAMUSCULAR; INTRAVENOUS EVERY 6 HOURS PRN
Status: DISCONTINUED | OUTPATIENT
Start: 2023-05-09 | End: 2023-05-11 | Stop reason: HOSPADM

## 2023-05-09 RX ORDER — GUAIFENESIN 100 MG/5ML
200 SOLUTION ORAL EVERY 4 HOURS PRN
Status: DISCONTINUED | OUTPATIENT
Start: 2023-05-09 | End: 2023-05-10

## 2023-05-09 RX ORDER — BENZONATATE 100 MG/1
100 CAPSULE ORAL 3 TIMES DAILY PRN
Status: DISCONTINUED | OUTPATIENT
Start: 2023-05-09 | End: 2023-05-10

## 2023-05-09 RX ORDER — AMITRIPTYLINE HYDROCHLORIDE 25 MG/1
100 TABLET, FILM COATED ORAL
Status: DISCONTINUED | OUTPATIENT
Start: 2023-05-09 | End: 2023-05-11 | Stop reason: HOSPADM

## 2023-05-09 RX ORDER — ALBUTEROL SULFATE 2.5 MG/3ML
2.5 SOLUTION RESPIRATORY (INHALATION) EVERY 6 HOURS PRN
Status: DISCONTINUED | OUTPATIENT
Start: 2023-05-09 | End: 2023-05-09

## 2023-05-09 RX ORDER — LEVALBUTEROL INHALATION SOLUTION 1.25 MG/3ML
SOLUTION RESPIRATORY (INHALATION)
Status: COMPLETED
Start: 2023-05-09 | End: 2023-05-11

## 2023-05-09 RX ORDER — ENOXAPARIN SODIUM 100 MG/ML
40 INJECTION SUBCUTANEOUS
Status: DISCONTINUED | OUTPATIENT
Start: 2023-05-09 | End: 2023-05-11 | Stop reason: HOSPADM

## 2023-05-09 RX ORDER — ACETAMINOPHEN 325 MG/1
650 TABLET ORAL EVERY 4 HOURS PRN
Status: DISCONTINUED | OUTPATIENT
Start: 2023-05-09 | End: 2023-05-11 | Stop reason: HOSPADM

## 2023-05-09 RX ORDER — LANOLIN ALCOHOL/MO/W.PET/CERES
3 CREAM (GRAM) TOPICAL
Status: DISCONTINUED | OUTPATIENT
Start: 2023-05-09 | End: 2023-05-11 | Stop reason: HOSPADM

## 2023-05-09 RX ORDER — NICOTINE 21 MG/24HR
1 PATCH, TRANSDERMAL 24 HOURS TRANSDERMAL DAILY
Status: DISCONTINUED | OUTPATIENT
Start: 2023-05-09 | End: 2023-05-11 | Stop reason: HOSPADM

## 2023-05-09 RX ORDER — GUAIFENESIN 600 MG/1
600 TABLET, EXTENDED RELEASE ORAL EVERY 12 HOURS SCHEDULED
Status: DISCONTINUED | OUTPATIENT
Start: 2023-05-09 | End: 2023-05-10

## 2023-05-09 RX ORDER — LEVALBUTEROL INHALATION SOLUTION 1.25 MG/3ML
1.25 SOLUTION RESPIRATORY (INHALATION)
Status: DISCONTINUED | OUTPATIENT
Start: 2023-05-09 | End: 2023-05-11 | Stop reason: HOSPADM

## 2023-05-09 RX ORDER — PANTOPRAZOLE SODIUM 40 MG/1
40 TABLET, DELAYED RELEASE ORAL
Status: DISCONTINUED | OUTPATIENT
Start: 2023-05-09 | End: 2023-05-11 | Stop reason: HOSPADM

## 2023-05-09 RX ORDER — LEVALBUTEROL INHALATION SOLUTION 1.25 MG/3ML
1.25 SOLUTION RESPIRATORY (INHALATION) EVERY 6 HOURS PRN
Status: DISCONTINUED | OUTPATIENT
Start: 2023-05-09 | End: 2023-05-09

## 2023-05-09 RX ORDER — METHYLPREDNISOLONE SODIUM SUCCINATE 40 MG/ML
40 INJECTION, POWDER, LYOPHILIZED, FOR SOLUTION INTRAMUSCULAR; INTRAVENOUS EVERY 8 HOURS SCHEDULED
Status: DISCONTINUED | OUTPATIENT
Start: 2023-05-09 | End: 2023-05-10

## 2023-05-09 RX ADMIN — GUAIFENESIN 600 MG: 600 TABLET, EXTENDED RELEASE ORAL at 12:51

## 2023-05-09 RX ADMIN — LEVALBUTEROL HYDROCHLORIDE 1.25 MG: 1.25 SOLUTION RESPIRATORY (INHALATION) at 20:26

## 2023-05-09 RX ADMIN — METHYLPREDNISOLONE SODIUM SUCCINATE 40 MG: 40 INJECTION, POWDER, FOR SOLUTION INTRAMUSCULAR; INTRAVENOUS at 21:25

## 2023-05-09 RX ADMIN — ALBUTEROL SULFATE 10 MG: 2.5 SOLUTION RESPIRATORY (INHALATION) at 10:42

## 2023-05-09 RX ADMIN — Medication 0.5 MG: at 14:33

## 2023-05-09 RX ADMIN — IPRATROPIUM BROMIDE 0.5 MG: 0.5 SOLUTION RESPIRATORY (INHALATION) at 20:26

## 2023-05-09 RX ADMIN — ENOXAPARIN SODIUM 40 MG: 40 INJECTION SUBCUTANEOUS at 12:51

## 2023-05-09 RX ADMIN — LEVALBUTEROL HYDROCHLORIDE 1.25 MG: 1.25 SOLUTION RESPIRATORY (INHALATION) at 14:34

## 2023-05-09 RX ADMIN — IPRATROPIUM BROMIDE 0.5 MG: 0.5 SOLUTION RESPIRATORY (INHALATION) at 14:33

## 2023-05-09 RX ADMIN — Medication 3 MG: at 21:12

## 2023-05-09 RX ADMIN — GUAIFENESIN 200 MG: 200 SOLUTION ORAL at 21:12

## 2023-05-09 RX ADMIN — SODIUM CHLORIDE, SODIUM LACTATE, POTASSIUM CHLORIDE, AND CALCIUM CHLORIDE 1000 ML: .6; .31; .03; .02 INJECTION, SOLUTION INTRAVENOUS at 10:45

## 2023-05-09 RX ADMIN — PANTOPRAZOLE SODIUM 40 MG: 40 TABLET, DELAYED RELEASE ORAL at 12:51

## 2023-05-09 RX ADMIN — ACETAMINOPHEN 650 MG: 325 TABLET ORAL at 12:51

## 2023-05-09 RX ADMIN — METHYLPREDNISOLONE SODIUM SUCCINATE 40 MG: 40 INJECTION, POWDER, FOR SOLUTION INTRAMUSCULAR; INTRAVENOUS at 14:49

## 2023-05-09 RX ADMIN — AMITRIPTYLINE HYDROCHLORIDE 100 MG: 25 TABLET, FILM COATED ORAL at 21:12

## 2023-05-09 RX ADMIN — ISODIUM CHLORIDE 3 ML: 0.03 SOLUTION RESPIRATORY (INHALATION) at 10:42

## 2023-05-09 RX ADMIN — BENZONATATE 100 MG: 100 CAPSULE ORAL at 12:51

## 2023-05-09 RX ADMIN — IPRATROPIUM BROMIDE 1 MG: 0.5 SOLUTION RESPIRATORY (INHALATION) at 10:42

## 2023-05-09 NOTE — RESPIRATORY THERAPY NOTE
"RT Protocol Note  Azalia Ospina 56 y o  female MRN: [id number]  Unit/Bed#: 7T [facility] 36-26 Encounter: [id number]    Assessment    Principal Problem:    Mild intermittent asthma with acute exacerbation  Active Problems:    Tobacco dependence syndrome    Bipolar 2 disorder ([facility])      Home Pulmonary Medications:  Albuterol inhaler       Past Medical History:   Diagnosis Date   • Asthma    • Bipolar disorder ([place] [address] )    • Migraine    • Psychiatric disorder    • PTSD (post-traumatic stress disorder)    • Seizures ([place] [address] )      Social History     Socioeconomic History   • Marital status:      Spouse name: None   • Number of children: None   • Years of education: None   • Highest education level: None   Occupational History   • None   Tobacco Use   • Smoking status: Former     Packs/day: 0 50     Types: Cigarettes     Quit date: 2023     Years since quittin 0   • Smokeless tobacco: Never   Vaping Use   • Vaping Use: Never used   Substance and Sexual Activity   • Alcohol use: Yes     Comment: ocassionally   • Drug use: No   • Sexual activity: None   Other Topics Concern   • None   Social History Narrative   • None     Social Determinants of Health     Financial Resource Strain: Not on file   Food Insecurity: Not on file   Transportation Needs: Not on file   Physical Activity: Not on file   Stress: Not on file   Social Connections: Not on file   Intimate Partner Violence: Not on file   Housing Stability: Not on file       Subjective  Pt resting some wheezing noted on room air        Objective    Physical Exam:        Vitals:  Blood pressure 137/95, pulse 104, temperature 97 5 °F (36 4 °C), temperature source Temporal, resp  rate 18, height 5' 2\" (1 575 m), weight 79 4 kg (175 lb 0 7 oz), SpO2 98 %  Imaging and other studies: I have personally reviewed pertinent reports  Plan  Change neb treatments with xopenex/atrovent tid and mdi prn if needed    Advise to quit smoking refused nicotine patches at " this time  Suggest home neb to be order and instrcut pt correct use of spacer

## 2023-05-09 NOTE — ED PROVIDER NOTES
History  Chief Complaint   Patient presents with   • Shortness of Breath     SOB, back pain, chest tightness/pain for about 3 days     Patient is a 72-year-old female with past medical significant for asthma presenting to the emergency department for evaluation of shortness of breath  Patient reports that she has had upper respiratory symptoms for the last week  She states that approximately 3 days ago her shortness of breath became acutely worse  She was initially placed on prednisone by her primary care provider to see if it helps with her cough/shortness of breath but states that over the past 3 days she has had acutely worsening shortness of breath everyday  She is not currently on a maintenance regimen for asthma and does not regularly take rescue inhaler  She is endorsing some mild chest discomfort she attributes to her coughing  She also states that she has not been eating or drinking well for the last week and feels very dehydrated  She denies fevers, chills, abdominal pain  Denies history of PE/DVT, estrogen supplementation, recent mobilization, hemoptysis  Prior to Admission Medications   Prescriptions Last Dose Informant Patient Reported? Taking? Diclofenac Sodium (VOLTAREN) 1 %   No No   Sig: Apply 2 g topically 4 (four) times a day   Multiple Vitamins-Minerals (MULTIVITAMIN ADULT EXTRA C PO)   Yes No   Sig: Take 1 tablet by mouth   acetaminophen (TYLENOL) 500 mg tablet   No No   Sig: Take 1 tablet (500 mg total) by mouth every 6 (six) hours as needed for mild pain   albuterol (2 5 mg/3 mL) 0 083 % nebulizer solution   No No   Sig: Take 1 vial (2 5 mg total) by nebulization every 6 (six) hours as needed for wheezing or shortness of breath   albuterol (PROVENTIL HFA,VENTOLIN HFA) 90 mcg/act inhaler   Yes No   Sig: Inhale 2 puffs every 6 (six) hours as needed for wheezing Indications: patient unsure of correct dosage/frequency     amitriptyline (ELAVIL) 100 mg tablet   Yes No   Sig: Take 100 mg by mouth daily at bedtime   lidocaine (LIDODERM) 5 %   No No   Sig: Apply 1 patch topically daily for 4 days Remove & Discard patch within 12 hours or as directed by MD   nicotine (NICODERM CQ) 14 mg/24hr TD 24 hr patch   No No   Sig: Place 1 patch on the skin over 24 hours daily Do not start before April 14, 2023  omeprazole (PriLOSEC) 40 MG capsule   Yes No   Sig: Take 40 mg by mouth daily   ondansetron (ZOFRAN) 4 mg tablet   No No   Sig: Take 1 tablet (4 mg total) by mouth every 6 (six) hours      Facility-Administered Medications: None       Past Medical History:   Diagnosis Date   • Asthma    • Bipolar disorder ([facility] )    • Migraine    • Psychiatric disorder    • PTSD (post-traumatic stress disorder)    • Seizures ([facility] )        Past Surgical History:   Procedure Laterality Date   • APPENDECTOMY     • BLADDER SURGERY     • CHOLECYSTECTOMY     • GASTRECTOMY      gastric sleeve   • GASTRIC RESTRICTION SURGERY      gastric sleeve in Jan 22 2018   • HEMORROIDECTOMY     • HYSTERECTOMY     • TUBAL LIGATION         Family History   Problem Relation Age of Onset   • Diabetes Mother         MELLITUS   • Diabetes Father         MELLITUS   • Diabetes Other      I have reviewed and agree with the history as documented      E-Cigarette/Vaping   • E-Cigarette Use Never User      E-Cigarette/Vaping Substances     Social History     Tobacco Use   • Smoking status: Every Day     Packs/day: 0 50     Types: Cigarettes   • Smokeless tobacco: Never   Vaping Use   • Vaping Use: Never used   Substance Use Topics   • Alcohol use: Yes     Comment: ocassionally   • Drug use: No        Review of Systems    Physical Exam  ED Triage Vitals [05/09/23 1021]   Temperature Pulse Respirations Blood Pressure SpO2   98 3 °F (36 8 °C) (!) 119 (!) 30 145/99 100 %      Temp Source Heart Rate Source Patient Position - Orthostatic VS BP Location FiO2 (%)   Oral Monitor Sitting Left arm --      Pain Score       --             Orthostatic Vital Signs  Vitals:    05/09/23 1021   BP: 145/99   Pulse: (!) 119   Patient Position - Orthostatic VS: Sitting       Physical Exam    ED Medications  Medications   albuterol inhalation solution 10 mg (has no administration in time range)     And   ipratropium (ATROVENT) 0 02 % inhalation solution 1 mg (has no administration in time range)     And   sodium chloride 0 9 % inhalation solution 3 mL (has no administration in time range)   lactated ringers bolus 1,000 mL (has no administration in time range)       Diagnostic Studies  Results Reviewed     Procedure Component Value Units Date/Time    CBC and differential [[id number]]     Lab Status: No result Specimen: Blood     Basic metabolic panel [[id number]]     Lab Status: No result Specimen: Blood                  XR chest 2 views    (Results Pending)         Procedures  Procedures      ED Course                                 Wells' Criteria for PE    Flowsheet Row Most Recent Value   Wells' Criteria for PE    Clinical signs and symptoms of DVT 0 Filed at: 05/09/2023 1020   PE is primary diagnosis or equally likely 0 Filed at: 05/09/2023 1020   HR >100 1 5 Filed at: 05/09/2023 1020   Immobilization at least 3 days or Surgery in the previous 4 weeks 0 Filed at: 05/09/2023 1020   Previous, objectively diagnosed PE or DVT 0 Filed at: 05/09/2023 1020   Hemoptysis 0 Filed at: 05/09/2023 1020   Malignancy with treatment within 6 months or palliative 0 Filed at: 05/09/2023 1020   Wells' Criteria Total 1 5 Filed at: 05/09/2023 1020            MDM      Disposition  Final diagnoses:   None     ED Disposition     None      Follow-up Information    None         Patient's Medications   Discharge Prescriptions    No medications on file     No discharge procedures on file      PDMP Review       Value Time User    PDMP Reviewed  Yes 4/10/2023  5:29 AM CARLOS UribeC           ED Provider  Attending physically available and evaluated Asim Medellin  I managed the patient along with the ED Attending      Electronically Signed by         Cece Levy DO  05/09/23 1024

## 2023-05-09 NOTE — ASSESSMENT & PLAN NOTE
· Presents with wheezing and shortness of breath  · Likely acute asthma exacerbation  · Solu-Medrol 40 mg IV every 8 hours scheduled  · Atrovent  · Xopenex  · Respiratory protocol  · Saturating well on room air

## 2023-05-09 NOTE — H&P
[facility]  H&P  Name: Azalia Ospina 56 y o  female I MRN: [id number]  Unit/Bed#: ED 08 I Date of Admission: 5/9/2023   Date of Service: 5/9/2023 I Hospital Day: 0      Assessment/Plan   * Mild intermittent asthma with acute exacerbation  Assessment & Plan  · Presents with wheezing and shortness of breath  · Likely acute asthma exacerbation  · Solu-Medrol 40 mg IV every 8 hours scheduled  · Atrovent  · Xopenex  · Respiratory protocol  · Saturating well on room air    Bipolar 2 disorder ([facility] )  Assessment & Plan  · Continue home amitriptyline  · Monitor patient's mood    Tobacco dependence syndrome  Assessment & Plan  · Nicotine patch while inpatient  · Encouraged tobacco cessation       VTE Prophylaxis: Lovenox  Code Status: Level 1 Full Code    Anticipated Length of Stay:  Patient will be admitted on an Observation basis with an anticipated length of stay of less than 2 midnights  Justification for Hospital Stay: Acute asthma exacerbation    Total Time for Visit, including Counseling / Coordination of Care: 60 minutes  Greater than 50% of this total time spent on direct patient counseling and coordination of care  Chief Complaint:  Wheezing/shortness of breath    History of Present Illness:    Azalia Ospina is a 56 y o  female with a past medical history significant for asthma, bipolar 2 disorder, tobacco dependence who presents with complaints of shortness of breath  The patient was found to be wheezing in the ED  Hemodynamically stable and saturating well on room air  The patient was assigned observation in the setting of acute asthma exacerbation  Review of Systems:    Review of Systems   Constitutional: Negative for chills and fever  HENT: Negative for ear pain and sore throat  Eyes: Negative for pain and visual disturbance  Respiratory: Positive for shortness of breath and wheezing  Negative for cough  Cardiovascular: Negative for chest pain and palpitations  Gastrointestinal: Negative for abdominal pain and vomiting  Genitourinary: Negative for dysuria and hematuria  Musculoskeletal: Negative for arthralgias and back pain  Skin: Negative for color change and rash  Neurological: Negative for seizures and syncope  All other systems reviewed and are negative  Past Medical and Surgical History:     Past Medical History:   Diagnosis Date   • Asthma    • Bipolar disorder ([facility] )    • Migraine    • Psychiatric disorder    • PTSD (post-traumatic stress disorder)    • Seizures ([facility] )        Past Surgical History:   Procedure Laterality Date   • APPENDECTOMY     • BLADDER SURGERY     • CHOLECYSTECTOMY     • GASTRECTOMY      gastric sleeve   • GASTRIC RESTRICTION SURGERY      gastric sleeve in Jan 22 2018   • HEMORROIDECTOMY     • HYSTERECTOMY     • TUBAL LIGATION         Meds/Allergies:    Prior to Admission medications    Medication Sig Start Date End Date Taking? Authorizing Provider   acetaminophen (TYLENOL) 500 mg tablet Take 1 tablet (500 mg total) by mouth every 6 (six) hours as needed for mild pain 3/3/23   Torie Maynard PA-C   albuterol (2 5 mg/3 mL) 0 083 % nebulizer solution Take 1 vial (2 5 mg total) by nebulization every 6 (six) hours as needed for wheezing or shortness of breath 9/18/19   Parvin Lee, DO   albuterol (PROVENTIL HFA,VENTOLIN HFA) 90 mcg/act inhaler Inhale 2 puffs every 6 (six) hours as needed for wheezing Indications: patient unsure of correct dosage/frequency      Historical Provider, MD   amitriptyline (ELAVIL) 100 mg tablet Take 100 mg by mouth daily at bedtime    Historical Provider, MD   Diclofenac Sodium (VOLTAREN) 1 % Apply 2 g topically 4 (four) times a day 2/25/23   Andrea Perez MD   lidocaine (LIDODERM) 5 % Apply 1 patch topically daily for 4 days Remove & Discard patch within 12 hours or as directed by MD 6/24/19 6/28/19  Marlaine Kayser Bendock, DO   Multiple Vitamins-Minerals (MULTIVITAMIN ADULT EXTRA C PO) Take 1 tablet by mouth    Historical Provider, MD   nicotine (NICODERM CQ) 14 mg/24hr TD 24 hr patch Place 1 patch on the skin over 24 hours daily Do not start before April 14, 2023 4/14/23   Silvia Baez MD   omeprazole (PriLOSEC) 40 MG capsule Take 40 mg by mouth daily    Historical Provider, MD   ondansetron (ZOFRAN) 4 mg tablet Take 1 tablet (4 mg total) by mouth every 6 (six) hours 3/3/23   Aranza Vidal PA-C       Allergies: Allergies   Allergen Reactions   • Polyethylene Glycol Anaphylaxis   • Golytely [Peg 3350-Electrolytes] Vomiting   • Risperidone    • Depakote [Valproic Acid] Rash       Social History:     Marital Status:    Substance Use History:   Social History     Substance and Sexual Activity   Alcohol Use Yes    Comment: ocassionally     Social History     Tobacco Use   Smoking Status Every Day   • Packs/day: 0 50   • Types: Cigarettes   Smokeless Tobacco Never     Social History     Substance and Sexual Activity   Drug Use No       Family History:    Pertinent family history reviewed    Physical Exam:     Vitals:   Blood Pressure: 110/62 (05/09/23 1111)  Pulse: 89 (05/09/23 1111)  Temperature: 98 3 °F (36 8 °C) (05/09/23 1021)  Temp Source: Oral (05/09/23 1021)  Respirations: 20 (05/09/23 1111)  Weight - Scale: 79 7 kg (175 lb 9 6 oz) (05/09/23 1021)  SpO2: 100 % (05/09/23 1111)    Physical Exam  Vitals and nursing note reviewed  Constitutional:       General: She is not in acute distress  Appearance: She is well-developed  HENT:      Head: Normocephalic and atraumatic  Eyes:      Conjunctiva/sclera: Conjunctivae normal    Cardiovascular:      Rate and Rhythm: Normal rate and regular rhythm  Heart sounds: No murmur heard  Pulmonary:      Effort: Pulmonary effort is normal  No respiratory distress  Breath sounds: Wheezing present  Abdominal:      Palpations: Abdomen is soft  Tenderness: There is no abdominal tenderness     Musculoskeletal:         General: No swelling  Cervical back: Neck supple  Skin:     General: Skin is warm and dry  Capillary Refill: Capillary refill takes less than 2 seconds  Neurological:      Mental Status: She is alert  Psychiatric:         Mood and Affect: Mood normal           Additional Data:     Lab Results: I have reviewed pertinent results     Results from last 7 days   Lab Units 05/09/23  1036   WBC Thousand/uL 19 15*   HEMOGLOBIN g/dL 14 0   HEMATOCRIT % 43 2   PLATELETS Thousands/uL 429*   NEUTROS PCT % 70   LYMPHS PCT % 23   MONOS PCT % 4   EOS PCT % 1                           Imaging: I have reviewed pertinent imaging     XR chest 2 views    (Results Pending)       EKG, Pathology, and Other Studies Reviewed on Admission:   · EKG: Reviewed     Allscripts / Epic Records Reviewed    ** Please Note: This note has been constructed using a voice recognition system   **

## 2023-05-10 LAB
ANION GAP SERPL CALCULATED.3IONS-SCNC: 13 MMOL/L (ref 4–13)
BASOPHILS # BLD AUTO: 0.04 THOUSANDS/ÂΜL (ref 0–0.1)
BASOPHILS NFR BLD AUTO: 0 % (ref 0–1)
BUN SERPL-MCNC: 11 MG/DL (ref 5–25)
CALCIUM SERPL-MCNC: 9.7 MG/DL (ref 8.4–10.2)
CHLORIDE SERPL-SCNC: 106 MMOL/L (ref 96–108)
CO2 SERPL-SCNC: 20 MMOL/L (ref 21–32)
CREAT SERPL-MCNC: 0.54 MG/DL (ref 0.6–1.3)
DME PARACHUTE DELIVERY DATE REQUESTED: NORMAL
DME PARACHUTE ITEM DESCRIPTION: NORMAL
DME PARACHUTE ORDER STATUS: NORMAL
DME PARACHUTE SUPPLIER NAME: NORMAL
DME PARACHUTE SUPPLIER PHONE: NORMAL
EOSINOPHIL # BLD AUTO: 0 THOUSAND/ÂΜL (ref 0–0.61)
EOSINOPHIL NFR BLD AUTO: 0 % (ref 0–6)
ERYTHROCYTE [DISTWIDTH] IN BLOOD BY AUTOMATED COUNT: 14.7 % (ref 11.6–15.1)
GFR SERPL CREATININE-BSD FRML MDRD: 113 ML/MIN/1.73SQ M
GLUCOSE P FAST SERPL-MCNC: 138 MG/DL (ref 65–99)
GLUCOSE SERPL-MCNC: 138 MG/DL (ref 65–140)
HCT VFR BLD AUTO: 44.9 % (ref 34.8–46.1)
HGB BLD-MCNC: 14.3 G/DL (ref 11.5–15.4)
IMM GRANULOCYTES # BLD AUTO: 0.12 THOUSAND/UL (ref 0–0.2)
IMM GRANULOCYTES NFR BLD AUTO: 1 % (ref 0–2)
LYMPHOCYTES # BLD AUTO: 1.66 THOUSANDS/ÂΜL (ref 0.6–4.47)
LYMPHOCYTES NFR BLD AUTO: 9 % (ref 14–44)
MAGNESIUM SERPL-MCNC: 2.1 MG/DL (ref 1.9–2.7)
MCH RBC QN AUTO: 26.5 PG (ref 26.8–34.3)
MCHC RBC AUTO-ENTMCNC: 31.8 G/DL (ref 31.4–37.4)
MCV RBC AUTO: 83 FL (ref 82–98)
MONOCYTES # BLD AUTO: 0.23 THOUSAND/ÂΜL (ref 0.17–1.22)
MONOCYTES NFR BLD AUTO: 1 % (ref 4–12)
NEUTROPHILS # BLD AUTO: 16.73 THOUSANDS/ÂΜL (ref 1.85–7.62)
NEUTS SEG NFR BLD AUTO: 89 % (ref 43–75)
NRBC BLD AUTO-RTO: 0 /100 WBCS
PHOSPHATE SERPL-MCNC: 4.3 MG/DL (ref 2.7–4.5)
PLATELET # BLD AUTO: 472 THOUSANDS/UL (ref 149–390)
PMV BLD AUTO: 10.5 FL (ref 8.9–12.7)
POTASSIUM SERPL-SCNC: 4.1 MMOL/L (ref 3.5–5.3)
RBC # BLD AUTO: 5.4 MILLION/UL (ref 3.81–5.12)
SODIUM SERPL-SCNC: 139 MMOL/L (ref 135–147)
WBC # BLD AUTO: 18.78 THOUSAND/UL (ref 4.31–10.16)

## 2023-05-10 RX ORDER — METHYLPREDNISOLONE SODIUM SUCCINATE 40 MG/ML
40 INJECTION, POWDER, LYOPHILIZED, FOR SOLUTION INTRAMUSCULAR; INTRAVENOUS EVERY 12 HOURS SCHEDULED
Status: DISCONTINUED | OUTPATIENT
Start: 2023-05-10 | End: 2023-05-11 | Stop reason: HOSPADM

## 2023-05-10 RX ORDER — GUAIFENESIN/DEXTROMETHORPHAN 100-10MG/5
10 SYRUP ORAL EVERY 4 HOURS PRN
Status: DISCONTINUED | OUTPATIENT
Start: 2023-05-10 | End: 2023-05-11 | Stop reason: HOSPADM

## 2023-05-10 RX ORDER — ALBUTEROL SULFATE 90 UG/1
2 AEROSOL, METERED RESPIRATORY (INHALATION) EVERY 4 HOURS PRN
Status: DISCONTINUED | OUTPATIENT
Start: 2023-05-10 | End: 2023-05-11 | Stop reason: HOSPADM

## 2023-05-10 RX ORDER — BENZONATATE 100 MG/1
100 CAPSULE ORAL 3 TIMES DAILY
Status: DISCONTINUED | OUTPATIENT
Start: 2023-05-10 | End: 2023-05-11 | Stop reason: HOSPADM

## 2023-05-10 RX ORDER — KETOROLAC TROMETHAMINE 30 MG/ML
15 INJECTION, SOLUTION INTRAMUSCULAR; INTRAVENOUS EVERY 6 HOURS PRN
Status: DISCONTINUED | OUTPATIENT
Start: 2023-05-10 | End: 2023-05-11 | Stop reason: HOSPADM

## 2023-05-10 RX ADMIN — KETOROLAC TROMETHAMINE 15 MG: 30 INJECTION, SOLUTION INTRAMUSCULAR; INTRAVENOUS at 16:56

## 2023-05-10 RX ADMIN — GUAIFENESIN 600 MG: 600 TABLET, EXTENDED RELEASE ORAL at 08:16

## 2023-05-10 RX ADMIN — LEVALBUTEROL HYDROCHLORIDE 1.25 MG: 1.25 SOLUTION RESPIRATORY (INHALATION) at 07:15

## 2023-05-10 RX ADMIN — BENZONATATE 100 MG: 100 CAPSULE ORAL at 21:03

## 2023-05-10 RX ADMIN — LEVALBUTEROL HYDROCHLORIDE 1.25 MG: 1.25 SOLUTION RESPIRATORY (INHALATION) at 14:00

## 2023-05-10 RX ADMIN — IPRATROPIUM BROMIDE 0.5 MG: 0.5 SOLUTION RESPIRATORY (INHALATION) at 19:30

## 2023-05-10 RX ADMIN — METHYLPREDNISOLONE SODIUM SUCCINATE 40 MG: 40 INJECTION, POWDER, FOR SOLUTION INTRAMUSCULAR; INTRAVENOUS at 20:23

## 2023-05-10 RX ADMIN — ACETAMINOPHEN 650 MG: 325 TABLET ORAL at 08:16

## 2023-05-10 RX ADMIN — LEVALBUTEROL HYDROCHLORIDE 1.25 MG: 1.25 SOLUTION RESPIRATORY (INHALATION) at 19:31

## 2023-05-10 RX ADMIN — GUAIFENESIN AND DEXTROMETHORPHAN 10 ML: 100; 10 SYRUP ORAL at 16:56

## 2023-05-10 RX ADMIN — IPRATROPIUM BROMIDE 0.5 MG: 0.5 SOLUTION RESPIRATORY (INHALATION) at 07:14

## 2023-05-10 RX ADMIN — BENZONATATE 100 MG: 100 CAPSULE ORAL at 16:56

## 2023-05-10 RX ADMIN — BENZONATATE 100 MG: 100 CAPSULE ORAL at 08:16

## 2023-05-10 RX ADMIN — ENOXAPARIN SODIUM 40 MG: 40 INJECTION SUBCUTANEOUS at 08:15

## 2023-05-10 RX ADMIN — GUAIFENESIN 200 MG: 200 SOLUTION ORAL at 05:39

## 2023-05-10 RX ADMIN — PANTOPRAZOLE SODIUM 40 MG: 40 TABLET, DELAYED RELEASE ORAL at 05:39

## 2023-05-10 RX ADMIN — Medication 3 MG: at 21:03

## 2023-05-10 RX ADMIN — AMITRIPTYLINE HYDROCHLORIDE 100 MG: 25 TABLET, FILM COATED ORAL at 21:03

## 2023-05-10 RX ADMIN — IPRATROPIUM BROMIDE 0.5 MG: 0.5 SOLUTION RESPIRATORY (INHALATION) at 14:00

## 2023-05-10 RX ADMIN — METHYLPREDNISOLONE SODIUM SUCCINATE 40 MG: 40 INJECTION, POWDER, FOR SOLUTION INTRAMUSCULAR; INTRAVENOUS at 05:53

## 2023-05-10 NOTE — PROGRESS NOTES
[facility]  Progress Note  Name: Smiley Mcmahon  MRN: [id number]  Unit/Bed#: 7T [facility] 712-01 I Date of Admission: 5/9/2023   Date of Service: 5/10/2023 I Hospital Day: 0    Assessment/Plan   * Mild intermittent asthma with acute exacerbation  Assessment & Plan  · Presents with wheezing and shortness of breath  · Likely acute asthma exacerbation  · Taper Solu-Medrol 40 mg IV every 12 hours scheduled  · Atrovent, Xopenex nebulizer treatment  · Tessalon, Robitussin-DM for cough   · Will discuss with CM for home nebulizer machine arrangement  · respiratory protocol  · Saturating well on room air    Bipolar 2 disorder ([facility] )  Assessment & Plan  · Continue home amitriptyline  · Monitor patient's mood    Tobacco dependence syndrome  Assessment & Plan  · Nicotine patch while inpatient  · Encouraged tobacco cessation         VTE Pharmacologic Prophylaxis:   Pharmacologic: Enoxaparin (Lovenox)  Mechanical VTE Prophylaxis in Place: Yes    Patient Centered Rounds: I have performed bedside rounds with nursing staff today  Discussions with Specialists or Other Care Team Provider: Discussed with case management, RN    Education and Discussions with Family / Patient: Discussed with patient    Time Spent for Care: 35 minutes  More than 50% of total time spent on counseling and coordination of care as described above  Current Length of Stay: 0 day(s)    Current Patient Status: Observation   Certification Statement: The patient will continue to require additional inpatient hospital stay due to Acute asthma exacerbation    Discharge Plan / Estimated Discharge Date: Possibly tomorrow      Code Status: Level 1 - Full Code      Subjective:   Patient was seen and examined at bedside  The patient has constant coughing during my encounter  Patient stated she was not able to sleep due to coughing, felt dizzy and not comfortable going home today    Patient was complaining of back and chest wall pain from cough  Patient was explained that she can use Tessalon 3 times a day and Robitussin-DM  IV Toradol for musculoskeletal pain  Objective:     Vitals:   Temp (24hrs), Av 8 °F (36 6 °C), Min:96 7 °F (35 9 °C), Max:98 5 °F (36 9 °C)    Temp:  [96 7 °F (35 9 °C)-98 5 °F (36 9 °C)] 97 2 °F (36 2 °C)  HR:  [] 95  Resp:  [18-30] 18  BP: (110-145)/(62-99) 138/87  SpO2:  [95 %-100 %] 95 %  Body mass index is 32 02 kg/m²  Input and Output Summary (last 24 hours): Intake/Output Summary (Last 24 hours) at 5/10/2023 0937  Last data filed at 2023 2110  Gross per 24 hour   Intake 1000 ml   Output 314 ml   Net 686 ml       Physical Exam:     Physical Exam  General: breathing well on room air, no acute distress  HEENT: NC/AT, PERRL, EOM - normal  Neck: Supple  Pulm/Chest: Normal chest wall expansion, clear breath sounds on both side, congested but no wheezing/rhonchi or crackles appreciated  CVS: RRR, normal S1&S2, no murmur appreciated, capillary refill <2s  Abd: soft, non tender, non distended, bowel sounds +  MSK: move all 4 extremities spontaneously  Skin: warm  CNS: no acute focal neuro deficit    Additional Data:     Labs:    Results from last 7 days   Lab Units 05/10/23  0541   WBC Thousand/uL 18 78*   HEMOGLOBIN g/dL 14 3   HEMATOCRIT % 44 9   PLATELETS Thousands/uL 472*   NEUTROS PCT % 89*   LYMPHS PCT % 9*   MONOS PCT % 1*   EOS PCT % 0     Results from last 7 days   Lab Units 05/10/23  0541   POTASSIUM mmol/L 4 1   CHLORIDE mmol/L 106   CO2 mmol/L 20*   BUN mg/dL 11   CREATININE mg/dL 0 54*   CALCIUM mg/dL 9 7           * I Have Reviewed All Lab Data Listed Above  * Additional Pertinent Lab Tests Reviewed: [address] Admission Reviewed    Imaging:      I have reviewed pertinent imaging        Recent Cultures (last 7 days):           Last 24 Hours Medication List:   Current Facility-Administered Medications   Medication Dose Route Frequency Provider Last Rate   • acetaminophen  650 mg Oral Q4H PRN Erinn Crum, DO     • albuterol  2 puff Inhalation Q4H PRN Janiya Alvarado MD     • amitriptyline  100 mg Oral HS Erinn Crum DO     • benzonatate  100 mg Oral TID Janiya Alvarado MD     • dextromethorphan-guaiFENesin  10 mL Oral Q4H PRN Janiya Alvarado MD     • enoxaparin  40 mg Subcutaneous Q24H [address] Erinn Crum DO     • ipratropium  0 5 mg Nebulization TID Erinn Crum DO     • ketorolac  15 mg Intravenous Q6H PRN Janiya Alvarado MD     • levalbuterol  1 25 mg Nebulization TID Erinn Crum DO     • levalbuterol  1 25 mg Nebulization Q6H PRN Erinn Crum DO     • melatonin  3 mg Oral HS Roberto Tai PA-C     • methylPREDNISolone sodium succinate  40 mg Intravenous Q12H [address] Janiya Alvarado MD     • nicotine  1 patch Transdermal Daily Erinn Crum DO     • ondansetron  4 mg Intravenous Q6H PRN Erinn Crum DO     • pantoprazole  40 mg Oral Early Morning Erinn Crum DO          Today, Patient Was Seen By: Janiya Alvarado MD    ** Please Note: Dragon 360 Dictation voice to text software may have been used in the creation of this document   **

## 2023-05-10 NOTE — ASSESSMENT & PLAN NOTE
· Presents with wheezing and shortness of breath  · Likely acute asthma exacerbation  · Taper Solu-Medrol 40 mg IV every 12 hours scheduled  · Atrovent, Xopenex nebulizer treatment  · Tessalon, Robitussin-DM for cough   · Will discuss with CM for home nebulizer machine arrangement  · respiratory protocol  · Saturating well on room air

## 2023-05-10 NOTE — UTILIZATION REVIEW
Initial Clinical Review    Admission: Date/Time/Statement:   Admission Orders (From admission, onward)     Ordered        05/09/23 1142  Place in Observation  Once                      Orders Placed This Encounter   Procedures   • Place in Observation     Standing Status:   Standing     Number of Occurrences:   1     Order Specific Question:   Level of Care     Answer:   Med Surg [16]     ED Arrival Information     Expected   -    Arrival   5/9/2023 10:03    Acuity   Urgent            Means of arrival   Walk-In    Escorted by   Self    Service   Hospitalist    Admission type   Emergency            Arrival complaint   SOB           Chief Complaint   Patient presents with   • Shortness of Breath     SOB, back pain, chest tightness/pain for about 3 days       Initial Presentation: 56 y o  female who presented self from home to [address] [place] [address],[address] [facility] ED  Admitted in observation status for evaluation and treatment of asthma exacerbation  PMHx: asthma, psychiatric disorder, migraines, seizures  Presented w/ SOB  On exam, wheezing  Plan: IV solumedrol, nebs, continue PTA meds, supportive care, Trend labs, replete electrolytes as needed        ED Triage Vitals   Temperature Pulse Respirations Blood Pressure SpO2   05/09/23 1021 05/09/23 1021 05/09/23 1021 05/09/23 1021 05/09/23 1021   98 3 °F (36 8 °C) (!) 119 (!) 30 145/99 100 %      Temp Source Heart Rate Source Patient Position - Orthostatic VS BP Location FiO2 (%)   05/09/23 1021 05/09/23 1021 05/09/23 1021 05/09/23 1021 --   Oral Monitor Sitting Left arm       Pain Score       05/09/23 1111       3          Wt Readings from Last 1 Encounters:   05/09/23 79 4 kg (175 lb 0 7 oz)     Additional Vital Signs:   Date/Time Temp Pulse Resp BP MAP (mmHg) SpO2 O2 Device   05/10/23 0725 97 2 °F (36 2 °C) Abnormal  95 18 138/87 -- 95 % None (Room air)   05/10/23 0700 -- -- -- -- -- 96 % --   05/09/23 2120 98 3 °F (36 8 °C) 100 18 116/75 83 96 % None (Room air)   05/09/23 2027 -- -- -- -- -- 98 % --   05/09/23 1900 96 7 °F (35 9 °C) Abnormal  104 18 120/71 -- -- --   05/09/23 1518 98 5 °F (36 9 °C) 98 20 115/76 84 96 % None (Room air)   05/09/23 1434 -- -- -- -- -- 98 % --   05/09/23 1251 97 5 °F (36 4 °C) 104 18 137/95 -- 100 % None (Room air)   05/09/23 1111 -- 89 20 110/62 78 100 % Other (comment)      Pertinent Labs/Diagnostic Test Results:   XR chest 2 views   Final Result by Nghia Van MD (05/09 1254)      No acute cardiopulmonary disease        Findings are stable            Workstation performed: [id number]               Results from last 7 days   Lab Units 05/10/23  0541 05/09/23  1036   WBC Thousand/uL 18 78* 19 15*   HEMOGLOBIN g/dL 14 3 14 0   HEMATOCRIT % 44 9 43 2   PLATELETS Thousands/uL 472* 429*   NEUTROS ABS Thousands/µL 16 73* 13 66*         Results from last 7 days   Lab Units 05/10/23  0541 05/09/23  1407   SODIUM mmol/L 139 142   POTASSIUM mmol/L 4 1 3 6   CHLORIDE mmol/L 106 108   CO2 mmol/L 20* 24   ANION GAP mmol/L 13 10   BUN mg/dL 11 11   CREATININE mg/dL 0 54* 0 68   EGFR ml/min/1 73sq m 113 105   CALCIUM mg/dL 9 7 9 2   MAGNESIUM mg/dL 2 1  --    PHOSPHORUS mg/dL 4 3  --              Results from last 7 days   Lab Units 05/10/23  0541 05/09/23  1407   GLUCOSE RANDOM mg/dL 138 160*       ED Treatment:   Medication Administration from 05/09/2023 1003 to 05/09/2023 1243       Date/Time Order Dose Route Action     05/09/2023 1042 EDT albuterol inhalation solution 10 mg 10 mg Nebulization Given     05/09/2023 1042 EDT ipratropium (ATROVENT) 0 02 % inhalation solution 1 mg 1 mg Nebulization Given     05/09/2023 1042 EDT sodium chloride 0 9 % inhalation solution 3 mL 3 mL Nebulization Given by Other     05/09/2023 1045 EDT lactated ringers bolus 1,000 mL 1,000 mL Intravenous New Bag        Past Medical History:   Diagnosis Date   • Asthma    • Bipolar disorder ([address] )    • Migraine    • Psychiatric disorder    • PTSD (post-traumatic stress disorder)    • Seizures [facility])      Present on Admission:  • Mild intermittent asthma with acute exacerbation  • Tobacco dependence syndrome  • Bipolar 2 disorder (HCC)      Admitting Diagnosis: SOB (shortness of breath) [R06 02]  Moderate persistent asthma with exacerbation [J45 41]  Age/Sex: 56 y o  female  Admission Orders:  Regular Diet  Scheduled Medications:  amitriptyline, 100 mg, Oral, HS  enoxaparin, 40 mg, Subcutaneous, Q24H KIMBERLY  guaiFENesin, 600 mg, Oral, Q12H KIMBERLY  ipratropium, 0 5 mg, Nebulization, TID  levalbuterol, 1 25 mg, Nebulization, TID  melatonin, 3 mg, Oral, HS  methylPREDNISolone sodium succinate, 40 mg, Intravenous, Q8H [facility]  nicotine, 1 patch, Transdermal, Daily  pantoprazole, 40 mg, Oral, Early Morning    Continuous IV Infusions: none     PRN Meds:  acetaminophen, 650 mg, Oral, Q4H PRN  benzonatate, 100 mg, Oral, TID PRN  guaiFENesin, 200 mg, Oral, Q4H PRN  levalbuterol, 1 25 mg, Nebulization, Q6H PRN  ondansetron, 4 mg, Intravenous, Q6H PRN        IP CONSULT TO CASE MANAGEMENT    Network Utilization Review Department  ATTENTION: Please call with any questions or concerns to [phone] and carefully listen to the prompts so that you are directed to the right person  All voicemails are confidential   Olayinkaeice Curling all requests for admission clinical reviews, approved or denied determinations and any other requests to dedicated fax number below belonging to the campus where the patient is receiving treatment   List of dedicated fax numbers for the Facilities:  [address] DENIALS (Administrative/Medical Necessity) [phone]   [address] [place] [address] (Maternity/NICU/Pediatrics) [phone]   [address] [facility]  [phone]   [facility] [address] [phone] [address] [place] [address] [address] [facility] [address] [place] [address] [phone]

## 2023-05-11 VITALS
BODY MASS INDEX: 32.21 KG/M2 | HEIGHT: 62 IN | WEIGHT: 175.04 LBS | HEART RATE: 87 BPM | DIASTOLIC BLOOD PRESSURE: 82 MMHG | TEMPERATURE: 97.4 F | SYSTOLIC BLOOD PRESSURE: 125 MMHG | OXYGEN SATURATION: 96 % | RESPIRATION RATE: 20 BRPM

## 2023-05-11 LAB
ANION GAP SERPL CALCULATED.3IONS-SCNC: 11 MMOL/L (ref 4–13)
BASOPHILS # BLD AUTO: 0.03 THOUSANDS/ÂΜL (ref 0–0.1)
BASOPHILS NFR BLD AUTO: 0 % (ref 0–1)
BUN SERPL-MCNC: 12 MG/DL (ref 5–25)
CALCIUM SERPL-MCNC: 9.3 MG/DL (ref 8.4–10.2)
CHLORIDE SERPL-SCNC: 105 MMOL/L (ref 96–108)
CO2 SERPL-SCNC: 22 MMOL/L (ref 21–32)
CREAT SERPL-MCNC: 0.54 MG/DL (ref 0.6–1.3)
EOSINOPHIL # BLD AUTO: 0 THOUSAND/ÂΜL (ref 0–0.61)
EOSINOPHIL NFR BLD AUTO: 0 % (ref 0–6)
ERYTHROCYTE [DISTWIDTH] IN BLOOD BY AUTOMATED COUNT: 14.7 % (ref 11.6–15.1)
GFR SERPL CREATININE-BSD FRML MDRD: 113 ML/MIN/1.73SQ M
GLUCOSE P FAST SERPL-MCNC: 155 MG/DL (ref 65–99)
GLUCOSE SERPL-MCNC: 155 MG/DL (ref 65–140)
HCT VFR BLD AUTO: 42.8 % (ref 34.8–46.1)
HGB BLD-MCNC: 13.6 G/DL (ref 11.5–15.4)
IMM GRANULOCYTES # BLD AUTO: 0.18 THOUSAND/UL (ref 0–0.2)
IMM GRANULOCYTES NFR BLD AUTO: 1 % (ref 0–2)
LYMPHOCYTES # BLD AUTO: 2.47 THOUSANDS/ÂΜL (ref 0.6–4.47)
LYMPHOCYTES NFR BLD AUTO: 12 % (ref 14–44)
MCH RBC QN AUTO: 26.3 PG (ref 26.8–34.3)
MCHC RBC AUTO-ENTMCNC: 31.8 G/DL (ref 31.4–37.4)
MCV RBC AUTO: 83 FL (ref 82–98)
MONOCYTES # BLD AUTO: 0.65 THOUSAND/ÂΜL (ref 0.17–1.22)
MONOCYTES NFR BLD AUTO: 3 % (ref 4–12)
NEUTROPHILS # BLD AUTO: 17.79 THOUSANDS/ÂΜL (ref 1.85–7.62)
NEUTS SEG NFR BLD AUTO: 84 % (ref 43–75)
NRBC BLD AUTO-RTO: 0 /100 WBCS
PLATELET # BLD AUTO: 454 THOUSANDS/UL (ref 149–390)
PMV BLD AUTO: 10.1 FL (ref 8.9–12.7)
POTASSIUM SERPL-SCNC: 3.9 MMOL/L (ref 3.5–5.3)
RBC # BLD AUTO: 5.17 MILLION/UL (ref 3.81–5.12)
SODIUM SERPL-SCNC: 138 MMOL/L (ref 135–147)
WBC # BLD AUTO: 21.12 THOUSAND/UL (ref 4.31–10.16)

## 2023-05-11 RX ORDER — PREDNISONE 10 MG/1
40 TABLET ORAL DAILY
Qty: 20 TABLET | Refills: 0 | Status: SHIPPED | OUTPATIENT
Start: 2023-05-11 | End: 2023-05-16

## 2023-05-11 RX ORDER — ALBUTEROL SULFATE 2.5 MG/3ML
2.5 SOLUTION RESPIRATORY (INHALATION) EVERY 6 HOURS PRN
Qty: 75 ML | Refills: 0 | Status: SHIPPED | OUTPATIENT
Start: 2023-05-11 | End: 2023-06-10

## 2023-05-11 RX ORDER — FLUTICASONE FUROATE AND VILANTEROL 200; 25 UG/1; UG/1
1 POWDER RESPIRATORY (INHALATION) DAILY
Qty: 60 BLISTER | Refills: 0 | Status: SHIPPED | OUTPATIENT
Start: 2023-05-11 | End: 2023-05-11

## 2023-05-11 RX ORDER — FLUTICASONE PROPIONATE AND SALMETEROL 500; 50 UG/1; UG/1
1 POWDER RESPIRATORY (INHALATION) 2 TIMES DAILY
Qty: 60 BLISTER | Refills: 0 | Status: SHIPPED | OUTPATIENT
Start: 2023-05-11 | End: 2023-06-10

## 2023-05-11 RX ORDER — LANOLIN ALCOHOL/MO/W.PET/CERES
3 CREAM (GRAM) TOPICAL
Qty: 30 TABLET | Refills: 0 | Status: SHIPPED | OUTPATIENT
Start: 2023-05-11

## 2023-05-11 RX ORDER — BENZONATATE 100 MG/1
100 CAPSULE ORAL 3 TIMES DAILY
Qty: 20 CAPSULE | Refills: 0 | Status: SHIPPED | OUTPATIENT
Start: 2023-05-11

## 2023-05-11 RX ORDER — GUAIFENESIN/DEXTROMETHORPHAN 100-10MG/5
10 SYRUP ORAL EVERY 4 HOURS PRN
Qty: 473 ML | Refills: 0 | Status: SHIPPED | OUTPATIENT
Start: 2023-05-11

## 2023-05-11 RX ORDER — BISACODYL 5 MG/1
5 TABLET, DELAYED RELEASE ORAL DAILY
Status: DISCONTINUED | OUTPATIENT
Start: 2023-05-11 | End: 2023-05-11 | Stop reason: HOSPADM

## 2023-05-11 RX ADMIN — BISACODYL 5 MG: 5 TABLET, COATED ORAL at 10:31

## 2023-05-11 RX ADMIN — ENOXAPARIN SODIUM 40 MG: 40 INJECTION SUBCUTANEOUS at 08:46

## 2023-05-11 RX ADMIN — BENZONATATE 100 MG: 100 CAPSULE ORAL at 08:46

## 2023-05-11 RX ADMIN — METHYLPREDNISOLONE SODIUM SUCCINATE 40 MG: 40 INJECTION, POWDER, FOR SOLUTION INTRAMUSCULAR; INTRAVENOUS at 08:46

## 2023-05-11 RX ADMIN — PANTOPRAZOLE SODIUM 40 MG: 40 TABLET, DELAYED RELEASE ORAL at 05:36

## 2023-05-11 RX ADMIN — LEVALBUTEROL HYDROCHLORIDE 1.25 MG: 1.25 SOLUTION RESPIRATORY (INHALATION) at 05:58

## 2023-05-11 RX ADMIN — LEVALBUTEROL INHALATION SOLUTION 1.25 MG: 1.25 SOLUTION RESPIRATORY (INHALATION) at 05:58

## 2023-05-11 RX ADMIN — GUAIFENESIN AND DEXTROMETHORPHAN 10 ML: 100; 10 SYRUP ORAL at 05:36

## 2023-05-11 RX ADMIN — KETOROLAC TROMETHAMINE 15 MG: 30 INJECTION, SOLUTION INTRAMUSCULAR; INTRAVENOUS at 05:36

## 2023-05-11 NOTE — PLAN OF CARE
Problem: RESPIRATORY - ADULT  Goal: Achieves optimal ventilation and oxygenation  Description: INTERVENTIONS:  - Assess for changes in respiratory status  - Assess for changes in mentation and behavior  - Position to facilitate oxygenation and minimize respiratory effort  - Oxygen administered by appropriate delivery if ordered  - Initiate smoking cessation education as indicated  - Encourage broncho-pulmonary hygiene including cough, deep breathe, Incentive Spirometry  - Assess the need for suctioning and aspirate as needed  - Assess and instruct to report SOB or any respiratory difficulty  - Respiratory Therapy support as indicated  Outcome: Progressing     Problem: PAIN - ADULT  Goal: Verbalizes/displays adequate comfort level or baseline comfort level  Description: Interventions:  - Encourage patient to monitor pain and request assistance  - Assess pain using appropriate pain scale  - Administer analgesics based on type and severity of pain and evaluate response  - Implement non-pharmacological measures as appropriate and evaluate response  - Consider cultural and social influences on pain and pain management  - Notify physician/advanced practitioner if interventions unsuccessful or patient reports new pain  Outcome: Progressing     Problem: INFECTION - ADULT  Goal: Absence or prevention of progression during hospitalization  Description: INTERVENTIONS:  - Assess and monitor for signs and symptoms of infection  - Monitor lab/diagnostic results  - Monitor all insertion sites, i e  indwelling lines, tubes, and drains  - Monitor endotracheal if appropriate and nasal secretions for changes in amount and color  - [place] appropriate cooling/warming therapies per order  - Administer medications as ordered  - Instruct and encourage patient and family to use good hand hygiene technique  - Identify and instruct in appropriate isolation precautions for identified infection/condition  Outcome: Progressing  Goal: Absence of fever/infection during neutropenic period  Description: INTERVENTIONS:  - Monitor WBC    Outcome: Progressing     Problem: SAFETY ADULT  Goal: Patient will remain free of falls  Description: INTERVENTIONS:  - Educate patient/family on patient safety including physical limitations  - Instruct patient to call for assistance with activity   - Consult OT/PT to assist with strengthening/mobility   - Keep Call bell within reach  - Keep bed low and locked with side rails adjusted as appropriate  - Keep care items and personal belongings within reach  - Initiate and maintain comfort rounds  - Make Fall Risk Sign visible to staff  - Apply yellow socks and bracelet for high fall risk patients  - Consider moving patient to room near nurses station  Outcome: Progressing  Goal: Maintain or return to baseline ADL function  Description: INTERVENTIONS:  -  Assess patient's ability to carry out ADLs; assess patient's baseline for ADL function and identify physical deficits which impact ability to perform ADLs (bathing, care of mouth/teeth, toileting, grooming, dressing, etc )  - Assess/evaluate cause of self-care deficits   - Assess range of motion  - Assess patient's mobility; develop plan if impaired  - Assess patient's need for assistive devices and provide as appropriate  - Encourage maximum independence but intervene and supervise when necessary  - Involve family in performance of ADLs  - Assess for home care needs following discharge   - Consider OT consult to assist with ADL evaluation and planning for discharge  - Provide patient education as appropriate  Outcome: Progressing  Goal: Maintains/Returns to pre admission functional level  Description: INTERVENTIONS:  - Perform BMAT or MOVE assessment daily    - Set and communicate daily mobility goal to care team and patient/family/caregiver     - Collaborate with rehabilitation services on mobility goals if consulted  - Out of bed for toileting  - Record patient progress and toleration of activity level   Outcome: Progressing     Problem: DISCHARGE PLANNING  Goal: Discharge to home or other facility with appropriate resources  Description: INTERVENTIONS:  - Identify barriers to discharge w/patient and caregiver  - Arrange for needed discharge resources and transportation as appropriate  - Identify discharge learning needs (meds, wound care, etc )  - Arrange for interpretive services to assist at discharge as needed  - Refer to Case Management Department for coordinating discharge planning if the patient needs post-hospital services based on physician/advanced practitioner order or complex needs related to functional status, cognitive ability, or social support system  Outcome: Progressing     Problem: Knowledge Deficit  Goal: Patient/family/caregiver demonstrates understanding of disease process, treatment plan, medications, and discharge instructions  Description: Complete learning assessment and assess knowledge base    Interventions:  - Provide teaching at level of understanding  - Provide teaching via preferred learning methods  Outcome: Progressing

## 2023-05-11 NOTE — DISCHARGE SUMMARY
[facility]  Discharge- Blaze Uriasi 1975, 56 y o  female MRN: [id number]  Unit/Bed#: 7[facility] 712-01 Encounter: [id number]  Primary Care Provider: Yobany Torres MD   Date and time admitted to hospital: 5/9/2023 10:07 AM    * Mild intermittent asthma with acute exacerbation  Assessment & Plan  · Presents with wheezing and shortness of breath  · Likely acute asthma exacerbation  · Atrovent, Xopenex nebulizer treatment  · Tessalon, Robitussin-DM for cough  · Patient received nebulizer machine before discharge  · Patient said she has albuterol inhalers at home  · Patient request sending medications to her own pharmacy -sent discharge medication including albuterol nebulizer solution, melatonin, cough medications are sent to [address] [place] [address]  · Patient was informed by her pharmacy that Prieto Rowland is not covered by insurance  Called pharmacy who informed that Erika Christy can be covered  Advair was sent to his topical pharmacy  · Continue respiratory protocol  · Saturating well on room air    Bipolar 2 disorder ([address] )  Assessment & Plan  · Continue home amitriptyline  · Monitor patient's mood    Tobacco dependence syndrome  Assessment & Plan  · Nicotine patch while inpatient  · Encouraged tobacco cessation      Discharging Physician / Practitioner: Merline Pepper MD  PCP: Yobany Torres MD  Admission Date:   Admission Orders (From admission, onward)     Ordered        05/09/23 1142  Place in Observation  Once                      Discharge Date: 05/11/23    Medical Problems     Resolved Problems  Date Reviewed: 5/11/2023   None         Consultations During Hospital Stay:  · None    Procedures Performed:   · None    Significant Findings / Test Results:   XR chest 2 views Result Date: 5/9/2023  · Impression: No acute cardiopulmonary disease  Findings are stable Workstation performed: [id number]       Incidental Findings:   · See above    Test Results Pending at Discharge (will require follow up):    · See "above     Outpatient Tests Requested:  · None    Complications: None    Reason for Admission: Shortness of breath, wheezing  Hospital Course:     Azalia Ospina is a 56 y o  female patient with PMH of moderate persistent asthma who originally presented to the hospital on 5/9/2023 due to shortness of breath and wheezing and admitted for acute asthma exacerbation  During hospitalization, patient is clinically improvement with nebulizer treatments and IV Solu-Medrol  Patient has been saturating well on room air  No evidence of cyanosis or respiratory distress  Patient was treated with Robitussin-DM and Tessalon for cough and with mild relief in symptoms  Patient has been ambulating well in the room and hallway  Patient requested home medication to be sent to home pharmacy  Patient is recommended to follow-up with PCP in 1 week and pulmonologist in 1 to 2 weeks  Patient is clinically and hemodynamically stable to be discharged today  Please see above list of diagnoses and related plan for additional information  Condition at Discharge: stable     Discharge Day Visit / Exam:     Subjective:  Patient was seen and examined at bedside  The patient reports she is fairly well today  She feels that her mucus is breaking down and cough is better with current cough medication regimen  She denies chest pain, palpitation, shortness of breath, N/V, abd pain    Stated  Vitals: Blood Pressure: 125/82 (05/11/23 0753)  Pulse: 87 (05/11/23 0753)  Temperature: (!) 97 4 °F (36 3 °C) (05/11/23 0753)  Temp Source: Temporal (05/11/23 0753)  Respirations: 20 (05/11/23 0753)  Height: 5' 2\" (157 5 cm) (05/09/23 1251)  Weight - Scale: 79 4 kg (175 lb 0 7 oz) (05/09/23 1251)  SpO2: 96 % (05/11/23 0753)  Exam:   Physical Exam  General: breathing well on room air, no acute distress  HEENT: NC/AT, PERRL, EOM - normal  Neck: Supple  Pulm/Chest: Normal chest wall expansion, coarse breath sounds on both side, no wheezing/rhonchi or " crackles appreciated  CVS: RRR, normal S1&S2, no murmur appreciated, capillary refill <2s  Abd: soft, non tender, non distended, bowel sounds +  MSK: move all 4 extremities spontaneously  Skin: warm  CNS: no acute focal neuro deficit    Discussion with Family: Discussed with patient    Discharge instructions/Information to patient and family:   See after visit summary for information provided to patient and family  Provisions for Follow-Up Care:  See after visit summary for information related to follow-up care and any pertinent home health orders  Disposition:     Home    For Discharges to [facility] SNF:   · Not Applicable to this Patient - Not Applicable to this Patient    Planned Readmission: No     Discharge Statement:  I spent 45 minutes discharging the patient  This time was spent on the day of discharge  I had direct contact with the patient on the day of discharge  Greater than 50% of the total time was spent examining patient, answering all patient questions, arranging and discussing plan of care with patient as well as directly providing post-discharge instructions  Additional time then spent on discharge activities  Discharge Medications:  See after visit summary for reconciled discharge medications provided to patient and family        ** Please Note: This note has been constructed using a voice recognition system **

## 2023-05-11 NOTE — DISCHARGE INSTR - AVS FIRST PAGE
Discharge instructions from hospitalist  1  Follow-up with your primary care physician in 1 week in regards to recent hospitalization  Follow-up with pulmonologist in 1 to 2 weeks    2  Take medications regularly    3  Come back to the ER if symptoms recur or worsen  4  Activity as tolerated  5   Diet :  Heart healthy diet; information packet has more detailed information

## 2023-05-11 NOTE — ASSESSMENT & PLAN NOTE
· Presents with wheezing and shortness of breath  · Likely acute asthma exacerbation  · Atrovent, Xopenex nebulizer treatment  · Tessalon, Robitussin-DM for cough  · Patient received nebulizer machine before discharge  · Patient said she has albuterol inhalers at home  · Patient request sending medications to her own pharmacy -sent discharge medication including albuterol nebulizer solution, melatonin, cough medications are sent to [facility]  · Patient was informed by her pharmacy that Mardeelmer Hernándezony is not covered by insurance  Called pharmacy who informed that Lynjerome Pate can be covered  Advair was sent to his topical pharmacy    · Continue respiratory protocol  · Saturating well on room air

## 2023-06-02 LAB
DME PARACHUTE DELIVERY DATE ACTUAL: NORMAL
DME PARACHUTE DELIVERY DATE REQUESTED: NORMAL
DME PARACHUTE ITEM DESCRIPTION: NORMAL
DME PARACHUTE ORDER STATUS: NORMAL
DME PARACHUTE SUPPLIER NAME: NORMAL
DME PARACHUTE SUPPLIER PHONE: NORMAL

## 2023-06-09 PROBLEM — N39.0 SEPSIS SECONDARY TO UTI (HCC): Status: RESOLVED | Noted: 2023-04-10 | Resolved: 2023-06-09

## 2023-06-09 PROBLEM — A41.9 SEPSIS SECONDARY TO UTI (HCC): Status: RESOLVED | Noted: 2023-04-10 | Resolved: 2023-06-09

## 2023-07-20 ENCOUNTER — TELEPHONE (OUTPATIENT)
Dept: OBGYN CLINIC | Facility: HOSPITAL | Age: 48
End: 2023-07-20

## 2023-07-20 NOTE — TELEPHONE ENCOUNTER
Caller: OT    Doctor: ELISSA    Reason for call: Calling to see if the patient sees one of our hand doctors. She does not.     Call back#: n/a

## 2023-08-31 ENCOUNTER — TELEPHONE (OUTPATIENT)
Age: 48
End: 2023-08-31

## 2023-08-31 NOTE — TELEPHONE ENCOUNTER
Caller: Carlo    Doctor: n/a    Reason for call: called to check If patient has an appt scheduled    Call back#: n/a

## 2023-10-24 ENCOUNTER — HOSPITAL ENCOUNTER (EMERGENCY)
Facility: HOSPITAL | Age: 48
Discharge: HOME | End: 2023-10-24
Attending: EMERGENCY MEDICINE | Admitting: EMERGENCY MEDICINE
Payer: MEDICARE

## 2023-10-24 VITALS
RESPIRATION RATE: 16 BRPM | SYSTOLIC BLOOD PRESSURE: 113 MMHG | OXYGEN SATURATION: 98 % | TEMPERATURE: 99.5 F | WEIGHT: 162 LBS | DIASTOLIC BLOOD PRESSURE: 64 MMHG | BODY MASS INDEX: 29.81 KG/M2 | HEIGHT: 62 IN | HEART RATE: 78 BPM

## 2023-10-24 DIAGNOSIS — G43.909 MIGRAINE HEADACHE: Primary | ICD-10-CM

## 2023-10-24 LAB
EXT PREGNANCY TEST URINE: NEGATIVE
EXT. CONTROL: NORMAL

## 2023-10-24 PROCEDURE — 96375 TX/PRO/DX INJ NEW DRUG ADDON: CPT

## 2023-10-24 PROCEDURE — 96365 THER/PROPH/DIAG IV INF INIT: CPT

## 2023-10-24 PROCEDURE — 99284 EMERGENCY DEPT VISIT MOD MDM: CPT | Performed by: PHYSICIAN ASSISTANT

## 2023-10-24 PROCEDURE — 99284 EMERGENCY DEPT VISIT MOD MDM: CPT

## 2023-10-24 PROCEDURE — 81025 URINE PREGNANCY TEST: CPT | Performed by: PHYSICIAN ASSISTANT

## 2023-10-24 PROCEDURE — 96372 THER/PROPH/DIAG INJ SC/IM: CPT

## 2023-10-24 PROCEDURE — 96366 THER/PROPH/DIAG IV INF ADDON: CPT

## 2023-10-24 RX ORDER — MAGNESIUM SULFATE HEPTAHYDRATE 40 MG/ML
2 INJECTION, SOLUTION INTRAVENOUS ONCE
Status: COMPLETED | OUTPATIENT
Start: 2023-10-24 | End: 2023-10-24

## 2023-10-24 RX ORDER — ONDANSETRON 4 MG/1
4 TABLET, FILM COATED ORAL EVERY 8 HOURS PRN
Qty: 9 TABLET | Refills: 0 | Status: SHIPPED | OUTPATIENT
Start: 2023-10-24 | End: 2023-10-27

## 2023-10-24 RX ORDER — KETOROLAC TROMETHAMINE 30 MG/ML
30 INJECTION, SOLUTION INTRAMUSCULAR; INTRAVENOUS ONCE
Status: COMPLETED | OUTPATIENT
Start: 2023-10-24 | End: 2023-10-24

## 2023-10-24 RX ORDER — SUMATRIPTAN 6 MG/.5ML
6 INJECTION, SOLUTION SUBCUTANEOUS ONCE
Status: COMPLETED | OUTPATIENT
Start: 2023-10-24 | End: 2023-10-24

## 2023-10-24 RX ORDER — DIPHENHYDRAMINE HYDROCHLORIDE 50 MG/ML
25 INJECTION INTRAMUSCULAR; INTRAVENOUS ONCE
Status: COMPLETED | OUTPATIENT
Start: 2023-10-24 | End: 2023-10-24

## 2023-10-24 RX ORDER — METOCLOPRAMIDE HYDROCHLORIDE 5 MG/ML
10 INJECTION INTRAMUSCULAR; INTRAVENOUS ONCE
Status: COMPLETED | OUTPATIENT
Start: 2023-10-24 | End: 2023-10-24

## 2023-10-24 RX ADMIN — KETOROLAC TROMETHAMINE 30 MG: 30 INJECTION, SOLUTION INTRAMUSCULAR; INTRAVENOUS at 21:09

## 2023-10-24 RX ADMIN — DIPHENHYDRAMINE HYDROCHLORIDE 25 MG: 50 INJECTION, SOLUTION INTRAMUSCULAR; INTRAVENOUS at 21:07

## 2023-10-24 RX ADMIN — SODIUM CHLORIDE 1000 ML: 0.9 INJECTION, SOLUTION INTRAVENOUS at 21:08

## 2023-10-24 RX ADMIN — METOCLOPRAMIDE 10 MG: 5 INJECTION, SOLUTION INTRAMUSCULAR; INTRAVENOUS at 21:12

## 2023-10-24 RX ADMIN — MAGNESIUM SULFATE HEPTAHYDRATE 2 G: 40 INJECTION, SOLUTION INTRAVENOUS at 21:21

## 2023-10-24 RX ADMIN — SUMATRIPTAN 6 MG: 6 INJECTION, SOLUTION SUBCUTANEOUS at 21:17

## 2023-10-24 NOTE — Clinical Note
Jarrett Mueller was seen and treated in our emergency department on 10/24/2023. No restrictions            Diagnosis:     Fran Eduardo  may return to work on return date. She may return on this date: 10/26/2023         If you have any questions or concerns, please don't hesitate to call.       Latanya Urrutia PA-C    ______________________________           _______________          _______________  Hospital Representative                              Date                                Time

## 2023-10-25 ENCOUNTER — HOSPITAL ENCOUNTER (EMERGENCY)
Facility: HOSPITAL | Age: 48
Discharge: HOME | End: 2023-10-25
Attending: EMERGENCY MEDICINE
Payer: MEDICARE

## 2023-10-25 VITALS
DIASTOLIC BLOOD PRESSURE: 70 MMHG | HEART RATE: 78 BPM | TEMPERATURE: 98.4 F | OXYGEN SATURATION: 99 % | SYSTOLIC BLOOD PRESSURE: 106 MMHG | RESPIRATION RATE: 20 BRPM

## 2023-10-25 DIAGNOSIS — R50.9 FEVER: Primary | ICD-10-CM

## 2023-10-25 DIAGNOSIS — M54.9 BACK PAIN: ICD-10-CM

## 2023-10-25 DIAGNOSIS — R07.9 CHEST PAIN: ICD-10-CM

## 2023-10-25 DIAGNOSIS — R11.0 NAUSEA: ICD-10-CM

## 2023-10-25 DIAGNOSIS — M79.10 MYALGIA: ICD-10-CM

## 2023-10-25 DIAGNOSIS — R51.9 HEADACHE: ICD-10-CM

## 2023-10-25 LAB
ALBUMIN SERPL BCP-MCNC: 3.9 G/DL (ref 3.5–5)
ALP SERPL-CCNC: 86 U/L (ref 34–104)
ALT SERPL W P-5'-P-CCNC: 35 U/L (ref 7–52)
ANION GAP SERPL CALCULATED.3IONS-SCNC: 9 MMOL/L
APTT PPP: 28 SECONDS (ref 23–37)
AST SERPL W P-5'-P-CCNC: 31 U/L (ref 13–39)
ATRIAL RATE: 100 BPM
BASOPHILS # BLD AUTO: 0.07 THOUSANDS/ÂΜL (ref 0–0.1)
BASOPHILS NFR BLD AUTO: 1 % (ref 0–1)
BILIRUB SERPL-MCNC: 0.36 MG/DL (ref 0.2–1)
BUN SERPL-MCNC: 7 MG/DL (ref 5–25)
CALCIUM SERPL-MCNC: 8.6 MG/DL (ref 8.4–10.2)
CARDIAC TROPONIN I PNL SERPL HS: 4 NG/L
CHLORIDE SERPL-SCNC: 104 MMOL/L (ref 96–108)
CO2 SERPL-SCNC: 22 MMOL/L (ref 21–32)
CREAT SERPL-MCNC: 0.72 MG/DL (ref 0.6–1.3)
EOSINOPHIL # BLD AUTO: 0 THOUSAND/ÂΜL (ref 0–0.61)
EOSINOPHIL NFR BLD AUTO: 0 % (ref 0–6)
ERYTHROCYTE [DISTWIDTH] IN BLOOD BY AUTOMATED COUNT: 17.5 % (ref 11.6–15.1)
FLUAV RNA RESP QL NAA+PROBE: NEGATIVE
FLUBV RNA RESP QL NAA+PROBE: NEGATIVE
GFR SERPL CREATININE-BSD FRML MDRD: 100 ML/MIN/1.73SQ M
GLUCOSE SERPL-MCNC: 103 MG/DL (ref 65–140)
HCT VFR BLD AUTO: 50.3 % (ref 34.8–46.1)
HGB BLD-MCNC: 15.9 G/DL (ref 11.5–15.4)
IMM GRANULOCYTES # BLD AUTO: 0.03 THOUSAND/UL (ref 0–0.2)
IMM GRANULOCYTES NFR BLD AUTO: 1 % (ref 0–2)
INR PPP: 1.13 (ref 0.84–1.19)
LACTATE SERPL-SCNC: 0.8 MMOL/L (ref 0.5–2)
LYMPHOCYTES # BLD AUTO: 0.82 THOUSANDS/ÂΜL (ref 0.6–4.47)
LYMPHOCYTES NFR BLD AUTO: 14 % (ref 14–44)
MCH RBC QN AUTO: 25.1 PG (ref 26.8–34.3)
MCHC RBC AUTO-ENTMCNC: 31.6 G/DL (ref 31.4–37.4)
MCV RBC AUTO: 80 FL (ref 82–98)
MONOCYTES # BLD AUTO: 0.16 THOUSAND/ÂΜL (ref 0.17–1.22)
MONOCYTES NFR BLD AUTO: 3 % (ref 4–12)
NEUTROPHILS # BLD AUTO: 4.82 THOUSANDS/ÂΜL (ref 1.85–7.62)
NEUTS SEG NFR BLD AUTO: 81 % (ref 43–75)
NRBC BLD AUTO-RTO: 0 /100 WBCS
P AXIS: 71 DEGREES
PLATELET # BLD AUTO: 258 THOUSANDS/UL (ref 149–390)
PMV BLD AUTO: 10.1 FL (ref 8.9–12.7)
POTASSIUM SERPL-SCNC: 3.6 MMOL/L (ref 3.5–5.3)
PR INTERVAL: 122 MS
PROT SERPL-MCNC: 7.1 G/DL (ref 6.4–8.4)
PROTHROMBIN TIME: 14.6 SECONDS (ref 11.6–14.5)
QRS AXIS: 94 DEGREES
QRSD INTERVAL: 92 MS
QT INTERVAL: 344 MS
QTC INTERVAL: 443 MS
RBC # BLD AUTO: 6.33 MILLION/UL (ref 3.81–5.12)
RSV RNA RESP QL NAA+PROBE: NEGATIVE
SARS-COV-2 RNA RESP QL NAA+PROBE: NEGATIVE
SODIUM SERPL-SCNC: 135 MMOL/L (ref 135–147)
T WAVE AXIS: 67 DEGREES
VENTRICULAR RATE: 100 BPM
WBC # BLD AUTO: 5.9 THOUSAND/UL (ref 4.31–10.16)

## 2023-10-25 PROCEDURE — 99284 EMERGENCY DEPT VISIT MOD MDM: CPT | Performed by: EMERGENCY MEDICINE

## 2023-10-25 PROCEDURE — 85610 PROTHROMBIN TIME: CPT | Performed by: EMERGENCY MEDICINE

## 2023-10-25 PROCEDURE — 96365 THER/PROPH/DIAG IV INF INIT: CPT

## 2023-10-25 PROCEDURE — 80053 COMPREHEN METABOLIC PANEL: CPT | Performed by: EMERGENCY MEDICINE

## 2023-10-25 PROCEDURE — 83605 ASSAY OF LACTIC ACID: CPT | Performed by: EMERGENCY MEDICINE

## 2023-10-25 PROCEDURE — 36415 COLL VENOUS BLD VENIPUNCTURE: CPT | Performed by: EMERGENCY MEDICINE

## 2023-10-25 PROCEDURE — 96375 TX/PRO/DX INJ NEW DRUG ADDON: CPT

## 2023-10-25 PROCEDURE — 85025 COMPLETE CBC W/AUTO DIFF WBC: CPT | Performed by: EMERGENCY MEDICINE

## 2023-10-25 PROCEDURE — 85730 THROMBOPLASTIN TIME PARTIAL: CPT | Performed by: EMERGENCY MEDICINE

## 2023-10-25 PROCEDURE — 96376 TX/PRO/DX INJ SAME DRUG ADON: CPT

## 2023-10-25 PROCEDURE — 96372 THER/PROPH/DIAG INJ SC/IM: CPT

## 2023-10-25 PROCEDURE — 84484 ASSAY OF TROPONIN QUANT: CPT | Performed by: EMERGENCY MEDICINE

## 2023-10-25 PROCEDURE — 99283 EMERGENCY DEPT VISIT LOW MDM: CPT

## 2023-10-25 PROCEDURE — 93005 ELECTROCARDIOGRAM TRACING: CPT

## 2023-10-25 PROCEDURE — 93010 ELECTROCARDIOGRAM REPORT: CPT | Performed by: INTERNAL MEDICINE

## 2023-10-25 PROCEDURE — 0241U HB NFCT DS VIR RESP RNA 4 TRGT: CPT | Performed by: EMERGENCY MEDICINE

## 2023-10-25 RX ORDER — SUMATRIPTAN 6 MG/.5ML
6 INJECTION, SOLUTION SUBCUTANEOUS
Status: DISCONTINUED | OUTPATIENT
Start: 2023-10-25 | End: 2023-10-25

## 2023-10-25 RX ORDER — NAPROXEN 500 MG/1
500 TABLET ORAL 2 TIMES DAILY WITH MEALS
Qty: 20 TABLET | Refills: 0 | Status: SHIPPED | OUTPATIENT
Start: 2023-10-25

## 2023-10-25 RX ORDER — KETOROLAC TROMETHAMINE 30 MG/ML
1 INJECTION, SOLUTION INTRAMUSCULAR; INTRAVENOUS ONCE
Status: COMPLETED | OUTPATIENT
Start: 2023-10-25 | End: 2023-10-25

## 2023-10-25 RX ORDER — METOCLOPRAMIDE 10 MG/1
10 TABLET ORAL EVERY 6 HOURS
Qty: 10 TABLET | Refills: 0 | Status: SHIPPED | OUTPATIENT
Start: 2023-10-25

## 2023-10-25 RX ORDER — METOCLOPRAMIDE HYDROCHLORIDE 5 MG/ML
10 INJECTION INTRAMUSCULAR; INTRAVENOUS ONCE
Status: COMPLETED | OUTPATIENT
Start: 2023-10-25 | End: 2023-10-25

## 2023-10-25 RX ORDER — ACETAMINOPHEN 325 MG/1
650 TABLET ORAL ONCE
Status: COMPLETED | OUTPATIENT
Start: 2023-10-25 | End: 2023-10-25

## 2023-10-25 RX ORDER — SUMATRIPTAN 6 MG/.5ML
6 INJECTION, SOLUTION SUBCUTANEOUS ONCE
Status: COMPLETED | OUTPATIENT
Start: 2023-10-25 | End: 2023-10-25

## 2023-10-25 RX ORDER — MAGNESIUM SULFATE HEPTAHYDRATE 40 MG/ML
2 INJECTION, SOLUTION INTRAVENOUS
Status: DISCONTINUED | OUTPATIENT
Start: 2023-10-25 | End: 2023-10-25 | Stop reason: HOSPADM

## 2023-10-25 RX ORDER — DEXAMETHASONE SODIUM PHOSPHATE 10 MG/ML
10 INJECTION, SOLUTION INTRAMUSCULAR; INTRAVENOUS ONCE
Status: COMPLETED | OUTPATIENT
Start: 2023-10-25 | End: 2023-10-25

## 2023-10-25 RX ORDER — DIPHENHYDRAMINE HYDROCHLORIDE 50 MG/ML
25 INJECTION INTRAMUSCULAR; INTRAVENOUS ONCE
Status: COMPLETED | OUTPATIENT
Start: 2023-10-25 | End: 2023-10-25

## 2023-10-25 RX ORDER — ONDANSETRON 2 MG/ML
1 INJECTION INTRAMUSCULAR; INTRAVENOUS ONCE
Status: COMPLETED | OUTPATIENT
Start: 2023-10-25 | End: 2023-10-25

## 2023-10-25 RX ADMIN — SUMATRIPTAN 6 MG: 6 INJECTION, SOLUTION SUBCUTANEOUS at 16:03

## 2023-10-25 RX ADMIN — METOCLOPRAMIDE 10 MG: 5 INJECTION, SOLUTION INTRAMUSCULAR; INTRAVENOUS at 15:55

## 2023-10-25 RX ADMIN — SODIUM CHLORIDE 1000 ML: 0.9 INJECTION, SOLUTION INTRAVENOUS at 16:08

## 2023-10-25 RX ADMIN — DEXAMETHASONE SODIUM PHOSPHATE 10 MG: 10 INJECTION INTRAMUSCULAR; INTRAVENOUS at 17:12

## 2023-10-25 RX ADMIN — METOCLOPRAMIDE 10 MG: 5 INJECTION, SOLUTION INTRAMUSCULAR; INTRAVENOUS at 17:16

## 2023-10-25 RX ADMIN — MAGNESIUM SULFATE HEPTAHYDRATE 2 G: 40 INJECTION, SOLUTION INTRAVENOUS at 16:08

## 2023-10-25 RX ADMIN — ACETAMINOPHEN 325MG 650 MG: 325 TABLET ORAL at 15:47

## 2023-10-25 RX ADMIN — DIPHENHYDRAMINE HYDROCHLORIDE 25 MG: 50 INJECTION, SOLUTION INTRAMUSCULAR; INTRAVENOUS at 15:51

## 2023-10-25 NOTE — ED PROVIDER NOTES
History  Chief Complaint   Patient presents with    Headache - Recurrent or Known Dx Migraines     Pt presents to the ED via EMS with c/o a migraine that started this morning. States that the headache got worse at 1400 after a meeting at school with her daughter. Pt has semitriptaline injection but her script had . States that the headache is different and the headache is in the back of her head. 54yo female who presents to ER for evaluation of migraine headache. Onset this morning. States she wakes up with a migraine every morning. She takes amitriptyline every night to help prevent them. Throughout the day the headache increased. She went to use her sumatriptan injection, however it was . States pain is behind her eye and radiates to the back of her head. Admits to photophobia. States she is now vomiting, have chills, and feeling hot. Denies head injury. Denies known fever. History provided by:  Patient  Headache - Recurrent or Known Dx Migraines  Associated symptoms: nausea, photophobia and vomiting    Associated symptoms: no abdominal pain, no fever, no neck stiffness, no numbness, no seizures and no weakness        Prior to Admission Medications   Prescriptions Last Dose Informant Patient Reported? Taking? Diclofenac Sodium (VOLTAREN) 1 %   No No   Sig: Apply 2 g topically 4 (four) times a day   Fluticasone-Salmeterol (Advair Diskus) 500-50 mcg/dose inhaler   No No   Sig: Inhale 1 puff 2 (two) times a day Rinse mouth after use. Multiple Vitamins-Minerals (MULTIVITAMIN ADULT EXTRA C PO)   Yes No   Sig: Take 1 tablet by mouth   acetaminophen (TYLENOL) 500 mg tablet   No No   Sig: Take 1 tablet (500 mg total) by mouth every 6 (six) hours as needed for mild pain   albuterol (PROVENTIL HFA,VENTOLIN HFA) 90 mcg/act inhaler   Yes No   Sig: Inhale 2 puffs every 6 (six) hours as needed for wheezing Indications: patient unsure of correct dosage/frequency.    amitriptyline (ELAVIL) 100 mg tablet Self Yes No   Sig: Take 100 mg by mouth daily at bedtime   benzonatate (TESSALON PERLES) 100 mg capsule   No No   Sig: Take 1 capsule (100 mg total) by mouth 3 (three) times a day   dextromethorphan-guaiFENesin (ROBITUSSIN DM)  mg/5 mL syrup   No No   Sig: Take 10 mL by mouth every 4 (four) hours as needed for cough   lidocaine (LIDODERM) 5 %   No No   Sig: Apply 1 patch topically daily for 4 days Remove & Discard patch within 12 hours or as directed by MD   melatonin 3 mg   No No   Sig: Take 1 tablet (3 mg total) by mouth daily at bedtime   nicotine (NICODERM CQ) 14 mg/24hr TD 24 hr patch   No No   Sig: Place 1 patch on the skin over 24 hours daily Do not start before 2023. Patient not taking: Reported on 2023   omeprazole (PriLOSEC) 40 MG capsule  Self Yes No   Sig: Take 40 mg by mouth daily   ondansetron (ZOFRAN) 4 mg tablet   No No   Sig: Take 1 tablet (4 mg total) by mouth every 6 (six) hours      Facility-Administered Medications: None       Past Medical History:   Diagnosis Date    Asthma     Bipolar disorder ([address])     Migraine     Psychiatric disorder     PTSD (post-traumatic stress disorder)     Seizures ([address])        Past Surgical History:   Procedure Laterality Date    APPENDECTOMY      BLADDER SURGERY      CHOLECYSTECTOMY      GASTRECTOMY      gastric sleeve    GASTRIC RESTRICTION SURGERY      gastric sleeve in 2018    HEMORROIDECTOMY      HYSTERECTOMY      TUBAL LIGATION         Family History   Problem Relation Age of Onset    Diabetes Mother         MELLITUS    Diabetes Father         MELLITUS    Diabetes Other      I have reviewed and agree with the history as documented.     E-Cigarette/Vaping    E-Cigarette Use Never User      E-Cigarette/Vaping Substances     Social History     Tobacco Use    Smoking status: Former     Packs/day: 0.25     Types: Cigarettes     Quit date: 2023     Years since quittin.5    Smokeless tobacco: Never   Vaping Use    Vaping Use: Never used   Substance Use Topics    Alcohol use: Yes     Comment: ocassionally    Drug use: No       Review of Systems   Constitutional:  Positive for chills. Negative for fever. Eyes:  Positive for photophobia. Respiratory:  Negative for shortness of breath. Cardiovascular:  Negative for chest pain. Gastrointestinal:  Positive for nausea and vomiting. Negative for abdominal pain. Musculoskeletal:  Negative for neck stiffness. Skin:  Negative for rash. Neurological:  Positive for light-headedness and headaches. Negative for seizures, syncope, weakness and numbness. Physical Exam  Physical Exam  Vitals and nursing note reviewed. Constitutional:       Appearance: Normal appearance. She is well-developed. HENT:      Head: Atraumatic. Right Ear: Tympanic membrane and external ear normal.      Left Ear: Tympanic membrane and external ear normal.      Nose: Nose normal.      Mouth/Throat:      Mouth: Mucous membranes are moist.      Pharynx: No posterior oropharyngeal erythema. Eyes:      General: No scleral icterus. Extraocular Movements: Extraocular movements intact. Conjunctiva/sclera: Conjunctivae normal.      Pupils: Pupils are equal, round, and reactive to light. Cardiovascular:      Rate and Rhythm: Normal rate and regular rhythm. Heart sounds: Normal heart sounds. No murmur heard. Pulmonary:      Effort: Pulmonary effort is normal. No respiratory distress. Breath sounds: Normal breath sounds. Abdominal:      General: There is no distension. Musculoskeletal:         General: Normal range of motion. Cervical back: Normal range of motion and neck supple. Lymphadenopathy:      Cervical: No cervical adenopathy. Skin:     General: Skin is warm and dry. Coloration: Skin is not pale. Neurological:      Mental Status: She is alert and oriented to person, place, and time. Cranial Nerves: No cranial nerve deficit.       Motor: No abnormal muscle tone.      Coordination: Finger-Nose-Finger Test normal.   Psychiatric:         Mood and Affect: Mood normal.         Vital Signs  ED Triage Vitals [10/24/23 2035]   Temperature Pulse Respirations Blood Pressure SpO2   99.5 °F (37.5 °C) (!) 106 20 143/80 95 %      Temp Source Heart Rate Source Patient Position - Orthostatic VS BP Location FiO2 (%)   Oral Monitor Lying Left arm --      Pain Score       10 - Worst Possible Pain           Vitals:    10/24/23 2035 10/24/23 2308   BP: 143/80 113/64   Pulse: (!) 106 78   Patient Position - Orthostatic VS: Lying          Visual Acuity      ED Medications  Medications   ketorolac (TORADOL) injection 30 mg (30 mg Intravenous Given 10/24/23 2109)   metoclopramide (REGLAN) injection 10 mg (10 mg Intravenous Given 10/24/23 2112)   diphenhydrAMINE (BENADRYL) injection 25 mg (25 mg Intravenous Given 10/24/23 2107)   SUMAtriptan (IMITREX) subcutaneous injection 6 mg (6 mg Subcutaneous Given 10/24/23 2117)   magnesium sulfate 2 g/50 mL IVPB (premix) 2 g (0 g Intravenous Stopped 10/24/23 2301)   sodium chloride 0.9 % bolus 1,000 mL (0 mL Intravenous Stopped 10/24/23 2302)       Diagnostic Studies  Results Reviewed       Procedure Component Value Units Date/Time    POCT pregnancy, urine [[id number]]  (Normal) Resulted: 10/24/23 2106    Lab Status: Final result Specimen: Urine Updated: 10/24/23 2106     EXT Preg Test, Ur Negative     Control Valid                   No orders to display              Procedures  Procedures         ED Course  ED Course as of 10/24/23 2318   Tue Oct 24, 2023   2201 Patient sleeping comfortably, will monitor and reevaluate   2252 Patient still resting comfortably, states pain is still there but greatly improved, she would like to give it some more time before heading home. SBIRT 20yo+      Flowsheet Row Most Recent Value   Initial Alcohol Screen: US AUDIT-C     1.  How often do you have a drink containing alcohol? 0 Filed at: 10/24/2023 2302   2. How many drinks containing alcohol do you have on a typical day you are drinking? 0 Filed at: 10/24/2023 2302   3a. Male UNDER 65: How often do you have five or more drinks on one occasion? 0 Filed at: 10/24/2023 2302   3b. FEMALE Any Age, or MALE 65+: How often do you have 4 or more drinks on one occassion? 0 Filed at: 10/24/2023 2302   Audit-C Score 0 Filed at: 10/24/2023 2302   JOSE: How many times in the past year have you. .. Used an illegal drug or used a prescription medication for non-medical reasons? Never Filed at: 10/24/2023 2302                      Medical Decision Making  Differential diagnosis includes but is not limited to: Migraine headache, tension headache, viral illness    Amount and/or Complexity of Data Reviewed  Labs: ordered. Risk  Prescription drug management. Disposition  Final diagnoses:   Migraine headache     Time reflects when diagnosis was documented in both MDM as applicable and the Disposition within this note       Time User Action Codes Description Comment    10/24/2023 11:13 PM Conrad Gray Add [G43.909] Migraine headache           ED Disposition       ED Disposition   Discharge    Condition   Stable    Date/Time   Tue Oct 24, 2023 [facility] discharge to home/self care.                    Follow-up Information       Follow up With Specialties Details Why Contact Info Additional Information    Ron Myers MD Internal Medicine In 3 days  [facility]  [phone]       [address] [facility] Department Emergency Medicine In 3 days  [address] [facility]  [id number]  [address] [facility] Department            Patient's Medications   Discharge Prescriptions    ONDANSETRON (ZOFRAN) 4 MG TABLET    Take 1 tablet (4 mg total) by mouth every 8 (eight) hours as needed for nausea or vomiting for up to 3 days       Start Date: 10/24/2023End Date: 10/27/2023       Order Dose: 4 mg       Quantity: 9 tablet    Refills: 0       No discharge procedures on file.     PDMP Review         Value Time User    PDMP Reviewed  Yes 4/10/2023  5:29 AM Amarilis Franklin PA-C            ED Provider  Electronically Signed by             Esther Fowler PA-C  10/24/23 9312

## 2023-10-25 NOTE — ED NOTES
Patient reports some relief after receiving meds for migraine.       Mikhail Rodriguez RN  10/24/23 1846

## 2023-10-25 NOTE — ED PROVIDER NOTES
History  Chief Complaint   Patient presents with    Migraine     Pt arrived via EMS. Pt states she had a migraine yesterday and went to [facility] where they gave her a migraine cocktail, let her sleep, and then sent her home. Today at 4 am it started again but with c/o chills and fever. C/o dizziness, CP, N/V.      56 y.o. F w/h/o migraines, bipolar disorder p/w migraine x 12h. Pt was evaluated at [facility] last night for the same complaint. States she thinks she had a fever while at the ER but had ice packs on so she didn't register as a fever. Felt better after meds. Pt states migraine returned early this morning. Gradual onset. Generalized. Associated with subjective fever/chills, N/V, CP, back pain, myalgias. Denies neck pain/stiffness, abd pain, diarrhea, sick contacts. Pt given 30mg Toradol and 4mg Zofran by EMS. History provided by:  Patient   used: No    Migraine  Associated symptoms: chest pain, fever, headaches, myalgias, nausea and vomiting    Associated symptoms: no abdominal pain, no cough, no diarrhea, no rhinorrhea, no shortness of breath and no sore throat        Prior to Admission Medications   Prescriptions Last Dose Informant Patient Reported? Taking? Diclofenac Sodium (VOLTAREN) 1 %   No No   Sig: Apply 2 g topically 4 (four) times a day   Fluticasone-Salmeterol (Advair Diskus) 500-50 mcg/dose inhaler   No No   Sig: Inhale 1 puff 2 (two) times a day Rinse mouth after use. Multiple Vitamins-Minerals (MULTIVITAMIN ADULT EXTRA C PO)   Yes No   Sig: Take 1 tablet by mouth   acetaminophen (TYLENOL) 500 mg tablet   No No   Sig: Take 1 tablet (500 mg total) by mouth every 6 (six) hours as needed for mild pain   albuterol (PROVENTIL HFA,VENTOLIN HFA) 90 mcg/act inhaler   Yes No   Sig: Inhale 2 puffs every 6 (six) hours as needed for wheezing Indications: patient unsure of correct dosage/frequency.    amitriptyline (ELAVIL) 100 mg tablet  Self Yes No   Sig: Take 100 mg by mouth daily at bedtime   benzonatate (TESSALON PERLES) 100 mg capsule   No No   Sig: Take 1 capsule (100 mg total) by mouth 3 (three) times a day   dextromethorphan-guaiFENesin (ROBITUSSIN DM)  mg/5 mL syrup   No No   Sig: Take 10 mL by mouth every 4 (four) hours as needed for cough   lidocaine (LIDODERM) 5 %   No No   Sig: Apply 1 patch topically daily for 4 days Remove & Discard patch within 12 hours or as directed by MD   melatonin 3 mg   No No   Sig: Take 1 tablet (3 mg total) by mouth daily at bedtime   nicotine (NICODERM CQ) 14 mg/24hr TD 24 hr patch   No No   Sig: Place 1 patch on the skin over 24 hours daily Do not start before April 14, 2023. Patient not taking: Reported on 5/9/2023   omeprazole (PriLOSEC) 40 MG capsule  Self Yes No   Sig: Take 40 mg by mouth daily   ondansetron (ZOFRAN) 4 mg tablet   No No   Sig: Take 1 tablet (4 mg total) by mouth every 6 (six) hours   ondansetron (ZOFRAN) 4 mg tablet   No No   Sig: Take 1 tablet (4 mg total) by mouth every 8 (eight) hours as needed for nausea or vomiting for up to 3 days      Facility-Administered Medications: None       Past Medical History:   Diagnosis Date    Asthma     Bipolar disorder ([address])     Migraine     Psychiatric disorder     PTSD (post-traumatic stress disorder)     Seizures ([address])        Past Surgical History:   Procedure Laterality Date    APPENDECTOMY      BLADDER SURGERY      CHOLECYSTECTOMY      GASTRECTOMY      gastric sleeve    GASTRIC RESTRICTION SURGERY      gastric sleeve in Jan 22 2018    HEMORROIDECTOMY      HYSTERECTOMY      TUBAL LIGATION         Family History   Problem Relation Age of Onset    Diabetes Mother         MELLITUS    Diabetes Father         MELLITUS    Diabetes Other      I have reviewed and agree with the history as documented.     E-Cigarette/Vaping    E-Cigarette Use Never User      E-Cigarette/Vaping Substances     Social History     Tobacco Use    Smoking status: Some Days     Packs/day: 0.25 Types: Cigarettes     Last attempt to quit: 2023     Years since quittin.5    Smokeless tobacco: Never   Vaping Use    Vaping Use: Never used   Substance Use Topics    Alcohol use: Yes     Comment: ocassionally    Drug use: No       Review of Systems   Constitutional:  Positive for chills and fever. HENT:  Negative for rhinorrhea and sore throat. Eyes:  Positive for photophobia (Light sensitivity). Negative for visual disturbance. Respiratory:  Negative for cough and shortness of breath. Cardiovascular:  Positive for chest pain. Gastrointestinal:  Positive for nausea and vomiting. Negative for abdominal pain and diarrhea. Musculoskeletal:  Positive for back pain and myalgias. Negative for neck pain and neck stiffness. Neurological:  Positive for light-headedness and headaches. Negative for weakness and numbness. Physical Exam  Physical Exam  Vitals and nursing note reviewed. Constitutional:       General: She is not in acute distress. Appearance: She is well-developed. She is not ill-appearing, toxic-appearing or diaphoretic. HENT:      Head: Normocephalic and atraumatic. Right Ear: Tympanic membrane normal. No drainage. No middle ear effusion. Tympanic membrane is not injected, erythematous or bulging. Left Ear: Tympanic membrane normal. No drainage. No middle ear effusion. Tympanic membrane is not injected, erythematous or bulging. Nose: Nose normal.      Mouth/Throat:      Pharynx: Oropharynx is clear. Uvula midline. No pharyngeal swelling, oropharyngeal exudate, posterior oropharyngeal erythema or uvula swelling. Tonsils: No tonsillar exudate or tonsillar abscesses. Eyes:      General:         Right eye: No discharge. Left eye: No discharge. Conjunctiva/sclera: Conjunctivae normal.      Right eye: Right conjunctiva is not injected. Left eye: Left conjunctiva is not injected.       Pupils: Pupils are equal, round, and reactive to light. Pupils are equal.   Neck:      Trachea: Trachea and phonation normal.      Meningeal: Brudzinski's sign and Kernig's sign absent. Cardiovascular:      Rate and Rhythm: Regular rhythm. Tachycardia present. Heart sounds: Normal heart sounds. No murmur heard. No friction rub. Pulmonary:      Effort: Pulmonary effort is normal. No accessory muscle usage or respiratory distress. Breath sounds: Normal breath sounds. No stridor. No wheezing, rhonchi or rales. Abdominal:      General: There is no distension. Palpations: Abdomen is soft. Tenderness: There is no abdominal tenderness. There is no guarding or rebound. Musculoskeletal:      Cervical back: Full passive range of motion without pain and normal range of motion. No edema, rigidity or crepitus. Normal range of motion. Lymphadenopathy:      Cervical: No cervical adenopathy. Skin:     General: Skin is warm and dry. Coloration: Skin is not pale. Findings: No rash. Neurological:      Mental Status: She is alert. GCS: GCS eye subscore is 4. GCS verbal subscore is 5. GCS motor subscore is 6. Cranial Nerves: Cranial nerves 2-12 are intact. Motor: Motor function is intact. Psychiatric:         Behavior: Behavior is cooperative.          Vital Signs  ED Triage Vitals   Temperature Pulse Respirations Blood Pressure SpO2   10/25/23 1518 10/25/23 1518 10/25/23 1518 10/25/23 1518 10/25/23 1518   (!) 101.5 °F (38.6 °C) 105 20 131/94 99 %      Temp Source Heart Rate Source Patient Position - Orthostatic VS BP Location FiO2 (%)   10/25/23 1518 10/25/23 1518 10/25/23 1518 10/25/23 1518 --   Oral Monitor Lying Right arm       Pain Score       10/25/23 1547       Med Not Given for Pain - for MAR use only           Vitals:    10/25/23 1518 10/25/23 1727   BP: 131/94 106/70   Pulse: 105 78   Patient Position - Orthostatic VS: Lying Lying         Visual Acuity      ED Medications  Medications   magnesium sulfate 2 g/50 mL IVPB (premix) 2 g (0 g Intravenous Stopped 10/25/23 1732)   ondansetron (FOR EMS ONLY) (ZOFRAN) 4 mg/2 mL injection 4 mg (0 mg Does not apply Given to EMS 10/25/23 1518)   ketorolac (FOR EMS ONLY) (TORADOL) injection 30 mg (0 mg Does not apply Given to EMS 10/25/23 1519)   sodium chloride 0.9 % bolus 1,000 mL (0 mL Intravenous Stopped 10/25/23 1731)   acetaminophen (TYLENOL) tablet 650 mg (650 mg Oral Given 10/25/23 1547)   diphenhydrAMINE (BENADRYL) injection 25 mg (25 mg Intravenous Given 10/25/23 1551)   metoclopramide (REGLAN) injection 10 mg (10 mg Intravenous Given 10/25/23 1555)   SUMAtriptan (IMITREX) subcutaneous injection 6 mg (6 mg Subcutaneous Given 10/25/23 1603)   metoclopramide (REGLAN) injection 10 mg (10 mg Intravenous Given 10/25/23 1716)   dexamethasone (PF) (DECADRON) injection 10 mg (10 mg Intravenous Given 10/25/23 1712)       Diagnostic Studies  Results Reviewed       Procedure Component Value Units Date/Time    FLU/RSV/COVID - if FLU/RSV clinically relevant [[id number]]  (Normal) Collected: 10/25/23 1546    Lab Status: Final result Specimen: Nares from Nose Updated: 10/25/23 1640     SARS-CoV-2 Negative     INFLUENZA A PCR Negative     INFLUENZA B PCR Negative     RSV PCR Negative    Narrative:      FOR PEDIATRIC PATIENTS - copy/paste COVID Guidelines URL to browser: [email or web address]    SARS-CoV-2 assay is a Nucleic Acid Amplification assay intended for the  qualitative detection of nucleic acid from SARS-CoV-2 in nasopharyngeal  swabs. Results are for the presumptive identification of SARS-CoV-2 RNA. Positive results are indicative of infection with SARS-CoV-2, the virus  causing COVID-19, but do not rule out bacterial infection or co-infection  with other viruses. Laboratories within the [facility] and its  territories are required to report all positive results to the appropriate  public health authorities.  Negative results do not preclude SARS-CoV-2  infection and should not be used as the sole basis for treatment or other  patient management decisions. Negative results must be combined with  clinical observations, patient history, and epidemiological information. This test has not been FDA cleared or approved. This test has been authorized by FDA under an Emergency Use Authorization  (EUA). This test is only authorized for the duration of time the  declaration that circumstances exist justifying the authorization of the  emergency use of an in vitro diagnostic tests for detection of SARS-CoV-2  virus and/or diagnosis of COVID-19 infection under section 564(b)(1) of  the Act, 21 U. S.C. 325YRK-9(M)(4), unless the authorization is terminated  or revoked sooner. The test has been validated but independent review by FDA  and CLIA is pending. Test performed using [employer]: This RT-PCR assay targets N2,  a region unique to SARS-CoV-2. A conserved region in the E-gene was chosen  for pan-Sarbecovirus detection which includes SARS-CoV-2. According to CMS-2020-01-R, this platform meets the definition of high-throughput technology. Lactic acid, plasma (w/reflex if result > 2.0) [[id number]]  (Normal) Collected: 10/25/23 1546    Lab Status: Final result Specimen: Blood from Arm, Right Updated: 10/25/23 1634     LACTIC ACID 0.8 mmol/L     Narrative:      Result may be elevated if tourniquet was used during collection.     HS Troponin 0hr (reflex protocol) [[id number]]  (Normal) Collected: 10/25/23 1546    Lab Status: Final result Specimen: Blood from Arm, Right Updated: 10/25/23 1632     hs TnI 0hr 4 ng/L     Comprehensive metabolic panel [[id number]] Collected: 10/25/23 1546    Lab Status: Final result Specimen: Blood from Arm, Right Updated: 10/25/23 1630     Sodium 135 mmol/L      Potassium 3.6 mmol/L      Chloride 104 mmol/L      CO2 22 mmol/L      ANION GAP 9 mmol/L      BUN 7 mg/dL      Creatinine 0.72 mg/dL      Glucose 103 mg/dL      Calcium 8.6 mg/dL      AST 31 U/L      ALT 35 U/L      Alkaline Phosphatase 86 U/L      Total Protein 7.1 g/dL      Albumin 3.9 g/dL      Total Bilirubin 0.36 mg/dL      eGFR 100 ml/min/1.73sq m     Narrative:      [facility] guidelines for Chronic Kidney Disease (CKD):     Stage 1 with normal or high GFR (GFR > 90 mL/min/1.73 square meters)    Stage 2 Mild CKD (GFR = 60-89 mL/min/1.73 square meters)    Stage 3A Moderate CKD (GFR = 45-59 mL/min/1.73 square meters)    Stage 3B Moderate CKD (GFR = 30-44 mL/min/1.73 square meters)    Stage 4 Severe CKD (GFR = 15-29 mL/min/1.73 square meters)    Stage 5 End Stage CKD (GFR <15 mL/min/1.73 square meters)  Note: GFR calculation is accurate only with a steady state creatinine    Protime-INR [[id number]]  (Abnormal) Collected: 10/25/23 1546    Lab Status: Final result Specimen: Blood from Arm, Right Updated: 10/25/23 1625     Protime 14.6 seconds      INR 1.13    APTT [[id number]]  (Normal) Collected: 10/25/23 1546    Lab Status: Final result Specimen: Blood from Arm, Right Updated: 10/25/23 1625     PTT 28 seconds     CBC and differential [[id number]]  (Abnormal) Collected: 10/25/23 1546    Lab Status: Final result Specimen: Blood from Arm, Right Updated: 10/25/23 1606     WBC 5.90 Thousand/uL      RBC 6.33 Million/uL      Hemoglobin 15.9 g/dL      Hematocrit 50.3 %      MCV 80 fL      MCH 25.1 pg      MCHC 31.6 g/dL      RDW 17.5 %      MPV 10.1 fL      Platelets 183 Thousands/uL      nRBC 0 /100 WBCs      Neutrophils Relative 81 %      Immat GRANS % 1 %      Lymphocytes Relative 14 %      Monocytes Relative 3 %      Eosinophils Relative 0 %      Basophils Relative 1 %      Neutrophils Absolute 4.82 Thousands/µL      Immature Grans Absolute 0.03 Thousand/uL      Lymphocytes Absolute 0.82 Thousands/µL      Monocytes Absolute 0.16 Thousand/µL      Eosinophils Absolute 0.00 Thousand/µL      Basophils Absolute 0.07 Thousands/µL No orders to display              Procedures  ECG 12 Lead Documentation Only    Date/Time: 10/25/2023 3:41 PM    Performed by: Doris Coburn DO  Authorized by: Doris Coburn DO    Indications / Diagnosis:  Chest pain  ECG reviewed by me, the ED Provider: yes    Patient location:  Bedside  Rate:     ECG rate:  100    ECG rate assessment: tachycardic    Rhythm:     Rhythm: sinus tachycardia    Ectopy:     Ectopy: none    QRS:     QRS intervals:  Normal  ST segments:     ST segments:  Normal           ED Course  ED Course as of 10/25/23 1829   Wed Oct 25, 2023   1520 Temperature(!): 101.5 °F (38.6 °C)  Febrile; pt given Toradol by EMS   1520 Pulse: 105  Tachycardic   1608 WBC: 5.90  Normal   1631 Comprehensive metabolic panel  Normal   7889 hs TnI 0hr: 4  Negative   1635 LACTIC ACID: 0.8  Normal   1641 FLU/RSV/COVID - if FLU/RSV clinically relevant  Negative   1644 Pt sleeping. Woke pt up. Pt states headache is improved, the HA is gone on the left side of her head but still has slight headache on the right side and would like more meds to completely take it away. I updated pt on labs/swab. Reglan/dexamethasone ordered. 1730 Temperature: 98.4 °F (36.9 °C)  Improved   1731 Pulse: 78  Improved   1742 Pt sleeping. Woke pt up. Pt reports feeling better. HEART Risk Score      Flowsheet Row Most Recent Value   Heart Score Risk Calculator    History 0 Filed at: 10/25/2023 1632   ECG 0 Filed at: 10/25/2023 1632   Age 1 Filed at: 10/25/2023 1632   Risk Factors 0 Filed at: 10/25/2023 1632   Troponin 0 Filed at: 10/25/2023 1632   HEART Score 1 Filed at: 10/25/2023 1632                                        Medical Decision Making  Amount and/or Complexity of Data Reviewed  Labs: ordered. Decision-making details documented in ED Course. Risk  OTC drugs. Prescription drug management.              Disposition  Final diagnoses:   Fever   Nausea   Headache   Myalgia   Chest pain   Back pain Time reflects when diagnosis was documented in both MDM as applicable and the Disposition within this note       Time User Action Codes Description Comment    10/25/2023  5:31 PM Camron Rdz [R50.9] Fever     10/25/2023  5:31 PM Camron Rdz [R11.0] Nausea     10/25/2023  5:31 PM Reid Miyamoto [R51.9] Headache     10/25/2023  5:31 PM Camron Rdz [M79.10] Myalgia     10/25/2023  5:31 PM Camron Rdz [R07.9] Chest pain     10/25/2023  5:31 PM Camron Rdz [M54.9] Back pain           ED Disposition       ED Disposition   Discharge    Condition   Stable    Date/Time   Wed Oct 25, 2023 1741    [place] discharge to home/self care. Follow-up Information    None         Patient's Medications   Discharge Prescriptions    METOCLOPRAMIDE (REGLAN) 10 MG TABLET    Take 1 tablet (10 mg total) by mouth every 6 (six) hours       Start Date: 10/25/2023End Date: --       Order Dose: 10 mg       Quantity: 10 tablet    Refills: 0    NAPROXEN (NAPROSYN) 500 MG TABLET    Take 1 tablet (500 mg total) by mouth 2 (two) times a day with meals       Start Date: 10/25/2023End Date: --       Order Dose: 500 mg       Quantity: 20 tablet    Refills: 0       No discharge procedures on file.     PDMP Review         Value Time User    PDMP Reviewed  Yes 4/10/2023  5:29 AM Luis Enrique Browne PA-C            ED Provider  Electronically Signed by             [address] [place] [address],   10/25/23 2761

## 2023-11-09 ENCOUNTER — TELEPHONE (OUTPATIENT)
Dept: PULMONOLOGY | Facility: CLINIC | Age: 48
End: 2023-11-09

## 2024-02-25 NOTE — PROGRESS NOTES
D: Patient completed her co-occurring assessment in which she admits to drinking when she is stressed to the point of intoxication  She has denial regarding what problem drinking looks like since she does not drink often  She comes from a dysfunctional family system her mother was addicted to IV drugs and  when she was 26 of Hepatitis C  Patient was involved in the [employer] when she was younger  She has been  three times  She has a history of trauma  A: Patient has PTSD from the trauma of her childhood which has framed her life  She has unresolved anger and resentments that erupts in her acting out along with her codependency and the need to control everything  P: Therapist will make an aftercare appointment for drug and alcohol at [facility] and give her information on codependency  She was given a journal to write her resentments  PAST MEDICAL HISTORY:  Atrial fibrillation     Heart murmur     High cholesterol     Hypertension     Lumbar radiculopathy     Thyroid nodule

## 2024-03-27 ENCOUNTER — APPOINTMENT (EMERGENCY)
Dept: RADIOLOGY | Facility: HOSPITAL | Age: 49
End: 2024-03-27
Payer: MEDICARE

## 2024-03-27 ENCOUNTER — HOSPITAL ENCOUNTER (EMERGENCY)
Facility: HOSPITAL | Age: 49
Discharge: LEFT BEFORE DISCHARGE | End: 2024-03-27
Attending: EMERGENCY MEDICINE
Payer: MEDICARE

## 2024-03-27 VITALS
TEMPERATURE: 97.8 F | RESPIRATION RATE: 16 BRPM | OXYGEN SATURATION: 100 % | SYSTOLIC BLOOD PRESSURE: 134 MMHG | WEIGHT: 158.73 LBS | DIASTOLIC BLOOD PRESSURE: 89 MMHG | BODY MASS INDEX: 29.03 KG/M2 | HEART RATE: 108 BPM

## 2024-03-27 DIAGNOSIS — M25.552 LEFT HIP PAIN: Primary | ICD-10-CM

## 2024-03-27 PROCEDURE — 99284 EMERGENCY DEPT VISIT MOD MDM: CPT | Performed by: EMERGENCY MEDICINE

## 2024-03-27 PROCEDURE — 99283 EMERGENCY DEPT VISIT LOW MDM: CPT

## 2024-03-28 NOTE — ED PROVIDER NOTES
History  Chief Complaint   Patient presents with    Leg Pain     Patient reports left leg pain, reports she was running around at the park yesterday catching a football, patient reports she went to catch the ball and she stepped into a hole and reports she twisted her leg, reports pain with walking.     48 y.o. F p/w left hip pain x yesterday.  Stepped in a hole while running and twisted her leg.  Having pain at left hip.  Able to ambulate.  Denies numbness/tingling.      History provided by:  Patient   used: No    Leg Pain      Prior to Admission Medications   Prescriptions Last Dose Informant Patient Reported? Taking?   Diclofenac Sodium (VOLTAREN) 1 %   No No   Sig: Apply 2 g topically 4 (four) times a day   Fluticasone-Salmeterol (Advair Diskus) 500-50 mcg/dose inhaler   No No   Sig: Inhale 1 puff 2 (two) times a day Rinse mouth after use.   Multiple Vitamins-Minerals (MULTIVITAMIN ADULT EXTRA C PO)   Yes No   Sig: Take 1 tablet by mouth   acetaminophen (TYLENOL) 500 mg tablet   No No   Sig: Take 1 tablet (500 mg total) by mouth every 6 (six) hours as needed for mild pain   albuterol (PROVENTIL HFA,VENTOLIN HFA) 90 mcg/act inhaler   Yes No   Sig: Inhale 2 puffs every 6 (six) hours as needed for wheezing Indications: patient unsure of correct dosage/frequency.   amitriptyline (ELAVIL) 100 mg tablet  Self Yes No   Sig: Take 100 mg by mouth daily at bedtime   benzonatate (TESSALON PERLES) 100 mg capsule   No No   Sig: Take 1 capsule (100 mg total) by mouth 3 (three) times a day   dextromethorphan-guaiFENesin (ROBITUSSIN DM)  mg/5 mL syrup   No No   Sig: Take 10 mL by mouth every 4 (four) hours as needed for cough   lidocaine (LIDODERM) 5 %   No No   Sig: Apply 1 patch topically daily for 4 days Remove & Discard patch within 12 hours or as directed by MD   melatonin 3 mg   No No   Sig: Take 1 tablet (3 mg total) by mouth daily at bedtime   metoclopramide (REGLAN) 10 mg tablet   No No   Sig:  Take 1 tablet (10 mg total) by mouth every 6 (six) hours   naproxen (NAPROSYN) 500 mg tablet   No No   Sig: Take 1 tablet (500 mg total) by mouth 2 (two) times a day with meals   nicotine (NICODERM CQ) 14 mg/24hr TD 24 hr patch   No No   Sig: Place 1 patch on the skin over 24 hours daily Do not start before 2023.   Patient not taking: Reported on 2023   omeprazole (PriLOSEC) 40 MG capsule  Self Yes No   Sig: Take 40 mg by mouth daily   ondansetron (ZOFRAN) 4 mg tablet   No No   Sig: Take 1 tablet (4 mg total) by mouth every 6 (six) hours   ondansetron (ZOFRAN) 4 mg tablet   No No   Sig: Take 1 tablet (4 mg total) by mouth every 8 (eight) hours as needed for nausea or vomiting for up to 3 days      Facility-Administered Medications: None       Past Medical History:   Diagnosis Date    Asthma     Bipolar disorder (HCC)     Migraine     Psychiatric disorder     PTSD (post-traumatic stress disorder)     Seizures (HCC)        Past Surgical History:   Procedure Laterality Date    APPENDECTOMY      BLADDER SURGERY      CHOLECYSTECTOMY      GASTRECTOMY      gastric sleeve    GASTRIC RESTRICTION SURGERY      gastric sleeve in 2018    HEMORROIDECTOMY      HYSTERECTOMY      TUBAL LIGATION         Family History   Problem Relation Age of Onset    Diabetes Mother         MELLITUS    Diabetes Father         MELLITUS    Diabetes Other      I have reviewed and agree with the history as documented.    E-Cigarette/Vaping    E-Cigarette Use Never User      E-Cigarette/Vaping Substances     Social History     Tobacco Use    Smoking status: Some Days     Current packs/day: 0.00     Types: Cigarettes     Last attempt to quit: 2023     Years since quittin.9    Smokeless tobacco: Never   Vaping Use    Vaping status: Never Used   Substance Use Topics    Alcohol use: Yes     Comment: ocassionally    Drug use: No       Review of Systems   Musculoskeletal:         Left hip pain   Neurological:  Negative for  weakness and numbness.       Physical Exam  Physical Exam  Vitals and nursing note reviewed.   Constitutional:       General: She is not in acute distress.     Appearance: She is well-developed. She is not ill-appearing, toxic-appearing or diaphoretic.   Cardiovascular:      Pulses: Normal pulses.   Pulmonary:      Effort: Pulmonary effort is normal.      Breath sounds: No stridor.   Abdominal:      Palpations: Abdomen is soft.      Tenderness: There is no abdominal tenderness.   Musculoskeletal:         General: No deformity. Normal range of motion.      Left hip: Tenderness (ASIS) and bony tenderness (At left ASIS) present. No crepitus. Normal range of motion.      Left upper leg: No tenderness or bony tenderness.      Left knee: Normal.   Skin:     General: Skin is warm and dry.      Findings: No abrasion, bruising, ecchymosis, erythema, laceration or rash.   Neurological:      Mental Status: She is alert.      GCS: GCS eye subscore is 4. GCS verbal subscore is 5. GCS motor subscore is 6.      Sensory: No sensory deficit.      Motor: Motor function is intact.      Gait: Gait (Ambulated from WR to bed 34 without difficulty) normal.         Vital Signs  ED Triage Vitals [03/27/24 2019]   Temperature Pulse Respirations Blood Pressure SpO2   97.8 °F (36.6 °C) (!) 108 16 134/89 100 %      Temp Source Heart Rate Source Patient Position - Orthostatic VS BP Location FiO2 (%)   Oral Monitor Sitting Right arm --      Pain Score       6           Vitals:    03/27/24 2019   BP: 134/89   Pulse: (!) 108   Patient Position - Orthostatic VS: Sitting         Visual Acuity      ED Medications  Medications - No data to display    Diagnostic Studies  Results Reviewed       None                   XR hip/pelv 2-3 vws left if performed    (Results Pending)              Procedures  Procedures         ED Course  ED Course as of 03/27/24 2045   Wed Mar 27, 2024   2038 Pt states she will come back in the morning and walked out without  difficulty                                             Medical Decision Making  Left ASIS pain - Will xray to r/o fx.    Amount and/or Complexity of Data Reviewed  Radiology: ordered.             Disposition  Final diagnoses:   Left hip pain     Time reflects when diagnosis was documented in both MDM as applicable and the Disposition within this note       Time User Action Codes Description Comment    3/27/2024  8:39 PM Tami Rdz Add [M25.552] Left hip pain           ED Disposition       ED Disposition   Left from Room after Provider Exam    Condition   --    Date/Time   Wed Mar 27, 2024  8:39 PM    Comment   Patient came to nurses station and stated she would like to leave and she'd return in the AM.              Follow-up Information    None         Discharge Medication List as of 3/27/2024  8:39 PM        CONTINUE these medications which have NOT CHANGED    Details   acetaminophen (TYLENOL) 500 mg tablet Take 1 tablet (500 mg total) by mouth every 6 (six) hours as needed for mild pain, Starting Fri 3/3/2023, Normal      albuterol (PROVENTIL HFA,VENTOLIN HFA) 90 mcg/act inhaler Inhale 2 puffs every 6 (six) hours as needed for wheezing Indications: patient unsure of correct dosage/frequency., Until Discontinued, Historical Med      amitriptyline (ELAVIL) 100 mg tablet Take 100 mg by mouth daily at bedtime, Historical Med      benzonatate (TESSALON PERLES) 100 mg capsule Take 1 capsule (100 mg total) by mouth 3 (three) times a day, Starting Thu 5/11/2023, Normal      dextromethorphan-guaiFENesin (ROBITUSSIN DM)  mg/5 mL syrup Take 10 mL by mouth every 4 (four) hours as needed for cough, Starting Thu 5/11/2023, Normal      Diclofenac Sodium (VOLTAREN) 1 % Apply 2 g topically 4 (four) times a day, Starting Sat 2/25/2023, Normal      Fluticasone-Salmeterol (Advair Diskus) 500-50 mcg/dose inhaler Inhale 1 puff 2 (two) times a day Rinse mouth after use., Starting Thu 5/11/2023, Until Sat 6/10/2023, Normal       lidocaine (LIDODERM) 5 % Apply 1 patch topically daily for 4 days Remove & Discard patch within 12 hours or as directed by MD, Starting Mon 6/24/2019, Until Fri 6/28/2019, Print      melatonin 3 mg Take 1 tablet (3 mg total) by mouth daily at bedtime, Starting Thu 5/11/2023, Normal      metoclopramide (REGLAN) 10 mg tablet Take 1 tablet (10 mg total) by mouth every 6 (six) hours, Starting Wed 10/25/2023, Print      Multiple Vitamins-Minerals (MULTIVITAMIN ADULT EXTRA C PO) Take 1 tablet by mouth, Historical Med      naproxen (NAPROSYN) 500 mg tablet Take 1 tablet (500 mg total) by mouth 2 (two) times a day with meals, Starting Wed 10/25/2023, Print      nicotine (NICODERM CQ) 14 mg/24hr TD 24 hr patch Place 1 patch on the skin over 24 hours daily Do not start before April 14, 2023., Starting Fri 4/14/2023, Normal      omeprazole (PriLOSEC) 40 MG capsule Take 40 mg by mouth daily, Historical Med      ondansetron (ZOFRAN) 4 mg tablet Take 1 tablet (4 mg total) by mouth every 6 (six) hours, Starting Fri 3/3/2023, Normal             No discharge procedures on file.    PDMP Review         Value Time User    PDMP Reviewed  Yes 4/10/2023  5:29 AM Genie Rossi PA-C            ED Provider  Electronically Signed by             Tami Rdz DO  03/27/24 2045

## 2024-03-28 NOTE — ED NOTES
"Patient came to nurses station and asked if she could leave and return tomorrow in the morning. RN informed patient that x-rays were already ordered and would be completed in a few minutes. Patient stated, \"I know but I'll just get them done in the morning.\" RN informed patient that if she leaves and comes back it would start an entirely different visit and would start over the process, RN also mentioned that it may result in 2 different co-pays. Patient verbalized understanding and left the department after provider evaluation.      Paola Pratt RN  03/27/24 2043    "

## 2024-08-21 ENCOUNTER — APPOINTMENT (EMERGENCY)
Dept: CT IMAGING | Facility: HOSPITAL | Age: 49
End: 2024-08-21
Payer: MEDICARE

## 2024-08-21 ENCOUNTER — HOSPITAL ENCOUNTER (EMERGENCY)
Facility: HOSPITAL | Age: 49
Discharge: HOME | End: 2024-08-22
Attending: EMERGENCY MEDICINE
Payer: MEDICARE

## 2024-08-21 VITALS
WEIGHT: 151.24 LBS | DIASTOLIC BLOOD PRESSURE: 69 MMHG | SYSTOLIC BLOOD PRESSURE: 102 MMHG | BODY MASS INDEX: 27.66 KG/M2 | RESPIRATION RATE: 18 BRPM | TEMPERATURE: 98 F | HEART RATE: 74 BPM | OXYGEN SATURATION: 99 %

## 2024-08-21 DIAGNOSIS — S09.90XA CLOSED HEAD INJURY, INITIAL ENCOUNTER: ICD-10-CM
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
## 2024-08-21 PROCEDURE — 72125 CT NECK SPINE W/O DYE: CPT

## 2024-08-21 PROCEDURE — 99284 EMERGENCY DEPT VISIT MOD MDM: CPT | Performed by: PHYSICIAN ASSISTANT

## 2024-08-21 PROCEDURE — 70450 CT HEAD/BRAIN W/O DYE: CPT

## 2024-08-21 PROCEDURE — 70486 CT MAXILLOFACIAL W/O DYE: CPT

## 2024-08-21 PROCEDURE — 99284 EMERGENCY DEPT VISIT MOD MDM: CPT

## 2024-08-21 RX ORDER — ACETAMINOPHEN 325 MG/1
650 TABLET ORAL ONCE
Status: COMPLETED | OUTPATIENT
Start: 2024-08-21 | End: 2024-08-21

## 2024-08-21 RX ADMIN — ACETAMINOPHEN 650 MG: 325 TABLET ORAL at 22:23

## 2024-08-21 NOTE — Clinical Note
Michelle Matos was seen and treated in our emergency department on 8/21/2024.    No restrictions            Diagnosis:     Michelle  may return to work on return date.    She may return on this date: 08/24/2024         If you have any questions or concerns, please don't hesitate to call.      Narda Dubose PA-C    ______________________________           _______________          _______________  Hospital Representative                              Date                                Time

## 2024-08-22 RX ORDER — ACETAMINOPHEN 500 MG
500 TABLET ORAL EVERY 6 HOURS PRN
Qty: 30 TABLET | Refills: 0 | Status: SHIPPED | OUTPATIENT
Start: 2024-08-22

## 2024-08-22 NOTE — DISCHARGE INSTRUCTIONS
Use Tylenol Motrin and ice for pain.  Return for new or worsening complaints.  Follow-up with primary care.
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
## 2024-11-13 ENCOUNTER — HOSPITAL ENCOUNTER (INPATIENT)
Facility: HOSPITAL | Age: 49
LOS: 6 days | DRG: 753 | End: 2024-11-19
Attending: EMERGENCY MEDICINE | Admitting: PSYCHIATRY & NEUROLOGY
Payer: COMMERCIAL

## 2024-11-13 DIAGNOSIS — R45.851 SUICIDAL IDEATION: ICD-10-CM

## 2024-11-13 DIAGNOSIS — F31.5 BIPOLAR I DISORDER, MOST RECENT EPISODE DEPRESSED, SEVERE WITH PSYCHOTIC FEATURES (HCC): Primary | Chronic | ICD-10-CM

## 2024-11-13 DIAGNOSIS — K91.2 POSTSURGICAL MALABSORPTION: ICD-10-CM

## 2024-11-13 DIAGNOSIS — G43.109 MIGRAINE WITH AURA AND WITHOUT STATUS MIGRAINOSUS, NOT INTRACTABLE: ICD-10-CM

## 2024-11-13 DIAGNOSIS — Z00.8 MEDICAL CLEARANCE FOR PSYCHIATRIC ADMISSION: ICD-10-CM

## 2024-11-13 LAB
AMPHETAMINES SERPL QL SCN: NEGATIVE
BARBITURATES UR QL: NEGATIVE
BENZODIAZ UR QL: NEGATIVE
COCAINE UR QL: NEGATIVE
ETHANOL EXG-MCNC: 0 MG/DL
FENTANYL UR QL SCN: NEGATIVE
HYDROCODONE UR QL SCN: NEGATIVE
METHADONE UR QL: NEGATIVE
OPIATES UR QL SCN: NEGATIVE
OXYCODONE+OXYMORPHONE UR QL SCN: NEGATIVE
PCP UR QL: NEGATIVE
THC UR QL: NEGATIVE

## 2024-11-13 PROCEDURE — 99285 EMERGENCY DEPT VISIT HI MDM: CPT

## 2024-11-13 PROCEDURE — 80307 DRUG TEST PRSMV CHEM ANLYZR: CPT

## 2024-11-13 PROCEDURE — 82075 ASSAY OF BREATH ETHANOL: CPT

## 2024-11-13 PROCEDURE — 99284 EMERGENCY DEPT VISIT MOD MDM: CPT

## 2024-11-13 RX ORDER — OLANZAPINE 2.5 MG/1
2.5 TABLET, FILM COATED ORAL
Status: DISCONTINUED | OUTPATIENT
Start: 2024-11-13 | End: 2024-11-19 | Stop reason: HOSPADM

## 2024-11-13 RX ORDER — OLANZAPINE 5 MG/1
5 TABLET ORAL ONCE
Status: COMPLETED | OUTPATIENT
Start: 2024-11-13 | End: 2024-11-13

## 2024-11-13 RX ORDER — LORAZEPAM 1 MG/1
2 TABLET ORAL ONCE
Status: COMPLETED | OUTPATIENT
Start: 2024-11-13 | End: 2024-11-13

## 2024-11-13 RX ORDER — BENZTROPINE MESYLATE 1 MG/1
1 TABLET ORAL
Status: DISCONTINUED | OUTPATIENT
Start: 2024-11-13 | End: 2024-11-19 | Stop reason: HOSPADM

## 2024-11-13 RX ORDER — OLANZAPINE 10 MG/1
10 TABLET ORAL
Status: DISCONTINUED | OUTPATIENT
Start: 2024-11-13 | End: 2024-11-19 | Stop reason: HOSPADM

## 2024-11-13 RX ORDER — HYDROXYZINE HYDROCHLORIDE 25 MG/1
25 TABLET, FILM COATED ORAL
Status: DISCONTINUED | OUTPATIENT
Start: 2024-11-13 | End: 2024-11-19 | Stop reason: HOSPADM

## 2024-11-13 RX ORDER — IBUPROFEN 400 MG/1
400 TABLET, FILM COATED ORAL EVERY 6 HOURS PRN
Status: DISCONTINUED | OUTPATIENT
Start: 2024-11-13 | End: 2024-11-19 | Stop reason: HOSPADM

## 2024-11-13 RX ORDER — DIPHENHYDRAMINE HYDROCHLORIDE 50 MG/ML
50 INJECTION INTRAMUSCULAR; INTRAVENOUS EVERY 6 HOURS PRN
Status: DISCONTINUED | OUTPATIENT
Start: 2024-11-13 | End: 2024-11-19 | Stop reason: HOSPADM

## 2024-11-13 RX ORDER — IBUPROFEN 600 MG/1
600 TABLET, FILM COATED ORAL EVERY 8 HOURS PRN
Status: DISCONTINUED | OUTPATIENT
Start: 2024-11-13 | End: 2024-11-19 | Stop reason: HOSPADM

## 2024-11-13 RX ORDER — MULTIVIT WITH MINERALS/LUTEIN
500 TABLET ORAL 2 TIMES DAILY
Status: ON HOLD | COMMUNITY
End: 2024-11-20 | Stop reason: ALTCHOICE

## 2024-11-13 RX ORDER — BISACODYL 10 MG
10 SUPPOSITORY, RECTAL RECTAL DAILY PRN
Status: DISCONTINUED | OUTPATIENT
Start: 2024-11-13 | End: 2024-11-19 | Stop reason: HOSPADM

## 2024-11-13 RX ORDER — ACETAMINOPHEN 325 MG/1
650 TABLET ORAL EVERY 6 HOURS PRN
Status: DISCONTINUED | OUTPATIENT
Start: 2024-11-13 | End: 2024-11-14 | Stop reason: SDUPTHER

## 2024-11-13 RX ORDER — ACETAMINOPHEN 325 MG/1
650 TABLET ORAL ONCE
Status: COMPLETED | OUTPATIENT
Start: 2024-11-13 | End: 2024-11-13

## 2024-11-13 RX ORDER — PROPRANOLOL HYDROCHLORIDE 10 MG/1
10 TABLET ORAL EVERY 8 HOURS PRN
Status: DISCONTINUED | OUTPATIENT
Start: 2024-11-13 | End: 2024-11-19 | Stop reason: HOSPADM

## 2024-11-13 RX ORDER — HYDROXYZINE HYDROCHLORIDE 50 MG/1
100 TABLET, FILM COATED ORAL
Status: DISCONTINUED | OUTPATIENT
Start: 2024-11-13 | End: 2024-11-19 | Stop reason: HOSPADM

## 2024-11-13 RX ORDER — OLANZAPINE 10 MG/2ML
5 INJECTION, POWDER, FOR SOLUTION INTRAMUSCULAR
Status: DISCONTINUED | OUTPATIENT
Start: 2024-11-13 | End: 2024-11-19 | Stop reason: HOSPADM

## 2024-11-13 RX ORDER — FERROUS SULFATE 325(65) MG
325 TABLET ORAL 2 TIMES DAILY WITH MEALS
Status: ON HOLD | COMMUNITY

## 2024-11-13 RX ORDER — LORAZEPAM 2 MG/ML
2 INJECTION INTRAMUSCULAR EVERY 6 HOURS PRN
Status: DISCONTINUED | OUTPATIENT
Start: 2024-11-13 | End: 2024-11-19 | Stop reason: HOSPADM

## 2024-11-13 RX ORDER — PROPRANOLOL HYDROCHLORIDE 10 MG/1
10 TABLET ORAL 2 TIMES DAILY
Status: ON HOLD | COMMUNITY

## 2024-11-13 RX ORDER — OLANZAPINE 5 MG/1
5 TABLET ORAL
Status: DISCONTINUED | OUTPATIENT
Start: 2024-11-13 | End: 2024-11-19 | Stop reason: HOSPADM

## 2024-11-13 RX ORDER — HYDROXYZINE HYDROCHLORIDE 50 MG/1
50 TABLET, FILM COATED ORAL
Status: DISCONTINUED | OUTPATIENT
Start: 2024-11-13 | End: 2024-11-19 | Stop reason: HOSPADM

## 2024-11-13 RX ORDER — BENZTROPINE MESYLATE 1 MG/ML
1 INJECTION, SOLUTION INTRAMUSCULAR; INTRAVENOUS
Status: DISCONTINUED | OUTPATIENT
Start: 2024-11-13 | End: 2024-11-19 | Stop reason: HOSPADM

## 2024-11-13 RX ORDER — ACETAMINOPHEN 325 MG/1
975 TABLET ORAL EVERY 6 HOURS PRN
Status: DISCONTINUED | OUTPATIENT
Start: 2024-11-13 | End: 2024-11-19 | Stop reason: HOSPADM

## 2024-11-13 RX ORDER — TRAZODONE HYDROCHLORIDE 50 MG/1
50 TABLET, FILM COATED ORAL
Status: DISCONTINUED | OUTPATIENT
Start: 2024-11-13 | End: 2024-11-19 | Stop reason: HOSPADM

## 2024-11-13 RX ORDER — MAGNESIUM HYDROXIDE/ALUMINUM HYDROXICE/SIMETHICONE 120; 1200; 1200 MG/30ML; MG/30ML; MG/30ML
30 SUSPENSION ORAL EVERY 4 HOURS PRN
Status: DISCONTINUED | OUTPATIENT
Start: 2024-11-13 | End: 2024-11-19 | Stop reason: HOSPADM

## 2024-11-13 RX ORDER — FOLIC ACID 1 MG/1
1 TABLET ORAL DAILY
Status: ON HOLD | COMMUNITY

## 2024-11-13 RX ORDER — AMOXICILLIN 250 MG
1 CAPSULE ORAL DAILY PRN
Status: DISCONTINUED | OUTPATIENT
Start: 2024-11-13 | End: 2024-11-19 | Stop reason: HOSPADM

## 2024-11-13 RX ORDER — OLANZAPINE 10 MG/2ML
10 INJECTION, POWDER, FOR SOLUTION INTRAMUSCULAR
Status: DISCONTINUED | OUTPATIENT
Start: 2024-11-13 | End: 2024-11-19 | Stop reason: HOSPADM

## 2024-11-13 RX ADMIN — OLANZAPINE 5 MG: 5 TABLET, FILM COATED ORAL at 10:21

## 2024-11-13 RX ADMIN — ACETAMINOPHEN 650 MG: 325 TABLET ORAL at 12:43

## 2024-11-13 RX ADMIN — ACETAMINOPHEN 975 MG: 325 TABLET ORAL at 16:57

## 2024-11-13 RX ADMIN — LORAZEPAM 2 MG: 1 TABLET ORAL at 16:57

## 2024-11-13 NOTE — ED NOTES
Report given to [facility]. Per [facility] nurse they cannot take the pt until 3629     Nabil Delgado, RN  11/13/24 1134

## 2024-11-13 NOTE — NURSING NOTE
Pt's active home meds verified with Per [facility] ([phone]), pt last received med delivery on 10/28/2024. Please see PTA section of admission navigator for detailed report. Provider notified of completion.

## 2024-11-13 NOTE — PLAN OF CARE
Problem: Potential for Falls  Goal: Patient will remain free of falls  Description: INTERVENTIONS:  - Educate patient/family on patient safety including physical limitations  - Instruct patient to call for assistance with activity   - Consult OT/PT to assist with strengthening/mobility   - Keep Call bell within reach  - Keep bed low and locked with side rails adjusted as appropriate  - Keep care items and personal belongings within reach  - Initiate and maintain comfort rounds  - Make Fall Risk Sign visible to staff  - Apply yellow socks and bracelet for high fall risk patients  - Consider moving patient to room near nurses station  Outcome: Not Progressing     Problem: SELF HARM/SUICIDALITY  Goal: Will have no self-injury during hospital stay  Description: INTERVENTIONS:  - Q 15 MINUTES: Routine safety checks  - Q WAKING SHIFT & PRN: Assess risk to determine if routine checks are adequate to maintain patient safety  - Encourage patient to participate actively in care by formulating a plan to combat response to suicidal ideation, identify supports and resources  Outcome: Not Progressing     Problem: DEPRESSION  Goal: Will be euthymic at discharge  Description: INTERVENTIONS:  - Administer medication as ordered  - Provide emotional support via 1:1 interaction with staff  - Encourage involvement in milieu/groups/activities  - Monitor for social isolation  Outcome: Not Progressing     Problem: PSYCHOSIS  Goal: Will report no hallucinations or delusions  Description: Interventions:  - Administer medication as  ordered  - Every waking shifts and PRN assess for the presence of hallucinations and or delusions  - Assist with reality testing to support increasing orientation  - Assess if patient's hallucinations or delusions are encouraging self-harm or harm to others and intervene as appropriate  Outcome: Not Progressing     Problem: Depression  Goal: Treatment Goal: Demonstrate behavioral control of depressive symptoms,  verbalize feelings of improved mood/affect, and adopt new coping skills prior to discharge  Outcome: Not Progressing  Goal: Verbalize thoughts and feelings  Description: Interventions:  - Assess and re-assess patient's level of risk   - Engage patient in 1:1 interactions, daily, for a minimum of 15 minutes   - Encourage patient to express feelings, fears, frustrations, hopes   Outcome: Not Progressing  Goal: Refrain from harming self  Description: Interventions:  - Monitor patient closely, per order   - Supervise medication ingestion, monitor effects and side effects   Outcome: Not Progressing  Goal: Refrain from isolation  Description: Interventions:  - Develop a trusting relationship   - Encourage socialization   Outcome: Not Progressing  Goal: Refrain from self-neglect  Outcome: Not Progressing  Goal: Attend and participate in unit activities, including therapeutic, recreational, and educational groups  Description: Interventions:  - Provide therapeutic and educational activities daily, encourage attendance and participation, and document same in the medical record   Outcome: Not Progressing  Goal: Complete daily ADLs, including personal hygiene independently, as able  Description: Interventions:  - Observe, teach, and assist patient with ADLS  -  Monitor and promote a balance of rest/activity, with adequate nutrition and elimination   Outcome: Not Progressing

## 2024-11-13 NOTE — ED NOTES
Insurance Eligibility Verification System checked - ([phone]).  Online system / automated system indicates: Nallely Rosales ID # [id number]

## 2024-11-13 NOTE — ED NOTES
Patient is accepted at  [facility]  Patient is accepted by      Patient may go to the floor at after report           Nurse report is to be called to [phone] prior to patient transfer.
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
## 2024-11-13 NOTE — ED NOTES
Insurance Authorization for admission:   Phone call placed to Nallely Rosales  Phone number: [phone]  Spoke to Marla     3 days approved.  11/13-11/15  Level of care: **.inpatient mental health   Review on Friday 11/15   Authorization # **.    To be given on admission to the unit

## 2024-11-13 NOTE — PLAN OF CARE
Problem: Potential for Falls  Goal: Patient will remain free of falls  Description: INTERVENTIONS:  - Educate patient/family on patient safety including physical limitations  - Instruct patient to call for assistance with activity   - Consult OT/PT to assist with strengthening/mobility   - Keep Call bell within reach  - Keep bed low and locked with side rails adjusted as appropriate  - Keep care items and personal belongings within reach  - Initiate and maintain comfort rounds  - Make Fall Risk Sign visible to staff  - Apply yellow socks and bracelet for high fall risk patients  - Consider moving patient to room near nurses station  11/13/2024 1825 by Abad Romero RN  Outcome: Not Progressing  11/13/2024 1824 by Abad Romero RN  Outcome: Not Progressing     Problem: SELF HARM/SUICIDALITY  Goal: Will have no self-injury during hospital stay  Description: INTERVENTIONS:  - Q 15 MINUTES: Routine safety checks  - Q WAKING SHIFT & PRN: Assess risk to determine if routine checks are adequate to maintain patient safety  - Encourage patient to participate actively in care by formulating a plan to combat response to suicidal ideation, identify supports and resources  Outcome: Not Progressing     Problem: DEPRESSION  Goal: Will be euthymic at discharge  Description: INTERVENTIONS:  - Administer medication as ordered  - Provide emotional support via 1:1 interaction with staff  - Encourage involvement in milieu/groups/activities  - Monitor for social isolation  Outcome: Not Progressing     Problem: PSYCHOSIS  Goal: Will report no hallucinations or delusions  Description: Interventions:  - Administer medication as  ordered  - Every waking shifts and PRN assess for the presence of hallucinations and or delusions  - Assist with reality testing to support increasing orientation  - Assess if patient's hallucinations or delusions are encouraging self-harm or harm to others and intervene as appropriate  Outcome: Not  Progressing     Problem: Depression  Goal: Treatment Goal: Demonstrate behavioral control of depressive symptoms, verbalize feelings of improved mood/affect, and adopt new coping skills prior to discharge  Outcome: Not Progressing  Goal: Verbalize thoughts and feelings  Description: Interventions:  - Assess and re-assess patient's level of risk   - Engage patient in 1:1 interactions, daily, for a minimum of 15 minutes   - Encourage patient to express feelings, fears, frustrations, hopes   Outcome: Not Progressing  Goal: Refrain from harming self  Description: Interventions:  - Monitor patient closely, per order   - Supervise medication ingestion, monitor effects and side effects   Outcome: Not Progressing  Goal: Refrain from isolation  Description: Interventions:  - Develop a trusting relationship   - Encourage socialization   Outcome: Not Progressing  Goal: Refrain from self-neglect  Outcome: Not Progressing  Goal: Attend and participate in unit activities, including therapeutic, recreational, and educational groups  Description: Interventions:  - Provide therapeutic and educational activities daily, encourage attendance and participation, and document same in the medical record   Outcome: Not Progressing  Goal: Complete daily ADLs, including personal hygiene independently, as able  Description: Interventions:  - Observe, teach, and assist patient with ADLS  -  Monitor and promote a balance of rest/activity, with adequate nutrition and elimination   Outcome: Not Progressing
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
## 2024-11-14 PROBLEM — Z98.890 STATUS POST PANNICULECTOMY: Status: ACTIVE | Noted: 2020-07-13

## 2024-11-14 PROBLEM — R12 HEARTBURN: Chronic | Status: ACTIVE | Noted: 2024-11-14

## 2024-11-14 PROBLEM — F41.1 GAD (GENERALIZED ANXIETY DISORDER): Chronic | Status: ACTIVE | Noted: 2024-11-14

## 2024-11-14 PROBLEM — Z00.8 MEDICAL CLEARANCE FOR PSYCHIATRIC ADMISSION: Status: ACTIVE | Noted: 2024-11-14

## 2024-11-14 PROBLEM — F14.10 COCAINE ABUSE, EPISODIC (HCC): Chronic | Status: ACTIVE | Noted: 2024-11-14

## 2024-11-14 PROBLEM — F43.12 POST-TRAUMATIC STRESS DISORDER, CHRONIC: Chronic | Status: ACTIVE | Noted: 2024-11-14

## 2024-11-14 PROBLEM — B02.9 HERPES ZOSTER WITHOUT COMPLICATION: Status: ACTIVE | Noted: 2019-05-21

## 2024-11-14 PROBLEM — E78.5 HYPERLIPIDEMIA: Status: ACTIVE | Noted: 2024-11-14

## 2024-11-14 PROBLEM — F31.5 BIPOLAR I DISORDER, MOST RECENT EPISODE DEPRESSED, SEVERE WITH PSYCHOTIC FEATURES (HCC): Chronic | Status: ACTIVE | Noted: 2018-11-28

## 2024-11-14 LAB
25(OH)D3 SERPL-MCNC: <7 NG/ML (ref 30–100)
ALBUMIN SERPL BCG-MCNC: 4.3 G/DL (ref 3.5–5)
ALP SERPL-CCNC: 89 U/L (ref 34–104)
ALT SERPL W P-5'-P-CCNC: 15 U/L (ref 7–52)
ANION GAP SERPL CALCULATED.3IONS-SCNC: 8 MMOL/L (ref 4–13)
AST SERPL W P-5'-P-CCNC: 16 U/L (ref 13–39)
ATRIAL RATE: 87 BPM
BACTERIA UR QL AUTO: ABNORMAL /HPF
BASOPHILS # BLD AUTO: 0.12 THOUSANDS/ÂΜL (ref 0–0.1)
BASOPHILS NFR BLD AUTO: 2 % (ref 0–1)
BILIRUB SERPL-MCNC: 0.36 MG/DL (ref 0.2–1)
BILIRUB UR QL STRIP: NEGATIVE
BUN SERPL-MCNC: 13 MG/DL (ref 5–25)
CALCIUM SERPL-MCNC: 9.7 MG/DL (ref 8.4–10.2)
CAOX CRY URNS QL MICRO: ABNORMAL /HPF
CHLORIDE SERPL-SCNC: 103 MMOL/L (ref 96–108)
CHOLEST SERPL-MCNC: 255 MG/DL (ref ?–200)
CLARITY UR: ABNORMAL
CO2 SERPL-SCNC: 31 MMOL/L (ref 21–32)
COLOR UR: ABNORMAL
CREAT SERPL-MCNC: 0.74 MG/DL (ref 0.6–1.3)
EOSINOPHIL # BLD AUTO: 0.16 THOUSAND/ÂΜL (ref 0–0.61)
EOSINOPHIL NFR BLD AUTO: 2 % (ref 0–6)
ERYTHROCYTE [DISTWIDTH] IN BLOOD BY AUTOMATED COUNT: 14 % (ref 11.6–15.1)
EST. AVERAGE GLUCOSE BLD GHB EST-MCNC: 117 MG/DL
FOLATE SERPL-MCNC: 7.8 NG/ML
GFR SERPL CREATININE-BSD FRML MDRD: 96 ML/MIN/1.73SQ M
GLUCOSE P FAST SERPL-MCNC: 114 MG/DL (ref 65–99)
GLUCOSE SERPL-MCNC: 114 MG/DL (ref 65–140)
GLUCOSE UR STRIP-MCNC: NEGATIVE MG/DL
HBA1C MFR BLD: 5.7 %
HCG SERPL QL: NEGATIVE
HCT VFR BLD AUTO: 46.5 % (ref 34.8–46.1)
HDLC SERPL-MCNC: 67 MG/DL
HGB BLD-MCNC: 15.5 G/DL (ref 11.5–15.4)
HGB UR QL STRIP.AUTO: 10
IMM GRANULOCYTES # BLD AUTO: 0.02 THOUSAND/UL (ref 0–0.2)
IMM GRANULOCYTES NFR BLD AUTO: 0 % (ref 0–2)
KETONES UR STRIP-MCNC: ABNORMAL MG/DL
LDLC SERPL CALC-MCNC: 166 MG/DL (ref 0–100)
LEUKOCYTE ESTERASE UR QL STRIP: 25
LYMPHOCYTES # BLD AUTO: 3.04 THOUSANDS/ÂΜL (ref 0.6–4.47)
LYMPHOCYTES NFR BLD AUTO: 38 % (ref 14–44)
MAGNESIUM SERPL-MCNC: 2.2 MG/DL (ref 1.9–2.7)
MCH RBC QN AUTO: 28.9 PG (ref 26.8–34.3)
MCHC RBC AUTO-ENTMCNC: 33.3 G/DL (ref 31.4–37.4)
MCV RBC AUTO: 87 FL (ref 82–98)
MONOCYTES # BLD AUTO: 0.44 THOUSAND/ÂΜL (ref 0.17–1.22)
MONOCYTES NFR BLD AUTO: 6 % (ref 4–12)
MUCOUS THREADS UR QL AUTO: ABNORMAL
NEUTROPHILS # BLD AUTO: 4.25 THOUSANDS/ÂΜL (ref 1.85–7.62)
NEUTS SEG NFR BLD AUTO: 52 % (ref 43–75)
NITRITE UR QL STRIP: NEGATIVE
NON-SQ EPI CELLS URNS QL MICRO: ABNORMAL /HPF
NONHDLC SERPL-MCNC: 188 MG/DL
NRBC BLD AUTO-RTO: 0 /100 WBCS
P AXIS: 67 DEGREES
PH UR STRIP.AUTO: 5 [PH]
PHOSPHATE SERPL-MCNC: 4.8 MG/DL (ref 2.7–4.5)
PLATELET # BLD AUTO: 366 THOUSANDS/UL (ref 149–390)
PMV BLD AUTO: 10 FL (ref 8.9–12.7)
POTASSIUM SERPL-SCNC: 4.4 MMOL/L (ref 3.5–5.3)
PR INTERVAL: 126 MS
PROT SERPL-MCNC: 7.1 G/DL (ref 6.4–8.4)
PROT UR STRIP-MCNC: ABNORMAL MG/DL
QRS AXIS: 77 DEGREES
QRSD INTERVAL: 96 MS
QT INTERVAL: 370 MS
QTC INTERVAL: 446 MS
RBC # BLD AUTO: 5.36 MILLION/UL (ref 3.81–5.12)
RBC #/AREA URNS AUTO: ABNORMAL /HPF
SODIUM SERPL-SCNC: 142 MMOL/L (ref 135–147)
SP GR UR STRIP.AUTO: 1.02 (ref 1–1.04)
T WAVE AXIS: 62 DEGREES
TREPONEMA PALLIDUM IGG+IGM AB [PRESENCE] IN SERUM OR PLASMA BY IMMUNOASSAY: NORMAL
TRIGL SERPL-MCNC: 109 MG/DL (ref ?–150)
TSH SERPL DL<=0.05 MIU/L-ACNC: 1.67 UIU/ML (ref 0.45–4.5)
UROBILINOGEN UA: 1 MG/DL
VENTRICULAR RATE: 87 BPM
VIT B12 SERPL-MCNC: 505 PG/ML (ref 180–914)
WBC # BLD AUTO: 8.03 THOUSAND/UL (ref 4.31–10.16)
WBC #/AREA URNS AUTO: ABNORMAL /HPF

## 2024-11-14 PROCEDURE — 80053 COMPREHEN METABOLIC PANEL: CPT

## 2024-11-14 PROCEDURE — 84443 ASSAY THYROID STIM HORMONE: CPT

## 2024-11-14 PROCEDURE — 93010 ELECTROCARDIOGRAM REPORT: CPT | Performed by: INTERNAL MEDICINE

## 2024-11-14 PROCEDURE — 82306 VITAMIN D 25 HYDROXY: CPT

## 2024-11-14 PROCEDURE — 84703 CHORIONIC GONADOTROPIN ASSAY: CPT

## 2024-11-14 PROCEDURE — 82746 ASSAY OF FOLIC ACID SERUM: CPT

## 2024-11-14 PROCEDURE — 84100 ASSAY OF PHOSPHORUS: CPT

## 2024-11-14 PROCEDURE — 99253 IP/OBS CNSLTJ NEW/EST LOW 45: CPT | Performed by: NURSE PRACTITIONER

## 2024-11-14 PROCEDURE — 80061 LIPID PANEL: CPT

## 2024-11-14 PROCEDURE — 85025 COMPLETE CBC W/AUTO DIFF WBC: CPT

## 2024-11-14 PROCEDURE — 82607 VITAMIN B-12: CPT

## 2024-11-14 PROCEDURE — 83735 ASSAY OF MAGNESIUM: CPT

## 2024-11-14 PROCEDURE — 99223 1ST HOSP IP/OBS HIGH 75: CPT | Performed by: PSYCHIATRY & NEUROLOGY

## 2024-11-14 PROCEDURE — 83036 HEMOGLOBIN GLYCOSYLATED A1C: CPT

## 2024-11-14 PROCEDURE — 93005 ELECTROCARDIOGRAM TRACING: CPT

## 2024-11-14 PROCEDURE — 86780 TREPONEMA PALLIDUM: CPT

## 2024-11-14 PROCEDURE — 81001 URINALYSIS AUTO W/SCOPE: CPT

## 2024-11-14 RX ORDER — PANTOPRAZOLE SODIUM 40 MG/1
40 TABLET, DELAYED RELEASE ORAL
Status: DISCONTINUED | OUTPATIENT
Start: 2024-11-14 | End: 2024-11-19 | Stop reason: HOSPADM

## 2024-11-14 RX ORDER — ALBUTEROL SULFATE 90 UG/1
2 INHALANT RESPIRATORY (INHALATION) EVERY 6 HOURS PRN
Status: DISCONTINUED | OUTPATIENT
Start: 2024-11-14 | End: 2024-11-19 | Stop reason: HOSPADM

## 2024-11-14 RX ORDER — ASCORBIC ACID 500 MG
500 TABLET ORAL 2 TIMES DAILY
Status: DISCONTINUED | OUTPATIENT
Start: 2024-11-14 | End: 2024-11-19 | Stop reason: HOSPADM

## 2024-11-14 RX ORDER — ERGOCALCIFEROL 1.25 MG/1
50000 CAPSULE, LIQUID FILLED ORAL WEEKLY
Status: DISCONTINUED | OUTPATIENT
Start: 2024-11-14 | End: 2024-11-19 | Stop reason: HOSPADM

## 2024-11-14 RX ORDER — METHOCARBAMOL 500 MG/1
500 TABLET, FILM COATED ORAL EVERY 8 HOURS PRN
Status: DISCONTINUED | OUTPATIENT
Start: 2024-11-14 | End: 2024-11-19 | Stop reason: HOSPADM

## 2024-11-14 RX ORDER — ATORVASTATIN CALCIUM 10 MG/1
10 TABLET, FILM COATED ORAL
Status: DISCONTINUED | OUTPATIENT
Start: 2024-11-14 | End: 2024-11-19 | Stop reason: HOSPADM

## 2024-11-14 RX ORDER — BISACODYL 5 MG/1
5 TABLET, DELAYED RELEASE ORAL DAILY PRN
Status: DISCONTINUED | OUTPATIENT
Start: 2024-11-14 | End: 2024-11-15

## 2024-11-14 RX ORDER — LAMOTRIGINE 25 MG/1
25 TABLET ORAL DAILY
Status: DISCONTINUED | OUTPATIENT
Start: 2024-11-14 | End: 2024-11-19 | Stop reason: HOSPADM

## 2024-11-14 RX ORDER — FERROUS SULFATE 325(65) MG
325 TABLET ORAL 2 TIMES DAILY WITH MEALS
Status: DISCONTINUED | OUTPATIENT
Start: 2024-11-14 | End: 2024-11-17

## 2024-11-14 RX ORDER — FLUTICASONE FUROATE AND VILANTEROL 200; 25 UG/1; UG/1
1 POWDER RESPIRATORY (INHALATION)
Status: DISCONTINUED | OUTPATIENT
Start: 2024-11-14 | End: 2024-11-19 | Stop reason: HOSPADM

## 2024-11-14 RX ORDER — OLANZAPINE 5 MG/1
5 TABLET ORAL
Status: DISCONTINUED | OUTPATIENT
Start: 2024-11-14 | End: 2024-11-15

## 2024-11-14 RX ORDER — GABAPENTIN 100 MG/1
100 CAPSULE ORAL 3 TIMES DAILY
Status: DISCONTINUED | OUTPATIENT
Start: 2024-11-14 | End: 2024-11-15

## 2024-11-14 RX ADMIN — OLANZAPINE 5 MG: 5 TABLET, FILM COATED ORAL at 21:10

## 2024-11-14 RX ADMIN — IBUPROFEN 600 MG: 600 TABLET, FILM COATED ORAL at 09:09

## 2024-11-14 RX ADMIN — ERGOCALCIFEROL 50000 UNITS: 1.25 CAPSULE ORAL at 15:00

## 2024-11-14 RX ADMIN — BISACODYL 5 MG: 5 TABLET, COATED ORAL at 21:11

## 2024-11-14 RX ADMIN — BISACODYL 5 MG: 5 TABLET, COATED ORAL at 17:54

## 2024-11-14 RX ADMIN — IBUPROFEN 400 MG: 400 TABLET, FILM COATED ORAL at 21:11

## 2024-11-14 RX ADMIN — GABAPENTIN 100 MG: 100 CAPSULE ORAL at 12:00

## 2024-11-14 RX ADMIN — PANTOPRAZOLE SODIUM 40 MG: 40 TABLET, DELAYED RELEASE ORAL at 11:59

## 2024-11-14 RX ADMIN — FERROUS SULFATE TAB 325 MG (65 MG ELEMENTAL FE) 325 MG: 325 (65 FE) TAB at 15:50

## 2024-11-14 RX ADMIN — IBUPROFEN 600 MG: 600 TABLET, FILM COATED ORAL at 17:49

## 2024-11-14 RX ADMIN — GABAPENTIN 100 MG: 100 CAPSULE ORAL at 15:50

## 2024-11-14 RX ADMIN — OXYCODONE HYDROCHLORIDE AND ACETAMINOPHEN 500 MG: 500 TABLET ORAL at 09:09

## 2024-11-14 RX ADMIN — GABAPENTIN 100 MG: 100 CAPSULE ORAL at 21:11

## 2024-11-14 RX ADMIN — METHOCARBAMOL TABLETS 500 MG: 500 TABLET, COATED ORAL at 15:50

## 2024-11-14 RX ADMIN — FERROUS SULFATE TAB 325 MG (65 MG ELEMENTAL FE) 325 MG: 325 (65 FE) TAB at 09:09

## 2024-11-14 RX ADMIN — HYDROXYZINE HYDROCHLORIDE 100 MG: 50 TABLET, FILM COATED ORAL at 09:09

## 2024-11-14 RX ADMIN — MELATONIN TAB 3 MG 3 MG: 3 TAB at 21:10

## 2024-11-14 RX ADMIN — ATORVASTATIN CALCIUM 10 MG: 10 TABLET, FILM COATED ORAL at 15:50

## 2024-11-14 RX ADMIN — OXYCODONE HYDROCHLORIDE AND ACETAMINOPHEN 500 MG: 500 TABLET ORAL at 17:49

## 2024-11-14 RX ADMIN — LAMOTRIGINE 25 MG: 25 TABLET ORAL at 12:00

## 2024-11-14 NOTE — NURSING NOTE
Pt stating that she is anxious. Pt states she usually takes Abilify at home for her anxiety but they did not order anything as yet. Gave Pt PRN PO Atarax 100 mg. Pt scored 25 on the Miller scale.

## 2024-11-14 NOTE — NURSING NOTE
"Pt stating she has not gone to the bathroom and \"looking pregnant\" from constipation. Pt requesting Dulcolax. Gave Pt PRN PO Dulcolax 5 mg.  "

## 2024-11-14 NOTE — ASSESSMENT & PLAN NOTE
Admission labs: CBC, CMP, TSH acceptable  Vitals stable   UDS negative   EKG reveals NSR, 87 bpm   Patient is medically cleared for admission to [facility] and treatment of underlying psychiatric illness based on available results  Please contact [facility] with any questions or concerns

## 2024-11-14 NOTE — ASSESSMENT & PLAN NOTE
Begin the following medication regimen:  Start Zyprexa 5 mg at bedtime for mood stabilization and psychotic symptoms   Start Gabapentin 100 mg TID for anxiety as well as seizure prophylaxis   Start Lamictal 25 mg daily for bipolar depression   Titration as follows: Weeks 1 and 2: 25 mg once daily; Weeks 3 and 4: 50 mg once daily; Week 5: 100 mg once daily; Week 6 and maintenance: 200 mg once daily  Start Melatonin 3 mg at bedtime for sleep

## 2024-11-14 NOTE — TREATMENT PLAN
TREATMENT PLAN REVIEW - Behavioral Health Michelle Matos 48 y.o. 1975 female MRN: [id number]    [facility] 3P BE[facility]AVIOR[facility] Room / Bed: [facility] 379/[facility] 379-02 Encounter: [id number]        Admit Date/Time:  11/13/2024  9:38 AM    Treatment Team:   MD Carlos Campbell RN Karalyn Binder Danielle Merk Karissa Marie Kormandy, CRNP Lisbeth Tatiana Lugo-Castillo Annia Acosta    Diagnosis: Principal Problem:    Bipolar I disorder, most recent episode depressed, severe with psychotic features (HCC)  Active Problems:    Medical clearance for psychiatric admission    ZACHARY (generalized anxiety disorder)    Cocaine abuse, episodic (HCC)    Post-traumatic stress disorder, chronic    Heartburn      Patient Strengths/Assets: ability for insight, average or above intelligence, capable of independent living, cooperative, communication skills, good insight, good past treatment response, insightful, motivated, motivation for treatment/growth, negotiates basic needs, patient is on a voluntary commitment, patient is willing to work on problems      Patient Barriers/Limitations: family conflict, financial instability, lack of financial means, lack of stable employment, legal problems, marital conflict, no/few hobbies or interests, noncompliant with medication, unstable housing situation    Short Term Goals: decrease in depressive symptoms, decrease in anxiety symptoms, decrease in paranoid thoughts, decrease in psychotic symptoms, decrease in suicidal thoughts, decrease in homicidal thoughts, decrease in level of agitation, decrease in frequency of aggressive outbursts, control of withdrawal symptoms, ability to stay safe on the unit, ability to stay free from restraints, improvement in ability to express basic needs, improvement in insight, improvement in reality testing, improvement in reasoning ability, improvement in self care, improvement in  impulse control, sleep improvement, improvement in appetite, tolerate medications, mood stabilization, increase in group attendance, increase in group participation, increase in socialization with peers on the unit, acceptance of need for psychiatric treatment, acceptance of psychiatric medications    Long Term Goals: improvement in depression, improvement in anxiety, stabilization of mood, free of suicidal thoughts, free of homicidal thoughts, no self abusive behavior, resolution of psychotic symptoms, resolution of withdrawal symptoms, improved impulse control, improved insight, no agitation on the unit, no aggressive behavior on the unit, able to express basic needs, acceptance of need for psychiatric medications, acceptance of need for psychiatric treatment, acceptance of need for psychiatric follow up after discharge, acceptance of psychiatric diagnosis, adequate self care, adequate sleep, adequate appetite, adequate oral intake, appropriate interaction with peers, appropriate interaction with family, stable living arrangements upon discharge, establishment of family support upon discharge    Progress Towards Goals: restarting psychiatric medications as prescribed, starting alcohol detoxification protocol, still has suicidal thoughts    Recommended Treatment: medication management, patient medication education, group therapy, milieu therapy, continued Behavioral Health psychiatric evaluation/assessment process     Treatment Frequency: daily medication monitoring, group and milieu therapy daily, monitoring through interdisciplinary rounds, monitoring through weekly patient care conferences    Expected Discharge Date: 10 days - 11/24/2024    Discharge Plan: referral for outpatient medication management with a psychiatrist, referral for outpatient psychotherapy, referral to drug and alcohol rehabilitation program/counseling    Treatment Plan Created/Updated By: Ashley Mejia DO

## 2024-11-14 NOTE — H&P
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 Psychiatric Certification:     Certification: Based upon physical, mental and social evaluations, I certify that inpatient psychiatric services are medically necessary for this patient for a duration of 10 midnights for the treatment of Bipolar I disorder, most recent episode depressed, severe with psychotic features (HCC)  Available alternative community resources do not meet the patient's mental health care needs.  I further attest that an established written individualized plan of care has been implemented and is outlined in the patient's medical records.      Ashley Mejia, DO  Psychiatry Residency, PGY-II  This note has been constructed using a voice recognition system. There may be translation, syntax, or grammatical errors. If you have any questions, please contact the dictating provider.
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
## 2024-11-14 NOTE — PROGRESS NOTES
Admission Self Assessment form was given to patient to complete, sign and return to this therapist.

## 2024-11-14 NOTE — PROGRESS NOTES
11/14/24 0853   Team Meeting   Meeting Type Daily Rounds   Team Members Present   Team Members Present Physician;Nurse;   Physician Team Member Chani   Nursing Team Member Jessica   Care Management Team Member Tre   Patient/Family Present   Patient Present No   Patient's Family Present No     201. Pt was admitted due to increase depression and SI. Pt hx alcohol and drugs. Pt  is calm and cooperative. Pt discharge TBD.

## 2024-11-14 NOTE — NURSING NOTE
"Pt stating \"I am feeling a little less anxious.\" Pt is seen in the dayroom social with peers. PRN PO Atarax 100 mg is effective.  "

## 2024-11-14 NOTE — NURSING NOTE
"Pt is calm, cooperative, withdrawn to self and isolating in room. Pt denies SI, HI, AH, VH at this time. Pt states \"I am depressed and I have a headache other than that I'm okay.\" Pt is not going to groups, but she is medications/meal complaint. Pt denies any unmet needs and remains on q15 min checks for safety.  "

## 2024-11-15 LAB
BACTERIA UR QL AUTO: ABNORMAL /HPF
BILIRUB UR QL STRIP: NEGATIVE
CLARITY UR: CLEAR
COLOR UR: YELLOW
GLUCOSE UR STRIP-MCNC: NEGATIVE MG/DL
HGB UR QL STRIP.AUTO: 50
KETONES UR STRIP-MCNC: ABNORMAL MG/DL
LEUKOCYTE ESTERASE UR QL STRIP: 25
MUCOUS THREADS UR QL AUTO: ABNORMAL
NITRITE UR QL STRIP: NEGATIVE
NON-SQ EPI CELLS URNS QL MICRO: ABNORMAL /HPF
PH UR STRIP.AUTO: 5 [PH]
PROT UR STRIP-MCNC: ABNORMAL MG/DL
RBC #/AREA URNS AUTO: ABNORMAL /HPF
SP GR UR STRIP.AUTO: 1.02 (ref 1–1.04)
UROBILINOGEN UA: 1 MG/DL
WBC #/AREA URNS AUTO: ABNORMAL /HPF

## 2024-11-15 PROCEDURE — 99233 SBSQ HOSP IP/OBS HIGH 50: CPT | Performed by: PSYCHIATRY & NEUROLOGY

## 2024-11-15 PROCEDURE — 81001 URINALYSIS AUTO W/SCOPE: CPT | Performed by: NURSE PRACTITIONER

## 2024-11-15 RX ORDER — BISACODYL 5 MG/1
5 TABLET, DELAYED RELEASE ORAL DAILY PRN
Status: DISCONTINUED | OUTPATIENT
Start: 2024-11-15 | End: 2024-11-15

## 2024-11-15 RX ORDER — VENLAFAXINE HYDROCHLORIDE 75 MG/1
75 CAPSULE, EXTENDED RELEASE ORAL DAILY
Status: DISCONTINUED | OUTPATIENT
Start: 2024-11-17 | End: 2024-11-19 | Stop reason: HOSPADM

## 2024-11-15 RX ORDER — FLUCONAZOLE 100 MG/1
100 TABLET ORAL ONCE
Status: COMPLETED | OUTPATIENT
Start: 2024-11-15 | End: 2024-11-15

## 2024-11-15 RX ORDER — METRONIDAZOLE 500 MG/1
500 TABLET ORAL EVERY 8 HOURS SCHEDULED
Status: DISCONTINUED | OUTPATIENT
Start: 2024-11-15 | End: 2024-11-19 | Stop reason: HOSPADM

## 2024-11-15 RX ORDER — BISACODYL 5 MG/1
10 TABLET, DELAYED RELEASE ORAL DAILY PRN
Status: DISCONTINUED | OUTPATIENT
Start: 2024-11-15 | End: 2024-11-19 | Stop reason: HOSPADM

## 2024-11-15 RX ORDER — GABAPENTIN 100 MG/1
200 CAPSULE ORAL 3 TIMES DAILY
Status: DISCONTINUED | OUTPATIENT
Start: 2024-11-15 | End: 2024-11-18

## 2024-11-15 RX ORDER — OLANZAPINE 10 MG/1
10 TABLET ORAL
Status: DISCONTINUED | OUTPATIENT
Start: 2024-11-15 | End: 2024-11-18

## 2024-11-15 RX ORDER — ONDANSETRON 4 MG/1
4 TABLET, ORALLY DISINTEGRATING ORAL EVERY 6 HOURS PRN
Status: DISCONTINUED | OUTPATIENT
Start: 2024-11-15 | End: 2024-11-19 | Stop reason: HOSPADM

## 2024-11-15 RX ORDER — NICOTINE 21 MG/24HR
1 PATCH, TRANSDERMAL 24 HOURS TRANSDERMAL DAILY
Status: DISCONTINUED | OUTPATIENT
Start: 2024-11-15 | End: 2024-11-19 | Stop reason: HOSPADM

## 2024-11-15 RX ORDER — NITROFURANTOIN 25; 75 MG/1; MG/1
100 CAPSULE ORAL 2 TIMES DAILY WITH MEALS
Status: DISCONTINUED | OUTPATIENT
Start: 2024-11-15 | End: 2024-11-16

## 2024-11-15 RX ORDER — VENLAFAXINE HYDROCHLORIDE 37.5 MG/1
37.5 CAPSULE, EXTENDED RELEASE ORAL DAILY
Status: COMPLETED | OUTPATIENT
Start: 2024-11-15 | End: 2024-11-16

## 2024-11-15 RX ORDER — LANOLIN ALCOHOL/MO/W.PET/CERES
400 CREAM (GRAM) TOPICAL 2 TIMES DAILY
Status: DISCONTINUED | OUTPATIENT
Start: 2024-11-15 | End: 2024-11-19 | Stop reason: HOSPADM

## 2024-11-15 RX ORDER — VENLAFAXINE HYDROCHLORIDE 37.5 MG/1
37.5 CAPSULE, EXTENDED RELEASE ORAL DAILY
Status: DISCONTINUED | OUTPATIENT
Start: 2024-11-15 | End: 2024-11-15

## 2024-11-15 RX ADMIN — SENNOSIDES AND DOCUSATE SODIUM 1 TABLET: 50; 8.6 TABLET ORAL at 09:00

## 2024-11-15 RX ADMIN — Medication 400 MG: at 11:04

## 2024-11-15 RX ADMIN — OXYCODONE HYDROCHLORIDE AND ACETAMINOPHEN 500 MG: 500 TABLET ORAL at 17:20

## 2024-11-15 RX ADMIN — OLANZAPINE 10 MG: 10 TABLET, FILM COATED ORAL at 21:06

## 2024-11-15 RX ADMIN — GABAPENTIN 200 MG: 100 CAPSULE ORAL at 21:06

## 2024-11-15 RX ADMIN — ONDANSETRON 4 MG: 4 TABLET, ORALLY DISINTEGRATING ORAL at 21:40

## 2024-11-15 RX ADMIN — BISACODYL 10 MG: 5 TABLET, COATED ORAL at 15:58

## 2024-11-15 RX ADMIN — FLUCONAZOLE 100 MG: 100 TABLET ORAL at 21:06

## 2024-11-15 RX ADMIN — IBUPROFEN 600 MG: 600 TABLET, FILM COATED ORAL at 15:59

## 2024-11-15 RX ADMIN — GABAPENTIN 200 MG: 100 CAPSULE ORAL at 15:58

## 2024-11-15 RX ADMIN — NITROFURANTOIN (MONOHYDRATE/MACROCRYSTALS) 100 MG: 25; 75 CAPSULE ORAL at 21:06

## 2024-11-15 RX ADMIN — FLUTICASONE FUROATE AND VILANTEROL TRIFENATATE 1 PUFF: 200; 25 POWDER RESPIRATORY (INHALATION) at 08:01

## 2024-11-15 RX ADMIN — METRONIDAZOLE 500 MG: 500 TABLET ORAL at 21:06

## 2024-11-15 RX ADMIN — FERROUS SULFATE TAB 325 MG (65 MG ELEMENTAL FE) 325 MG: 325 (65 FE) TAB at 17:20

## 2024-11-15 RX ADMIN — FERROUS SULFATE TAB 325 MG (65 MG ELEMENTAL FE) 325 MG: 325 (65 FE) TAB at 08:01

## 2024-11-15 RX ADMIN — NICOTINE 1 PATCH: 21 PATCH, EXTENDED RELEASE TRANSDERMAL at 09:19

## 2024-11-15 RX ADMIN — ATORVASTATIN CALCIUM 10 MG: 10 TABLET, FILM COATED ORAL at 17:21

## 2024-11-15 RX ADMIN — GABAPENTIN 100 MG: 100 CAPSULE ORAL at 08:01

## 2024-11-15 RX ADMIN — LAMOTRIGINE 25 MG: 25 TABLET ORAL at 08:01

## 2024-11-15 RX ADMIN — Medication 400 MG: at 17:20

## 2024-11-15 RX ADMIN — CYANOCOBALAMIN TAB 1000 MCG 500 MCG: 1000 TAB at 11:04

## 2024-11-15 RX ADMIN — OXYCODONE HYDROCHLORIDE AND ACETAMINOPHEN 500 MG: 500 TABLET ORAL at 08:01

## 2024-11-15 RX ADMIN — VENLAFAXINE HYDROCHLORIDE 37.5 MG: 37.5 CAPSULE, EXTENDED RELEASE ORAL at 11:04

## 2024-11-15 RX ADMIN — ACETAMINOPHEN 975 MG: 325 TABLET ORAL at 08:01

## 2024-11-15 RX ADMIN — TRAZODONE HYDROCHLORIDE 50 MG: 50 TABLET ORAL at 21:06

## 2024-11-15 RX ADMIN — PANTOPRAZOLE SODIUM 40 MG: 40 TABLET, DELAYED RELEASE ORAL at 06:20

## 2024-11-15 RX ADMIN — MELATONIN TAB 3 MG 3 MG: 3 TAB at 21:06

## 2024-11-15 NOTE — PLAN OF CARE
Problem: Potential for Falls  Goal: Patient will remain free of falls  Description: INTERVENTIONS:  - Educate patient/family on patient safety including physical limitations  - Instruct patient to call for assistance with activity   - Consult OT/PT to assist with strengthening/mobility   - Keep Call bell within reach  - Keep bed low and locked with side rails adjusted as appropriate  - Keep care items and personal belongings within reach  - Initiate and maintain comfort rounds  - Make Fall Risk Sign visible to staff    Problem: PSYCHOSIS  Goal: Will report no hallucinations or delusions  Description: Interventions:  - Administer medication as  ordered  - Every waking shifts and PRN assess for the presence of hallucinations and or delusions  - Assist with reality testing to support increasing orientation  - Assess if patient's hallucinations or delusions are encouraging self-harm or harm to others and intervene as appropriate  Outcome: Progressing     Problem: Depression  Goal: Treatment Goal: Demonstrate behavioral control of depressive symptoms, verbalize feelings of improved mood/affect, and adopt new coping skills prior to discharge  Outcome: Progressing  Goal: Verbalize thoughts and feelings  Description: Interventions:  - Assess and re-assess patient's level of risk   - Engage patient in 1:1 interactions, daily, for a minimum of 15 minutes   - Encourage patient to express feelings, fears, frustrations, hopes   Outcome: Progressing  Goal: Refrain from harming self  Description: Interventions:  - Monitor patient closely, per order   - Supervise medication ingestion, monitor effects and side effects   Outcome: Progressing  Goal: Refrain from isolation  Description: Interventions:  - Develop a trusting relationship   - Encourage socialization   Outcome: Progressing  Goal: Refrain from self-neglect  Outcome: Progressing  Goal: Attend and participate in unit activities, including therapeutic, recreational, and  educational groups  Description: Interventions:  - Provide therapeutic and educational activities daily, encourage attendance and participation, and document same in the medical record   Outcome: Progressing  Goal: Complete daily ADLs, including personal hygiene independently, as able  Description: Interventions:  - Observe, teach, and assist patient with ADLS  -  Monitor and promote a balance of rest/activity, with adequate nutrition and elimination   Outcome: Progressing   - Apply yellow socks and bracelet for high fall risk patients  - Consider moving patient to room near nurses station  Outcome: Progressing     Problem: SELF HARM/SUICIDALITY  Goal: Will have no self-injury during hospital stay  Description: INTERVENTIONS:  - Q 15 MINUTES: Routine safety checks  - Q WAKING SHIFT & PRN: Assess risk to determine if routine checks are adequate to maintain patient safety  - Encourage patient to participate actively in care by formulating a plan to combat response to suicidal ideation, identify supports and resources  Outcome: Progressing

## 2024-11-15 NOTE — NURSING NOTE
"Patient was visible in the small tv room this morning, then retreated to their room after breakfast to lay down. Expresses feeling depressed, anxious, and reports auditory hallucinations behind their left hear of \"a elvira\". Denies SI or HI.     On CIWA protocol, most recent score 5. Patient assessed for orientation and oriented/redirected as needed. Patient fall risk will be passed on to the next shift.     Medication compliant.    Encouraged groups and hygiene.    Staff availability reinforced.   "

## 2024-11-15 NOTE — PROGRESS NOTES
11/15/24 0841   Team Meeting   Meeting Type Daily Rounds   Team Members Present   Team Members Present Physician;Nurse;   Physician Team Member Chani   Nursing Team Member Mary   Care Management Team Member Tre   Patient/Family Present   Patient Present No   Patient's Family Present No     201. Pt is med/ meal compliant. Pt denies all symptoms. Pt is calm and cooperative. Pt is discharging next week Wednesday.

## 2024-11-15 NOTE — ASSESSMENT & PLAN NOTE
Start Effexor 37.5 mg today and tomorrow and increase to 75 mg on 11/17 for migraine prophylaxis   Magnesium oxide supplementation, 400 mg BID  Vitamin B12 supplement   Neurology follow-up outpatient, referral placed

## 2024-11-15 NOTE — NURSING NOTE
Patient complained of constipation and requested PRN medication. Received PRN senokot s 8.6-50 mg for constipation at 0900.    Upon reassessment one hour later at 1000 patient reports that they have not yet had a bowel movement. Medication not effective.

## 2024-11-15 NOTE — CASE MANAGEMENT
Discussed with patient: AUDIT score of 32 UDS/Identified Substance(s) used: Alcohol   Risks discussed included: affect her health and body organs, relationships, and inability to maintain employment/ housing.   Recommendations discussed: psychotherapy and outpatient case management to help ease financial stressor as well as rehab.   Patient's response: patient was not agreeable to be referred to rehab.
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
## 2024-11-15 NOTE — NURSING NOTE
Pt cooperative and pleasant upon approach. Denies any SI/HI/AVH at this time. Pt endorses an ongoing headache, 6/10 pain, and constipation. PRN PO motrin 400mg and dulcolax 5mg given @ 2111. Moltrin effective - pt resting in bed, offers no c/o pain at this time. Dulcolax not effective, pt did not report BM. Adherent with scheduled medications. CIWA = 3 @ 2054. Q15 minute observation maintained for safety.

## 2024-11-15 NOTE — PROGRESS NOTES
Progress Note - Behavioral Health   Name: Michelle Matos 48 y.o. female I MRN: [id number]  Unit/Bed#: -02 I Date of Admission: 11/13/2024   Date of Service: 11/15/2024 I Hospital Day: 2     Assessment & Plan  Bipolar I disorder, most recent episode depressed, severe with psychotic features (HCC)  Michelle is calm and cooperative today. She notes ongoing AH but reports they are now muffles. She has passive SI but no plan and contracts for safety. She is tolerating her medications without side effects.     Medication regimen as follows with any changes bolded:  Increase Zyprexa to 10 mg at bedtime for mood stabilization and psychotic symptoms   Increase Gabapentin to 200 mg TID for anxiety as well as seizure prophylaxis   Continue Lamictal 25 mg daily for bipolar depression and seizure prophylaxis  Titration as follows: Weeks 1 and 2: 25 mg once daily; Weeks 3 and 4: 50 mg once daily; Week 5: 100 mg once daily; Week 6 and maintenance: 200 mg once daily  Start Effexor 37.5 mg for two doses and increase to 75 mg on 11/17/2024 - for mood, anxiety, and migraines  Continue melatonin 3 mg at bedtime for sleep  ZACHARY (generalized anxiety disorder)  See Principal problem  Cocaine abuse, episodic (HCC)  Encourage cessation   Counseling on the unit    Post-traumatic stress disorder, chronic  Stable  See Principal problem  Tobacco dependence syndrome  Encourage cessation   Nicotine patch and gum   Migraine with aura and without status migrainosus, not intractable  Start Effexor 37.5 mg today and tomorrow and increase to 75 mg on 11/17 for migraine prophylaxis   Magnesium oxide supplementation, 400 mg BID  Vitamin B12 supplement   Neurology follow-up outpatient, referral placed        ------------------------------------------------------------    Recommended Treatment Plan:   Behavioral checks every 15 minutes  Encourage participation in group therapy, milieu therapy and occupational therapy.  Assess for side effects of  "medications.  Collaboration with [facility] for medical co-morbidities as indicated.  Continue discussion with CM/SW to assist with obtaining collateral, disposition planning, and the implementation of patient-centered individualized plan of care.  Estimated Discharge Date: 11/21/2024    Risks, benefits and possible side effects of Medications: Risks, benefits, and possible side effects of medications have been explained to the patient, who verbalizes understanding    Legal status: 201    Disposition: to be determined, likely previous living arrangement      Subjective: Patient's chart was reviewed, and patient's progress and plan was discussed with treatment team. Per nursing report, Michelle has been cooperative and pleasant on approach, at times isolative to her room. She has many somatic complaints but seeks out staff for prns.     Prn medications over the past 24 hours: Ducolax, Robaxin, Motrin, Senokot, Nicotine gum    Michelle was evaluated this morning for continuity of care. On examination, Michelle is lying comfortably in bed and is pleasant. She states her mood is \"emotional\"  and when asked to expand upon this she expresses feelings of sadness and anger regarding her upcoming court case for child custody. She reports sleeping better last night, but did wake up a few times. Her appetite has been improving. She denies adverse effects from medications. She continues to report fleeting passive death wishes without plan, but contracts for safety. She denies HI. She notes ongoing AH of an unknown male but reports that they are muffles and she cannot make out what they are saying. She also endorses VH of \"shadow people\" when she closes her eyes only.    VS: Reviewed, within normal limits    Progress Toward Goals: medication and meal compliant, pleasant, somatic complaints, ongoing SI and AH    Psychiatric Review of Systems:  Behavior over the last 24 hours: unchanged  Sleep: improving  Appetite: adequate, " "improving  Medication side effects: none verbalized  Medical ROS: Complete review of systems is negative except as noted above.    Vital signs in last 24 hours:  Temp:  [97.7 °F (36.5 °C)-99.1 °F (37.3 °C)] 97.7 °F (36.5 °C)  HR:  [] 91  BP: (107-133)/(65-81) 133/80  Resp:  [16-18] 16  SpO2:  [98 %-100 %] 100 %  O2 Device: None (Room air)    Mental Status Exam:    Appearance:  alert, marginal grooming and hygiene, orange dyed hair , tattooed, good eye contact, wrapped in blanket, casually dressed, and appears older than stated age   Behavior:  calm and cooperative   Speech:  spontaneous, clear, normal rate, normal volume, and coherent   Mood:  \"Emotional\"   Affect:  Constricted, tearful at times   Thought Process:  generally linear and goal-directed but circumstantial at times   Associations: intact associations   Thought Content:  No overt delusions, mild paranoia, somatic preoccupation   Perceptual Disturbances: auditory hallucinations of muffles, visual hallucinations of shadow people, and distracted   Risk Potential: Suicidal ideation - Yes, fleeting suicidal thoughts, contracts for safety on the unit, would talk to staff if not feeling safe on the unit  Homicidal ideation - None at present  Potential for aggression - Not at present   Sensorium:  oriented to person, place, and time/date   Memory:  recent and remote memory grossly intact   Consciousness:  alert and awake   Attention/Concentration: attention span and concentration appear shorter than expected for age   Insight:  fair   Judgment: limited   Gait/Station: normal gait/station, normal balance   Motor Activity: no abnormal movements     Current Medications:  Current Facility-Administered Medications   Medication Dose Route Frequency Provider Last Rate    acetaminophen  975 mg Oral Q6H PRN Ashley Mejia DO      albuterol  2 puff Inhalation Q6H PRN Bethanie Wilde MD      aluminum-magnesium hydroxide-simethicone  30 mL Oral Q4H PRN Ashley " Roberto, DO      amitriptyline  25 mg Oral HS Ashley Mejia, DO      ascorbic acid  500 mg Oral BID Ashley Mejia, DO      atorvastatin  10 mg Oral Daily With Dinner EDWIN Núñez      benztropine  1 mg Intramuscular Q4H PRN Max 6/day Ashley Mejia, DO      benztropine  1 mg Oral Q4H PRN Max 6/day Ashley Mejia, DO      bisacodyl  10 mg Oral Daily PRN Ashley Mejia, DO      bisacodyl  10 mg Rectal Daily PRN Ashley Mejia, DO      cyanocobalamin  500 mcg Oral Daily Ashley Mejia, DO      hydrOXYzine HCL  50 mg Oral Q6H PRN Max 4/day Ashley Mejia, DO      Or    diphenhydrAMINE  50 mg Intramuscular Q6H PRN Ashley Mejia, DO      ergocalciferol  50,000 Units Oral Weekly EDWIN Núñez      ferrous sulfate  325 mg Oral BID With Meals Ashley Mejia, DO      fluticasone-vilanterol  1 puff Inhalation Daily Bethanie Wilde MD      gabapentin  200 mg Oral TID Ashley Mejia, DO      hydrOXYzine HCL  100 mg Oral Q6H PRN Max 4/day Ashley Mejia, DO      Or    LORazepam  2 mg Intramuscular Q6H PRN Ashley Mejia, DO      hydrOXYzine HCL  25 mg Oral Q6H PRN Max 4/day Ashley Mejia, DO      ibuprofen  400 mg Oral Q6H PRN Ashley Mejia, DO      ibuprofen  600 mg Oral Q8H PRN Ashley Mejia, DO      lamoTRIgine  25 mg Oral Daily Ashley Mejia, DO      magnesium Oxide  400 mg Oral BID Ashley Mejia, DO      melatonin  3 mg Oral HS Ashley Mejia, DO      methocarbamol  500 mg Oral Q8H PRN EDWIN Núñez      nicotine  1 patch Transdermal Daily Ashley Mejia, DO      nicotine polacrilex  2 mg Oral Q2H PRN Ashley Mejia, DO      OLANZapine  10 mg Oral Q3H PRN Max 3/day Ashley Mejia, DO      Or    OLANZapine  10 mg Intramuscular Q3H PRN Max 3/day Ashley Mejia, DO      OLANZapine  5 mg Oral Q3H PRN Max 6/day Ashley Mejia, DO      Or    OLANZapine  5 mg Intramuscular Q3H PRN Max 6/day Ashley Mejia DO      OLANZapine  10 mg Oral HS Ashley Mejia DO      OLANZapine   2.5 mg Oral Q3H PRN Max 8/day Ashley Mejia,       pantoprazole  40 mg Oral Early Morning Bethanie Wilde MD      propranolol  10 mg Oral Q8H PRN Ashley Mejia,       senna-docusate sodium  1 tablet Oral Daily PRN Ashley Mejia,       traZODone  50 mg Oral HS PRN Ashley Mejia, DO         Behavioral Health Medications: all current active meds have been reviewed. Changes as in plan section above.    Laboratory results:  I have personally reviewed all pertinent laboratory/tests results.   Recent Results (from the past 48 hours)   POCT alcohol breath test    Collection Time: 11/13/24 10:14 AM   Result Value Ref Range    EXTBreath Alcohol 0.00    Rapid drug screen, urine    Collection Time: 11/13/24 10:16 AM   Result Value Ref Range    Amph/Meth UR Negative Negative    Barbiturate Ur Negative Negative    Benzodiazepine Urine Negative Negative    Cocaine Urine Negative Negative    Methadone Urine Negative Negative    Opiate Urine Negative Negative    PCP Ur Negative Negative    THC Urine Negative Negative    Oxycodone Urine Negative Negative    Fentanyl Urine Negative Negative    HYDROCODONE URINE Negative Negative   TSH, 3rd generation with Free T4 reflex    Collection Time: 11/14/24  6:14 AM   Result Value Ref Range    TSH 3RD GENERATON 1.668 0.450 - 4.500 uIU/mL   hCG, serum, qualitative    Collection Time: 11/14/24  6:14 AM   Result Value Ref Range    Preg, Serum Negative Negative   CBC and differential    Collection Time: 11/14/24  6:15 AM   Result Value Ref Range    WBC 8.03 4.31 - 10.16 Thousand/uL    RBC 5.36 (H) 3.81 - 5.12 Million/uL    Hemoglobin 15.5 (H) 11.5 - 15.4 g/dL    Hematocrit 46.5 (H) 34.8 - 46.1 %    MCV 87 82 - 98 fL    MCH 28.9 26.8 - 34.3 pg    MCHC 33.3 31.4 - 37.4 g/dL    RDW 14.0 11.6 - 15.1 %    MPV 10.0 8.9 - 12.7 fL    Platelets 366 149 - 390 Thousands/uL    nRBC 0 /100 WBCs    Segmented % 52 43 - 75 %    Immature Grans % 0 0 - 2 %    Lymphocytes % 38 14 - 44 %    Monocytes % 6  4 - 12 %    Eosinophils Relative 2 0 - 6 %    Basophils Relative 2 (H) 0 - 1 %    Absolute Neutrophils 4.25 1.85 - 7.62 Thousands/µL    Absolute Immature Grans 0.02 0.00 - 0.20 Thousand/uL    Absolute Lymphocytes 3.04 0.60 - 4.47 Thousands/µL    Absolute Monocytes 0.44 0.17 - 1.22 Thousand/µL    Eosinophils Absolute 0.16 0.00 - 0.61 Thousand/µL    Basophils Absolute 0.12 (H) 0.00 - 0.10 Thousands/µL   Hemoglobin A1C    Collection Time: 11/14/24  6:16 AM   Result Value Ref Range    Hemoglobin A1C 5.7 (H) Normal 4.0-5.6%; PreDiabetic 5.7-6.4%; Diabetic >=6.5%; Glycemic control for adults with diabetes <7.0% %     mg/dl   Vitamin B12    Collection Time: 11/14/24  6:16 AM   Result Value Ref Range    Vitamin B-12 505 180 - 914 pg/mL   Folate    Collection Time: 11/14/24  6:16 AM   Result Value Ref Range    Folate 7.8 >5.9 ng/mL   Vitamin D 25 hydroxy    Collection Time: 11/14/24  6:16 AM   Result Value Ref Range    Vit D, 25-Hydroxy <7.0 (L) 30.0 - 100.0 ng/mL   Total Syphilis (IgG/IgM) Screening    Collection Time: 11/14/24  6:16 AM   Result Value Ref Range    Syphilis Total Antibody Non-reactive Non-Reactive   Lipid panel    Collection Time: 11/14/24  6:17 AM   Result Value Ref Range    Cholesterol 255 (H) See Comment mg/dL    Triglycerides 109 See Comment mg/dL    HDL, Direct 67 >=50 mg/dL    LDL Calculated 166 (H) 0 - 100 mg/dL    Non-HDL-Chol (CHOL-HDL) 188 mg/dl   Comprehensive metabolic panel    Collection Time: 11/14/24  6:17 AM   Result Value Ref Range    Sodium 142 135 - 147 mmol/L    Potassium 4.4 3.5 - 5.3 mmol/L    Chloride 103 96 - 108 mmol/L    CO2 31 21 - 32 mmol/L    ANION GAP 8 4 - 13 mmol/L    BUN 13 5 - 25 mg/dL    Creatinine 0.74 0.60 - 1.30 mg/dL    Glucose 114 65 - 140 mg/dL    Glucose, Fasting 114 (H) 65 - 99 mg/dL    Calcium 9.7 8.4 - 10.2 mg/dL    AST 16 13 - 39 U/L    ALT 15 7 - 52 U/L    Alkaline Phosphatase 89 34 - 104 U/L    Total Protein 7.1 6.4 - 8.4 g/dL    Albumin 4.3 3.5 - 5.0  g/dL    Total Bilirubin 0.36 0.20 - 1.00 mg/dL    eGFR 96 ml/min/1.73sq m   Magnesium    Collection Time: 11/14/24  6:17 AM   Result Value Ref Range    Magnesium 2.2 1.9 - 2.7 mg/dL   Phosphorus    Collection Time: 11/14/24  6:17 AM   Result Value Ref Range    Phosphorus 4.8 (H) 2.7 - 4.5 mg/dL   UA (URINE) with reflex to Scope    Collection Time: 11/14/24  1:26 PM   Result Value Ref Range    Color, UA Brenda (A) Straw, Yellow, Pale Yellow    Clarity, UA Slightly Cloudy (A) Clear, Other    Specific Gravity, UA 1.025 1.003 - 1.040    pH, UA 5.0 4.5, 5.0, 5.5, 6.0, 6.5, 7.0, 7.5, 8.0    Leukocytes, UA 25.0 (A) Negative    Nitrite, UA Negative Negative    Protein, UA 30 (1+) (A) Negative mg/dl    Glucose, UA Negative Negative mg/dl    Ketones, UA 5 (Trace) (A) Negative mg/dl    Bilirubin, UA Negative Negative    Occult Blood, UA 10.0 (A) Negative    UROBILINOGEN UA 1.0 1.0, Negative mg/dL   Urine Microscopic    Collection Time: 11/14/24  1:26 PM   Result Value Ref Range    RBC, UA 0-1 None Seen, 0-1, 1-2, 2-4, 0-5 /hpf    WBC, UA 1-2 None Seen, 0-1, 1-2, 0-5, 2-4 /hpf    Epithelial Cells Innumerable (A) None Seen, Occasional /hpf    Bacteria, UA Innumerable (A) None Seen, Occasional /hpf    Ca Oxalate Cyn, UA Moderate (A) None Seen /hpf    MUCUS THREADS Occasional (A) None Seen   ECG 12 lead    Collection Time: 11/14/24  2:40 PM   Result Value Ref Range    Ventricular Rate 87 BPM    Atrial Rate 87 BPM    WA Interval 126 ms    QRSD Interval 96 ms    QT Interval 370 ms    QTC Interval 446 ms    P Axis 67 degrees    QRS Axis 77 degrees    T Wave Axis 62 degrees        Counseling / Coordination of Care:  Patient's progress discussed with staff in treatment team meeting.  Medication changes discussed with patient.  Medication education provided to patient.  Educated on importance of medication and treatment compliance.  Supportive therapy provided to patient.  Group attendance encouraged.      Ashley Mejia, DO  11/15/24  Psychiatry Residency, PGY-II  This note has been constructed using a voice recognition system. There may be translation, syntax, or grammatical errors. If you have any questions, please contact the dictating author.

## 2024-11-15 NOTE — PROGRESS NOTES
11/15/24 0839   Team Meeting   Meeting Type Daily Rounds   Team Members Present   Team Members Present Nurse;Physician;   Physician Team Member Chani   Nursing Team Member [facility] Team Member Geovanny   Patient/Family Present   Patient Present No   Patient's Family Present No     Patient new admit on unit. 201. Reports alcohol and cocaine use. 2 prior suicide attempts. Children in [facility] custody. Increase in SI over the past 7 months. Hx of migraines.

## 2024-11-15 NOTE — ASSESSMENT & PLAN NOTE
Michelle is calm and cooperative today. She notes ongoing AH but reports they are now muffles. She has passive SI but no plan and contracts for safety. She is tolerating her medications without side effects.     Medication regimen as follows with any changes bolded:  Increase Zyprexa to 10 mg at bedtime for mood stabilization and psychotic symptoms   Increase Gabapentin to 200 mg TID for anxiety as well as seizure prophylaxis   Continue Lamictal 25 mg daily for bipolar depression and seizure prophylaxis  Titration as follows: Weeks 1 and 2: 25 mg once daily; Weeks 3 and 4: 50 mg once daily; Week 5: 100 mg once daily; Week 6 and maintenance: 200 mg once daily  Start Effexor 37.5 mg for two doses and increase to 75 mg on 11/17/2024 - for mood, anxiety, and migraines  Continue melatonin 3 mg at bedtime for sleep

## 2024-11-16 PROCEDURE — 99232 SBSQ HOSP IP/OBS MODERATE 35: CPT | Performed by: PSYCHIATRY & NEUROLOGY

## 2024-11-16 RX ORDER — FLUTICASONE PROPIONATE 50 MCG
1 SPRAY, SUSPENSION (ML) NASAL DAILY
Status: DISCONTINUED | OUTPATIENT
Start: 2024-11-16 | End: 2024-11-19 | Stop reason: HOSPADM

## 2024-11-16 RX ADMIN — PANTOPRAZOLE SODIUM 40 MG: 40 TABLET, DELAYED RELEASE ORAL at 06:40

## 2024-11-16 RX ADMIN — FLUTICASONE PROPIONATE 1 SPRAY: 50 SPRAY, METERED NASAL at 15:13

## 2024-11-16 RX ADMIN — FERROUS SULFATE TAB 325 MG (65 MG ELEMENTAL FE) 325 MG: 325 (65 FE) TAB at 17:17

## 2024-11-16 RX ADMIN — OXYCODONE HYDROCHLORIDE AND ACETAMINOPHEN 500 MG: 500 TABLET ORAL at 08:37

## 2024-11-16 RX ADMIN — LAMOTRIGINE 25 MG: 25 TABLET ORAL at 08:37

## 2024-11-16 RX ADMIN — FERROUS SULFATE TAB 325 MG (65 MG ELEMENTAL FE) 325 MG: 325 (65 FE) TAB at 08:37

## 2024-11-16 RX ADMIN — GABAPENTIN 200 MG: 100 CAPSULE ORAL at 17:17

## 2024-11-16 RX ADMIN — Medication 400 MG: at 08:37

## 2024-11-16 RX ADMIN — METRONIDAZOLE 500 MG: 500 TABLET ORAL at 15:13

## 2024-11-16 RX ADMIN — CYANOCOBALAMIN TAB 1000 MCG 500 MCG: 1000 TAB at 08:37

## 2024-11-16 RX ADMIN — SENNOSIDES AND DOCUSATE SODIUM 1 TABLET: 50; 8.6 TABLET ORAL at 09:10

## 2024-11-16 RX ADMIN — ACETAMINOPHEN 975 MG: 325 TABLET ORAL at 13:28

## 2024-11-16 RX ADMIN — HYDROXYZINE HYDROCHLORIDE 100 MG: 50 TABLET, FILM COATED ORAL at 19:39

## 2024-11-16 RX ADMIN — GABAPENTIN 200 MG: 100 CAPSULE ORAL at 21:30

## 2024-11-16 RX ADMIN — VENLAFAXINE HYDROCHLORIDE 37.5 MG: 37.5 CAPSULE, EXTENDED RELEASE ORAL at 08:37

## 2024-11-16 RX ADMIN — NITROFURANTOIN (MONOHYDRATE/MACROCRYSTALS) 100 MG: 25; 75 CAPSULE ORAL at 09:11

## 2024-11-16 RX ADMIN — GABAPENTIN 200 MG: 100 CAPSULE ORAL at 08:37

## 2024-11-16 RX ADMIN — NICOTINE POLACRILEX 2 MG: 2 GUM, CHEWING BUCCAL at 19:39

## 2024-11-16 RX ADMIN — METRONIDAZOLE 500 MG: 500 TABLET ORAL at 06:40

## 2024-11-16 RX ADMIN — TRAZODONE HYDROCHLORIDE 50 MG: 50 TABLET ORAL at 21:31

## 2024-11-16 RX ADMIN — MELATONIN TAB 3 MG 3 MG: 3 TAB at 21:30

## 2024-11-16 RX ADMIN — METRONIDAZOLE 500 MG: 500 TABLET ORAL at 21:31

## 2024-11-16 RX ADMIN — Medication 400 MG: at 17:17

## 2024-11-16 RX ADMIN — IBUPROFEN 600 MG: 600 TABLET, FILM COATED ORAL at 19:39

## 2024-11-16 RX ADMIN — NICOTINE POLACRILEX 2 MG: 2 GUM, CHEWING BUCCAL at 15:16

## 2024-11-16 RX ADMIN — FLUTICASONE FUROATE AND VILANTEROL TRIFENATATE 1 PUFF: 200; 25 POWDER RESPIRATORY (INHALATION) at 08:43

## 2024-11-16 RX ADMIN — ATORVASTATIN CALCIUM 10 MG: 10 TABLET, FILM COATED ORAL at 17:17

## 2024-11-16 RX ADMIN — BISACODYL 10 MG: 5 TABLET, COATED ORAL at 13:28

## 2024-11-16 RX ADMIN — NICOTINE 1 PATCH: 21 PATCH, EXTENDED RELEASE TRANSDERMAL at 08:37

## 2024-11-16 RX ADMIN — BISACODYL 10 MG: 10 SUPPOSITORY RECTAL at 19:38

## 2024-11-16 RX ADMIN — OLANZAPINE 10 MG: 10 TABLET, FILM COATED ORAL at 21:31

## 2024-11-16 RX ADMIN — OXYCODONE HYDROCHLORIDE AND ACETAMINOPHEN 500 MG: 500 TABLET ORAL at 17:17

## 2024-11-16 NOTE — NURSING NOTE
Patient is visible and social with peers.     This morning while eating breakfast in the dayroom patient approached this writer due to another patient attempting to touch food on their plate. Patients were  and this writer gave patient a chance to explain their perspective back in their room. Discussed with patient and they verbalized understanding to come to staff if they have any further issues with peers.     Reports some anxiety and agitation due to peers. Denies SI, HI, or hallucinations of any kind. Reports they had a nightmare last night.     Medication compliant.     Encouraged groups.    Staff availability reinforced.    Assessed patient for orientation and reoriented as necessary. Patient on high fall risk due to CIWA protocol, which will be passed on to oncoming shift. Most recent score 2.

## 2024-11-16 NOTE — PLAN OF CARE
Problem: Potential for Falls  Goal: Patient will remain free of falls  Description: INTERVENTIONS:  - Educate patient/family on patient safety including physical limitations  - Instruct patient to call for assistance with activity   - Consult OT/PT to assist with strengthening/mobility   - Keep Call bell within reach  - Keep bed low and locked with side rails adjusted as appropriate  - Keep care items and personal belongings within reach  - Initiate and maintain comfort rounds  - Make Fall Risk Sign visible to staff    Problem: SELF HARM/SUICIDALITY  Goal: Will have no self-injury during hospital stay  Description: INTERVENTIONS:  - Q 15 MINUTES: Routine safety checks  - Q WAKING SHIFT & PRN: Assess risk to determine if routine checks are adequate to maintain patient safety  - Encourage patient to participate actively in care by formulating a plan to combat response to suicidal ideation, identify supports and resources  Outcome: Progressing     Problem: DEPRESSION  Goal: Will be euthymic at discharge  Description: INTERVENTIONS:  - Administer medication as ordered  - Provide emotional support via 1:1 interaction with staff  - Encourage involvement in milieu/groups/activities  - Monitor for social isolation  Outcome: Progressing     Problem: PSYCHOSIS  Goal: Will report no hallucinations or delusions  Description: Interventions:  - Administer medication as  ordered  - Every waking shifts and PRN assess for the presence of hallucinations and or delusions  - Assist with reality testing to support increasing orientation  - Assess if patient's hallucinations or delusions are encouraging self-harm or harm to others and intervene as appropriate  Outcome: Progressing     Problem: Depression  Goal: Treatment Goal: Demonstrate behavioral control of depressive symptoms, verbalize feelings of improved mood/affect, and adopt new coping skills prior to discharge  Outcome: Progressing  Goal: Verbalize thoughts and  feelings  Description: Interventions:  - Assess and re-assess patient's level of risk   - Engage patient in 1:1 interactions, daily, for a minimum of 15 minutes   - Encourage patient to express feelings, fears, frustrations, hopes   Outcome: Progressing  Goal: Refrain from harming self  Description: Interventions:  - Monitor patient closely, per order   - Supervise medication ingestion, monitor effects and side effects   Outcome: Progressing  Goal: Refrain from isolation  Description: Interventions:  - Develop a trusting relationship   - Encourage socialization   Outcome: Progressing  Goal: Refrain from self-neglect  Outcome: Progressing  Goal: Attend and participate in unit activities, including therapeutic, recreational, and educational groups  Description: Interventions:  - Provide therapeutic and educational activities daily, encourage attendance and participation, and document same in the medical record   Outcome: Progressing  Goal: Complete daily ADLs, including personal hygiene independently, as able  Description: Interventions:  - Observe, teach, and assist patient with ADLS  -  Monitor and promote a balance of rest/activity, with adequate nutrition and elimination   Outcome: Progressing     Problem: Ineffective Coping  Goal: Participates in unit activities  Description: Interventions:  - Provide therapeutic environment   - Provide required programming   - Redirect inappropriate behaviors   Outcome: Progressing     Problem: DISCHARGE PLANNING - CARE MANAGEMENT  Goal: Discharge to post-acute care or home with appropriate resources  Description: INTERVENTIONS:  - Conduct assessment to determine patient/family and health care team treatment goals, and need for post-acute services based on payer coverage, community resources, and patient preferences, and barriers to discharge  - Address psychosocial, clinical, and financial barriers to discharge as identified in assessment in conjunction with the patient/family  and health care team  - Arrange appropriate level of post-acute services according to patient’s   needs and preference and payer coverage in collaboration with the physician and health care team  - Communicate with and update the patient/family, physician, and health care team regarding progress on the discharge plan  - Arrange appropriate transportation to post-acute venues  Outcome: Progressing   - Apply yellow socks and bracelet for high fall risk patients  - Consider moving patient to room near nurses station  Outcome: Progressing

## 2024-11-16 NOTE — NURSING NOTE
Patient was visible this evening. Social with peers.Reports some anxiety. Denies SI, HI, and hallucinations of any kind. Medication compliant. Orientation assessed, on high fall risk due to CIWA  protocol, which will be passed on to next shift. Medication compliant.    Staff availability reinforced.

## 2024-11-16 NOTE — PROGRESS NOTES
Progress Note - Behavioral Health     Michelle Matos 48 y.o. female MRN: [id number]   Unit/Bed#: [facility] 379-02 Encounter: [id number]            Assessment & Plan  Bipolar I disorder, most recent episode depressed, severe with psychotic features (HCC)    ZACHARY (generalized anxiety disorder)    Cocaine abuse, episodic (HCC)  Encourage cessation   Counseling on the unit    Post-traumatic stress disorder, chronic  Stable  See Principal problem  Tobacco dependence syndrome  Encourage cessation   Nicotine patch and gum   Migraine with aura and without status migrainosus, not intractable          Assessment & Plan     Endorsing depression, and irritability due to other patient on the unit.  No SI or HI.  Hallucinations are improving.  Plan is to continue the current psychiatric medication with no changes.  Medication changes including increasing Zyprexa, Neurontin, and start of Effexor was yesterday.  At this time we will continue to monitor the patient for her mood, behavior, safety, sleep and response to medication while she is here in the unit.  Principal Problem:    Bipolar I disorder, most recent episode depressed, severe with psychotic features (HCC)  Active Problems:    S/P laparoscopic sleeve gastrectomy    Tobacco dependence syndrome    Migraine with aura and without status migrainosus, not intractable    Asthma    Vitamin D deficiency    Medical clearance for psychiatric admission    ZACHARY (generalized anxiety disorder)    Cocaine abuse, episodic (HCC)    Post-traumatic stress disorder, chronic    Heartburn    Hyperlipidemia    Recommended Treatment:     Planned medication and treatment changes:    All current active medications have been reviewed  Encourage group therapy, milieu therapy and occupational therapy  Behavioral Health checks for safety monitoring  Continue treatment with group therapy, milieu therapy and occupational therapy  Continue current medications:  Current Facility-Administered Medications   Medication  Dose Route Frequency Provider Last Rate    acetaminophen  975 mg Oral Q6H PRN Ashley Mejia, DO      albuterol  2 puff Inhalation Q6H PRN Bethanie Wilde MD      aluminum-magnesium hydroxide-simethicone  30 mL Oral Q4H PRN Ashley Mejia, DO      ascorbic acid  500 mg Oral BID Ashley Mejia, DO      atorvastatin  10 mg Oral Daily With Dinner EDWIN Núñez      benztropine  1 mg Intramuscular Q4H PRN Max 6/day Ashley Mejia, DO      benztropine  1 mg Oral Q4H PRN Max 6/day Ashley Mejia, DO      bisacodyl  10 mg Oral Daily PRN Ashley Mejia, DO      bisacodyl  10 mg Rectal Daily PRN Ashley Mejia, DO      cyanocobalamin  500 mcg Oral Daily Ashley Mejia, DO      hydrOXYzine HCL  50 mg Oral Q6H PRN Max 4/day Ashley Mejia,       Or    diphenhydrAMINE  50 mg Intramuscular Q6H PRN Ashley Mejia, DO      ergocalciferol  50,000 Units Oral Weekly EDWIN Núñez      ferrous sulfate  325 mg Oral BID With Meals Ashley Mejia, DO      fluticasone  1 spray Each Nare Daily EDWIN Núñez      fluticasone-vilanterol  1 puff Inhalation Daily Bethanie Wilde MD      gabapentin  200 mg Oral TID Ashley Mejia, DO      hydrOXYzine HCL  100 mg Oral Q6H PRN Max 4/day Ashley Mejia DO      Or    LORazepam  2 mg Intramuscular Q6H PRN Ashley Mejia, DO      hydrOXYzine HCL  25 mg Oral Q6H PRN Max 4/day Ashley Mejia, DO      ibuprofen  400 mg Oral Q6H PRN Ashley Mejia, DO      ibuprofen  600 mg Oral Q8H PRN Ashley Mejia, DO      lamoTRIgine  25 mg Oral Daily Ashley Mejia, DO      magnesium Oxide  400 mg Oral BID Ashley Mejia, DO      melatonin  3 mg Oral HS Ashley Mejia, DO      methocarbamol  500 mg Oral Q8H PRN EDWIN Núñez      metroNIDAZOLE  500 mg Oral Q8H [facility] EDWIN Núñez      nicotine  1 patch Transdermal Daily Ashley Mejia,       nicotine polacrilex  2 mg Oral Q2H PRN Ashley Mejia DO      OLANZapine  10 mg Oral Q3H  PRN Max 3/day Ashley Roberto, DO      Or    OLANZapine  10 mg Intramuscular Q3H PRN Max 3/day Ashley Roberto, DO      OLANZapine  5 mg Oral Q3H PRN Max 6/day Ashley Roberto, DO      Or    OLANZapine  5 mg Intramuscular Q3H PRN Max 6/day Ashley Roberto, DO      OLANZapine  10 mg Oral HS Ashley Roberto, DO      OLANZapine  2.5 mg Oral Q3H PRN Max 8/day Ashley Roberto, DO      ondansetron  4 mg Oral Q6H PRN Radha Baltazar MD      pantoprazole  40 mg Oral Early Morning Bethanie Wilde MD      propranolol  10 mg Oral Q8H PRN Ashley Roberto, DO      senna-docusate sodium  1 tablet Oral Daily PRN Ashley Roberto, DO      traZODone  50 mg Oral HS PRN Ashley Roberto, DO      [START ON 11/17/2024] venlafaxine  75 mg Oral Daily Ashley Roberto, DO         Risks / Benefits of Treatment:    Risks, benefits, and possible side effects of medications explained to patient and patient verbalizes understanding and agreement for treatment.        Subjective: Patient chart reviewed and case discussed with the nurse.  The patient was seen in an milieu today.  She reports feeling very depressed and rates it at 7 on a scale of 0-10 with 10 being the worst.  She denies any SI or HI.  Denies feeling anxious.  She reports she at times gets upset due to one of the other patient on the unit who has been very intrusive.  She said she is still walking away from the situation whenever there is a trigger.  Reports sleeping not good at night.  He wanted to increase in trazodone dose.  Reports appetite has been okay.  Reports voices are much better and mostly mumbling in nature.  Reports seeing shadows at times.  Did not endorse any paranoia or delusional ideation during the meeting.  Denied any other concerns.  As per nurse patient has been visible and social in the milieu.  Patient CIWA score today was 2.  Compliant with medication and made.        ROS: no complaints    Mental Status Evaluation:    Appearance:  casually dressed   Behavior:   "cooperative, calm   Speech:  normal rate, normal volume, normal pitch   Mood:  depressed, irritable   Affect:  flat   Thought Process:  logical   Associations: intact associations   Thought Content:  no overt delusions   Perceptual Disturbances: auditory hallucinations of \"mumbling\", visual hallucinations of \"shadows\"   Risk Potential: Suicidal ideation - None  Homicidal ideation - None  Potential for aggression - No   Sensorium:  oriented to person, place, and time/date   Memory:  recent and remote memory grossly intact   Consciousness:  alert and awake   Attention: attention span and concentration are age appropriate   Insight:  fair   Judgment: fair   Gait/Station: normal gait/station, normal balance   Motor Activity: no abnormal movements     Vital signs in last 24 hours:    Temp:  [97.5 °F (36.4 °C)-97.8 °F (36.6 °C)] 97.5 °F (36.4 °C)  HR:  [] 93  BP: (112-133)/(68-86) 112/68  Resp:  [16] 16  SpO2:  [98 %-100 %] 99 %  O2 Device: None (Room air)    Laboratory results: I have personally reviewed all pertinent laboratory/tests results.  Most Recent Labs:   Lab Results   Component Value Date    WBC 8.03 11/14/2024    RBC 5.36 (H) 11/14/2024    HGB 15.5 (H) 11/14/2024    HCT 46.5 (H) 11/14/2024     11/14/2024    RDW 14.0 11/14/2024    TOTANEUTABS 18.02 (H) 04/10/2023    NEUTROABS 4.25 11/14/2024    SODIUM 142 11/14/2024    K 4.4 11/14/2024     11/14/2024    CO2 31 11/14/2024    BUN 13 11/14/2024    CREATININE 0.74 11/14/2024    GLUC 114 11/14/2024    GLUF 114 (H) 11/14/2024    CALCIUM 9.7 11/14/2024    AST 16 11/14/2024    ALT 15 11/14/2024    ALKPHOS 89 11/14/2024    TP 7.1 11/14/2024    ALB 4.3 11/14/2024    TBILI 0.36 11/14/2024    CHOLESTEROL 255 (H) 11/14/2024    HDL 67 11/14/2024    TRIG 109 11/14/2024    LDLCALC 166 (H) 11/14/2024    NONHDLC 188 11/14/2024    CARBAMAZEPIN <3.0 (L) 09/10/2019    ZZU7IUNCORWS 1.668 11/14/2024    PREGUR negative 10/24/2019    PREGSERUM Negative 11/14/2024    " HCG <2 09/14/2014    RPR Non-Reactive 12/03/2018    HGBA1C 5.7 (H) 11/14/2024     11/14/2024       Progress Toward Goals: improving gradually    Counseling / Coordination of Care:    Total floor / unit time spent today 15 minutes. Greater than 50% of total time was spent with the patient and / or family counseling and / or coordination of care. A description of counseling / coordination of care:        Luis F Diaz MD 11/16/24

## 2024-11-16 NOTE — PLAN OF CARE
Problem: Depression  Goal: Attend and participate in unit activities, including therapeutic, recreational, and educational groups  Description: Interventions:  - Provide therapeutic and educational activities daily, encourage attendance and participation, and document same in the medical record   Outcome: Progressing     Problem: Ineffective Coping  Goal: Participates in unit activities  Description: Interventions:  - Provide therapeutic environment   - Provide required programming   - Redirect inappropriate behaviors   Outcome: Progressing

## 2024-11-16 NOTE — NURSING NOTE
Patient complained of difficulty with going to the bathroom, requested PRN medication. Received PRN senokot 8.6-50 mg PO for constipation at 0910.    Medication not effective upon reassessment at 1010

## 2024-11-16 NOTE — NURSING NOTE
Upon reassessment one hour later at 2206 from the PRN trazodone 50 mg patient received at 2106 patient observed laying in bed comfortably, trazodone effective.

## 2024-11-16 NOTE — NURSING NOTE
"Patient approached this writer and complained of cramps, a thick discharge, \"fishy\" smell and just noticed these symptoms yesterday.     On call medical provider contacted and ordered a FELIX, Diflucan, Flagyl and macrobid.  "

## 2024-11-16 NOTE — NURSING NOTE
Patient approached this writer and reported feeling nauseated again, then briskly walked to their bathroom and threw up. Offered and received PRN zofran-ODT 4 mg PO for vomiting at 2140.

## 2024-11-16 NOTE — NURSING NOTE
Patient complained of trouble sleeping and requested PRN trazodone. Received PRN trazodone 50 mg PO for insomnia at 2106.

## 2024-11-16 NOTE — NURSING NOTE
Upon reassessment one hour later at 2240 from the PRN zofran 4 mg patient received at 2140 patient observed resting in bed and has not had any further complaints of nausea. Medication effective.

## 2024-11-16 NOTE — NURSING NOTE
Patient continued to complain of constipation and requested PRN medication. Received PRN dulcolax 10 mg PO for second line for constipation at 1558.    At 1658 patient reports that the medication was a bit effective.

## 2024-11-17 PROCEDURE — 99232 SBSQ HOSP IP/OBS MODERATE 35: CPT | Performed by: PSYCHIATRY & NEUROLOGY

## 2024-11-17 RX ORDER — SENNOSIDES 8.6 MG
2 TABLET ORAL 2 TIMES DAILY
Status: DISCONTINUED | OUTPATIENT
Start: 2024-11-17 | End: 2024-11-19 | Stop reason: HOSPADM

## 2024-11-17 RX ORDER — BISACODYL 10 MG
10 SUPPOSITORY, RECTAL RECTAL ONCE
Status: COMPLETED | OUTPATIENT
Start: 2024-11-17 | End: 2024-11-17

## 2024-11-17 RX ORDER — FERROUS SULFATE 325(65) MG
325 TABLET ORAL EVERY OTHER DAY
Status: DISCONTINUED | OUTPATIENT
Start: 2024-11-19 | End: 2024-11-19 | Stop reason: HOSPADM

## 2024-11-17 RX ORDER — LACTULOSE 10 G/15ML
20 SOLUTION ORAL 3 TIMES DAILY
Status: COMPLETED | OUTPATIENT
Start: 2024-11-17 | End: 2024-11-17

## 2024-11-17 RX ADMIN — GABAPENTIN 200 MG: 100 CAPSULE ORAL at 08:34

## 2024-11-17 RX ADMIN — FLUTICASONE FUROATE AND VILANTEROL TRIFENATATE 1 PUFF: 200; 25 POWDER RESPIRATORY (INHALATION) at 08:36

## 2024-11-17 RX ADMIN — MELATONIN TAB 3 MG 3 MG: 3 TAB at 21:00

## 2024-11-17 RX ADMIN — LACTULOSE 20 G: 20 SOLUTION ORAL at 15:40

## 2024-11-17 RX ADMIN — LAMOTRIGINE 25 MG: 25 TABLET ORAL at 08:34

## 2024-11-17 RX ADMIN — FERROUS SULFATE TAB 325 MG (65 MG ELEMENTAL FE) 325 MG: 325 (65 FE) TAB at 08:33

## 2024-11-17 RX ADMIN — TRAZODONE HYDROCHLORIDE 50 MG: 50 TABLET ORAL at 21:00

## 2024-11-17 RX ADMIN — IBUPROFEN 400 MG: 400 TABLET, FILM COATED ORAL at 12:12

## 2024-11-17 RX ADMIN — ATORVASTATIN CALCIUM 10 MG: 10 TABLET, FILM COATED ORAL at 17:30

## 2024-11-17 RX ADMIN — METRONIDAZOLE 500 MG: 500 TABLET ORAL at 06:53

## 2024-11-17 RX ADMIN — NICOTINE 1 PATCH: 21 PATCH, EXTENDED RELEASE TRANSDERMAL at 08:36

## 2024-11-17 RX ADMIN — METRONIDAZOLE 500 MG: 500 TABLET ORAL at 15:08

## 2024-11-17 RX ADMIN — HYDROXYZINE HYDROCHLORIDE 100 MG: 50 TABLET, FILM COATED ORAL at 21:00

## 2024-11-17 RX ADMIN — VENLAFAXINE HYDROCHLORIDE 75 MG: 75 CAPSULE, EXTENDED RELEASE ORAL at 08:34

## 2024-11-17 RX ADMIN — METRONIDAZOLE 500 MG: 500 TABLET ORAL at 21:00

## 2024-11-17 RX ADMIN — LACTULOSE 20 G: 20 SOLUTION ORAL at 12:09

## 2024-11-17 RX ADMIN — SENNOSIDES 17.2 MG: 8.6 TABLET, FILM COATED ORAL at 17:30

## 2024-11-17 RX ADMIN — BISACODYL 10 MG: 10 SUPPOSITORY RECTAL at 18:47

## 2024-11-17 RX ADMIN — GABAPENTIN 200 MG: 100 CAPSULE ORAL at 21:00

## 2024-11-17 RX ADMIN — PANTOPRAZOLE SODIUM 40 MG: 40 TABLET, DELAYED RELEASE ORAL at 06:53

## 2024-11-17 RX ADMIN — OLANZAPINE 10 MG: 10 TABLET, FILM COATED ORAL at 21:00

## 2024-11-17 RX ADMIN — OLANZAPINE 5 MG: 5 TABLET, FILM COATED ORAL at 15:40

## 2024-11-17 RX ADMIN — FLUTICASONE PROPIONATE 1 SPRAY: 50 SPRAY, METERED NASAL at 08:36

## 2024-11-17 RX ADMIN — CYANOCOBALAMIN TAB 1000 MCG 500 MCG: 1000 TAB at 08:34

## 2024-11-17 RX ADMIN — Medication 400 MG: at 17:30

## 2024-11-17 RX ADMIN — OXYCODONE HYDROCHLORIDE AND ACETAMINOPHEN 500 MG: 500 TABLET ORAL at 08:34

## 2024-11-17 RX ADMIN — Medication 400 MG: at 08:34

## 2024-11-17 RX ADMIN — SENNOSIDES 17.2 MG: 8.6 TABLET, FILM COATED ORAL at 12:09

## 2024-11-17 RX ADMIN — OXYCODONE HYDROCHLORIDE AND ACETAMINOPHEN 500 MG: 500 TABLET ORAL at 17:30

## 2024-11-17 RX ADMIN — GABAPENTIN 200 MG: 100 CAPSULE ORAL at 17:30

## 2024-11-17 NOTE — NURSING NOTE
"Patient became loud and irritable during room check yelled \"That idiot threw out my comb\". Received PRN zyprexa 5 mg PO for moderate agitation at 1540.  "

## 2024-11-17 NOTE — NURSING NOTE
1938: Pt reporting feeling anxious to being on the unit. PRN PO Atarax 100 mg administered.    2038: Pt still reporting feeling anxious.

## 2024-11-17 NOTE — NURSING NOTE
Upon reassessment one hour later at 1640 from the PRN zyprexa 5 mg patient receved at 1540 patient is observed sitting calmly in their room. Patient has since apologized to the T and reports that the medication helped their aggression. Medication effective and patient no longer speaking aggressively.

## 2024-11-17 NOTE — NURSING NOTE
1938:Pt c/o of constipation, with no BM since admission, Dulcolax rectal suppository 10 mg administered.     2038: Pt reports a small BM.

## 2024-11-17 NOTE — NURSING NOTE
2131: Pt requesting Trazodone to assist with insomnia, PRN PO Trazodone 59 mg administered,    2231: Pt resting in bed.

## 2024-11-17 NOTE — PLAN OF CARE
Problem: Potential for Falls  Goal: Patient will remain free of falls  Description: INTERVENTIONS:  - Keep care items and personal belongings within reach  - Initiate and maintain comfort rounds  - Make Fall Risk Sign visible to staff  - Apply yellow socks and bracelet for high fall risk patients  - Consider moving patient to room near nurses station  Outcome: Progressing     Problem: SELF HARM/SUICIDALITY  Goal: Will have no self-injury during hospital stay  Description: INTERVENTIONS:  - Q 15 MINUTES: Routine safety checks  - Q WAKING SHIFT & PRN: Assess risk to determine if routine checks are adequate to maintain patient safety  - Encourage patient to participate actively in care by formulating a plan to combat response to suicidal ideation, identify supports and resources  Outcome: Progressing     Problem: DEPRESSION  Goal: Will be euthymic at discharge  Description: INTERVENTIONS:  - Administer medication as ordered  - Provide emotional support via 1:1 interaction with staff  - Encourage involvement in milieu/groups/activities  - Monitor for social isolation  Outcome: Progressing     Problem: PSYCHOSIS  Goal: Will report no hallucinations or delusions  Description: Interventions:  - Administer medication as  ordered  - Every waking shifts and PRN assess for the presence of hallucinations and or delusions  - Assist with reality testing to support increasing orientation  - Assess if patient's hallucinations or delusions are encouraging self-harm or harm to others and intervene as appropriate  Outcome: Progressing     Problem: Depression  Goal: Treatment Goal: Demonstrate behavioral control of depressive symptoms, verbalize feelings of improved mood/affect, and adopt new coping skills prior to discharge  Outcome: Progressing  Goal: Verbalize thoughts and feelings  Description: Interventions:  - Assess and re-assess patient's level of risk   - Engage patient in 1:1 interactions, daily, for a minimum of 15 minutes    - Encourage patient to express feelings, fears, frustrations, hopes   Outcome: Progressing  Goal: Refrain from harming self  Description: Interventions:  - Monitor patient closely, per order   - Supervise medication ingestion, monitor effects and side effects   Outcome: Progressing  Goal: Refrain from isolation  Description: Interventions:  - Develop a trusting relationship   - Encourage socialization   Outcome: Progressing  Goal: Refrain from self-neglect  Outcome: Progressing  Goal: Attend and participate in unit activities, including therapeutic, recreational, and educational groups  Description: Interventions:  - Provide therapeutic and educational activities daily, encourage attendance and participation, and document same in the medical record   Outcome: Progressing  Goal: Complete daily ADLs, including personal hygiene independently, as able  Description: Interventions:  - Observe, teach, and assist patient with ADLS  -  Monitor and promote a balance of rest/activity, with adequate nutrition and elimination   Outcome: Progressing     Problem: Ineffective Coping  Goal: Participates in unit activities  Description: Interventions:  - Provide therapeutic environment   - Provide required programming   - Redirect inappropriate behaviors   Outcome: Progressing     Problem: DISCHARGE PLANNING - CARE MANAGEMENT  Goal: Discharge to post-acute care or home with appropriate resources  Description: INTERVENTIONS:  - Conduct assessment to determine patient/family and health care team treatment goals, and need for post-acute services based on payer coverage, community resources, and patient preferences, and barriers to discharge  - Address psychosocial, clinical, and financial barriers to discharge as identified in assessment in conjunction with the patient/family and health care team  - Arrange appropriate level of post-acute services according to patient’s   needs and preference and payer coverage in collaboration with  the physician and health care team  - Communicate with and update the patient/family, physician, and health care team regarding progress on the discharge plan  - Arrange appropriate transportation to post-acute venues  Outcome: Progressing

## 2024-11-17 NOTE — NURSING NOTE
Patient is visible for meals. Retreated to their bed after lunch and napped. Reports anxiety and auditory hallucinations. Reports SI earlier but currently denies and agrees to come to staff if thoughts happen again. Denies HI.     Medication compliant.    Staff availability reinforced.     Patient high fall risk due to CIWA protocol. Assessed orientation and reoriented as necessary. Will pass on to the oncoming shift.

## 2024-11-17 NOTE — PROGRESS NOTES
Progress Note - Behavioral Health     Michelle Matos 48 y.o. female MRN: [id number]   Unit/Bed#: [facility] 379-02 Encounter: [id number]            Assessment & Plan  Bipolar I disorder, most recent episode depressed, severe with psychotic features (HCC)    ZACHARY (generalized anxiety disorder)  See Principal problem  Cocaine abuse, episodic (HCC)  Encourage cessation   Counseling on the unit    Post-traumatic stress disorder, chronic  Stable  See Principal problem  Tobacco dependence syndrome  Encourage cessation   Nicotine patch and gum   Migraine with aura and without status migrainosus, not intractable          Assessment & Plan     Endorsing depression, and anxiety due to physical concerns.  Complains of constipation and hospitalist on-call adjusted her medication for it.  No SI or HI reported to me but endorsed to the staff.  Hallucinations are improving.  Plan is to continue the current psychiatric medication with no changes.  Medication changes including increasing Zyprexa, Neurontin, and start of Effexor was done on Friday by her regular treatment team.  At this time we will continue to monitor the patient for her mood, behavior, safety, sleep and response to medication while she is here in the unit.  Principal Problem:    Bipolar I disorder, most recent episode depressed, severe with psychotic features (HCC)  Active Problems:    S/P laparoscopic sleeve gastrectomy    Tobacco dependence syndrome    Migraine with aura and without status migrainosus, not intractable    Asthma    Vitamin D deficiency    Medical clearance for psychiatric admission    ZACHARY (generalized anxiety disorder)    Cocaine abuse, episodic (HCC)    Post-traumatic stress disorder, chronic    Heartburn    Hyperlipidemia    Recommended Treatment:     Planned medication and treatment changes:    All current active medications have been reviewed  Encourage group therapy, milieu therapy and occupational therapy  Behavioral Health checks for safety  monitoring  Continue treatment with group therapy, milieu therapy and occupational therapy  Continue current medications:  Current Facility-Administered Medications   Medication Dose Route Frequency Provider Last Rate    acetaminophen  975 mg Oral Q6H PRN Ashley Mejia, DO      albuterol  2 puff Inhalation Q6H PRN Bethanie Wilde MD      aluminum-magnesium hydroxide-simethicone  30 mL Oral Q4H PRN Ashley Mejia, DO      ascorbic acid  500 mg Oral BID Ashley Mejia, DO      atorvastatin  10 mg Oral Daily With Dinner EDWIN Núñez      benztropine  1 mg Intramuscular Q4H PRN Max 6/day Ashley Mejia, DO      benztropine  1 mg Oral Q4H PRN Max 6/day Ashley Mejia, DO      bisacodyl  10 mg Oral Daily PRN Ashley Mejia, DO      bisacodyl  10 mg Rectal Daily PRN Ashley Mejia, DO      cyanocobalamin  500 mcg Oral Daily Ashley Mejia, DO      hydrOXYzine HCL  50 mg Oral Q6H PRN Max 4/day Ashley Mejia DO      Or    diphenhydrAMINE  50 mg Intramuscular Q6H PRN Ashley Mejia, DO      ergocalciferol  50,000 Units Oral Weekly EDWIN Núñez      [START ON 11/19/2024] ferrous sulfate  325 mg Oral Every Other Day EDWIN Núñez      fluticasone  1 spray Each Nare Daily EDWIN Núñez      fluticasone-vilanterol  1 puff Inhalation Daily Bethanie Wilde MD      gabapentin  200 mg Oral TID Ashley Mejia, DO      hydrOXYzine HCL  100 mg Oral Q6H PRN Max 4/day Ashley Mejia, DO      Or    LORazepam  2 mg Intramuscular Q6H PRN Ashley Mejia, DO      hydrOXYzine HCL  25 mg Oral Q6H PRN Max 4/day Ashley Mejia, DO      ibuprofen  400 mg Oral Q6H PRN Ashley Mejia, DO      ibuprofen  600 mg Oral Q8H PRN Ashley Mejia, DO      lactulose  20 g Oral TID EDWIN Núñez      lamoTRIgine  25 mg Oral Daily Ashley Mejia, DO      magnesium Oxide  400 mg Oral BID Ashley Mejia, DO      melatonin  3 mg Oral HS Ashley Mejia, DO      methocarbamol  500  mg Oral Q8H PRN EDWIN Núñez      metroNIDAZOLE  500 mg Oral Q8H KIMBERLY EDWIN Núñez      nicotine  1 patch Transdermal Daily Ashley Mejia, DO      nicotine polacrilex  2 mg Oral Q2H PRN Ashley Mejia, DO      OLANZapine  10 mg Oral Q3H PRN Max 3/day Ashley Mejia, DO      Or    OLANZapine  10 mg Intramuscular Q3H PRN Max 3/day Ashley Roberto, DO      OLANZapine  5 mg Oral Q3H PRN Max 6/day Ashleyyin Mejia, DO      Or    OLANZapine  5 mg Intramuscular Q3H PRN Max 6/day Ashleyyin Mejia, DO      OLANZapine  10 mg Oral HS Ashley Mejia, DO      OLANZapine  2.5 mg Oral Q3H PRN Max 8/day Ashley Roberto, DO      ondansetron  4 mg Oral Q6H PRN Radha Baltazar MD      pantoprazole  40 mg Oral Early Morning Bethanie Wilde MD      propranolol  10 mg Oral Q8H PRN Ashley Mejia, DO      senna  2 tablet Oral BID EDWIN Núñez      senna-docusate sodium  1 tablet Oral Daily PRN Ashley Mejia, DO      traZODone  50 mg Oral HS PRN Ashley Mejia, DO      venlafaxine  75 mg Oral Daily Ashley Mejia, DO         Risks / Benefits of Treatment:    Risks, benefits, and possible side effects of medications explained to patient and patient verbalizes understanding and agreement for treatment.        Subjective: Patient chart reviewed and case discussed with the nurse.  The patient was seen in an milieu today.  She reports feeling depressed and rates it at 5 on a scale of 0-10 with 10 being the worst.  Reports feeling low due to abdominal pain.  Reports struggling with constipation.  She denies any SI or HI.  Reports anxiety also present due to the abdominal discomfort.  Reports sleeping  was okay last night. Reports appetite has been fine.  Reports voices are much better and mostly mumbling in nature.  Reports seeing shadows at times.  Did not endorse any paranoia or delusional ideation during the meeting.  Denied any other concerns.  As per nurse patient has been visible and social  "in the milieu.  Had expressed suicidal thoughts in the morning today but denied any plan or intent.  Contracts for safety and agreed to talk to the nurse if increases again.  Compliant with meds and meals.      ROS: no complaints    Mental Status Evaluation:    Appearance:  casually dressed   Behavior:  cooperative, calm   Speech:  normal rate, normal volume, normal pitch   Mood:  depressed, irritable, anxious   Affect:  flat   Thought Process:  logical   Associations: intact associations   Thought Content:  no overt delusions   Perceptual Disturbances: auditory hallucinations of \"mumbling\", visual hallucinations of \"shadows\"   Risk Potential: Suicidal ideation - None  Homicidal ideation - None  Potential for aggression - No   Sensorium:  oriented to person, place, and time/date   Memory:  recent and remote memory grossly intact   Consciousness:  alert and awake   Attention: attention span and concentration are age appropriate   Insight:  fair   Judgment: fair   Gait/Station: normal gait/station, normal balance   Motor Activity: no abnormal movements     Vital signs in last 24 hours:    Temp:  [97.4 °F (36.3 °C)-97.9 °F (36.6 °C)] 97.9 °F (36.6 °C)  HR:  [] 96  BP: (113-140)/(61-72) 113/72  Resp:  [16-18] 16  SpO2:  [98 %-99 %] 98 %  O2 Device: None (Room air)    Laboratory results: I have personally reviewed all pertinent laboratory/tests results.  Most Recent Labs:   Lab Results   Component Value Date    WBC 8.03 11/14/2024    RBC 5.36 (H) 11/14/2024    HGB 15.5 (H) 11/14/2024    HCT 46.5 (H) 11/14/2024     11/14/2024    RDW 14.0 11/14/2024    TOTANEUTABS 18.02 (H) 04/10/2023    NEUTROABS 4.25 11/14/2024    SODIUM 142 11/14/2024    K 4.4 11/14/2024     11/14/2024    CO2 31 11/14/2024    BUN 13 11/14/2024    CREATININE 0.74 11/14/2024    GLUC 114 11/14/2024    GLUF 114 (H) 11/14/2024    CALCIUM 9.7 11/14/2024    AST 16 11/14/2024    ALT 15 11/14/2024    ALKPHOS 89 11/14/2024    TP 7.1 11/14/2024 "    ALB 4.3 11/14/2024    TBILI 0.36 11/14/2024    CHOLESTEROL 255 (H) 11/14/2024    HDL 67 11/14/2024    TRIG 109 11/14/2024    LDLCALC 166 (H) 11/14/2024    NONHDLC 188 11/14/2024    CARBAMAZEPIN <3.0 (L) 09/10/2019    NJY8ENISXFUJ 1.668 11/14/2024    PREGUR negative 10/24/2019    PREGSERUM Negative 11/14/2024    HCG <2 09/14/2014    RPR Non-Reactive 12/03/2018    HGBA1C 5.7 (H) 11/14/2024     11/14/2024       Progress Toward Goals: improving gradually    Counseling / Coordination of Care:    Total floor / unit time spent today 15 minutes. Greater than 50% of total time was spent with the patient and / or family counseling and / or coordination of care. A description of counseling / coordination of care:        Luis F Diaz MD 11/17/24

## 2024-11-17 NOTE — NURSING NOTE
Pt calm and cooperative, visible and social on the unit. Pt reports ongoing anxiety, denies all other psych symptoms. Compliant with medication administration, denies any unmet needs. Q 15 min safety checks maintained.

## 2024-11-17 NOTE — NURSING NOTE
Patient is laying in their bed hunched over in pain related to constipation. Reports passing only a small amount of bowel movement a couple of days ago though not enough, and passing gas.     Reached out via EPIC secure chat to [facility] admitting SLIM provider at 1811. Provider put in a one time order for patient to receive a dulcolax suppository at 1845.

## 2024-11-17 NOTE — NURSING NOTE
Patient complained of constipation. On- call medical provider adjusted iron pill and added a couple of doses of lactulose and senokot.

## 2024-11-18 ENCOUNTER — APPOINTMENT (INPATIENT)
Dept: CT IMAGING | Facility: HOSPITAL | Age: 49
DRG: 753 | End: 2024-11-18
Payer: COMMERCIAL

## 2024-11-18 LAB
ALBUMIN SERPL BCG-MCNC: 4.3 G/DL (ref 3.5–5)
ALP SERPL-CCNC: 69 U/L (ref 34–104)
ALT SERPL W P-5'-P-CCNC: 22 U/L (ref 7–52)
ANION GAP SERPL CALCULATED.3IONS-SCNC: 8 MMOL/L (ref 4–13)
AST SERPL W P-5'-P-CCNC: 24 U/L (ref 13–39)
BASOPHILS # BLD AUTO: 0.1 THOUSANDS/ÂΜL (ref 0–0.1)
BASOPHILS NFR BLD AUTO: 1 % (ref 0–1)
BILIRUB SERPL-MCNC: 0.2 MG/DL (ref 0.2–1)
BUN SERPL-MCNC: 17 MG/DL (ref 5–25)
CALCIUM SERPL-MCNC: 9.7 MG/DL (ref 8.4–10.2)
CHLORIDE SERPL-SCNC: 105 MMOL/L (ref 96–108)
CO2 SERPL-SCNC: 25 MMOL/L (ref 21–32)
CREAT SERPL-MCNC: 0.74 MG/DL (ref 0.6–1.3)
EOSINOPHIL # BLD AUTO: 0.14 THOUSAND/ÂΜL (ref 0–0.61)
EOSINOPHIL NFR BLD AUTO: 1 % (ref 0–6)
ERYTHROCYTE [DISTWIDTH] IN BLOOD BY AUTOMATED COUNT: 14 % (ref 11.6–15.1)
GFR SERPL CREATININE-BSD FRML MDRD: 96 ML/MIN/1.73SQ M
GLUCOSE SERPL-MCNC: 89 MG/DL (ref 65–140)
HCT VFR BLD AUTO: 43.6 % (ref 34.8–46.1)
HGB BLD-MCNC: 14.1 G/DL (ref 11.5–15.4)
IMM GRANULOCYTES # BLD AUTO: 0.04 THOUSAND/UL (ref 0–0.2)
IMM GRANULOCYTES NFR BLD AUTO: 0 % (ref 0–2)
LIPASE SERPL-CCNC: 371 U/L (ref 11–82)
LYMPHOCYTES # BLD AUTO: 3.34 THOUSANDS/ÂΜL (ref 0.6–4.47)
LYMPHOCYTES NFR BLD AUTO: 32 % (ref 14–44)
MAGNESIUM SERPL-MCNC: 2.8 MG/DL (ref 1.9–2.7)
MCH RBC QN AUTO: 28.8 PG (ref 26.8–34.3)
MCHC RBC AUTO-ENTMCNC: 32.3 G/DL (ref 31.4–37.4)
MCV RBC AUTO: 89 FL (ref 82–98)
MONOCYTES # BLD AUTO: 0.79 THOUSAND/ÂΜL (ref 0.17–1.22)
MONOCYTES NFR BLD AUTO: 8 % (ref 4–12)
NEUTROPHILS # BLD AUTO: 6.15 THOUSANDS/ÂΜL (ref 1.85–7.62)
NEUTS SEG NFR BLD AUTO: 58 % (ref 43–75)
NRBC BLD AUTO-RTO: 0 /100 WBCS
PLATELET # BLD AUTO: 362 THOUSANDS/UL (ref 149–390)
PMV BLD AUTO: 9.7 FL (ref 8.9–12.7)
POTASSIUM SERPL-SCNC: 4.3 MMOL/L (ref 3.5–5.3)
PROT SERPL-MCNC: 7.1 G/DL (ref 6.4–8.4)
RBC # BLD AUTO: 4.89 MILLION/UL (ref 3.81–5.12)
SODIUM SERPL-SCNC: 138 MMOL/L (ref 135–147)
WBC # BLD AUTO: 10.56 THOUSAND/UL (ref 4.31–10.16)

## 2024-11-18 PROCEDURE — 83735 ASSAY OF MAGNESIUM: CPT

## 2024-11-18 PROCEDURE — 83690 ASSAY OF LIPASE: CPT

## 2024-11-18 PROCEDURE — 85025 COMPLETE CBC W/AUTO DIFF WBC: CPT

## 2024-11-18 PROCEDURE — 99232 SBSQ HOSP IP/OBS MODERATE 35: CPT | Performed by: PSYCHIATRY & NEUROLOGY

## 2024-11-18 PROCEDURE — 80053 COMPREHEN METABOLIC PANEL: CPT

## 2024-11-18 PROCEDURE — 74177 CT ABD & PELVIS W/CONTRAST: CPT

## 2024-11-18 RX ORDER — OLANZAPINE 2.5 MG/1
2.5 TABLET, FILM COATED ORAL
Status: CANCELLED | OUTPATIENT
Start: 2024-11-18

## 2024-11-18 RX ORDER — VENLAFAXINE HYDROCHLORIDE 75 MG/1
75 CAPSULE, EXTENDED RELEASE ORAL DAILY
Status: CANCELLED | OUTPATIENT
Start: 2024-11-19

## 2024-11-18 RX ORDER — KETOROLAC TROMETHAMINE 30 MG/ML
30 INJECTION, SOLUTION INTRAMUSCULAR; INTRAVENOUS ONCE
Status: DISCONTINUED | OUTPATIENT
Start: 2024-11-18 | End: 2024-11-18

## 2024-11-18 RX ORDER — ASCORBIC ACID 500 MG
500 TABLET ORAL 2 TIMES DAILY
Status: CANCELLED | OUTPATIENT
Start: 2024-11-19

## 2024-11-18 RX ORDER — KETOROLAC TROMETHAMINE 30 MG/ML
15 INJECTION, SOLUTION INTRAMUSCULAR; INTRAVENOUS ONCE
Status: COMPLETED | OUTPATIENT
Start: 2024-11-18 | End: 2024-11-18

## 2024-11-18 RX ORDER — BENZTROPINE MESYLATE 1 MG/1
1 TABLET ORAL
Status: CANCELLED | OUTPATIENT
Start: 2024-11-18

## 2024-11-18 RX ORDER — IBUPROFEN 600 MG/1
600 TABLET, FILM COATED ORAL EVERY 8 HOURS PRN
Status: CANCELLED | OUTPATIENT
Start: 2024-11-18

## 2024-11-18 RX ORDER — OLANZAPINE 10 MG/1
10 TABLET ORAL
Status: CANCELLED | OUTPATIENT
Start: 2024-11-18

## 2024-11-18 RX ORDER — METHOCARBAMOL 500 MG/1
500 TABLET, FILM COATED ORAL ONCE
Status: COMPLETED | OUTPATIENT
Start: 2024-11-18 | End: 2024-11-18

## 2024-11-18 RX ORDER — DIPHENHYDRAMINE HYDROCHLORIDE 50 MG/ML
50 INJECTION INTRAMUSCULAR; INTRAVENOUS EVERY 6 HOURS PRN
Status: CANCELLED | OUTPATIENT
Start: 2024-11-18

## 2024-11-18 RX ORDER — BENZTROPINE MESYLATE 1 MG/ML
1 INJECTION, SOLUTION INTRAMUSCULAR; INTRAVENOUS
Status: CANCELLED | OUTPATIENT
Start: 2024-11-18

## 2024-11-18 RX ORDER — BISACODYL 5 MG/1
10 TABLET, DELAYED RELEASE ORAL DAILY PRN
Status: CANCELLED | OUTPATIENT
Start: 2024-11-18

## 2024-11-18 RX ORDER — LAMOTRIGINE 25 MG/1
25 TABLET ORAL DAILY
Status: CANCELLED | OUTPATIENT
Start: 2024-11-19

## 2024-11-18 RX ORDER — MAGNESIUM CARB/ALUMINUM HYDROX 105-160MG
296 TABLET,CHEWABLE ORAL ONCE
Status: COMPLETED | OUTPATIENT
Start: 2024-11-18 | End: 2024-11-18

## 2024-11-18 RX ORDER — SODIUM CHLORIDE 9 MG/ML
100 INJECTION, SOLUTION INTRAVENOUS CONTINUOUS
Status: CANCELLED | OUTPATIENT
Start: 2024-11-18

## 2024-11-18 RX ORDER — SENNOSIDES 8.6 MG
2 TABLET ORAL 2 TIMES DAILY
Status: CANCELLED | OUTPATIENT
Start: 2024-11-19

## 2024-11-18 RX ORDER — LANOLIN ALCOHOL/MO/W.PET/CERES
400 CREAM (GRAM) TOPICAL 2 TIMES DAILY
Status: CANCELLED | OUTPATIENT
Start: 2024-11-19

## 2024-11-18 RX ORDER — OLANZAPINE 10 MG/2ML
10 INJECTION, POWDER, FOR SOLUTION INTRAMUSCULAR
Status: CANCELLED | OUTPATIENT
Start: 2024-11-18

## 2024-11-18 RX ORDER — PANTOPRAZOLE SODIUM 40 MG/1
40 TABLET, DELAYED RELEASE ORAL
Status: CANCELLED | OUTPATIENT
Start: 2024-11-19

## 2024-11-18 RX ORDER — ACETAMINOPHEN 325 MG/1
975 TABLET ORAL EVERY 6 HOURS PRN
Status: CANCELLED | OUTPATIENT
Start: 2024-11-18

## 2024-11-18 RX ORDER — FLUTICASONE PROPIONATE 50 MCG
1 SPRAY, SUSPENSION (ML) NASAL DAILY
Status: CANCELLED | OUTPATIENT
Start: 2024-11-19

## 2024-11-18 RX ORDER — HYDROXYZINE HYDROCHLORIDE 25 MG/1
25 TABLET, FILM COATED ORAL
Status: CANCELLED | OUTPATIENT
Start: 2024-11-18

## 2024-11-18 RX ORDER — METOCLOPRAMIDE 10 MG/1
10 TABLET ORAL ONCE
Status: COMPLETED | OUTPATIENT
Start: 2024-11-18 | End: 2024-11-18

## 2024-11-18 RX ORDER — ONDANSETRON 4 MG/1
4 TABLET, ORALLY DISINTEGRATING ORAL EVERY 6 HOURS PRN
Status: CANCELLED | OUTPATIENT
Start: 2024-11-18

## 2024-11-18 RX ORDER — SODIUM CHLORIDE 9 MG/ML
100 INJECTION, SOLUTION INTRAVENOUS CONTINUOUS
Status: DISCONTINUED | OUTPATIENT
Start: 2024-11-18 | End: 2024-11-19 | Stop reason: HOSPADM

## 2024-11-18 RX ORDER — LORAZEPAM 2 MG/ML
2 INJECTION INTRAMUSCULAR EVERY 6 HOURS PRN
Status: CANCELLED | OUTPATIENT
Start: 2024-11-18

## 2024-11-18 RX ORDER — ATORVASTATIN CALCIUM 10 MG/1
10 TABLET, FILM COATED ORAL
Status: CANCELLED | OUTPATIENT
Start: 2024-11-19

## 2024-11-18 RX ORDER — OLANZAPINE 10 MG/2ML
5 INJECTION, POWDER, FOR SOLUTION INTRAMUSCULAR
Status: CANCELLED | OUTPATIENT
Start: 2024-11-18

## 2024-11-18 RX ORDER — METHOCARBAMOL 500 MG/1
500 TABLET, FILM COATED ORAL EVERY 8 HOURS PRN
Status: CANCELLED | OUTPATIENT
Start: 2024-11-18

## 2024-11-18 RX ORDER — TRAZODONE HYDROCHLORIDE 50 MG/1
50 TABLET, FILM COATED ORAL
Status: CANCELLED | OUTPATIENT
Start: 2024-11-18

## 2024-11-18 RX ORDER — FLUTICASONE FUROATE AND VILANTEROL 200; 25 UG/1; UG/1
1 POWDER RESPIRATORY (INHALATION)
Status: CANCELLED | OUTPATIENT
Start: 2024-11-19

## 2024-11-18 RX ORDER — AMOXICILLIN 250 MG
1 CAPSULE ORAL DAILY PRN
Status: CANCELLED | OUTPATIENT
Start: 2024-11-18

## 2024-11-18 RX ORDER — NICOTINE 21 MG/24HR
1 PATCH, TRANSDERMAL 24 HOURS TRANSDERMAL DAILY
Status: CANCELLED | OUTPATIENT
Start: 2024-11-19

## 2024-11-18 RX ORDER — ERGOCALCIFEROL 1.25 MG/1
50000 CAPSULE, LIQUID FILLED ORAL WEEKLY
Status: CANCELLED | OUTPATIENT
Start: 2024-11-21 | End: 2025-01-09

## 2024-11-18 RX ORDER — HYDROXYZINE HYDROCHLORIDE 50 MG/1
50 TABLET, FILM COATED ORAL
Status: CANCELLED | OUTPATIENT
Start: 2024-11-18

## 2024-11-18 RX ORDER — ALBUTEROL SULFATE 90 UG/1
2 INHALANT RESPIRATORY (INHALATION) EVERY 6 HOURS PRN
Status: CANCELLED | OUTPATIENT
Start: 2024-11-18

## 2024-11-18 RX ORDER — GABAPENTIN 300 MG/1
300 CAPSULE ORAL 3 TIMES DAILY
Status: DISCONTINUED | OUTPATIENT
Start: 2024-11-18 | End: 2024-11-19 | Stop reason: HOSPADM

## 2024-11-18 RX ORDER — METRONIDAZOLE 500 MG/1
500 TABLET ORAL EVERY 8 HOURS SCHEDULED
Status: CANCELLED | OUTPATIENT
Start: 2024-11-19 | End: 2024-11-22

## 2024-11-18 RX ORDER — DIPHENHYDRAMINE HCL 25 MG
25 TABLET ORAL ONCE
Status: COMPLETED | OUTPATIENT
Start: 2024-11-18 | End: 2024-11-18

## 2024-11-18 RX ORDER — MAGNESIUM HYDROXIDE/ALUMINUM HYDROXICE/SIMETHICONE 120; 1200; 1200 MG/30ML; MG/30ML; MG/30ML
30 SUSPENSION ORAL EVERY 4 HOURS PRN
Status: CANCELLED | OUTPATIENT
Start: 2024-11-18

## 2024-11-18 RX ORDER — LACTULOSE 10 G/15ML
20 SOLUTION ORAL 3 TIMES DAILY
Status: COMPLETED | OUTPATIENT
Start: 2024-11-18 | End: 2024-11-18

## 2024-11-18 RX ORDER — PROPRANOLOL HYDROCHLORIDE 10 MG/1
10 TABLET ORAL EVERY 8 HOURS PRN
Status: CANCELLED | OUTPATIENT
Start: 2024-11-18

## 2024-11-18 RX ORDER — FERROUS SULFATE 325(65) MG
325 TABLET ORAL EVERY OTHER DAY
Status: CANCELLED | OUTPATIENT
Start: 2024-11-19

## 2024-11-18 RX ORDER — IBUPROFEN 400 MG/1
400 TABLET, FILM COATED ORAL EVERY 6 HOURS PRN
Status: CANCELLED | OUTPATIENT
Start: 2024-11-18

## 2024-11-18 RX ORDER — IBUPROFEN 600 MG/1
600 TABLET, FILM COATED ORAL ONCE
Status: COMPLETED | OUTPATIENT
Start: 2024-11-18 | End: 2024-11-18

## 2024-11-18 RX ORDER — GABAPENTIN 300 MG/1
300 CAPSULE ORAL 3 TIMES DAILY
Status: CANCELLED | OUTPATIENT
Start: 2024-11-19

## 2024-11-18 RX ORDER — OLANZAPINE 5 MG/1
5 TABLET ORAL
Status: CANCELLED | OUTPATIENT
Start: 2024-11-18

## 2024-11-18 RX ORDER — HYDROXYZINE HYDROCHLORIDE 50 MG/1
100 TABLET, FILM COATED ORAL
Status: CANCELLED | OUTPATIENT
Start: 2024-11-18

## 2024-11-18 RX ORDER — BISACODYL 10 MG
10 SUPPOSITORY, RECTAL RECTAL DAILY PRN
Status: CANCELLED | OUTPATIENT
Start: 2024-11-18

## 2024-11-18 RX ADMIN — MAGNESIUM CITRATE 296 ML: 1.75 LIQUID ORAL at 20:19

## 2024-11-18 RX ADMIN — NICOTINE POLACRILEX 2 MG: 2 GUM, CHEWING BUCCAL at 16:29

## 2024-11-18 RX ADMIN — DIPHENHYDRAMINE HYDROCHLORIDE 25 MG: 25 TABLET ORAL at 11:00

## 2024-11-18 RX ADMIN — HYDROXYZINE HYDROCHLORIDE 100 MG: 50 TABLET, FILM COATED ORAL at 11:59

## 2024-11-18 RX ADMIN — IOHEXOL 100 ML: 350 INJECTION, SOLUTION INTRAVENOUS at 21:28

## 2024-11-18 RX ADMIN — METRONIDAZOLE 500 MG: 500 TABLET ORAL at 06:51

## 2024-11-18 RX ADMIN — IBUPROFEN 600 MG: 600 TABLET, FILM COATED ORAL at 11:00

## 2024-11-18 RX ADMIN — NICOTINE 1 PATCH: 21 PATCH, EXTENDED RELEASE TRANSDERMAL at 08:53

## 2024-11-18 RX ADMIN — Medication 400 MG: at 18:39

## 2024-11-18 RX ADMIN — IBUPROFEN 600 MG: 600 TABLET, FILM COATED ORAL at 08:49

## 2024-11-18 RX ADMIN — LACTULOSE 20 G: 20 SOLUTION ORAL at 11:00

## 2024-11-18 RX ADMIN — CYANOCOBALAMIN TAB 1000 MCG 500 MCG: 1000 TAB at 08:50

## 2024-11-18 RX ADMIN — METRONIDAZOLE 500 MG: 500 TABLET ORAL at 14:30

## 2024-11-18 RX ADMIN — GABAPENTIN 200 MG: 100 CAPSULE ORAL at 08:49

## 2024-11-18 RX ADMIN — SENNOSIDES 17.2 MG: 8.6 TABLET, FILM COATED ORAL at 18:39

## 2024-11-18 RX ADMIN — GABAPENTIN 300 MG: 300 CAPSULE ORAL at 16:29

## 2024-11-18 RX ADMIN — PANTOPRAZOLE SODIUM 40 MG: 40 TABLET, DELAYED RELEASE ORAL at 06:51

## 2024-11-18 RX ADMIN — LACTULOSE 20 G: 20 SOLUTION ORAL at 16:29

## 2024-11-18 RX ADMIN — METHOCARBAMOL TABLETS 500 MG: 500 TABLET, COATED ORAL at 11:00

## 2024-11-18 RX ADMIN — METOCLOPRAMIDE 10 MG: 10 TABLET ORAL at 11:00

## 2024-11-18 RX ADMIN — VENLAFAXINE HYDROCHLORIDE 75 MG: 75 CAPSULE, EXTENDED RELEASE ORAL at 08:49

## 2024-11-18 RX ADMIN — KETOROLAC TROMETHAMINE 15 MG: 30 INJECTION, SOLUTION INTRAMUSCULAR; INTRAVENOUS at 22:30

## 2024-11-18 RX ADMIN — ATORVASTATIN CALCIUM 10 MG: 10 TABLET, FILM COATED ORAL at 16:29

## 2024-11-18 RX ADMIN — OXYCODONE HYDROCHLORIDE AND ACETAMINOPHEN 500 MG: 500 TABLET ORAL at 18:39

## 2024-11-18 RX ADMIN — FLUTICASONE PROPIONATE 1 SPRAY: 50 SPRAY, METERED NASAL at 08:53

## 2024-11-18 RX ADMIN — OXYCODONE HYDROCHLORIDE AND ACETAMINOPHEN 500 MG: 500 TABLET ORAL at 08:50

## 2024-11-18 RX ADMIN — Medication 400 MG: at 08:49

## 2024-11-18 RX ADMIN — LAMOTRIGINE 25 MG: 25 TABLET ORAL at 08:49

## 2024-11-18 RX ADMIN — FLUTICASONE FUROATE AND VILANTEROL TRIFENATATE 1 PUFF: 200; 25 POWDER RESPIRATORY (INHALATION) at 08:53

## 2024-11-18 RX ADMIN — SENNOSIDES 17.2 MG: 8.6 TABLET, FILM COATED ORAL at 08:50

## 2024-11-18 RX ADMIN — ACETAMINOPHEN 975 MG: 325 TABLET ORAL at 16:36

## 2024-11-18 NOTE — PLAN OF CARE
Problem: Potential for Falls  Goal: Patient will remain free of falls  Description: INTERVENTIONS:  - Keep care items and personal belongings within reach  - Initiate and maintain comfort rounds  - Make Fall Risk Sign visible to staff  - Apply yellow socks and bracelet for high fall risk patients  - Consider moving patient to room near nurses station  Outcome: Progressing     Problem: SELF HARM/SUICIDALITY  Goal: Will have no self-injury during hospital stay  Description: INTERVENTIONS:  - Q 15 MINUTES: Routine safety checks  - Q WAKING SHIFT & PRN: Assess risk to determine if routine checks are adequate to maintain patient safety  - Encourage patient to participate actively in care by formulating a plan to combat response to suicidal ideation, identify supports and resources  Outcome: Progressing     Problem: DEPRESSION  Goal: Will be euthymic at discharge  Description: INTERVENTIONS:  - Administer medication as ordered  - Provide emotional support via 1:1 interaction with staff  - Encourage involvement in milieu/groups/activities  - Monitor for social isolation  Outcome: Progressing

## 2024-11-18 NOTE — PROGRESS NOTES
Progress Note - Behavioral Health   Name: Michelle Matos 48 y.o. female I MRN: [id number]   Unit/Bed#: U 379-02 I Date of Admission: 11/13/2024   Date of Service: 11/18/2024 I Hospital Day: 5         Assessment & Plan  Bipolar I disorder, most recent episode depressed, severe with psychotic features (HCC)  Yandy still feels depressed and anxious. She also still reports auditory hallucinations.  Increase Zyprexa to 15 mg at bedtime for mood stabilization and psychotic symptoms   Increase Gabapentin to 300 mg TID for anxiety as well as seizure prophylaxis   Continue Lamictal 25 mg daily for bipolar depression and seizure prophylaxis  Titration as follows: Weeks 1 and 2: 25 mg once daily; Weeks 3 and 4: 50 mg once daily; Week 5: 100 mg once daily; Week 6 and maintenance: 200 mg once daily  Continue Effexor XR 75 mg on 11/17/2024 for mood, anxiety, and migraines  Increase melatonin to 6 mg at bedtime for sleep  ZACHARY (generalized anxiety disorder)  See Principal problem  Cocaine abuse, episodic (HCC)  Encourage cessation   Counseling on the unit    Post-traumatic stress disorder, chronic  Stable  See Principal problem  Tobacco dependence syndrome  Encourage cessation   Nicotine patch and gum   Migraine with aura and without status migrainosus, not intractable  Continue Effexor XR 75 mg on 11/17 for migraine prophylaxis   Magnesium oxide supplementation, 400 mg BID  Vitamin B12 supplement   Neurology follow-up outpatient, referral placed        Recommended Treatment:     All current active medications have been reviewed  Encourage group therapy, milieu therapy and occupational therapy  Behavioral Health checks for safety monitoring  Medical service follows for constipation and migraine headaches  Estimated Discharge Date: 11/27/2024  Legal Status:   Voluntary 201 commitment    Current medications:  Current Facility-Administered Medications   Medication Dose Route Frequency Provider Last Rate    acetaminophen  975 mg Oral  Q6H PRN Ashley Mejia, DO      albuterol  2 puff Inhalation Q6H PRN Bethanie Wilde MD      aluminum-magnesium hydroxide-simethicone  30 mL Oral Q4H PRN Ashley Mejia, DO      ascorbic acid  500 mg Oral BID Ashley Mejia, DO      atorvastatin  10 mg Oral Daily With Dinner EDWIN Núñez      benztropine  1 mg Intramuscular Q4H PRN Max 6/day Ashley Mejia, DO      benztropine  1 mg Oral Q4H PRN Max 6/day Ashley Mejia, DO      bisacodyl  10 mg Oral Daily PRN Ashley Mejia, DO      bisacodyl  10 mg Rectal Daily PRN Ashley Mejia, DO      cyanocobalamin  500 mcg Oral Daily Ashley Mejia, DO      hydrOXYzine HCL  50 mg Oral Q6H PRN Max 4/day Ashley Mejia, DO      Or    diphenhydrAMINE  50 mg Intramuscular Q6H PRN Ashley Mejia, DO      diphenhydrAMINE  25 mg Oral Once EDWIN Núñez      ergocalciferol  50,000 Units Oral Weekly EDWIN Núñez      [START ON 11/19/2024] ferrous sulfate  325 mg Oral Every Other Day EDWIN Núñez      fluticasone  1 spray Each Nare Daily EDWIN Núñez      fluticasone-vilanterol  1 puff Inhalation Daily Bethanie Wilde MD      gabapentin  300 mg Oral TID Bethanie Wilde MD      hydrOXYzine HCL  100 mg Oral Q6H PRN Max 4/day Ashley Mejia,       Or    LORazepam  2 mg Intramuscular Q6H PRN Ashley Mejia, DO      hydrOXYzine HCL  25 mg Oral Q6H PRN Max 4/day Ashley Mejia, DO      ibuprofen  400 mg Oral Q6H PRN Ashley Mejia, DO      ibuprofen  600 mg Oral Q8H PRN Ashley Mejia, DO      ibuprofen  600 mg Oral Once EDWIN Núñez      lactulose  20 g Oral TID EDWIN Núñez      lamoTRIgine  25 mg Oral Daily Ashley Mejia, DO      magnesium Oxide  400 mg Oral BID Ashley Mejia, DO      melatonin  6 mg Oral HS Bethanie Wilde MD      methocarbamol  500 mg Oral Q8H PRN EDWIN Núñez      methocarbamol  500 mg Oral Once EDWIN Núñez    "   metoclopramide  10 mg Oral Once EDWIN Núñez      metroNIDAZOLE  500 mg Oral Q8H KIMBERLY EDWIN Núñez      nicotine  1 patch Transdermal Daily Ashley Mejia, DO      nicotine polacrilex  2 mg Oral Q2H PRN Ashley Mejia, DO      OLANZapine  10 mg Oral Q3H PRN Max 3/day Ashley Mejia, DO      Or    OLANZapine  10 mg Intramuscular Q3H PRN Max 3/day Ashley Roberto, DO      OLANZapine  5 mg Oral Q3H PRN Max 6/day Ashleyyin Mejia, DO      Or    OLANZapine  5 mg Intramuscular Q3H PRN Max 6/day Ashleyyin Mejia, DO      OLANZapine  15 mg Oral HS Bethanie Wilde MD      OLANZapine  2.5 mg Oral Q3H PRN Max 8/day Ashley Mejia, DO      ondansetron  4 mg Oral Q6H PRN Radha Baltazar MD      pantoprazole  40 mg Oral Early Morning Bethanie Wilde MD      propranolol  10 mg Oral Q8H PRN Ashley Mejia, DO      senna  2 tablet Oral BID EDWIN Núñez      senna-docusate sodium  1 tablet Oral Daily PRN Ashley Mejia, DO      traZODone  50 mg Oral HS PRN Ashley Mejia, DO      venlafaxine  75 mg Oral Daily Ashley Mejia, DO         Risks / Benefits of Treatment:    Risks, benefits, and possible side effects of medications explained to patient and patient verbalizes understanding and agreement for treatment.  Risks of medications in pregnancy explained if female patient. Patient verbalizes understanding and agrees to notify her doctor if she becomes pregnant.    Subjective:    Behavior over the last 24 hours: minimal improvement.     Michelle continues to feel anxious, depressed, helpless, and hopeless. She denies suicidal thoughts today, but reports \"angry thoughts\" towards other people and feels that others are talking abut her. She states that auditory hallucinations still present today \"I hear many people talking, they are loud and they get me upset\". She also still reports visual hallucinations of \"a man talking\". Limited participation in milieu. Compliant with " "medications.    Sleep: decreased  Appetite: normal  Medication side effects: No   ROS: reports constipation and headache, denies any shortness of breath or chest pain, all other systems are negative    Mental Status Evaluation:    Appearance:  casually dressed   Behavior:  cooperative, limited eye contact   Speech:  normal rate, soft   Mood:  depressed, anxious   Affect:  blunted   Thought Process:  organized, goal directed, concrete   Associations: concrete associations   Thought Content:  persecutory delusions   Perceptual Disturbances: vague auditory hallucinations of \"people talking\", visual hallucinations of \"a man\"   Risk Potential: Suicidal ideation - None at present  Homicidal ideation - angry, hostile feelings with no homicidal plan  Potential for aggression - Yes, due to poor impulse control   Sensorium:  oriented to person, place, and time/date   Memory:  recent and remote memory grossly intact   Consciousness:  alert and awake   Attention/Concentration: decreased concentration and decreased attention span   Insight:  impaired   Judgment: impaired   Gait/Station: normal gait/station, normal balance   Motor Activity: no abnormal movements     Vital signs in last 24 hours:    Temp:  [97.4 °F (36.3 °C)-98.4 °F (36.9 °C)] 97.5 °F (36.4 °C)  HR:  [] 75  BP: (113-155)/(58-72) 126/58  Resp:  [16-18] 18  SpO2:  [98 %-100 %] 100 %  O2 Device: None (Room air)         Laboratory results: I have personally reviewed all pertinent laboratory/tests results    Urinalysis   Lab Results   Component Value Date    COLORU Yellow 11/15/2024    CLARITYU Clear 11/15/2024    SPECGRAV 1.025 11/15/2024    PHUR 5.0 11/15/2024    LEUKOCYTESUR 25.0 (A) 11/15/2024    NITRITE Negative 11/15/2024    PROTEIN UA 30 (1+) (A) 11/15/2024    GLUCOSEU Negative 11/15/2024    KETONESU 5 (Trace) (A) 11/15/2024    UROBILINOGEN 1.0 11/15/2024    BILIRUBINUR Negative 11/15/2024    BLOODU 50.0 (A) 11/15/2024    RBCUA 1-2 (A) 11/15/2024    WBCUA " 2-4 11/15/2024    EPIS Moderate (A) 11/15/2024    BACTERIA Occasional 11/15/2024       Suicide/Homicide Risk Assessment:    Risk of Harm to Self:   Nursing Suicide Risk Assessment Last 24 hours: C-SSRS Risk (Since Last Contact)  Calculated C-SSRS Risk Score (Since Last Contact): No Risk Indicated  Current Specific Risk Factors include: diagnosis of mood disorder, current psychotic symptoms  Protective Factors: no current suicidal ideation, ability to communicate with staff on the unit, taking medications as ordered on the unit  Based on today's assessment, Michelle presents the following risk of harm to self: low    Risk of Harm to Others:  Nursing Homicide Risk Assessment: Violence Risk to Others: Denies within past 6 months  Based on today's assessment, Michelle presents the following risk of harm to others: low    The following interventions are recommended: Behavioral Health checks for safety monitoring, continued hospitalization on locked unit    Progress Toward Goals: minimal improvement, still anxious, still depressed, denies current suicidal thoughts, still reports psychotic symptoms    Counseling / Coordination of Care:    Patient's progress discussed with staff in treatment team meeting.  Medication changes reviewed with nursing staff.  Medication changes discussed with patient.        Bethanie Wilde MD 11/18/24

## 2024-11-18 NOTE — NURSING NOTE
Pt is calm, cooperative, social with peers and staff. Pt is going to groups, doing ADLs and taking medications as prescribed. Pt states she is hearing a bunch of voices in the background arguing all at once. Pt also states she is seeing a shadow of a old man. The voices and the man are not doing or saying anything they are just there. Pt denies SI and HI. Pt states she does have abdominal pain due to not having a good BM recently. Medical is aware. Pt denies any unmet needs and remains on q15 min checks for safety.

## 2024-11-18 NOTE — NURSING NOTE
Pt pleasant on approach,  visible and social on the unit. Pt states she anxious due to be on the unit, denies all other psych symptoms. Reporting ongoing abdomen pain due to having small BM the past few days. Compliant with medication administration, denies any unmet needs. Q 15 min safety checks maintained.

## 2024-11-18 NOTE — NURSING NOTE
2100; Pt requesting something to assist with insomnia, PRN PO Trazodone 50 mg administered.     2200: Pt resting in bed.

## 2024-11-18 NOTE — ASSESSMENT & PLAN NOTE
Yandy still feels depressed and anxious. She also still reports auditory hallucinations.  Increase Zyprexa to 15 mg at bedtime for mood stabilization and psychotic symptoms   Increase Gabapentin to 300 mg TID for anxiety as well as seizure prophylaxis   Continue Lamictal 25 mg daily for bipolar depression and seizure prophylaxis  Titration as follows: Weeks 1 and 2: 25 mg once daily; Weeks 3 and 4: 50 mg once daily; Week 5: 100 mg once daily; Week 6 and maintenance: 200 mg once daily  Continue Effexor XR 75 mg on 11/17/2024 for mood, anxiety, and migraines  Increase melatonin to 6 mg at bedtime for sleep

## 2024-11-18 NOTE — PROGRESS NOTES
Pt completed and signed Safety & Relapse Prevention Plan. Chart copy was labeled with Pt sticker and placed in RN scan bin. Pt copy placed in Pt folder and is to be received upon D/C.        11/18/24 1200   Activity/Group Checklist   Group Admission/Discharge   Attendance Attended   Attendance Duration (min) 0-15   Interactions Interacted appropriately   Affect/Mood Appropriate   Goals Achieved Identified feelings;Identified triggers;Identified relapse prevention strategies;Identified medication adherence strategies;Discussed safety plan;Discussed coping strategies;Discussed discharge plans

## 2024-11-18 NOTE — NURSING NOTE
2100: Pt anxious due to constipation which is causing abdomen pain. PRN PO Atarax 100 mg administered.     2200: Pt resting in bed no longer appears anxious.

## 2024-11-18 NOTE — PROGRESS NOTES
11/18/24 1448   Team Meeting   Meeting Type Tx Team Meeting   Team Members Present   Team Members Present Physician;Nurse;   Physician Team Member Chani   Nursing Team Member [facility] Management Team Member Tre   Patient/Family Present   Patient Present Yes   Patient's Family Present No     Tx plan was reviewed and discussed with Pt. Pt was encouraged to attend groups. Medication was discussed with Pt. Pt signed tx plan.

## 2024-11-18 NOTE — PROGRESS NOTES
11/18/24 0853   Team Meeting   Meeting Type Daily Rounds   Team Members Present   Team Members Present Physician;Nurse;   Physician Team Member Chani   [facility] Tre   Patient/Family Present   Patient Present No   Patient's Family Present No     201. Pt is med/ meal complaint. Pt is reporting AH, VH and anxiety. Pt reported SI in the morning but then denied in the afternoon. Pt received PRN for agitation and was effective. Pt attends groups. Pt discharges this week.

## 2024-11-18 NOTE — ASSESSMENT & PLAN NOTE
Continue Effexor XR 75 mg on 11/17 for migraine prophylaxis   Magnesium oxide supplementation, 400 mg BID  Vitamin B12 supplement   Neurology follow-up outpatient, referral placed

## 2024-11-18 NOTE — NURSING NOTE
Pt presents with complaint of severe symptoms of anxiety; prn 100 mg PO Atarax was administered.

## 2024-11-18 NOTE — QUICK NOTE
Notified by primary service regarding patient's complaints of intractable headache and constipation.     Patient was on Amitriptyline by her PCP with referral to neurology for headaches. At this time, Amitriptyline has been discontinued, and she has been started on Effexor. Will trial 1x dose of motrin, reglan, benadryl, and robaxin for headache. Continue hydration and magnesium supplementation. Recommend close follow-up with neurology for headaches.     Patient was on iron supplement BID which has been reduced. Her bowel regimen was increased.

## 2024-11-19 ENCOUNTER — HOSPITAL ENCOUNTER (INPATIENT)
Facility: HOSPITAL | Age: 49
LOS: 1 days | DRG: 753 | End: 2024-11-20
Attending: INTERNAL MEDICINE | Admitting: PSYCHIATRY & NEUROLOGY
Payer: COMMERCIAL

## 2024-11-19 VITALS
RESPIRATION RATE: 16 BRPM | TEMPERATURE: 97.5 F | OXYGEN SATURATION: 99 % | HEART RATE: 103 BPM | DIASTOLIC BLOOD PRESSURE: 74 MMHG | WEIGHT: 156.8 LBS | BODY MASS INDEX: 28.85 KG/M2 | HEIGHT: 62 IN | SYSTOLIC BLOOD PRESSURE: 110 MMHG

## 2024-11-19 DIAGNOSIS — Z13.9 ENCOUNTER FOR SCREENING INVOLVING SOCIAL DETERMINANTS OF HEALTH (SDOH): ICD-10-CM

## 2024-11-19 DIAGNOSIS — F31.5 BIPOLAR I DISORDER, MOST RECENT EPISODE DEPRESSED, SEVERE WITH PSYCHOTIC FEATURES (HCC): Primary | ICD-10-CM

## 2024-11-19 DIAGNOSIS — Z00.8 MEDICAL CLEARANCE FOR PSYCHIATRIC ADMISSION: ICD-10-CM

## 2024-11-19 PROBLEM — R10.11 RIGHT UPPER QUADRANT ABDOMINAL PAIN: Status: ACTIVE | Noted: 2024-11-19

## 2024-11-19 LAB
ANION GAP SERPL CALCULATED.3IONS-SCNC: 8 MMOL/L (ref 4–13)
BUN SERPL-MCNC: 16 MG/DL (ref 5–25)
CALCIUM SERPL-MCNC: 8.4 MG/DL (ref 8.4–10.2)
CHLORIDE SERPL-SCNC: 106 MMOL/L (ref 96–108)
CO2 SERPL-SCNC: 24 MMOL/L (ref 21–32)
CREAT SERPL-MCNC: 0.69 MG/DL (ref 0.6–1.3)
ERYTHROCYTE [DISTWIDTH] IN BLOOD BY AUTOMATED COUNT: 14.1 % (ref 11.6–15.1)
GFR SERPL CREATININE-BSD FRML MDRD: 103 ML/MIN/1.73SQ M
GLUCOSE P FAST SERPL-MCNC: 77 MG/DL (ref 65–99)
GLUCOSE SERPL-MCNC: 77 MG/DL (ref 65–140)
HCT VFR BLD AUTO: 40.5 % (ref 34.8–46.1)
HGB BLD-MCNC: 12.9 G/DL (ref 11.5–15.4)
LIPASE SERPL-CCNC: 77 U/L (ref 11–82)
MCH RBC QN AUTO: 28.3 PG (ref 26.8–34.3)
MCHC RBC AUTO-ENTMCNC: 31.9 G/DL (ref 31.4–37.4)
MCV RBC AUTO: 89 FL (ref 82–98)
PLATELET # BLD AUTO: 332 THOUSANDS/UL (ref 149–390)
PMV BLD AUTO: 10 FL (ref 8.9–12.7)
POTASSIUM SERPL-SCNC: 4.3 MMOL/L (ref 3.5–5.3)
RBC # BLD AUTO: 4.56 MILLION/UL (ref 3.81–5.12)
SODIUM SERPL-SCNC: 138 MMOL/L (ref 135–147)
WBC # BLD AUTO: 10.3 THOUSAND/UL (ref 4.31–10.16)

## 2024-11-19 PROCEDURE — 80048 BASIC METABOLIC PNL TOTAL CA: CPT | Performed by: PHYSICIAN ASSISTANT

## 2024-11-19 PROCEDURE — 99238 HOSP IP/OBS DSCHRG MGMT 30/<: CPT | Performed by: PSYCHIATRY & NEUROLOGY

## 2024-11-19 PROCEDURE — 99223 1ST HOSP IP/OBS HIGH 75: CPT | Performed by: INTERNAL MEDICINE

## 2024-11-19 PROCEDURE — 83690 ASSAY OF LIPASE: CPT | Performed by: PHYSICIAN ASSISTANT

## 2024-11-19 PROCEDURE — 85027 COMPLETE CBC AUTOMATED: CPT | Performed by: PHYSICIAN ASSISTANT

## 2024-11-19 RX ORDER — LAMOTRIGINE 25 MG/1
25 TABLET ORAL DAILY
Status: DISCONTINUED | OUTPATIENT
Start: 2024-11-19 | End: 2024-11-20 | Stop reason: HOSPADM

## 2024-11-19 RX ORDER — AMOXICILLIN 250 MG
1 CAPSULE ORAL DAILY PRN
Status: DISCONTINUED | OUTPATIENT
Start: 2024-11-19 | End: 2024-11-20 | Stop reason: HOSPADM

## 2024-11-19 RX ORDER — MAGNESIUM HYDROXIDE/ALUMINUM HYDROXICE/SIMETHICONE 120; 1200; 1200 MG/30ML; MG/30ML; MG/30ML
30 SUSPENSION ORAL EVERY 4 HOURS PRN
Status: DISCONTINUED | OUTPATIENT
Start: 2024-11-19 | End: 2024-11-20 | Stop reason: HOSPADM

## 2024-11-19 RX ORDER — IBUPROFEN 600 MG/1
600 TABLET, FILM COATED ORAL EVERY 8 HOURS PRN
Status: DISCONTINUED | OUTPATIENT
Start: 2024-11-19 | End: 2024-11-19

## 2024-11-19 RX ORDER — DIPHENHYDRAMINE HYDROCHLORIDE 50 MG/ML
50 INJECTION INTRAMUSCULAR; INTRAVENOUS EVERY 6 HOURS PRN
Status: DISCONTINUED | OUTPATIENT
Start: 2024-11-19 | End: 2024-11-20

## 2024-11-19 RX ORDER — GABAPENTIN 300 MG/1
300 CAPSULE ORAL 3 TIMES DAILY
Status: DISCONTINUED | OUTPATIENT
Start: 2024-11-19 | End: 2024-11-19

## 2024-11-19 RX ORDER — OLANZAPINE 10 MG/2ML
5 INJECTION, POWDER, FOR SOLUTION INTRAMUSCULAR
Status: DISCONTINUED | OUTPATIENT
Start: 2024-11-19 | End: 2024-11-20 | Stop reason: HOSPADM

## 2024-11-19 RX ORDER — NICOTINE 21 MG/24HR
1 PATCH, TRANSDERMAL 24 HOURS TRANSDERMAL DAILY
Status: DISCONTINUED | OUTPATIENT
Start: 2024-11-19 | End: 2024-11-19

## 2024-11-19 RX ORDER — HYDROXYZINE HYDROCHLORIDE 50 MG/1
100 TABLET, FILM COATED ORAL
Status: DISCONTINUED | OUTPATIENT
Start: 2024-11-19 | End: 2024-11-20

## 2024-11-19 RX ORDER — IBUPROFEN 400 MG/1
400 TABLET, FILM COATED ORAL EVERY 6 HOURS PRN
Status: DISCONTINUED | OUTPATIENT
Start: 2024-11-19 | End: 2024-11-19

## 2024-11-19 RX ORDER — ASCORBIC ACID 500 MG
500 TABLET ORAL 2 TIMES DAILY
Status: DISCONTINUED | OUTPATIENT
Start: 2024-11-19 | End: 2024-11-20 | Stop reason: HOSPADM

## 2024-11-19 RX ORDER — ALBUTEROL SULFATE 90 UG/1
2 INHALANT RESPIRATORY (INHALATION) EVERY 6 HOURS PRN
Status: DISCONTINUED | OUTPATIENT
Start: 2024-11-19 | End: 2024-11-20 | Stop reason: HOSPADM

## 2024-11-19 RX ORDER — FLUTICASONE FUROATE AND VILANTEROL 200; 25 UG/1; UG/1
1 POWDER RESPIRATORY (INHALATION)
Status: DISCONTINUED | OUTPATIENT
Start: 2024-11-19 | End: 2024-11-20 | Stop reason: HOSPADM

## 2024-11-19 RX ORDER — BISACODYL 5 MG/1
10 TABLET, DELAYED RELEASE ORAL DAILY PRN
Status: DISCONTINUED | OUTPATIENT
Start: 2024-11-19 | End: 2024-11-20 | Stop reason: HOSPADM

## 2024-11-19 RX ORDER — ONDANSETRON 4 MG/1
4 TABLET, ORALLY DISINTEGRATING ORAL EVERY 6 HOURS PRN
Status: DISCONTINUED | OUTPATIENT
Start: 2024-11-19 | End: 2024-11-20 | Stop reason: HOSPADM

## 2024-11-19 RX ORDER — HYDROXYZINE HYDROCHLORIDE 25 MG/1
25 TABLET, FILM COATED ORAL
Status: DISCONTINUED | OUTPATIENT
Start: 2024-11-19 | End: 2024-11-20

## 2024-11-19 RX ORDER — PANTOPRAZOLE SODIUM 40 MG/1
40 TABLET, DELAYED RELEASE ORAL
Status: DISCONTINUED | OUTPATIENT
Start: 2024-11-19 | End: 2024-11-20 | Stop reason: HOSPADM

## 2024-11-19 RX ORDER — NICOTINE 21 MG/24HR
1 PATCH, TRANSDERMAL 24 HOURS TRANSDERMAL DAILY
Status: DISCONTINUED | OUTPATIENT
Start: 2024-11-19 | End: 2024-11-20 | Stop reason: ALTCHOICE

## 2024-11-19 RX ORDER — HYDROXYZINE HYDROCHLORIDE 50 MG/1
50 TABLET, FILM COATED ORAL
Status: DISCONTINUED | OUTPATIENT
Start: 2024-11-19 | End: 2024-11-20

## 2024-11-19 RX ORDER — OXYCODONE HYDROCHLORIDE 5 MG/1
5 TABLET ORAL EVERY 4 HOURS PRN
Refills: 0 | Status: DISCONTINUED | OUTPATIENT
Start: 2024-11-19 | End: 2024-11-20 | Stop reason: HOSPADM

## 2024-11-19 RX ORDER — METRONIDAZOLE 500 MG/1
500 TABLET ORAL EVERY 8 HOURS SCHEDULED
Status: DISCONTINUED | OUTPATIENT
Start: 2024-11-19 | End: 2024-11-20

## 2024-11-19 RX ORDER — OLANZAPINE 5 MG/1
5 TABLET ORAL
Status: DISCONTINUED | OUTPATIENT
Start: 2024-11-19 | End: 2024-11-20 | Stop reason: HOSPADM

## 2024-11-19 RX ORDER — ACETAMINOPHEN 325 MG/1
975 TABLET ORAL EVERY 6 HOURS PRN
Status: DISCONTINUED | OUTPATIENT
Start: 2024-11-19 | End: 2024-11-19

## 2024-11-19 RX ORDER — FERROUS SULFATE 325(65) MG
325 TABLET ORAL EVERY OTHER DAY
Status: DISCONTINUED | OUTPATIENT
Start: 2024-11-19 | End: 2024-11-20 | Stop reason: HOSPADM

## 2024-11-19 RX ORDER — KETOROLAC TROMETHAMINE 30 MG/ML
15 INJECTION, SOLUTION INTRAMUSCULAR; INTRAVENOUS EVERY 6 HOURS PRN
Status: DISCONTINUED | OUTPATIENT
Start: 2024-11-19 | End: 2024-11-20 | Stop reason: HOSPADM

## 2024-11-19 RX ORDER — BENZTROPINE MESYLATE 1 MG/1
1 TABLET ORAL
Status: DISCONTINUED | OUTPATIENT
Start: 2024-11-19 | End: 2024-11-20

## 2024-11-19 RX ORDER — SODIUM CHLORIDE 9 MG/ML
125 INJECTION, SOLUTION INTRAVENOUS CONTINUOUS
Status: DISCONTINUED | OUTPATIENT
Start: 2024-11-19 | End: 2024-11-19

## 2024-11-19 RX ORDER — HEPARIN SODIUM 5000 [USP'U]/ML
5000 INJECTION, SOLUTION INTRAVENOUS; SUBCUTANEOUS EVERY 8 HOURS SCHEDULED
Status: DISCONTINUED | OUTPATIENT
Start: 2024-11-19 | End: 2024-11-20 | Stop reason: HOSPADM

## 2024-11-19 RX ORDER — OLANZAPINE 10 MG/2ML
10 INJECTION, POWDER, FOR SOLUTION INTRAMUSCULAR
Status: DISCONTINUED | OUTPATIENT
Start: 2024-11-19 | End: 2024-11-20 | Stop reason: HOSPADM

## 2024-11-19 RX ORDER — OLANZAPINE 2.5 MG/1
2.5 TABLET, FILM COATED ORAL
Status: DISCONTINUED | OUTPATIENT
Start: 2024-11-19 | End: 2024-11-20

## 2024-11-19 RX ORDER — ERGOCALCIFEROL 1.25 MG/1
50000 CAPSULE, LIQUID FILLED ORAL WEEKLY
Status: DISCONTINUED | OUTPATIENT
Start: 2024-11-21 | End: 2024-11-20 | Stop reason: HOSPADM

## 2024-11-19 RX ORDER — ACETAMINOPHEN 325 MG/1
650 TABLET ORAL EVERY 6 HOURS PRN
Status: DISCONTINUED | OUTPATIENT
Start: 2024-11-19 | End: 2024-11-20

## 2024-11-19 RX ORDER — OLANZAPINE 10 MG/1
10 TABLET ORAL
Status: DISCONTINUED | OUTPATIENT
Start: 2024-11-19 | End: 2024-11-20 | Stop reason: HOSPADM

## 2024-11-19 RX ORDER — BENZTROPINE MESYLATE 1 MG/ML
1 INJECTION, SOLUTION INTRAMUSCULAR; INTRAVENOUS
Status: DISCONTINUED | OUTPATIENT
Start: 2024-11-19 | End: 2024-11-20

## 2024-11-19 RX ORDER — SENNOSIDES 8.6 MG
2 TABLET ORAL 2 TIMES DAILY
Status: DISCONTINUED | OUTPATIENT
Start: 2024-11-19 | End: 2024-11-20 | Stop reason: HOSPADM

## 2024-11-19 RX ORDER — METHOCARBAMOL 500 MG/1
500 TABLET, FILM COATED ORAL EVERY 8 HOURS PRN
Status: DISCONTINUED | OUTPATIENT
Start: 2024-11-19 | End: 2024-11-20 | Stop reason: HOSPADM

## 2024-11-19 RX ORDER — FLUTICASONE PROPIONATE 50 MCG
1 SPRAY, SUSPENSION (ML) NASAL DAILY
Status: DISCONTINUED | OUTPATIENT
Start: 2024-11-19 | End: 2024-11-20 | Stop reason: HOSPADM

## 2024-11-19 RX ORDER — VENLAFAXINE HYDROCHLORIDE 75 MG/1
75 CAPSULE, EXTENDED RELEASE ORAL DAILY
Status: DISCONTINUED | OUTPATIENT
Start: 2024-11-19 | End: 2024-11-20 | Stop reason: HOSPADM

## 2024-11-19 RX ORDER — LANOLIN ALCOHOL/MO/W.PET/CERES
400 CREAM (GRAM) TOPICAL 2 TIMES DAILY
Status: DISCONTINUED | OUTPATIENT
Start: 2024-11-19 | End: 2024-11-20 | Stop reason: HOSPADM

## 2024-11-19 RX ORDER — SODIUM CHLORIDE 9 MG/ML
100 INJECTION, SOLUTION INTRAVENOUS CONTINUOUS
Status: DISCONTINUED | OUTPATIENT
Start: 2024-11-19 | End: 2024-11-19

## 2024-11-19 RX ORDER — TRAZODONE HYDROCHLORIDE 50 MG/1
50 TABLET, FILM COATED ORAL
Status: DISCONTINUED | OUTPATIENT
Start: 2024-11-19 | End: 2024-11-20 | Stop reason: HOSPADM

## 2024-11-19 RX ORDER — ATORVASTATIN CALCIUM 10 MG/1
10 TABLET, FILM COATED ORAL
Status: DISCONTINUED | OUTPATIENT
Start: 2024-11-19 | End: 2024-11-20 | Stop reason: HOSPADM

## 2024-11-19 RX ORDER — PROPRANOLOL HYDROCHLORIDE 10 MG/1
10 TABLET ORAL EVERY 8 HOURS PRN
Status: DISCONTINUED | OUTPATIENT
Start: 2024-11-19 | End: 2024-11-20 | Stop reason: HOSPADM

## 2024-11-19 RX ORDER — LORAZEPAM 2 MG/ML
2 INJECTION INTRAMUSCULAR EVERY 6 HOURS PRN
Status: DISCONTINUED | OUTPATIENT
Start: 2024-11-19 | End: 2024-11-20

## 2024-11-19 RX ORDER — BISACODYL 10 MG
10 SUPPOSITORY, RECTAL RECTAL DAILY PRN
Status: DISCONTINUED | OUTPATIENT
Start: 2024-11-19 | End: 2024-11-20 | Stop reason: HOSPADM

## 2024-11-19 RX ORDER — GABAPENTIN 300 MG/1
300 CAPSULE ORAL 3 TIMES DAILY
Status: DISCONTINUED | OUTPATIENT
Start: 2024-11-19 | End: 2024-11-20 | Stop reason: HOSPADM

## 2024-11-19 RX ADMIN — TRAZODONE HYDROCHLORIDE 50 MG: 50 TABLET ORAL at 01:16

## 2024-11-19 RX ADMIN — MELATONIN TAB 3 MG 6 MG: 3 TAB at 01:17

## 2024-11-19 RX ADMIN — SENNOSIDES 17.2 MG: 8.6 TABLET, FILM COATED ORAL at 17:35

## 2024-11-19 RX ADMIN — MORPHINE SULFATE 2 MG: 2 INJECTION, SOLUTION INTRAMUSCULAR; INTRAVENOUS at 12:37

## 2024-11-19 RX ADMIN — BISACODYL 10 MG: 5 TABLET, COATED ORAL at 17:33

## 2024-11-19 RX ADMIN — FLUTICASONE PROPIONATE 1 SPRAY: 50 SPRAY, METERED NASAL at 08:07

## 2024-11-19 RX ADMIN — HYDROXYZINE HYDROCHLORIDE 100 MG: 50 TABLET, FILM COATED ORAL at 21:28

## 2024-11-19 RX ADMIN — NICOTINE 1 PATCH: 21 PATCH, EXTENDED RELEASE TRANSDERMAL at 08:08

## 2024-11-19 RX ADMIN — FLUTICASONE FUROATE AND VILANTEROL TRIFENATATE 1 PUFF: 200; 25 POWDER RESPIRATORY (INHALATION) at 08:07

## 2024-11-19 RX ADMIN — OLANZAPINE 15 MG: 5 TABLET, FILM COATED ORAL at 21:09

## 2024-11-19 RX ADMIN — GABAPENTIN 300 MG: 300 CAPSULE ORAL at 15:13

## 2024-11-19 RX ADMIN — METRONIDAZOLE 500 MG: 500 TABLET ORAL at 05:36

## 2024-11-19 RX ADMIN — GABAPENTIN 300 MG: 300 CAPSULE ORAL at 21:09

## 2024-11-19 RX ADMIN — IBUPROFEN 600 MG: 600 TABLET, FILM COATED ORAL at 01:16

## 2024-11-19 RX ADMIN — ATORVASTATIN CALCIUM 10 MG: 10 TABLET, FILM COATED ORAL at 16:04

## 2024-11-19 RX ADMIN — HEPARIN SODIUM 5000 UNITS: 5000 INJECTION INTRAVENOUS; SUBCUTANEOUS at 21:11

## 2024-11-19 RX ADMIN — VITAM B12 500 MCG: 100 TAB at 08:07

## 2024-11-19 RX ADMIN — MAGNESIUM OXIDE TAB 400 MG (241.3 MG ELEMENTAL MG) 400 MG: 400 (241.3 MG) TAB at 17:35

## 2024-11-19 RX ADMIN — MAGNESIUM OXIDE TAB 400 MG (241.3 MG ELEMENTAL MG) 400 MG: 400 (241.3 MG) TAB at 08:08

## 2024-11-19 RX ADMIN — SODIUM CHLORIDE 125 ML/HR: 0.9 INJECTION, SOLUTION INTRAVENOUS at 01:25

## 2024-11-19 RX ADMIN — OXYCODONE HYDROCHLORIDE AND ACETAMINOPHEN 500 MG: 500 TABLET ORAL at 17:35

## 2024-11-19 RX ADMIN — OXYCODONE HYDROCHLORIDE AND ACETAMINOPHEN 500 MG: 500 TABLET ORAL at 08:08

## 2024-11-19 RX ADMIN — HYDROXYZINE HYDROCHLORIDE 100 MG: 50 TABLET, FILM COATED ORAL at 08:46

## 2024-11-19 RX ADMIN — KETOROLAC TROMETHAMINE 15 MG: 30 INJECTION, SOLUTION INTRAMUSCULAR; INTRAVENOUS at 08:54

## 2024-11-19 RX ADMIN — PANTOPRAZOLE SODIUM 40 MG: 40 TABLET, DELAYED RELEASE ORAL at 05:36

## 2024-11-19 RX ADMIN — METRONIDAZOLE 500 MG: 500 TABLET ORAL at 21:09

## 2024-11-19 RX ADMIN — HEPARIN SODIUM 5000 UNITS: 5000 INJECTION INTRAVENOUS; SUBCUTANEOUS at 01:17

## 2024-11-19 RX ADMIN — SODIUM CHLORIDE 125 ML/HR: 0.9 INJECTION, SOLUTION INTRAVENOUS at 08:50

## 2024-11-19 RX ADMIN — OXYCODONE HYDROCHLORIDE 5 MG: 5 TABLET ORAL at 03:38

## 2024-11-19 RX ADMIN — VENLAFAXINE HYDROCHLORIDE 75 MG: 75 CAPSULE, EXTENDED RELEASE ORAL at 08:07

## 2024-11-19 RX ADMIN — HYDROXYZINE HYDROCHLORIDE 50 MG: 50 TABLET, FILM COATED ORAL at 15:12

## 2024-11-19 RX ADMIN — GABAPENTIN 300 MG: 300 CAPSULE ORAL at 08:14

## 2024-11-19 RX ADMIN — FERROUS SULFATE TAB 325 MG (65 MG ELEMENTAL FE) 325 MG: 325 (65 FE) TAB at 08:07

## 2024-11-19 RX ADMIN — GABAPENTIN 300 MG: 300 CAPSULE ORAL at 01:17

## 2024-11-19 RX ADMIN — SENNOSIDES 17.2 MG: 8.6 TABLET, FILM COATED ORAL at 08:07

## 2024-11-19 RX ADMIN — KETOROLAC TROMETHAMINE 15 MG: 30 INJECTION, SOLUTION INTRAMUSCULAR; INTRAVENOUS at 21:10

## 2024-11-19 RX ADMIN — MELATONIN TAB 3 MG 6 MG: 3 TAB at 21:12

## 2024-11-19 RX ADMIN — HYDROXYZINE HYDROCHLORIDE 100 MG: 50 TABLET, FILM COATED ORAL at 01:38

## 2024-11-19 RX ADMIN — METRONIDAZOLE 500 MG: 500 TABLET ORAL at 14:15

## 2024-11-19 RX ADMIN — LAMOTRIGINE 25 MG: 25 TABLET ORAL at 08:07

## 2024-11-19 RX ADMIN — HEPARIN SODIUM 5000 UNITS: 5000 INJECTION INTRAVENOUS; SUBCUTANEOUS at 14:15
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
## 2024-11-19 NOTE — ASSESSMENT & PLAN NOTE
Not in exacerbation at this time  We will continue reveals the prescribed Proventil 90 mcg 2 puffs every 6 hours as needed and substitute Breo 200/25 mcg 1 inhalation daily for prehospital Advair

## 2024-11-19 NOTE — PLAN OF CARE
Problem: PAIN - ADULT  Goal: Verbalizes/displays adequate comfort level or baseline comfort level  Description: Interventions:  - Encourage patient to monitor pain and request assistance  - Assess pain using appropriate pain scale  - Administer analgesics based on type and severity of pain and evaluate response  - Implement non-pharmacological measures as appropriate and evaluate response  - Consider cultural and social influences on pain and pain management  - Notify physician/advanced practitioner if interventions unsuccessful or patient reports new pain  Outcome: Progressing     Problem: INFECTION - ADULT  Goal: Absence or prevention of progression during hospitalization  Description: INTERVENTIONS:  - Assess and monitor for signs and symptoms of infection  - Monitor lab/diagnostic results  - Monitor all insertion sites, i.e. indwelling lines, tubes, and drains  - Monitor endotracheal if appropriate and nasal secretions for changes in amount and color  - Ceres appropriate cooling/warming therapies per order  - Administer medications as ordered  - Instruct and encourage patient and family to use good hand hygiene technique  - Identify and instruct in appropriate isolation precautions for identified infection/condition  Outcome: Progressing  Goal: Absence of fever/infection during neutropenic period  Description: INTERVENTIONS:  - Monitor WBC    Outcome: Progressing     Problem: SAFETY ADULT  Goal: Patient will remain free of falls  Description: INTERVENTIONS:  - Educate patient/family on patient safety including physical limitations  - Instruct patient to call for assistance with activity   - Consult OT/PT to assist with strengthening/mobility   - Keep Call bell within reach  - Keep bed low and locked with side rails adjusted as appropriate  - Keep care items and personal belongings within reach  - Initiate and maintain comfort rounds  - Make Fall Risk Sign visible to staff  - Apply yellow socks and bracelet  for high fall risk patients  - Consider moving patient to room near nurses station  Outcome: Progressing     Problem: DISCHARGE PLANNING  Goal: Discharge to home or other facility with appropriate resources  Description: INTERVENTIONS:  - Identify barriers to discharge w/patient and caregiver  - Arrange for needed discharge resources and transportation as appropriate  - Identify discharge learning needs (meds, wound care, etc.)  - Arrange for interpretive services to assist at discharge as needed  - Refer to Case Management Department for coordinating discharge planning if the patient needs post-hospital services based on physician/advanced practitioner order or complex needs related to functional status, cognitive ability, or social support system  Outcome: Progressing     Problem: Knowledge Deficit  Goal: Patient/family/caregiver demonstrates understanding of disease process, treatment plan, medications, and discharge instructions  Description: Complete learning assessment and assess knowledge base.  Interventions:  - Provide teaching at level of understanding  - Provide teaching via preferred learning methods  Outcome: Progressing     Problem: GASTROINTESTINAL - ADULT  Goal: Minimal or absence of nausea and/or vomiting  Description: INTERVENTIONS:  - Administer IV fluids if ordered to ensure adequate hydration  - Maintain NPO status until nausea and vomiting are resolved  - Nasogastric tube if ordered  - Administer ordered antiemetic medications as needed  - Provide nonpharmacologic comfort measures as appropriate  - Advance diet as tolerated, if ordered  - Consider nutrition services referral to assist patient with adequate nutrition and appropriate food choices  Outcome: Progressing  Goal: Maintains or returns to baseline bowel function  Description: INTERVENTIONS:  - Assess bowel function  - Encourage oral fluids to ensure adequate hydration  - Administer IV fluids if ordered to ensure adequate hydration  -  Administer ordered medications as needed  - Encourage mobilization and activity  - Consider nutritional services referral to assist patient with adequate nutrition and appropriate food choices  Outcome: Progressing  Goal: Maintains adequate nutritional intake  Description: INTERVENTIONS:  - Monitor percentage of each meal consumed  - Identify factors contributing to decreased intake, treat as appropriate  - Assist with meals as needed  - Monitor I&O, weight, and lab values if indicated  - Obtain nutrition services referral as needed  Outcome: Progressing  Goal: Oral mucous membranes remain intact  Description: INTERVENTIONS  - Assess oral mucosa and hygiene practices  - Implement preventative oral hygiene regimen  - Implement oral medicated treatments as ordered  - Initiate Nutrition services referral as needed  Outcome: Progressing

## 2024-11-19 NOTE — ASSESSMENT & PLAN NOTE
Yandy still feels depressed and anxious. She also still reports auditory hallucinations.    Plan Per inpatient behavioral health unit:  Zyprexa 15 mg at bedtime for mood stabilization and psychotic symptoms   Gabapentin 300 mg TID for anxiety as well as seizure prophylaxis   Continue Lamictal 25 mg daily for bipolar depression and seizure prophylaxis  Titration as follows: Weeks 1 and 2: 25 mg once daily; Weeks 3 and 4: 50 mg once daily; Week 5: 100 mg once daily; Week 6 and maintenance: 200 mg once daily  Effexor XR 75 mg on 11/17/2024 for mood, anxiety, and migraines  melatonin 6 mg at bedtime for sleep    Plan to return to inpatient unit to complete behavioral health treatment

## 2024-11-19 NOTE — PLAN OF CARE
Problem: PAIN - ADULT  Goal: Verbalizes/displays adequate comfort level or baseline comfort level  Description: Interventions:  - Encourage patient to monitor pain and request assistance  - Assess pain using appropriate pain scale  - Administer analgesics based on type and severity of pain and evaluate response  - Implement non-pharmacological measures as appropriate and evaluate response  - Consider cultural and social influences on pain and pain management  - Notify physician/advanced practitioner if interventions unsuccessful or patient reports new pain  Outcome: Progressing     Problem: GASTROINTESTINAL - ADULT  Goal: Minimal or absence of nausea and/or vomiting  Description: INTERVENTIONS:  - Administer IV fluids if ordered to ensure adequate hydration  - Maintain NPO status until nausea and vomiting are resolved  - Nasogastric tube if ordered  - Administer ordered antiemetic medications as needed  - Provide nonpharmacologic comfort measures as appropriate  - Advance diet as tolerated, if ordered  - Consider nutrition services referral to assist patient with adequate nutrition and appropriate food choices  Outcome: Progressing     Problem: GASTROINTESTINAL - ADULT  Goal: Maintains or returns to baseline bowel function  Description: INTERVENTIONS:  - Assess bowel function  - Encourage oral fluids to ensure adequate hydration  - Administer IV fluids if ordered to ensure adequate hydration  - Administer ordered medications as needed  - Encourage mobilization and activity  - Consider nutritional services referral to assist patient with adequate nutrition and appropriate food choices  Outcome: Progressing     Problem: GASTROINTESTINAL - ADULT  Goal: Maintains adequate nutritional intake  Description: INTERVENTIONS:  - Monitor percentage of each meal consumed  - Identify factors contributing to decreased intake, treat as appropriate  - Assist with meals as needed  - Monitor I&O, weight, and lab values if  indicated  - Obtain nutrition services referral as needed  Outcome: Progressing     Problem: GASTROINTESTINAL - ADULT  Goal: Oral mucous membranes remain intact  Description: INTERVENTIONS  - Assess oral mucosa and hygiene practices  - Implement preventative oral hygiene regimen  - Implement oral medicated treatments as ordered  - Initiate Nutrition services referral as needed  Outcome: Progressing

## 2024-11-19 NOTE — NURSING NOTE
Patient has been accepted to medical floor (7 tower). Report has been called in (Mahnaz VANEGAS) and patient is in route with supervisor and security. Vitals: 110/74  O2. Patient currently reports 8/10 pain, Toradol was administered via IV. Several bouts of brown color emesis observed.

## 2024-11-19 NOTE — DISCHARGE INSTR - OTHER ORDERS
Support Services by: [facility]  Next Steps:    Call [phone] to schedule an appointment.      Email [email or web address] to get more info.   About: We offer a myriad of support services to help individuals and families overcome the obstacles they face. Uniquely equipped staff meet people where they are and guide them through the steps to be successful with money management, job readiness, benefit enrollment, securing an ID, and apartment hunting.  Eligibility: This program serves those who are experiencing homelessness, about to experience homelessness, or are in an unstable housing situation.  Nearest location: 3.53 miles away.  [facility]  [address] [place] [address]   [phone]   Note: Walk in hours: Monday-Thursday from 9am to 3pm, & Friday (No assessments, appts. only, and open to drop off paperwork).  Hours:  Monday:09:00 AM - 03:00 PM  Tuesday:09:00 AM - 03:00 PM  Wednesday:09:00 AM - 03:00 PM  Thursday:09:00 AM - 03:00 PM  Friday:09:00 AM - 03:00 PM    Housing Choice Vouchers (Section 8) by: [facility] (AHA)  Next Steps:    Call [phone] to get more info.   Program availability: full  About: The [employer] (AHA) provides the Housing Choice Voucher program for low-income families, senior citizens, and persons with disabilities who need support affording decent, safe, and sanitary housing in the private market.    This program provides:    - Housing Vouchers    Contact the [facility] to ask if the Housing Choice Voucher Program waiting list is open and accepting applications. Applications are accepted online.  Eligibility: Must meet income guidelines.  Nearest location: 3.5 miles away.  [facility]  [address] [place] [address]   [phone]  Hours:  Monday:08:00 AM - 05:00 PM  Tuesday:08:00 AM - 05:00 PM  Wednesday:08:00 AM - 05:00 PM  Thursday:08:00 AM - 05:00  PM  Friday:08:00 AM - 05:00 PM  Section 8 Housing Choice Vouchers by: [facility] ([facility])  Next Steps:    Go to the nearest location to apply.      Call [phone] to apply.   Program availability: waitlist  About: The Housing Choice Voucher program is the federal government's major program for assisting very low-income families, the elderly, and the disabled to afford decent, safe, and sanitary housing in the private market.    This program provides:    - Housing vouchers    Since housing assistance is provided on behalf of the family or individual, participants are able to find their own housing, including single-family homes, townhouses and apartments.The participant is free to choose any housing that meets the requirements of the program and is not limited to units located in subsidized housing projects. Housing choice vouchers are administered locally by public housing agencies (PHAs).    A family that is issued a housing voucher is responsible for finding a suitable housing unit of the family's choice where the owner agrees to rent under the program. This unit may include the family's present residence. Rental units must meet minimum standards of health and safety, as determined by the PHA. A housing subsidy is paid to the landlord directly by the PHA on behalf of the participating family. The family then pays the difference between the actual rent charged by the landlord and the amount subsidized by the program. Under certain circumstances, if authorized by the PHA, a family may use its voucher to purchase a modest home.   Eligibility: Must meet income requirements.  Nearest location: 8.31 miles away.  Housing Voucher Office  [address] [place] [address]   [phone]  Hours:  Monday:08:00 AM - 05:00 PM  Tuesday:08:00 AM - 05:00 PM  Wednesday:08:00 AM - 05:00 PM  Thursday:08:00 AM - 05:00 PM  Friday:08:00 AM - 05:00 PM

## 2024-11-19 NOTE — ASSESSMENT & PLAN NOTE
Patient reports that she has not had a bowel movement in 1 week despite oral medication administration and behavioral health  As of evaluation today, patient had 1 large and another smaller bowel movement  Abdominal pain improved, patient feels back to baseline and would like to return to inpatient unit  Will defer to medicine team for appropriateness for discharge back to inpatient unit
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
## 2024-11-19 NOTE — QUICK NOTE
Reached out to by primary team regarding abdominal pain, absence of bowel sounds, and emesis. Provider ordered STAT CT Abdomen Pelvis with contrast. Primary team requested for Family Medicine team to evaluate the patient in person.     Per patient, she has been without BM for 5 days, and in last two days she has been in significant pain. Pain waxes and wanes worsening this morning. Also, endorses back pain.     PE demonstrated adequate bowel sounds, diffuse abdominal tenderness to palpation, marked distention, guarding, no rigidity. VS WNL.     Advised IV access, toradol IM because no IV available on the floor, CBC, CMP, lipase, and Mg in addition to the CT scan ordered.     CT abdomen revealed:  Distended fluid-filled ascending colon with mild gaseous distention of the transverse colon with small fluid levels. Correlate clinically for diarrhea. No obstruction, bowel wall thickening or other inflammatory process.     [facility] provider on call, Robby Diego PA-C, was made aware of the findings and advised to transfer to medical floor for IVFL, pain management, and further workup.   Provider agreed with transfer and will resume care.     Family Medicine team is available if any concerns arise overnight.

## 2024-11-19 NOTE — ASSESSMENT & PLAN NOTE
Patient reports that she has not had a bowel movement in 1 week despite oral medication administration and behavioral health  CT imaging does not reveal large stool burden  Offered patient soapsuds enema which she states she would like to try in the a.m. an order for same was placed

## 2024-11-19 NOTE — TREATMENT PLAN
Reached out to by primary RN regarding abdominal distention, absence of bowel sounds, and emesis. Description concerning for bowel obstruction. Will get STAT CT Abdomen Pelvis with contrast. Advised RN to have family medicine resident in house evaluate the patient as well. May need upgrade to medical     Robby Diego Jr., PA-C

## 2024-11-19 NOTE — ASSESSMENT & PLAN NOTE
"Placed in observation medicine  CT imaging revealed \"Distended fluid-filled ascending colon with mild gaseous distention of the transverse colon with small fluid levels. Correlate clinically for diarrhea No obstruction, bowel wall thickening or other inflammatory process\"  Lipase was elevated at 371 with no CT findings consistent with pancreatitis  We will place on bowel rest  Give pain control and antiemetics as needed  Continue to trend lipase  Patient with history of prior cholecystectomy and multiple abdominal surgeries to include gastric sleeve  Consider GI consult,possible ultrasound right upper quadrant or MRCP in a.m.  "

## 2024-11-19 NOTE — H&P
"H&P - Hospitalist   Name: Michelle Matos 48 y.o. female I MRN: [id number]  Unit/Bed#: 7T [facility] 707-01 I Date of Admission: 11/19/2024   Date of Service: 11/19/2024 I Hospital Day: 1     Assessment & Plan  Right upper quadrant abdominal pain  Placed in observation medicine  CT imaging revealed \"Distended fluid-filled ascending colon with mild gaseous distention of the transverse colon with small fluid levels. Correlate clinically for diarrhea No obstruction, bowel wall thickening or other inflammatory process\"  Lipase was elevated at 371 with no CT findings consistent with pancreatitis  We will place on bowel rest  Give pain control and antiemetics as needed  Continue to trend lipase  Patient with history of prior cholecystectomy and multiple abdominal surgeries to include gastric sleeve  Consider GI consult,possible ultrasound right upper quadrant or MRCP in a.m.  Constipation  Patient reports that she has not had a bowel movement in 1 week despite oral medication administration and behavioral health  CT imaging does not reveal large stool burden  Offered patient soapsuds enema which she states she would like to try in the a.m. an order for same was placed  Tobacco dependence syndrome  Will give nicotine 21 mg transdermal daily  Bipolar I disorder, most recent episode depressed, severe with psychotic features (HCC)  Patient was previously on inpatient behavioral health unit   Continue previously prescribed psychiatric medications at previously prescribed doses  Consult psychiatry in a.m.  Chronic vaginitis  Will continue previously prescribed Flagyl 500 mg p.o. every 8 hours  Asthma  Not in exacerbation at this time  We will continue reveals the prescribed Proventil 90 mcg 2 puffs every 6 hours as needed and substitute Breo 200/25 mcg 1 inhalation daily for prehospital Advair  Hyperlipidemia  Continue prehospital Lipitor 10 mg p.o. nightly  TeleMedicine H&P - [facility] Internal Medicine    Patient Information: Michelle SMITH" Shawna 48 y.o. female MRN: [id number]  Unit/Bed#: 7T [facility] 707-01 Encounter: [id number]  Admitting Physician: Amos Silva PA-C  PCP: Onesimo Velasco MD  Date of Admission:  11/19/24    REQUIRED DOCUMENTATION:     1. This service was provided via Telemedicine.  2. Provider located at [facility].  3. TeleMed provider: Amos Silva PA-C.  4. Identify all parties in room with patient during tele consult:  Patient and patient's nurse  5. After connecting through [employer], patient was identified by name and date of birth and assistant checked wristband.  Patient was then informed that this was a Telemedicine visit and that the exam was being conducted confidentially over secure lines. My office door was closed. No one else was in the room.  Patient acknowledged consent and understanding of privacy and security of the Telemedicine visit, and gave us permission to have the assistant stay in the room in order to assist with the history and to conduct the exam.  I informed the patient that I have reviewed their record in Epic and presented the opportunity for them to ask any questions regarding the visit today.  The patient agreed to participate.     VTE Prophylaxis: Heparin  / sequential compression device   Code Status: Level 1  Discussion with family: None present at bedside at time of exam    Anticipated Length of Stay:  Patient will be admitted on an Observation basis with an anticipated length of stay of less than 2 midnights.   Justification for Hospital Stay: Abdominal pain with constipation elevated lipase requiring bowel rest and pain control    Total Time for Visit, including Counseling / Coordination of Care: 1 hour.  Greater than 50% of this total time spent on direct patient counseling and coordination of care.    Chief Complaint:   Abdominal pain and distention x 3 days    History of Present Illness:    Michelle Matos is a 48 y.o. female who presents inpatient behavioral health unit nominal pain and distention x  3 days.  Patient has extensive abdominal surgery history to include prior cholecystectomy, hysterectomy and gastric sleeve who states that she has not moved her bowels in a week's time despite multiple oral medications given in behavioral health unit.  Patient states that her abdomen is distended and complains of right upper quadrant and left lower quadrant pain accompanied with nausea and vomiting.  CT imaging was unrevealing and lipase was elevated at 371 without CT findings consistent with pancreatitis.  Patient denies any history of previous episodes of pancreatitis though does have alcohol use history.    Patient denies any fever or chills, no bright red blood per rectum or dark stools.    Review of Systems:    Review of Systems   Constitutional:  Negative for chills and fever.   HENT:  Negative for congestion and sore throat.    Respiratory:  Negative for cough, shortness of breath and wheezing.    Cardiovascular:  Negative for chest pain and palpitations.   Gastrointestinal:  Positive for abdominal distention, abdominal pain and constipation. Negative for diarrhea, nausea and vomiting.   Genitourinary:  Negative for dysuria, frequency, hematuria and urgency.   Musculoskeletal:  Negative for arthralgias and myalgias.   Skin:  Negative for wound.   Neurological:  Negative for dizziness, syncope, light-headedness and headaches.   Psychiatric/Behavioral:  Positive for hallucinations.    All other systems reviewed and are negative.      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     Neuralgia     OAB (overactive bladder)     Obesity     Postsurgical malabsorption     PTSD (post-traumatic stress disorder)     Seizures (HCC)     Spinal stenosis of lumbar region     Tarsal tunnel syndrome     Tendinitis of right rotator cuff     Vitamin D deficiency        Past Surgical History:   Procedure Laterality Date    APPENDECTOMY      BLADDER SURGERY      CHOLECYSTECTOMY      GASTRECTOMY      gastric sleeve    GASTRIC RESTRICTION SURGERY      gastric sleeve in Jan 22 2018    HEMORROIDECTOMY      HYSTERECTOMY      TUBAL LIGATION         Meds/Allergies:    Prior to Admission medications    Medication Sig Start Date End Date Taking? Authorizing Provider   albuterol (PROVENTIL HFA,VENTOLIN HFA) 90 mcg/act inhaler Inhale 2 puffs 4 (four) times a day   Yes Historical Provider, MD   amitriptyline (ELAVIL) 100 mg tablet Take 25 mg by mouth daily at bedtime   Yes Historical Provider, MD   ferrous sulfate 325 (65 Fe) mg tablet Take 325 mg by mouth 2 (two) times a day with meals   Yes Historical Provider, MD   folic acid (FOLVITE) 1 mg tablet Take 1 mg by mouth daily   Yes Historical Provider, MD   lidocaine (LIDODERM) 5 % Apply 1 patch topically daily for 4 days Remove & Discard patch within 12 hours or as directed by MD 6/24/19 11/19/24 Yes María Elena Lugo, DO   melatonin 3 mg Take 1 tablet (3 mg total) by mouth daily at bedtime 5/11/23  Yes Rachell Frye MD   metoclopramide (REGLAN) 10 mg tablet Take 1 tablet (10 mg total) by mouth every 6 (six) hours 10/25/23  Yes Tami Rdz, DO   nicotine (NICODERM CQ) 14 mg/24hr TD 24 hr patch Place 1 patch on the skin over 24 hours daily Do not start before April 14, 2023. 4/14/23  Yes Rachell Frye MD   omeprazole (PriLOSEC) 40 MG capsule Take 40 mg by mouth daily   Yes Historical Provider, MD   ondansetron (ZOFRAN) 4 mg tablet Take 1 tablet (4 mg total) by mouth every 6 (six) hours 3/3/23  Yes Latosha Maynard PA-C   acetaminophen (TYLENOL) 500 mg  tablet Take 1 tablet (500 mg total) by mouth every 6 (six) hours as needed for mild pain  Patient not taking: Reported on 11/19/2024 3/3/23   Latosha Maynard PA-C   acetaminophen (TYLENOL) 500 mg tablet Take 1 tablet (500 mg total) by mouth every 6 (six) hours as needed for moderate pain  Patient not taking: Reported on 11/19/2024 8/22/24   Narda Dubose PA-C   ascorbic acid (VITAMIN C) 250 mg tablet Take 500 mg by mouth 2 (two) times a day    Historical Provider, MD   benzonatate (TESSALON PERLES) 100 mg capsule Take 1 capsule (100 mg total) by mouth 3 (three) times a day  Patient not taking: Reported on 11/19/2024 5/11/23   Rachell Frye MD   dextromethorphan-guaiFENesin (ROBITUSSIN DM)  mg/5 mL syrup Take 10 mL by mouth every 4 (four) hours as needed for cough  Patient not taking: Reported on 11/19/2024 5/11/23   Rachell Frye MD   Diclofenac Sodium (VOLTAREN) 1 % Apply 2 g topically 4 (four) times a day  Patient not taking: Reported on 11/19/2024 2/25/23   Lito Nesbitt MD   Fluticasone-Salmeterol (Advair Diskus) 500-50 mcg/dose inhaler Inhale 1 puff 2 (two) times a day Rinse mouth after use. 5/11/23 11/19/24  Rachell Frye MD   Multiple Vitamins-Minerals (MULTIVITAMIN ADULT EXTRA C PO) Take 1 tablet by mouth  Patient not taking: Reported on 11/19/2024    Historical Provider, MD   naproxen (NAPROSYN) 500 mg tablet Take 1 tablet (500 mg total) by mouth 2 (two) times a day with meals  Patient not taking: Reported on 11/19/2024 10/25/23   Tami Rdz DO   propranolol (INDERAL) 10 mg tablet Take 10 mg by mouth 2 (two) times a day  Patient not taking: Reported on 11/19/2024    Historical Provider, MD     all medications and allergies reviewed    Allergies:   Allergies   Allergen Reactions    Polyethylene Glycol Vomiting    Golytely [Peg 3350-Kcl-Nabcb-Nacl-Nasulf] Vomiting    Risperidone     Depakote [Valproic Acid] Rash       Social History:     Marital Status:    Occupation: On  "FMLA  Patient Pre-hospital Living Situation: With family  Patient Pre-hospital Level of Mobility: Full with assist of a cane  Patient Pre-hospital Diet Restrictions: None  Substance Use History:   Social History     Substance and Sexual Activity   Alcohol Use Yes    Comment: ocassionally     Social History     Tobacco Use   Smoking Status Some Days    Current packs/day: 0.00    Types: Cigarettes    Last attempt to quit: 2023    Years since quittin.5    Passive exposure: Past   Smokeless Tobacco Never     Social History     Substance and Sexual Activity   Drug Use Yes    Types: Cocaine    Comment: valente       Family History:  I have reviewed the patients family history     Physical Exam:     Vitals:   Blood Pressure: 123/92 (24)  Pulse: 92 (24)  Temperature: (!) 97.3 °F (36.3 °C) (24)  Temp Source: Temporal (24)  Respirations: 18 (24)  Height: 5' 1\" (154.9 cm) (24)  Weight - Scale: 73.1 kg (161 lb 2.5 oz) (24)  SpO2: 97 % (24)    Physical Exam  Vitals and nursing note reviewed.   Constitutional:       Appearance: She is well-developed.   HENT:      Head: Normocephalic and atraumatic.      Right Ear: Tympanic membrane normal.      Left Ear: Tympanic membrane normal.      Ears:      Comments: Per nursing exam given remote location     Nose: Nose normal.      Comments: Per nursing exam given remote location     Mouth/Throat:      Mouth: Mucous membranes are moist.      Pharynx: No oropharyngeal exudate.      Comments: Per nursing exam given remote location  Eyes:      General: No scleral icterus.     Pupils: Pupils are equal, round, and reactive to light.      Comments: Per nursing exam given remote location   Neck:      Vascular: No JVD.      Comments: Per nursing exam given remote location  Cardiovascular:      Rate and Rhythm: Normal rate and regular rhythm.      Heart sounds: Normal heart sounds. No murmur heard.     " Comments: Per nursing exam given remote location  Pulmonary:      Effort: Pulmonary effort is normal. No respiratory distress.      Breath sounds: Normal breath sounds. No wheezing or rales.      Comments: Per nursing exam given remote location  Abdominal:      General: Bowel sounds are normal. There is distension.      Palpations: Abdomen is soft.      Tenderness: There is abdominal tenderness. There is no guarding or rebound.      Comments: Per nursing exam given remote location   Musculoskeletal:         General: Normal range of motion.      Cervical back: Normal range of motion and neck supple.      Right lower leg: No edema.      Left lower leg: No edema.      Comments: Per nursing exam given remote location   Lymphadenopathy:      Cervical: No cervical adenopathy.   Skin:     General: Skin is warm and dry.      Findings: No erythema or rash.      Comments: Per nursing exam given remote location   Neurological:      Mental Status: She is alert and oriented to person, place, and time.           Additional Data:     Lab Results: Results Review Statement: I reviewed radiology reports from this admission including: CT abdomen/pelvis.      Results from last 7 days   Lab Units 11/18/24  2135   WBC Thousand/uL 10.56*   RBC Million/uL 4.89   HEMOGLOBIN g/dL 14.1   HEMATOCRIT % 43.6   PLATELETS Thousands/uL 362   SEGS PCT % 58   LYMPHO PCT % 32   MONO PCT % 8   EOS PCT % 1     Results from last 7 days   Lab Units 11/18/24  2135   SODIUM mmol/L 138   POTASSIUM mmol/L 4.3   CHLORIDE mmol/L 105   CO2 mmol/L 25   BUN mg/dL 17   CREATININE mg/dL 0.74   CALCIUM mg/dL 9.7   ALK PHOS U/L 69   ALT U/L 22   AST U/L 24             Results from last 7 days   Lab Units 11/14/24  0616   HEMOGLOBIN A1C % 5.7*         Imaging: Results Review Statement: I reviewed radiology reports from this admission including: CT abdomen/pelvis.  No orders to display       EKG, Pathology, and Other Studies Reviewed on Admission:   EKG: N/A    Epic  Records Reviewed: Yes     ** Please Note: This note has been constructed using a voice recognition system. **

## 2024-11-19 NOTE — ASSESSMENT & PLAN NOTE
Patient was previously on inpatient behavioral health unit   Continue previously prescribed psychiatric medications at previously prescribed doses  Consult psychiatry in a.m.

## 2024-11-19 NOTE — CASE MANAGEMENT
Case Management Assessment & Discharge Planning Note    Patient name Michelle Matos  Location 7T [facility] 707/7T [facility] 707-01 MRN [id number]  : 1975 Date 2024       Current Admission Date: 2024  Current Admission Diagnosis:Right upper quadrant abdominal pain   Patient Active Problem List    Diagnosis Date Noted Date Diagnosed    Right upper quadrant abdominal pain 2024     Medical clearance for psychiatric admission 2024     ZACHARY (generalized anxiety disorder) 2024     Cocaine abuse, episodic (HCC) 2024     Post-traumatic stress disorder, chronic 2024     Heartburn 2024     Hyperlipidemia 2024     Abdominal pain 04/10/2023     Status post panniculectomy 2020     Herpes zoster without complication 2019     Contact dermatitis 2018     Neuralgia 2018     Bipolar I disorder, most recent episode depressed, severe with psychotic features (HCC) 2018     History of migraine 2018     S/P laparoscopic sleeve gastrectomy 2018     Postsurgical malabsorption 2018     Tobacco dependence syndrome 2018     Constipation 2018     Spinal stenosis of lumbar region 10/12/2017     Female sexual dysfunction 2017     OAB (overactive bladder) 2017     Lumbar radiculopathy 2017     Lumbar stenosis 2017     Class 1 obesity due to excess calories without serious comorbidity with body mass index (BMI) of 33.0 to 33.9 in adult 2017     History of kidney stones 2017     Bicipital tendonitis of shoulder, right 2017     Tendinitis of right rotator cuff 2017     Bowel dysfunction 2016     Tarsal tunnel syndrome 2016     Migraine with aura and without status migrainosus, not intractable 2016     Chronic vaginitis 2016     Hypertonicity of bladder 2014     Asthma 10/16/2014     Chronic lumbar radiculopathy 10/08/2014     Mixed urge and stress incontinence  09/09/2014     Vitamin D deficiency 08/28/2014     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     Seizures (HCC) 02/10/2012       LOS (days): 1  Geometric Mean LOS (GMLOS) (days):   Days to GMLOS:     OBJECTIVE:        Current admission status: Observation    Preferred Pharmacy:   [facility] - [place], [place] - [address]  [address]  [address]  [place] [address]  Phone: [phone] Fax: [phone]    Primary Care Provider: Onesimo Velasco MD    Primary Insurance: [employer] [place] [place]  Secondary Insurance:     ASSESSMENT:  Active Health Care Proxies       Isiah Matos  Alternate Health Care Agent - Spouse   Primary Phone: [phone] (Home)                 Patient Information  Mental Status: Alert  During Assessment patient was accompanied by: Not accompanied during assessment  Assessment information provided by:: Patient  Primary Caregiver: Self  Support Systems: Spouse/significant other, Self, Daughter  County of Residence: [place]  What city do you live in?: [place]  Home entry access options. Select all that apply.: Stairs  Number of steps to enter home.: 3  Do the steps have railings?: Yes  Type of Current Residence: [address] [place] [address] home  Upon entering residence, is there a bedroom on the main floor (no further steps)?: Yes  Upon entering residence, is there a bathroom on the main floor (no further steps)?: Yes  Living Arrangements: Lives w/ Spouse/significant other, Lives w/ Children  Is patient a ?: No    Activities of Daily Living Prior to Admission  Functional Status: Independent  Completes ADLs independently?: Yes  Ambulates independently?: Yes  Does patient use assisted devices?: Yes  Assisted Devices (DME) used: Straight Cane  Does patient currently own DME?: Yes  What DME does the patient currently own?: Straight Cane  Does patient have a history of Outpatient Therapy (PT/OT)?: No  Does the patient have a history of Short-Term Rehab?: No  Does  patient have a history of HHC?: No  Does patient currently have HHC?: No    Patient Information Continued  Income Source: Unemployed  Does patient have prescription coverage?: Yes  Does patient receive dialysis treatments?: No  Does patient have a history of substance abuse?: No  Is patient currently in treatment for substance abuse?: Yes  Does patient have a history of Mental Health Diagnosis?: Yes  Is patient receiving treatment for mental health?: Yes  Has patient received inpatient treatment related to mental health in the last 2 years?: Yes (11/13-11/19/24 [facility])    Means of Transportation  Means of Transport to Appts:: Drives Self  DISCHARGE DETAILS:    Discharge planning discussed with:: Patient  Freedom of Choice: Yes     CM contacted family/caregiver?: No- see comments (AAOx3)  Were Treatment Team discharge recommendations reviewed with patient/caregiver?: Yes  Did patient/caregiver verbalize understanding of patient care needs?: Yes  Were patient/caregiver advised of the risks associated with not following Treatment Team discharge recommendations?: Yes    Other Referral/Resources/Interventions Provided:  Interventions: Shelter       Additional Comments: Met with patient at bedside.  Patient reports she is losing her housing Dec 11.  Has been working with [employer].  Did provide with Find Help Housing resources and added to AVS,  Psych consult pending.  Per NINFA Urbina waiting for GI consult.  Patient was transferred from [facility] to medical unit 11/19 abd pain.  CM department following thru discharge

## 2024-11-20 ENCOUNTER — HOSPITAL ENCOUNTER (INPATIENT)
Facility: HOSPITAL | Age: 49
LOS: 6 days | Discharge: HOME | End: 2024-11-26
Attending: PSYCHIATRY & NEUROLOGY | Admitting: PSYCHIATRY & NEUROLOGY
Payer: COMMERCIAL

## 2024-11-20 VITALS
HEIGHT: 61 IN | WEIGHT: 161.16 LBS | RESPIRATION RATE: 16 BRPM | TEMPERATURE: 97.4 F | HEART RATE: 80 BPM | DIASTOLIC BLOOD PRESSURE: 75 MMHG | BODY MASS INDEX: 30.43 KG/M2 | OXYGEN SATURATION: 97 % | SYSTOLIC BLOOD PRESSURE: 99 MMHG

## 2024-11-20 DIAGNOSIS — E55.9 VITAMIN D DEFICIENCY: ICD-10-CM

## 2024-11-20 DIAGNOSIS — E78.2 MIXED HYPERLIPIDEMIA: ICD-10-CM

## 2024-11-20 DIAGNOSIS — K59.00 CONSTIPATION, UNSPECIFIED CONSTIPATION TYPE: ICD-10-CM

## 2024-11-20 DIAGNOSIS — Z00.8 MEDICAL CLEARANCE FOR PSYCHIATRIC ADMISSION: ICD-10-CM

## 2024-11-20 DIAGNOSIS — F31.5 BIPOLAR I DISORDER, MOST RECENT EPISODE DEPRESSED, SEVERE WITH PSYCHOTIC FEATURES (HCC): Primary | Chronic | ICD-10-CM

## 2024-11-20 DIAGNOSIS — K08.89 TOOTH PAIN: ICD-10-CM

## 2024-11-20 DIAGNOSIS — R12 HEARTBURN: Chronic | ICD-10-CM

## 2024-11-20 DIAGNOSIS — N39.46 MIXED URGE AND STRESS INCONTINENCE: ICD-10-CM

## 2024-11-20 PROCEDURE — 99232 SBSQ HOSP IP/OBS MODERATE 35: CPT | Performed by: INTERNAL MEDICINE

## 2024-11-20 RX ORDER — BENZTROPINE MESYLATE 1 MG/ML
1 INJECTION, SOLUTION INTRAMUSCULAR; INTRAVENOUS
Status: DISCONTINUED | OUTPATIENT
Start: 2024-11-20 | End: 2024-11-26 | Stop reason: HOSPADM

## 2024-11-20 RX ORDER — HYDROXYZINE HYDROCHLORIDE 25 MG/1
25 TABLET, FILM COATED ORAL
Status: DISCONTINUED | OUTPATIENT
Start: 2024-11-20 | End: 2024-11-20 | Stop reason: HOSPADM

## 2024-11-20 RX ORDER — LORAZEPAM 2 MG/ML
2 INJECTION INTRAMUSCULAR EVERY 6 HOURS PRN
Status: DISCONTINUED | OUTPATIENT
Start: 2024-11-20 | End: 2024-11-26 | Stop reason: HOSPADM

## 2024-11-20 RX ORDER — OLANZAPINE 2.5 MG/1
2.5 TABLET, FILM COATED ORAL
Status: DISCONTINUED | OUTPATIENT
Start: 2024-11-20 | End: 2024-11-20 | Stop reason: HOSPADM

## 2024-11-20 RX ORDER — OLANZAPINE 10 MG/2ML
5 INJECTION, POWDER, FOR SOLUTION INTRAMUSCULAR
Status: DISCONTINUED | OUTPATIENT
Start: 2024-11-20 | End: 2024-11-26 | Stop reason: HOSPADM

## 2024-11-20 RX ORDER — HEPARIN SODIUM 5000 [USP'U]/ML
5000 INJECTION, SOLUTION INTRAVENOUS; SUBCUTANEOUS EVERY 8 HOURS SCHEDULED
Status: DISCONTINUED | OUTPATIENT
Start: 2024-11-20 | End: 2024-11-21

## 2024-11-20 RX ORDER — OLANZAPINE 10 MG/1
10 TABLET ORAL
Status: DISCONTINUED | OUTPATIENT
Start: 2024-11-20 | End: 2024-11-26 | Stop reason: HOSPADM

## 2024-11-20 RX ORDER — OLANZAPINE 10 MG/2ML
2.5 INJECTION, POWDER, FOR SOLUTION INTRAMUSCULAR
Status: DISCONTINUED | OUTPATIENT
Start: 2024-11-20 | End: 2024-11-26 | Stop reason: HOSPADM

## 2024-11-20 RX ORDER — IBUPROFEN 600 MG/1
600 TABLET, FILM COATED ORAL EVERY 8 HOURS PRN
Status: DISCONTINUED | OUTPATIENT
Start: 2024-11-20 | End: 2024-11-26 | Stop reason: HOSPADM

## 2024-11-20 RX ORDER — IBUPROFEN 400 MG/1
400 TABLET, FILM COATED ORAL EVERY 6 HOURS PRN
Status: DISCONTINUED | OUTPATIENT
Start: 2024-11-20 | End: 2024-11-20 | Stop reason: HOSPADM

## 2024-11-20 RX ORDER — METHOCARBAMOL 500 MG/1
500 TABLET, FILM COATED ORAL EVERY 8 HOURS PRN
Status: DISCONTINUED | OUTPATIENT
Start: 2024-11-20 | End: 2024-11-26 | Stop reason: HOSPADM

## 2024-11-20 RX ORDER — TRAZODONE HYDROCHLORIDE 50 MG/1
50 TABLET, FILM COATED ORAL
Status: DISCONTINUED | OUTPATIENT
Start: 2024-11-20 | End: 2024-11-26 | Stop reason: HOSPADM

## 2024-11-20 RX ORDER — IBUPROFEN 600 MG/1
600 TABLET, FILM COATED ORAL EVERY 8 HOURS PRN
Status: DISCONTINUED | OUTPATIENT
Start: 2024-11-20 | End: 2024-11-20 | Stop reason: HOSPADM

## 2024-11-20 RX ORDER — OLANZAPINE 5 MG/1
5 TABLET ORAL
Status: DISCONTINUED | OUTPATIENT
Start: 2024-11-20 | End: 2024-11-26 | Stop reason: HOSPADM

## 2024-11-20 RX ORDER — DIPHENHYDRAMINE HYDROCHLORIDE 50 MG/ML
50 INJECTION INTRAMUSCULAR; INTRAVENOUS EVERY 6 HOURS PRN
Status: DISCONTINUED | OUTPATIENT
Start: 2024-11-20 | End: 2024-11-20 | Stop reason: HOSPADM

## 2024-11-20 RX ORDER — ALBUTEROL SULFATE 90 UG/1
2 INHALANT RESPIRATORY (INHALATION) EVERY 6 HOURS PRN
Status: DISCONTINUED | OUTPATIENT
Start: 2024-11-20 | End: 2024-11-26 | Stop reason: HOSPADM

## 2024-11-20 RX ORDER — HYDROXYZINE HYDROCHLORIDE 25 MG/1
25 TABLET, FILM COATED ORAL
Status: DISCONTINUED | OUTPATIENT
Start: 2024-11-20 | End: 2024-11-26 | Stop reason: HOSPADM

## 2024-11-20 RX ORDER — FLUTICASONE FUROATE AND VILANTEROL 200; 25 UG/1; UG/1
1 POWDER RESPIRATORY (INHALATION)
Status: DISCONTINUED | OUTPATIENT
Start: 2024-11-21 | End: 2024-11-26 | Stop reason: HOSPADM

## 2024-11-20 RX ORDER — OLANZAPINE 5 MG/1
5 TABLET ORAL
Status: DISCONTINUED | OUTPATIENT
Start: 2024-11-20 | End: 2024-11-20

## 2024-11-20 RX ORDER — BENZTROPINE MESYLATE 1 MG/ML
1 INJECTION, SOLUTION INTRAMUSCULAR; INTRAVENOUS
Status: DISCONTINUED | OUTPATIENT
Start: 2024-11-20 | End: 2024-11-20 | Stop reason: HOSPADM

## 2024-11-20 RX ORDER — VENLAFAXINE HYDROCHLORIDE 75 MG/1
75 CAPSULE, EXTENDED RELEASE ORAL DAILY
Status: DISCONTINUED | OUTPATIENT
Start: 2024-11-21 | End: 2024-11-21

## 2024-11-20 RX ORDER — FLUTICASONE PROPIONATE 50 MCG
1 SPRAY, SUSPENSION (ML) NASAL DAILY
Status: DISCONTINUED | OUTPATIENT
Start: 2024-11-21 | End: 2024-11-26 | Stop reason: HOSPADM

## 2024-11-20 RX ORDER — FERROUS SULFATE 325(65) MG
325 TABLET ORAL EVERY OTHER DAY
Status: DISCONTINUED | OUTPATIENT
Start: 2024-11-21 | End: 2024-11-26 | Stop reason: HOSPADM

## 2024-11-20 RX ORDER — KETOROLAC TROMETHAMINE 30 MG/ML
15 INJECTION, SOLUTION INTRAMUSCULAR; INTRAVENOUS EVERY 6 HOURS PRN
Status: DISPENSED | OUTPATIENT
Start: 2024-11-20 | End: 2024-11-21

## 2024-11-20 RX ORDER — HYDROXYZINE HYDROCHLORIDE 50 MG/1
100 TABLET, FILM COATED ORAL
Status: DISCONTINUED | OUTPATIENT
Start: 2024-11-20 | End: 2024-11-20 | Stop reason: HOSPADM

## 2024-11-20 RX ORDER — SENNOSIDES 8.6 MG
2 TABLET ORAL 2 TIMES DAILY
Status: DISCONTINUED | OUTPATIENT
Start: 2024-11-20 | End: 2024-11-23

## 2024-11-20 RX ORDER — DIPHENHYDRAMINE HYDROCHLORIDE 50 MG/ML
50 INJECTION INTRAMUSCULAR; INTRAVENOUS EVERY 6 HOURS PRN
Status: DISCONTINUED | OUTPATIENT
Start: 2024-11-20 | End: 2024-11-26 | Stop reason: HOSPADM

## 2024-11-20 RX ORDER — PANTOPRAZOLE SODIUM 40 MG/1
40 TABLET, DELAYED RELEASE ORAL
Status: DISCONTINUED | OUTPATIENT
Start: 2024-11-21 | End: 2024-11-26 | Stop reason: HOSPADM

## 2024-11-20 RX ORDER — ACETAMINOPHEN 325 MG/1
650 TABLET ORAL EVERY 6 HOURS PRN
Status: DISCONTINUED | OUTPATIENT
Start: 2024-11-20 | End: 2024-11-20 | Stop reason: HOSPADM

## 2024-11-20 RX ORDER — OLANZAPINE 10 MG/2ML
2.5 INJECTION, POWDER, FOR SOLUTION INTRAMUSCULAR
Status: DISCONTINUED | OUTPATIENT
Start: 2024-11-20 | End: 2024-11-20

## 2024-11-20 RX ORDER — PROPRANOLOL HYDROCHLORIDE 10 MG/1
10 TABLET ORAL EVERY 8 HOURS PRN
Status: DISCONTINUED | OUTPATIENT
Start: 2024-11-20 | End: 2024-11-26 | Stop reason: HOSPADM

## 2024-11-20 RX ORDER — HYDROXYZINE HYDROCHLORIDE 50 MG/1
50 TABLET, FILM COATED ORAL
Status: DISCONTINUED | OUTPATIENT
Start: 2024-11-20 | End: 2024-11-20 | Stop reason: HOSPADM

## 2024-11-20 RX ORDER — OLANZAPINE 2.5 MG/1
2.5 TABLET, FILM COATED ORAL
Status: DISCONTINUED | OUTPATIENT
Start: 2024-11-20 | End: 2024-11-26 | Stop reason: HOSPADM

## 2024-11-20 RX ORDER — OLANZAPINE 10 MG/2ML
5 INJECTION, POWDER, FOR SOLUTION INTRAMUSCULAR
Status: DISCONTINUED | OUTPATIENT
Start: 2024-11-20 | End: 2024-11-20

## 2024-11-20 RX ORDER — MAGNESIUM HYDROXIDE/ALUMINUM HYDROXICE/SIMETHICONE 120; 1200; 1200 MG/30ML; MG/30ML; MG/30ML
30 SUSPENSION ORAL EVERY 4 HOURS PRN
Status: DISCONTINUED | OUTPATIENT
Start: 2024-11-20 | End: 2024-11-26 | Stop reason: HOSPADM

## 2024-11-20 RX ORDER — BENZTROPINE MESYLATE 1 MG/1
1 TABLET ORAL
Status: DISCONTINUED | OUTPATIENT
Start: 2024-11-20 | End: 2024-11-20 | Stop reason: HOSPADM

## 2024-11-20 RX ORDER — HYDROXYZINE HYDROCHLORIDE 50 MG/1
100 TABLET, FILM COATED ORAL
Status: DISCONTINUED | OUTPATIENT
Start: 2024-11-20 | End: 2024-11-26 | Stop reason: HOSPADM

## 2024-11-20 RX ORDER — BISACODYL 10 MG
10 SUPPOSITORY, RECTAL RECTAL DAILY PRN
Status: DISCONTINUED | OUTPATIENT
Start: 2024-11-20 | End: 2024-11-26 | Stop reason: HOSPADM

## 2024-11-20 RX ORDER — AMOXICILLIN 250 MG
1 CAPSULE ORAL DAILY PRN
Status: DISCONTINUED | OUTPATIENT
Start: 2024-11-20 | End: 2024-11-26 | Stop reason: HOSPADM

## 2024-11-20 RX ORDER — ATORVASTATIN CALCIUM 10 MG/1
10 TABLET, FILM COATED ORAL
Status: DISCONTINUED | OUTPATIENT
Start: 2024-11-20 | End: 2024-11-26 | Stop reason: HOSPADM

## 2024-11-20 RX ORDER — IBUPROFEN 400 MG/1
400 TABLET, FILM COATED ORAL EVERY 6 HOURS PRN
Status: DISCONTINUED | OUTPATIENT
Start: 2024-11-20 | End: 2024-11-26 | Stop reason: HOSPADM

## 2024-11-20 RX ORDER — ASCORBIC ACID 500 MG
500 TABLET ORAL 2 TIMES DAILY
Status: DISCONTINUED | OUTPATIENT
Start: 2024-11-20 | End: 2024-11-26 | Stop reason: HOSPADM

## 2024-11-20 RX ORDER — LAMOTRIGINE 25 MG/1
25 TABLET ORAL DAILY
Status: DISCONTINUED | OUTPATIENT
Start: 2024-11-21 | End: 2024-11-26 | Stop reason: HOSPADM

## 2024-11-20 RX ORDER — NICOTINE 21 MG/24HR
1 PATCH, TRANSDERMAL 24 HOURS TRANSDERMAL DAILY
Status: DISCONTINUED | OUTPATIENT
Start: 2024-11-20 | End: 2024-11-20 | Stop reason: HOSPADM

## 2024-11-20 RX ORDER — ERGOCALCIFEROL 1.25 MG/1
50000 CAPSULE, LIQUID FILLED ORAL WEEKLY
Status: DISCONTINUED | OUTPATIENT
Start: 2024-11-21 | End: 2024-11-26 | Stop reason: HOSPADM

## 2024-11-20 RX ORDER — OXYCODONE HYDROCHLORIDE 5 MG/1
5 TABLET ORAL EVERY 4 HOURS PRN
Refills: 0 | Status: DISCONTINUED | OUTPATIENT
Start: 2024-11-20 | End: 2024-11-21

## 2024-11-20 RX ORDER — BISACODYL 5 MG/1
10 TABLET, DELAYED RELEASE ORAL DAILY PRN
Status: DISCONTINUED | OUTPATIENT
Start: 2024-11-20 | End: 2024-11-26 | Stop reason: HOSPADM

## 2024-11-20 RX ORDER — BENZTROPINE MESYLATE 1 MG/1
1 TABLET ORAL
Status: DISCONTINUED | OUTPATIENT
Start: 2024-11-20 | End: 2024-11-26 | Stop reason: HOSPADM

## 2024-11-20 RX ORDER — ONDANSETRON 4 MG/1
4 TABLET, ORALLY DISINTEGRATING ORAL EVERY 6 HOURS PRN
Status: DISCONTINUED | OUTPATIENT
Start: 2024-11-20 | End: 2024-11-26 | Stop reason: HOSPADM

## 2024-11-20 RX ORDER — ACETAMINOPHEN 325 MG/1
650 TABLET ORAL EVERY 6 HOURS PRN
Status: DISCONTINUED | OUTPATIENT
Start: 2024-11-20 | End: 2024-11-26 | Stop reason: HOSPADM

## 2024-11-20 RX ORDER — GABAPENTIN 300 MG/1
300 CAPSULE ORAL 3 TIMES DAILY
Status: DISCONTINUED | OUTPATIENT
Start: 2024-11-20 | End: 2024-11-26 | Stop reason: HOSPADM

## 2024-11-20 RX ORDER — OLANZAPINE 10 MG/2ML
10 INJECTION, POWDER, FOR SOLUTION INTRAMUSCULAR
Status: DISCONTINUED | OUTPATIENT
Start: 2024-11-20 | End: 2024-11-26 | Stop reason: HOSPADM

## 2024-11-20 RX ORDER — LORAZEPAM 2 MG/ML
2 INJECTION INTRAMUSCULAR EVERY 6 HOURS PRN
Status: DISCONTINUED | OUTPATIENT
Start: 2024-11-20 | End: 2024-11-20 | Stop reason: HOSPADM

## 2024-11-20 RX ORDER — LANOLIN ALCOHOL/MO/W.PET/CERES
400 CREAM (GRAM) TOPICAL 2 TIMES DAILY
Status: DISCONTINUED | OUTPATIENT
Start: 2024-11-20 | End: 2024-11-26 | Stop reason: HOSPADM

## 2024-11-20 RX ORDER — HYDROXYZINE HYDROCHLORIDE 50 MG/1
50 TABLET, FILM COATED ORAL
Status: DISCONTINUED | OUTPATIENT
Start: 2024-11-20 | End: 2024-11-26 | Stop reason: HOSPADM

## 2024-11-20 RX ADMIN — GABAPENTIN 300 MG: 300 CAPSULE ORAL at 21:24

## 2024-11-20 RX ADMIN — HYDROXYZINE HYDROCHLORIDE 100 MG: 50 TABLET, FILM COATED ORAL at 08:49

## 2024-11-20 RX ADMIN — MELATONIN TAB 3 MG 6 MG: 3 TAB at 21:24

## 2024-11-20 RX ADMIN — VENLAFAXINE HYDROCHLORIDE 75 MG: 75 CAPSULE, EXTENDED RELEASE ORAL at 08:36

## 2024-11-20 RX ADMIN — HEPARIN SODIUM 5000 UNITS: 5000 INJECTION INTRAVENOUS; SUBCUTANEOUS at 21:51

## 2024-11-20 RX ADMIN — PANTOPRAZOLE SODIUM 40 MG: 40 TABLET, DELAYED RELEASE ORAL at 05:27

## 2024-11-20 RX ADMIN — ATORVASTATIN CALCIUM 10 MG: 10 TABLET, FILM COATED ORAL at 17:21

## 2024-11-20 RX ADMIN — OLANZAPINE 15 MG: 5 TABLET, FILM COATED ORAL at 21:24

## 2024-11-20 RX ADMIN — FLUTICASONE PROPIONATE 1 SPRAY: 50 SPRAY, METERED NASAL at 08:40

## 2024-11-20 RX ADMIN — VITAM B12 500 MCG: 100 TAB at 08:36

## 2024-11-20 RX ADMIN — LAMOTRIGINE 25 MG: 25 TABLET ORAL at 08:36

## 2024-11-20 RX ADMIN — OXYCODONE HYDROCHLORIDE AND ACETAMINOPHEN 500 MG: 500 TABLET ORAL at 17:21

## 2024-11-20 RX ADMIN — GABAPENTIN 300 MG: 300 CAPSULE ORAL at 08:36

## 2024-11-20 RX ADMIN — SENNOSIDES 17.2 MG: 8.6 TABLET, FILM COATED ORAL at 08:36

## 2024-11-20 RX ADMIN — FLUTICASONE FUROATE AND VILANTEROL TRIFENATATE 1 PUFF: 200; 25 POWDER RESPIRATORY (INHALATION) at 08:37

## 2024-11-20 RX ADMIN — MAGNESIUM OXIDE TAB 400 MG (241.3 MG ELEMENTAL MG) 400 MG: 400 (241.3 MG) TAB at 08:36

## 2024-11-20 RX ADMIN — HEPARIN SODIUM 5000 UNITS: 5000 INJECTION INTRAVENOUS; SUBCUTANEOUS at 13:34

## 2024-11-20 RX ADMIN — GABAPENTIN 300 MG: 300 CAPSULE ORAL at 17:21

## 2024-11-20 RX ADMIN — METRONIDAZOLE 500 MG: 500 TABLET ORAL at 05:27

## 2024-11-20 RX ADMIN — IBUPROFEN 600 MG: 600 TABLET, FILM COATED ORAL at 17:20

## 2024-11-20 RX ADMIN — OXYCODONE HYDROCHLORIDE AND ACETAMINOPHEN 500 MG: 500 TABLET ORAL at 08:36

## 2024-11-20 RX ADMIN — NICOTINE 1 PATCH: 21 PATCH, EXTENDED RELEASE TRANSDERMAL at 08:36

## 2024-11-20 RX ADMIN — Medication 400 MG: at 17:18

## 2024-11-20 RX ADMIN — HEPARIN SODIUM 5000 UNITS: 5000 INJECTION INTRAVENOUS; SUBCUTANEOUS at 05:27

## 2024-11-20 RX ADMIN — KETOROLAC TROMETHAMINE 15 MG: 30 INJECTION, SOLUTION INTRAMUSCULAR; INTRAVENOUS at 08:50

## 2024-11-20 RX ADMIN — SENNOSIDES 17.2 MG: 8.6 TABLET, FILM COATED ORAL at 17:21

## 2024-11-20 RX ADMIN — OXYCODONE HYDROCHLORIDE 5 MG: 5 TABLET ORAL at 21:23

## 2024-11-20 RX ADMIN — HYDROXYZINE HYDROCHLORIDE 100 MG: 50 TABLET, FILM COATED ORAL at 17:21

## 2024-11-20 NOTE — CASE MANAGEMENT
Case Management Progress Note    Patient name Michelle Matos  Location 7T [facility] 707/7T [facility] 707-01 MRN [id number]  : 1975 Date 2024       LOS (days): 1  Geometric Mean LOS (GMLOS) (days):   Days to GMLOS:        OBJECTIVE:        Current admission status: Inpatient Psych  Preferred Pharmacy:   [facility] - [place], [place] - [address] [facility]  [address] [facility]  [address]  [place] [address]  Phone: [phone] Fax: [phone]    Primary Care Provider: Onesimo Velasco MD    Primary Insurance: [employer] [facility] [place] [facility]  Secondary Insurance:     PROGRESS NOTE:    Message from Crisis accepted at 1515 on 3P.  Nurse Yessenia aware same.  Will need WC and  to [facility]

## 2024-11-20 NOTE — CASE MANAGEMENT
Case Management Progress Note    Patient name Michelle Matos  Location 7T [facility] 707/7T [facility] 707-01 MRN [id number]  : 1975 Date 2024       LOS (days): 1  Geometric Mean LOS (GMLOS) (days):   Days to GMLOS:        OBJECTIVE:        Current admission status: Observation  Preferred Pharmacy:   [facility] - [place], [place] - [address] [facility]  [address] [facility]  [address]  [place] [address]  Phone: [phone] Fax: [phone]    Primary Care Provider: Onesimo Velasco MD    Primary Insurance: [facility]  Secondary Insurance:     PROGRESS NOTE:    Per NINFA Urbina patient is medically cleared for inpatient psych admission.  Message to [facility] for 201 BHU placement.  Copy of 201 in folder with stick original on 3 rd floor BHU.  Await placement to U.

## 2024-11-20 NOTE — ED NOTES
Insurance Eligibility Verification System checked - ([phone]).  Online system / automated system indicates: Nallely Rosales ID # [id number]

## 2024-11-20 NOTE — ED NOTES
The patient has been medically cleared for return to inpatient psychiatry by Yazmin Urbina DO. The patient was seen by Juan Weaver MD and Jordan Holter, DO, Psychiatry. Their recommendation was return to inpatient psychiatry for safety and stabilization. The original 201 remains on 3P.

## 2024-11-20 NOTE — ASSESSMENT & PLAN NOTE
Patient reports that she has not had a bowel movement in 1 week despite oral medication administration and behavioral health  CT imaging does not reveal large stool burden  S/p soap suds enema with large bm. No bm this am and some residual pain. Will give soap suds enema again  Continue bowel regimen  She is medically stable for inpt psych   Currently tolerating an advanced diet

## 2024-11-20 NOTE — PLAN OF CARE
Problem: PAIN - ADULT  Goal: Verbalizes/displays adequate comfort level or baseline comfort level  Description: Interventions:  - Encourage patient to monitor pain and request assistance  - Assess pain using appropriate pain scale  - Administer analgesics based on type and severity of pain and evaluate response  - Implement non-pharmacological measures as appropriate and evaluate response  - Consider cultural and social influences on pain and pain management  - Notify physician/advanced practitioner if interventions unsuccessful or patient reports new pain  Outcome: Progressing     Problem: INFECTION - ADULT  Goal: Absence or prevention of progression during hospitalization  Description: INTERVENTIONS:  - Assess and monitor for signs and symptoms of infection  - Monitor lab/diagnostic results  - Monitor all insertion sites, i.e. indwelling lines, tubes, and drains  - Monitor endotracheal if appropriate and nasal secretions for changes in amount and color  - [place] appropriate cooling/warming therapies per order  - Administer medications as ordered  - Instruct and encourage patient and family to use good hand hygiene technique  - Identify and instruct in appropriate isolation precautions for identified infection/condition  Outcome: Progressing  Goal: Absence of fever/infection during neutropenic period  Description: INTERVENTIONS:  - Monitor WBC    Outcome: Progressing     Problem: SAFETY ADULT  Goal: Patient will remain free of falls  Description: INTERVENTIONS:  - Educate patient/family on patient safety including physical limitations  - Instruct patient to call for assistance with activity   - Consult OT/PT to assist with strengthening/mobility   - Keep Call bell within reach  - Keep bed low and locked with side rails adjusted as appropriate  - Keep care items and personal belongings within reach  - Initiate and maintain comfort rounds  - Make Fall Risk Sign visible to staff  - Apply yellow socks and bracelet  for high fall risk patients  - Consider moving patient to room near nurses station  Outcome: Progressing     Problem: DISCHARGE PLANNING  Goal: Discharge to home or other facility with appropriate resources  Description: INTERVENTIONS:  - Identify barriers to discharge w/patient and caregiver  - Arrange for needed discharge resources and transportation as appropriate  - Identify discharge learning needs (meds, wound care, etc.)  - Arrange for interpretive services to assist at discharge as needed  - Refer to Case Management Department for coordinating discharge planning if the patient needs post-hospital services based on physician/advanced practitioner order or complex needs related to functional status, cognitive ability, or social support system  Outcome: Progressing     Problem: Knowledge Deficit  Goal: Patient/family/caregiver demonstrates understanding of disease process, treatment plan, medications, and discharge instructions  Description: Complete learning assessment and assess knowledge base.  Interventions:  - Provide teaching at level of understanding  - Provide teaching via preferred learning methods  Outcome: Progressing     Problem: GASTROINTESTINAL - ADULT  Goal: Minimal or absence of nausea and/or vomiting  Description: INTERVENTIONS:  - Administer IV fluids if ordered to ensure adequate hydration  - Maintain NPO status until nausea and vomiting are resolved  - Nasogastric tube if ordered  - Administer ordered antiemetic medications as needed  - Provide nonpharmacologic comfort measures as appropriate  - Advance diet as tolerated, if ordered  - Consider nutrition services referral to assist patient with adequate nutrition and appropriate food choices  Outcome: Progressing  Goal: Maintains or returns to baseline bowel function  Description: INTERVENTIONS:  - Assess bowel function  - Encourage oral fluids to ensure adequate hydration  - Administer IV fluids if ordered to ensure adequate hydration  -  Administer ordered medications as needed  - Encourage mobilization and activity  - Consider nutritional services referral to assist patient with adequate nutrition and appropriate food choices  Outcome: Progressing  Goal: Maintains adequate nutritional intake  Description: INTERVENTIONS:  - Monitor percentage of each meal consumed  - Identify factors contributing to decreased intake, treat as appropriate  - Assist with meals as needed  - Monitor I&O, weight, and lab values if indicated  - Obtain nutrition services referral as needed  Outcome: Progressing  Goal: Oral mucous membranes remain intact  Description: INTERVENTIONS  - Assess oral mucosa and hygiene practices  - Implement preventative oral hygiene regimen  - Implement oral medicated treatments as ordered  - Initiate Nutrition services referral as needed  Outcome: Progressing
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
## 2024-11-20 NOTE — PROGRESS NOTES
"Progress Note - Hospitalist   Name: Michelle Matos 48 y.o. female I MRN: [id number]  Unit/Bed#: 7T [facility] 707-01 I Date of Admission: 11/19/2024   Date of Service: 11/20/2024 I Hospital Day: 1    Assessment & Plan  Right upper quadrant abdominal pain  Placed in observation medicine  CT imaging revealed \"Distended fluid-filled ascending colon with mild gaseous distention of the transverse colon with small fluid levels. Correlate clinically for diarrhea No obstruction, bowel wall thickening or other inflammatory process\"  Lipase normalized. Could be due to constipation  No further ruq abd complaints  Tolerating diet  S/p bm   Constipation  Patient reports that she has not had a bowel movement in 1 week despite oral medication administration and behavioral health  CT imaging does not reveal large stool burden  S/p soap suds enema with large bm. No bm this am and some residual pain. Will give soap suds enema again  Continue bowel regimen  She is medically stable for inpt psych   Currently tolerating an advanced diet   Tobacco dependence syndrome  continue nicotine 21 mg transdermal daily  Bipolar I disorder, most recent episode depressed, severe with psychotic features (HCC)  Patient was previously on inpatient behavioral health unit   Continue previously prescribed psychiatric medications at previously prescribed doses  Appreciate psychiatry recommendations.   Okay to return to inpt psych when medically cleared   Chronic vaginitis  Completed course of flagyl   Asthma  Not in exacerbation at this time  Continue current treatment   Hyperlipidemia  Continue Lipitor 10 mg p.o. nightly    VTE Pharmacologic Prophylaxis:   heparin     Mobility:   Basic Mobility Inpatient Raw Score: 24  JH-HLM Goal: 8: Walk 250 feet or more  JH-HLM Achieved: 7: Walk 25 feet or more      Patient Centered Rounds:  discussed with her nurse       Education and Discussions with Family / Patient: Patient declined call to .     Current Length " of Stay: 1 day(s)  Current Patient Status: Observation     Discharge Plan: Medically stable for discharge back to inpt psych     Code Status: Level 1 - Full Code    Subjective   Pt seen and examined. She is tolerating an advanced diet. She states she has some slight pain this am but has not had a bm yet today. She is asking for an enema. No other problems were noted     Objective :  Temp:  [97.4 °F (36.3 °C)-97.6 °F (36.4 °C)] 97.4 °F (36.3 °C)  HR:  [] 80  BP: ()/(75-80) 99/75  Resp:  [16-20] 16  SpO2:  [97 %-98 %] 97 %  O2 Device: None (Room air)    Body mass index is 30.45 kg/m².     Input and Output Summary (last 24 hours):     Intake/Output Summary (Last 24 hours) at 11/20/2024 1142  Last data filed at 11/20/2024 0850  Gross per 24 hour   Intake 720 ml   Output --   Net 720 ml       Physical Exam  Constitutional:       Appearance: Normal appearance.   HENT:      Head: Normocephalic and atraumatic.   Eyes:      Extraocular Movements: Extraocular movements intact.      Pupils: Pupils are equal, round, and reactive to light.   Cardiovascular:      Rate and Rhythm: Normal rate and regular rhythm.      Heart sounds: No murmur heard.     No friction rub. No gallop.   Pulmonary:      Effort: Pulmonary effort is normal. No respiratory distress.      Breath sounds: Normal breath sounds. No wheezing, rhonchi or rales.   Abdominal:      General: Bowel sounds are normal. There is no distension.      Palpations: Abdomen is soft.      Tenderness: There is abdominal tenderness. There is no guarding or rebound.      Comments: Mild ttp left lower quadrant    Neurological:      Mental Status: She is alert and oriented to person, place, and time.         Lines/Drains:      Lab Results: I have reviewed the following results:   Results from last 7 days   Lab Units 11/19/24  0446 11/18/24  2135   WBC Thousand/uL 10.30* 10.56*   HEMOGLOBIN g/dL 12.9 14.1   HEMATOCRIT % 40.5 43.6   PLATELETS Thousands/uL 332 362   SEGS PCT  %  --  58   LYMPHO PCT %  --  32   MONO PCT %  --  8   EOS PCT %  --  1     Results from last 7 days   Lab Units 11/19/24  0446 11/18/24  2135   SODIUM mmol/L 138 138   POTASSIUM mmol/L 4.3 4.3   CHLORIDE mmol/L 106 105   CO2 mmol/L 24 25   BUN mg/dL 16 17   CREATININE mg/dL 0.69 0.74   ANION GAP mmol/L 8 8   CALCIUM mg/dL 8.4 9.7   ALBUMIN g/dL  --  4.3   TOTAL BILIRUBIN mg/dL  --  0.20   ALK PHOS U/L  --  69   ALT U/L  --  22   AST U/L  --  24   GLUCOSE RANDOM mg/dL 77 89             Results from last 7 days   Lab Units 11/14/24  0616   HEMOGLOBIN A1C % 5.7*           Recent Cultures (last 7 days):         Imaging Results Review: No pertinent imaging studies reviewed.  Other Study Results Review: No additional pertinent studies reviewed.    Last 24 Hours Medication List:     Current Facility-Administered Medications:     acetaminophen (TYLENOL) tablet 650 mg, Q6H PRN    albuterol (PROVENTIL HFA,VENTOLIN HFA) inhaler 2 puff, Q6H PRN    aluminum-magnesium hydroxide-simethicone (MAALOX) oral suspension 30 mL, Q4H PRN    ascorbic acid (VITAMIN C) tablet 500 mg, BID    atorvastatin (LIPITOR) tablet 10 mg, Daily With Dinner    benztropine (COGENTIN) injection 1 mg, Q4H PRN Max 6/day    benztropine (COGENTIN) tablet 1 mg, Q4H PRN Max 6/day    bisacodyl (DULCOLAX) EC tablet 10 mg, Daily PRN    bisacodyl (DULCOLAX) rectal suppository 10 mg, Daily PRN    cyanocobalamin (VITAMIN B-12) tablet 500 mcg, Daily    hydrOXYzine HCL (ATARAX) tablet 50 mg, Q6H PRN Max 4/day **OR** diphenhydrAMINE (BENADRYL) injection 50 mg, Q6H PRN    [START ON 11/21/2024] ergocalciferol (VITAMIN D2) capsule 50,000 Units, Weekly    ferrous sulfate tablet 325 mg, Every Other Day    fluticasone (FLONASE) 50 mcg/act nasal spray 1 spray, Daily    fluticasone-vilanterol 200-25 mcg/actuation 1 puff, Daily    gabapentin (NEURONTIN) capsule 300 mg, TID    heparin (porcine) subcutaneous injection 5,000 Units, Q8H KIMBERLY    hydrOXYzine HCL (ATARAX) tablet 100 mg,  Q6H PRN Max 4/day **OR** LORazepam (ATIVAN) injection 2 mg, Q6H PRN    hydrOXYzine HCL (ATARAX) tablet 25 mg, Q6H PRN Max 4/day    ketorolac (TORADOL) injection 15 mg, Q6H PRN    lamoTRIgine (LaMICtal) tablet 25 mg, Daily    magnesium Oxide (MAG-OX) tablet 400 mg, BID    melatonin tablet 6 mg, HS    methocarbamol (ROBAXIN) tablet 500 mg, Q8H PRN    morphine injection 2 mg, Q4H PRN    nicotine (NICODERM CQ) 21 mg/24 hr TD 24 hr patch 1 patch, Daily    nicotine polacrilex (NICORETTE) gum 2 mg, Q2H PRN    OLANZapine (ZyPREXA) tablet 10 mg, Q3H PRN Max 3/day **OR** OLANZapine (ZyPREXA) IM injection 10 mg, Q3H PRN Max 3/day    OLANZapine (ZyPREXA) tablet 5 mg, Q3H PRN Max 6/day **OR** OLANZapine (ZyPREXA) IM injection 5 mg, Q3H PRN Max 6/day    OLANZapine (ZyPREXA) tablet 15 mg, HS    OLANZapine (ZyPREXA) tablet 2.5 mg, Q3H PRN Max 8/day    ondansetron (ZOFRAN-ODT) dispersible tablet 4 mg, Q6H PRN    oxyCODONE (ROXICODONE) IR tablet 5 mg, Q4H PRN    pantoprazole (PROTONIX) EC tablet 40 mg, Early Morning    propranolol (INDERAL) tablet 10 mg, Q8H PRN    senna (SENOKOT) tablet 17.2 mg, BID    senna-docusate sodium (SENOKOT S) 8.6-50 mg per tablet 1 tablet, Daily PRN    traZODone (DESYREL) tablet 50 mg, HS PRN    venlafaxine (EFFEXOR-XR) 24 hr capsule 75 mg, Daily    Administrative Statements   Today, Patient Was Seen By: Yazmin Urbina DO

## 2024-11-20 NOTE — ED NOTES
Insurance Authorization for admission:   Phone call placed to [facility].  Phone number: [phone].     Spoke to Christiane.     3 days approved, LCD 11/22.  Level of care: IP.  Review on 11/22.   Authorization # To be issued upon arrival.

## 2024-11-20 NOTE — NURSING NOTE
Atarax 100mg Prn was effective in reducing severe anxiety symptoms on reassessment. Pt is no longer presenting with this concern at this time.

## 2024-11-20 NOTE — PLAN OF CARE
Problem: Ineffective Coping  Goal: Identifies ineffective coping skills  Outcome: Not Progressing  Goal: Identifies healthy coping skills  Outcome: Not Progressing  Goal: Participates in unit activities  Description: Interventions:  - Provide therapeutic environment   - Provide required programming   - Redirect inappropriate behaviors   Outcome: Not Progressing  Goal: Patient/Family verbalizes awareness of resources  Outcome: Not Progressing     Problem: Depression  Goal: Treatment Goal: Demonstrate behavioral control of depressive symptoms, verbalize feelings of improved mood/affect, and adopt new coping skills prior to discharge  Outcome: Not Progressing  Goal: Verbalize thoughts and feelings  Description: Interventions:  - Assess and re-assess patient's level of risk   - Engage patient in 1:1 interactions, daily, for a minimum of 15 minutes   - Encourage patient to express feelings, fears, frustrations, hopes   Outcome: Not Progressing  Goal: Refrain from harming self  Description: Interventions:  - Monitor patient closely, per order   - Supervise medication ingestion, monitor effects and side effects   Outcome: Not Progressing  Goal: Attend and participate in unit activities, including therapeutic, recreational, and educational groups  Description: Interventions:  - Provide therapeutic and educational activities daily, encourage attendance and participation, and document same in the medical record   Outcome: Not Progressing     Problem: Anxiety  Goal: Anxiety is at manageable level  Description: Interventions:  - Assess and monitor patient's anxiety level.   - Monitor for signs and symptoms (heart palpitations, chest pain, shortness of breath, headaches, nausea, feeling jumpy, restlessness, irritable, apprehensive).   - Collaborate with interdisciplinary team and initiate plan and interventions as ordered.  - [place] patient to unit/surroundings  - Explain treatment plan  - Encourage participation in care  -  Encourage verbalization of concerns/fears  - Identify coping mechanisms  - Assist in developing anxiety-reducing skills  - Administer/offer alternative therapies  - Limit or eliminate stimulants  Outcome: Not Progressing

## 2024-11-20 NOTE — NURSING NOTE
Pt presents with symptoms of severe anxiety as evidenced by leg quivering during assessment; prn 100 mg PO Atarax was administered.

## 2024-11-20 NOTE — PLAN OF CARE
Problem: PAIN - ADULT  Goal: Verbalizes/displays adequate comfort level or baseline comfort level  Description: Interventions:  - Encourage patient to monitor pain and request assistance  - Assess pain using appropriate pain scale  - Administer analgesics based on type and severity of pain and evaluate response  - Implement non-pharmacological measures as appropriate and evaluate response  - Consider cultural and social influences on pain and pain management  - Notify physician/advanced practitioner if interventions unsuccessful or patient reports new pain  Outcome: Progressing     Problem: GASTROINTESTINAL - ADULT  Goal: Minimal or absence of nausea and/or vomiting  Description: INTERVENTIONS:  - Administer IV fluids if ordered to ensure adequate hydration  - Maintain NPO status until nausea and vomiting are resolved  - Nasogastric tube if ordered  - Administer ordered antiemetic medications as needed  - Provide nonpharmacologic comfort measures as appropriate  - Advance diet as tolerated, if ordered  - Consider nutrition services referral to assist patient with adequate nutrition and appropriate food choices  Outcome: Progressing     Problem: GASTROINTESTINAL - ADULT  Goal: Maintains or returns to baseline bowel function  Description: INTERVENTIONS:  - Assess bowel function  - Encourage oral fluids to ensure adequate hydration  - Administer IV fluids if ordered to ensure adequate hydration  - Administer ordered medications as needed  - Encourage mobilization and activity  - Consider nutritional services referral to assist patient with adequate nutrition and appropriate food choices  Outcome: Progressing     Problem: GASTROINTESTINAL - ADULT  Goal: Maintains adequate nutritional intake  Description: INTERVENTIONS:  - Monitor percentage of each meal consumed  - Identify factors contributing to decreased intake, treat as appropriate  - Assist with meals as needed  - Monitor I&O, weight, and lab values if  indicated  - Obtain nutrition services referral as needed  Outcome: Progressing

## 2024-11-20 NOTE — ASSESSMENT & PLAN NOTE
Patient was previously on inpatient behavioral health unit   Continue previously prescribed psychiatric medications at previously prescribed doses  Appreciate psychiatry recommendations.   Okay to return to inpt psych when medically cleared

## 2024-11-20 NOTE — ASSESSMENT & PLAN NOTE
"Placed in [facility] medicine  CT imaging revealed \"Distended fluid-filled ascending colon with mild gaseous distention of the transverse colon with small fluid levels. Correlate clinically for diarrhea No obstruction, bowel wall thickening or other inflammatory process\"  Lipase normalized. Could be due to constipation  No further ruq abd complaints  Tolerating diet  S/p bm   "

## 2024-11-21 PROBLEM — E78.2 MIXED HYPERLIPIDEMIA: Status: ACTIVE | Noted: 2024-11-14

## 2024-11-21 PROBLEM — K08.89 TOOTH PAIN: Status: ACTIVE | Noted: 2024-11-21

## 2024-11-21 PROCEDURE — 99223 1ST HOSP IP/OBS HIGH 75: CPT | Performed by: PSYCHIATRY & NEUROLOGY

## 2024-11-21 PROCEDURE — 99253 IP/OBS CNSLTJ NEW/EST LOW 45: CPT | Performed by: NURSE PRACTITIONER

## 2024-11-21 RX ORDER — FOLIC ACID 1 MG/1
1 TABLET ORAL DAILY
Status: DISCONTINUED | OUTPATIENT
Start: 2024-11-21 | End: 2024-11-26 | Stop reason: HOSPADM

## 2024-11-21 RX ORDER — OXYCODONE HYDROCHLORIDE 5 MG/1
5 TABLET ORAL EVERY 4 HOURS PRN
Refills: 0 | Status: DISCONTINUED | OUTPATIENT
Start: 2024-11-21 | End: 2024-11-26 | Stop reason: HOSPADM

## 2024-11-21 RX ORDER — NICOTINE 21 MG/24HR
1 PATCH, TRANSDERMAL 24 HOURS TRANSDERMAL DAILY
Status: DISCONTINUED | OUTPATIENT
Start: 2024-11-21 | End: 2024-11-26 | Stop reason: HOSPADM

## 2024-11-21 RX ORDER — VENLAFAXINE HYDROCHLORIDE 150 MG/1
150 CAPSULE, EXTENDED RELEASE ORAL DAILY
Status: DISCONTINUED | OUTPATIENT
Start: 2024-11-22 | End: 2024-11-26 | Stop reason: HOSPADM

## 2024-11-21 RX ORDER — SUMATRIPTAN 50 MG/1
50 TABLET, FILM COATED ORAL ONCE
Status: COMPLETED | OUTPATIENT
Start: 2024-11-21 | End: 2024-11-21

## 2024-11-21 RX ADMIN — IBUPROFEN 600 MG: 600 TABLET, FILM COATED ORAL at 17:13

## 2024-11-21 RX ADMIN — OXYCODONE HYDROCHLORIDE AND ACETAMINOPHEN 500 MG: 500 TABLET ORAL at 17:10

## 2024-11-21 RX ADMIN — GABAPENTIN 300 MG: 300 CAPSULE ORAL at 09:09

## 2024-11-21 RX ADMIN — ATORVASTATIN CALCIUM 10 MG: 10 TABLET, FILM COATED ORAL at 17:10

## 2024-11-21 RX ADMIN — NICOTINE 1 PATCH: 14 PATCH, EXTENDED RELEASE TRANSDERMAL at 09:20

## 2024-11-21 RX ADMIN — OXYCODONE HYDROCHLORIDE 5 MG: 5 TABLET ORAL at 19:01

## 2024-11-21 RX ADMIN — OLANZAPINE 5 MG: 5 TABLET, FILM COATED ORAL at 12:59

## 2024-11-21 RX ADMIN — VENLAFAXINE HYDROCHLORIDE 75 MG: 75 CAPSULE, EXTENDED RELEASE ORAL at 09:09

## 2024-11-21 RX ADMIN — ERGOCALCIFEROL 50000 UNITS: 1.25 CAPSULE ORAL at 09:09

## 2024-11-21 RX ADMIN — TRAZODONE HYDROCHLORIDE 50 MG: 50 TABLET ORAL at 21:27

## 2024-11-21 RX ADMIN — CYANOCOBALAMIN TAB 1000 MCG 500 MCG: 1000 TAB at 09:09

## 2024-11-21 RX ADMIN — GABAPENTIN 300 MG: 300 CAPSULE ORAL at 21:25

## 2024-11-21 RX ADMIN — AMOXICILLIN AND CLAVULANATE POTASSIUM 1 TABLET: 875; 125 TABLET, FILM COATED ORAL at 21:27

## 2024-11-21 RX ADMIN — AMOXICILLIN AND CLAVULANATE POTASSIUM 1 TABLET: 875; 125 TABLET, FILM COATED ORAL at 15:00

## 2024-11-21 RX ADMIN — GABAPENTIN 300 MG: 300 CAPSULE ORAL at 17:00

## 2024-11-21 RX ADMIN — MELATONIN TAB 3 MG 6 MG: 3 TAB at 21:25

## 2024-11-21 RX ADMIN — PANTOPRAZOLE SODIUM 40 MG: 40 TABLET, DELAYED RELEASE ORAL at 06:04

## 2024-11-21 RX ADMIN — FLUTICASONE PROPIONATE 1 SPRAY: 50 SPRAY, METERED NASAL at 09:22

## 2024-11-21 RX ADMIN — HYDROXYZINE HYDROCHLORIDE 100 MG: 50 TABLET, FILM COATED ORAL at 09:20

## 2024-11-21 RX ADMIN — OXYCODONE HYDROCHLORIDE 5 MG: 5 TABLET ORAL at 12:54

## 2024-11-21 RX ADMIN — Medication 400 MG: at 09:09

## 2024-11-21 RX ADMIN — FLUTICASONE FUROATE AND VILANTEROL TRIFENATATE 1 PUFF: 200; 25 POWDER RESPIRATORY (INHALATION) at 09:00

## 2024-11-21 RX ADMIN — SUMATRIPTAN SUCCINATE 50 MG: 50 TABLET ORAL at 15:00

## 2024-11-21 RX ADMIN — IBUPROFEN 600 MG: 600 TABLET, FILM COATED ORAL at 09:20

## 2024-11-21 RX ADMIN — SENNOSIDES 17.2 MG: 8.6 TABLET, FILM COATED ORAL at 17:10

## 2024-11-21 RX ADMIN — FOLIC ACID 1 MG: 1 TABLET ORAL at 11:00

## 2024-11-21 RX ADMIN — Medication 400 MG: at 17:10

## 2024-11-21 RX ADMIN — NICOTINE POLACRILEX 2 MG: 2 GUM, CHEWING BUCCAL at 17:15

## 2024-11-21 RX ADMIN — OXYCODONE HYDROCHLORIDE AND ACETAMINOPHEN 500 MG: 500 TABLET ORAL at 09:09

## 2024-11-21 RX ADMIN — SENNOSIDES 17.2 MG: 8.6 TABLET, FILM COATED ORAL at 09:09

## 2024-11-21 RX ADMIN — OLANZAPINE 15 MG: 5 TABLET, FILM COATED ORAL at 21:24

## 2024-11-21 RX ADMIN — LAMOTRIGINE 25 MG: 25 TABLET ORAL at 09:09

## 2024-11-21 RX ADMIN — HEPARIN SODIUM 5000 UNITS: 5000 INJECTION INTRAVENOUS; SUBCUTANEOUS at 06:04

## 2024-11-21 NOTE — PLAN OF CARE
Problem: Ineffective Coping  Goal: Identifies ineffective coping skills  Outcome: Progressing  Goal: Identifies healthy coping skills  Outcome: Progressing  Goal: Participates in unit activities  Description: Interventions:  - Provide therapeutic environment   - Provide required programming   - Redirect inappropriate behaviors   Outcome: Progressing  Goal: Patient/Family verbalizes awareness of resources  Outcome: Progressing     Problem: Depression  Goal: Treatment Goal: Demonstrate behavioral control of depressive symptoms, verbalize feelings of improved mood/affect, and adopt new coping skills prior to discharge  Outcome: Progressing  Goal: Verbalize thoughts and feelings  Description: Interventions:  - Assess and re-assess patient's level of risk   - Engage patient in 1:1 interactions, daily, for a minimum of 15 minutes   - Encourage patient to express feelings, fears, frustrations, hopes   Outcome: Progressing  Goal: Refrain from harming self  Description: Interventions:  - Monitor patient closely, per order   - Supervise medication ingestion, monitor effects and side effects   Outcome: Progressing  Goal: Attend and participate in unit activities, including therapeutic, recreational, and educational groups  Description: Interventions:  - Provide therapeutic and educational activities daily, encourage attendance and participation, and document same in the medical record   Outcome: Progressing     Problem: Anxiety  Goal: Anxiety is at manageable level  Description: Interventions:  - Assess and monitor patient's anxiety level.   - Monitor for signs and symptoms (heart palpitations, chest pain, shortness of breath, headaches, nausea, feeling jumpy, restlessness, irritable, apprehensive).   - Collaborate with interdisciplinary team and initiate plan and interventions as ordered.  - [place] patient to unit/surroundings  - Explain treatment plan  - Encourage participation in care  - Encourage verbalization of  concerns/fears  - Identify coping mechanisms  - Assist in developing anxiety-reducing skills  - Administer/offer alternative therapies  - Limit or eliminate stimulants  Outcome: Progressing

## 2024-11-21 NOTE — NURSING NOTE
"Pt is complaining of AH telling her that \"I am worthless. That is why you're in here. Your never going to get out. They are also telling me to punch people.\" Pt states \"that is why I am keeping in my room.\" Pt scored a 3 on broset scale. Gave PRN PO Zyprexa 5 mg.  "

## 2024-11-21 NOTE — CMS CERTIFICATION NOTE
Certification: Based upon physical, mental and social evaluations, I certify that inpatient psychiatric services continue to be medically necessary for this patient for a duration of greater than 2 midnights for the treatment of  Bipolar I disorder, most recent episode depressed, severe with psychotic features (HCC) Available alternative community resources still do not meet the patient's mental health care needs. I further attest that an established written individualized plan of care has been updated and is outlined in the patient's medical records.    Jordan Christopher Holter, DO

## 2024-11-21 NOTE — PROGRESS NOTES
11/21/24 0851   Team Meeting   Meeting Type Daily Rounds   Team Members Present   Team Members Present Physician;Nurse;   Physician Team Member Chani   [facility]   Care Management Team Member Tre   Patient/Family Present   Patient Present No   Patient's Family Present No     201.Pt is med/ meal complaint. Pt denies all symptoms but reports severe anxiety. PRN atarax and was effective. Pt is calm and cooperative. Pt is visible and social with peers. Pt discharges on 11/27.

## 2024-11-21 NOTE — ASSESSMENT & PLAN NOTE
Patient complains of chronic migraines  Patient sometimes takes Imitrex injection  I am going to try Imitrex 50 mg p.o. x 1 may repeat 2 hours after  Will evaluate again tomorrow with the patient if the medicine worked

## 2024-11-21 NOTE — NURSING NOTE
Pt complaining of severe anxiety and tooth pain. Pt scored 27 on the Miller Scale. Gave PRN PO Atarax 100 mg and Ibuprofen 600 mg.

## 2024-11-21 NOTE — ASSESSMENT & PLAN NOTE
Continue previously prescribed psychotropic medications at present dosing. Consider upward titration of Effexor XR 75 mg to 150 mg daily to address dysphoric mood including depression and anxiety in addition to post-traumatic stress disorder and chronic pain as off-label agent  No associated orders from this encounter found during lookback period of 72 hours.

## 2024-11-21 NOTE — TREATMENT PLAN
TREATMENT PLAN REVIEW - Behavioral Health Michelle Matos 48 y.o. 1975 female MRN: [id number]    [facility] 3P BEHAVIORAL HLTH Room / Bed: [email or web address] 377/[email or web address] 377-02 Encounter: [id number]          Admit Date/Time:  11/20/2024  3:35 PM    Treatment Team:   Jordan C Holter, DO Holly Remmes Tara Nickens Sarah Baldowski, RN Christine Li Annia Acosta Maria Lamura Dawn McDonald Antonio Medina Evangelia Raymond    Diagnosis: Principal Problem:    Bipolar I disorder, most recent episode depressed, severe with psychotic features (HCC)  Active Problems:    S/P laparoscopic sleeve gastrectomy    Constipation    Tobacco dependence syndrome    Migraine    Asthma    Vitamin D deficiency    Medical clearance for psychiatric admission    ZACHARY (generalized anxiety disorder)    Cocaine abuse, episodic (HCC)    Post-traumatic stress disorder, chronic    Heartburn    Mixed hyperlipidemia    Tooth pain      Patient Strengths/Assets: ability for insight, average or above intelligence, capable of independent living, patient is on a voluntary commitment, patient is willing to work on problems    Patient Barriers/Limitations: difficulty adapting, financial instability, lack of social/family support, limited support system, marital/family conflict, poor physical health, substance abuse    Short Term Goals: decrease in depressive symptoms, decrease in anxiety symptoms, decrease in suicidal thoughts, improvement in insight, mood stabilization, increase in group attendance, increase in socialization with peers on the unit, acceptance of need for psychiatric treatment, acceptance of psychiatric medications    Long Term Goals: improvement in depression, improvement in anxiety, resolution of depressive symptoms, stabilization of mood, free of suicidal thoughts, acceptance of need for psychiatric medications, acceptance of need for psychiatric treatment, acceptance of need for psychiatric follow up after  discharge, acceptance of psychiatric diagnosis, appropriate interaction with peers, stable living arrangements upon discharge, establishment of family support upon discharge    Progress Towards Goals: starting psychiatric medications as prescribed, continue psychiatric medications as prescribed    Recommended Treatment: medication management, patient medication education, group therapy, milieu therapy, continued Behavioral Health psychiatric evaluation/assessment process    Treatment Frequency: daily medication monitoring, group and milieu therapy daily, monitoring through interdisciplinary rounds, monitoring through weekly patient care conferences    Expected Discharge Date:  TBD    Discharge Plan: referrals as indicated, return to previous living arrangement    Treatment Plan Created/Updated By: Jordan Christopher Holter, DO

## 2024-11-21 NOTE — NURSING NOTE
PT visible, cooperative and calm upon assessment, PT denies SI/HI/VH/AH, reports tooth pain 10/10, motrin was given previously, PT stated meds were ineffective.  Oxy 5mg given at 2123, and upon re-assessment, PT stated pain relief 6/10.  Compliant with HS meds, and no further discomfort.

## 2024-11-21 NOTE — ASSESSMENT & PLAN NOTE
Patient is a long-term active tobacco abuser greater than 1 year  Patient will have a nicotine patch as well as nicotine gum available to her for nicotine replacement therapy  Smoking cessation education
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
## 2024-11-21 NOTE — ASSESSMENT & PLAN NOTE
Patient was recently transferred to the medicine service with abdominal pain and not having a bowel movement for 5 days  Patient went to [address] [place] [address] on 11/19 and returned on11/20 patient had a CT scanning of the abdomen which was negative  Patient was able to tolerate food  Patient was able to have a bowel movement  She was deemed stable to return to [facility]  Need to monitor her bowel movements very closely  Patient is going to have repeat labs in the morning on 10/22/2024 as well as a iron panel  They have decreased her iron supplementation from twice daily down to daily

## 2024-11-21 NOTE — ASSESSMENT & PLAN NOTE
Patient complaining of left upper tooth pain  Patient has lost the filling in the tooth since she has been admitted  Start Augmentin 875 twice daily x 7 days  Patient needs to follow-up with her dentist upon discharge from the [facility]

## 2024-11-21 NOTE — CONSULTS
Consultation - Hospitalist   Name: Michelle Matos 48 y.o. female I MRN: [id number]  Unit/Bed#: [facility] 377-02 I Date of Admission: 11/20/2024   Date of Service: 11/21/2024 I Hospital Day: 1   Inpatient consult for Medical Clearance for  patient  Consult performed by: EWDIN Alanis  Consult ordered by: Jordan C Holter, DO        Physician Requesting Evaluation: Jordan C Holter, DO   Reason for Evaluation / Principal Problem: Medical clearance for psychiatric admission    Assessment & Plan  Medical clearance for psychiatric admission  Vital signs stable afebrile patient seen and examined by myself labs from 11/19/2024 were reviewed by myself sodium 138 potassium 4.3 creatinine 0.69  Patient medically stable for admit  Please reach out to [facility] with any medical questions or concerns  S/P laparoscopic sleeve gastrectomy  Small frequent meals  MVI  Monitor bowel movements closely  Constipation  Patient was recently transferred to the medicine service with abdominal pain and not having a bowel movement for 5 days  Patient went to [address] [place] [address] on 11/19 and returned on11/20 patient had a CT scanning of the abdomen which was negative  Patient was able to tolerate food  Patient was able to have a bowel movement  She was deemed stable to return to [facility]  Need to monitor her bowel movements very closely  Patient is going to have repeat labs in the morning on 10/22/2024 as well as a iron panel  They have decreased her iron supplementation from twice daily down to daily  Tobacco dependence syndrome  Patient is a long-term active tobacco abuser greater than 1 year  Patient will have a nicotine patch as well as nicotine gum available to her for nicotine replacement therapy  Smoking cessation education  Asthma  Patient will have Breo as well as an albuterol as needed metered-dose inhaler  Stable uncomplicated at this moment  Vitamin D deficiency  Patient's vitamin D level was less than 7.0  Patient started on ergocalciferol 50,000  units p.o. weekly x 8 weeks  Patient needs a repeat vitamin D by her PCP in approximately 10 to 12 weeks  Cocaine abuse, episodic (HCC)  This admission her U tox was negative  Appears to be in remission  Continue to educate patient on the importance of drug cessation education  Heartburn  Continue PPI  Mixed hyperlipidemia  Patient will continue on her Lipitor 10 mg p.o. daily  Tooth pain  Patient complaining of left upper tooth pain  Patient has lost the filling in the tooth since she has been admitted  Start Augmentin 875 twice daily x 7 days  Patient needs to follow-up with her dentist upon discharge from the [facility]  Migraine  Patient complains of chronic migraines  Patient sometimes takes Imitrex injection  I am going to try Imitrex 50 mg p.o. x 1 may repeat 2 hours after  Will evaluate again tomorrow with the patient if the medicine worked      Collaboration of Care: Were Recommendations Directly Discussed with Primary Treatment Team? - No     History of Present Illness   Michelle Matos is a 48 y.o. female who is originally admitted to the psychiatry service due to worsening depression. We are consulted for medical clearance for admission to Behavioral Health Unit and treatment of underlying psychiatric illness.      Patient has a longstanding history of bipolar disorder, anxiety, PTSD, cocaine abuse, headaches, gastric sleeve.  Patient was originally admitted to the U on 11/14/2024.  On 11/19/2024 she went to the medical floor secondary to abdominal pain and not having movement.  While on 7 tower patient was given IV fluid, had a CT scan of her abdomen, started with small frequent meals, eventually had a bowel movement.  Patient is now deemed appropriate to return to the U.    Review of Systems   Constitutional:  Negative for chills and fever.   HENT:  Negative for ear pain and sore throat.    Eyes:  Negative for pain and visual disturbance.   Respiratory:  Negative for cough and shortness of breath.     Cardiovascular:  Negative for chest pain and palpitations.   Gastrointestinal:  Negative for abdominal pain and vomiting.   Genitourinary:  Negative for dysuria and hematuria.   Musculoskeletal:  Negative for arthralgias and back pain.   Skin:  Negative for color change and rash.   Neurological:  Negative for seizures and syncope.        Patient complains of chronic migraines   Psychiatric/Behavioral:  Positive for decreased concentration and dysphoric mood. The patient is nervous/anxious.    All other systems reviewed and are negative.      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     Neuralgia     OAB (overactive bladder)     Obesity     Postsurgical malabsorption     PTSD (post-traumatic stress disorder)     Seizures (HCC)     Spinal stenosis of lumbar region     Tarsal tunnel syndrome     Tendinitis of right rotator cuff     Vitamin D deficiency      Past Surgical History:   Procedure Laterality Date    APPENDECTOMY      BLADDER SURGERY      CHOLECYSTECTOMY      GASTRECTOMY      gastric sleeve    GASTRIC RESTRICTION SURGERY      gastric sleeve in 2018    HEMORROIDECTOMY      HYSTERECTOMY      TUBAL LIGATION       Social History     Tobacco Use    Smoking status: Former     Current packs/day: 0.00     Types: Cigarettes     Quit date: 2023     Years since quittin.5     Passive exposure: Past    Smokeless tobacco: Never   Vaping Use    Vaping status: Never Used   Substance and Sexual Activity    Alcohol use: Yes     Comment: ocassionally    Drug use: Yes     Types:  Cocaine     Comment: valente    Sexual activity: Not on file     E-Cigarette/Vaping    E-Cigarette Use Never User      E-Cigarette/Vaping Substances    Nicotine No     THC No     CBD No     Flavoring No     Other No     Unknown No        Marital Status:     Meds/Allergies   I have reviewed home medications using recent Epic encounter.  Prior to Admission medications    Medication Sig Start Date End Date Taking? Authorizing Provider   albuterol (PROVENTIL HFA,VENTOLIN HFA) 90 mcg/act inhaler Inhale 2 puffs 4 (four) times a day   Yes Historical Provider, MD   melatonin 3 mg Take 1 tablet (3 mg total) by mouth daily at bedtime 5/11/23  Yes Rachell Frye MD   nicotine (NICODERM CQ) 14 mg/24hr TD 24 hr patch Place 1 patch on the skin over 24 hours daily Do not start before April 14, 2023. 4/14/23  Yes Rachell Frye MD   acetaminophen (TYLENOL) 500 mg tablet Take 1 tablet (500 mg total) by mouth every 6 (six) hours as needed for mild pain  Patient not taking: Reported on 11/13/2024 3/3/23   Latosha Maynard PA-C   acetaminophen (TYLENOL) 500 mg tablet Take 1 tablet (500 mg total) by mouth every 6 (six) hours as needed for moderate pain  Patient not taking: Reported on 11/13/2024 8/22/24   Narda Dubose PA-C   benzonatate (TESSALON PERLES) 100 mg capsule Take 1 capsule (100 mg total) by mouth 3 (three) times a day  Patient not taking: Reported on 11/13/2024 5/11/23   Rachell Frye MD   dextromethorphan-guaiFENesin (ROBITUSSIN DM)  mg/5 mL syrup Take 10 mL by mouth every 4 (four) hours as needed for cough  Patient not taking: Reported on 11/13/2024 5/11/23   Rachell Frye MD   Diclofenac Sodium (VOLTAREN) 1 % Apply 2 g topically 4 (four) times a day  Patient not taking: Reported on 11/13/2024 2/25/23   Lito Nesbitt MD   ferrous sulfate 325 (65 Fe) mg tablet Take 325 mg by mouth 2 (two) times a day with meals  Patient not taking: Reported on 11/20/2024    Historical Provider, MD   Fluticasone-Salmeterol (Advair  "Diskus) 500-50 mcg/dose inhaler Inhale 1 puff 2 (two) times a day Rinse mouth after use. 5/11/23 11/19/24  Rachell Frye MD   folic acid (FOLVITE) 1 mg tablet Take 1 mg by mouth daily  Patient not taking: Reported on 11/20/2024    Historical Provider, MD   lidocaine (LIDODERM) 5 % Apply 1 patch topically daily for 4 days Remove & Discard patch within 12 hours or as directed by MD 6/24/19 11/19/24  María Elena Lugo,    metoclopramide (REGLAN) 10 mg tablet Take 1 tablet (10 mg total) by mouth every 6 (six) hours 10/25/23   Tami Rdz, DO   Multiple Vitamins-Minerals (MULTIVITAMIN ADULT EXTRA C PO) Take 1 tablet by mouth    Historical Provider, MD   naproxen (NAPROSYN) 500 mg tablet Take 1 tablet (500 mg total) by mouth 2 (two) times a day with meals  Patient not taking: Reported on 11/13/2024 10/25/23   Tami Rdz, DO   omeprazole (PriLOSEC) 40 MG capsule Take 40 mg by mouth daily    Historical Provider, MD   ondansetron (ZOFRAN) 4 mg tablet Take 1 tablet (4 mg total) by mouth every 6 (six) hours  Patient not taking: Reported on 11/20/2024 3/3/23   Latosha Maynard PA-C   propranolol (INDERAL) 10 mg tablet Take 10 mg by mouth 2 (two) times a day  Patient not taking: Reported on 11/20/2024    Historical Provider, MD     Allergies   Allergen Reactions    Polyethylene Glycol Vomiting    Golytely [Peg 3350-Kcl-Nabcb-Nacl-Nasulf] Vomiting    Risperidone     Depakote [Valproic Acid] Rash       Objective :  Temp:  [97.5 °F (36.4 °C)-98 °F (36.7 °C)] 97.6 °F (36.4 °C)  HR:  [] 83  BP: (105-124)/(67-79) 110/74  Resp:  [16] 16  SpO2:  [99 %-100 %] 99 %  O2 Device: None (Room air)    Height: 5' 1\" (154.9 cm) (11/20/24 1535)  Weight - Scale: 74.1 kg (163 lb 6.4 oz) (11/20/24 1535)  Physical Exam  Constitutional:       Appearance: Normal appearance. She is normal weight.   HENT:      Head: Normocephalic and atraumatic.      Nose: Nose normal.      Mouth/Throat:      Mouth: Mucous membranes are moist.    "   Pharynx: Oropharynx is clear.      Comments: Patient has left upper tooth pain where she has lost a filling and it is causing her pain I evaluated the tooth I can see where the filling is missing and the inside of the tooth is decayed significantly  Eyes:      Extraocular Movements: Extraocular movements intact.      Pupils: Pupils are equal, round, and reactive to light.   Cardiovascular:      Rate and Rhythm: Normal rate and regular rhythm.      Pulses: Normal pulses.      Heart sounds: Normal heart sounds.   Pulmonary:      Effort: Pulmonary effort is normal.      Breath sounds: Normal breath sounds.   Abdominal:      General: Abdomen is flat. Bowel sounds are normal.      Palpations: Abdomen is soft.   Musculoskeletal:         General: Normal range of motion.      Cervical back: Normal range of motion and neck supple.   Skin:     General: Skin is warm and dry.   Neurological:      Mental Status: She is alert and oriented to person, place, and time.   Psychiatric:         Behavior: Behavior normal.      Comments: Patient anxious, depressed           Lab Results: I have reviewed the following results:  Results from last 7 days   Lab Units 11/19/24 0446 11/18/24  2135   WBC Thousand/uL 10.30* 10.56*   HEMOGLOBIN g/dL 12.9 14.1   HEMATOCRIT % 40.5 43.6   PLATELETS Thousands/uL 332 362   SEGS PCT %  --  58   LYMPHO PCT %  --  32   MONO PCT %  --  8   EOS PCT %  --  1     Results from last 7 days   Lab Units 11/19/24  0446 11/18/24  2135   SODIUM mmol/L 138 138   POTASSIUM mmol/L 4.3 4.3   CHLORIDE mmol/L 106 105   CO2 mmol/L 24 25   BUN mg/dL 16 17   CREATININE mg/dL 0.69 0.74   ANION GAP mmol/L 8 8   CALCIUM mg/dL 8.4 9.7   ALBUMIN g/dL  --  4.3   TOTAL BILIRUBIN mg/dL  --  0.20   ALK PHOS U/L  --  69   ALT U/L  --  22   AST U/L  --  24   GLUCOSE RANDOM mg/dL 77 89             Lab Results   Component Value Date/Time    HGBA1C 5.7 (H) 11/14/2024 06:16 AM    HGBA1C 5.8 12/03/2018 05:49 AM    HGBA1C 5.7 (H)  02/26/2018 09:14 AM           Imaging Results Review: I personally reviewed the following image studies/reports in PACS and discussed pertinent findings with Radiology: CT abdomen/pelvis. My interpretation of the radiology images/reports is: Negative.  Other Study Results Review: EKG was reviewed.     Administrative Statements   I have spent a total time of 45 minutes in caring for this patient on the day of the visit/encounter including Diagnostic results, Prognosis, Risks and benefits of tx options, Instructions for management, Patient and family education, Importance of tx compliance, Risk factor reductions, Impressions, Counseling / Coordination of care, Documenting in the medical record, Reviewing / ordering tests, medicine, procedures  , Obtaining or reviewing history  , and Communicating with other healthcare professionals .  ** Please Note: This note has been constructed using a voice recognition system. **

## 2024-11-21 NOTE — ASSESSMENT & PLAN NOTE
Patient's vitamin D level was less than 7.0  Patient started on ergocalciferol 50,000 units p.o. weekly x 8 weeks  Patient needs a repeat vitamin D by her PCP in approximately 10 to 12 weeks

## 2024-11-21 NOTE — PROGRESS NOTES
Admission Self Assessment form was completed, signed and given to this therapist.  Work focus as per form: helping my family to understand struggles, knowledge about mental health, knowledge about medication and community support and resources

## 2024-11-21 NOTE — NURSING NOTE
"Reassessment of atarax 100 mg. Pt states she is \"feeling a little better.\" PRN Effective. Pt is resting in bed.  "

## 2024-11-21 NOTE — NURSING NOTE
Pt is calm, cooperative, social with peers and staff. Pt denies SI, HI, AH, but endorses VH of a man with wings on her wall. Pt is going to groups, doing ADLs and taking medications as prescribed. Pt denies any unmet needs and remains on q15 min checks for safety.

## 2024-11-21 NOTE — ASSESSMENT & PLAN NOTE
Vital signs stable afebrile patient seen and examined by myself labs from 11/19/2024 were reviewed by myself sodium 138 potassium 4.3 creatinine 0.69  Patient medically stable for admit  Please reach out to [facility] with any medical questions or concerns

## 2024-11-21 NOTE — ASSESSMENT & PLAN NOTE
Patient will have Breo as well as an albuterol as needed metered-dose inhaler  Stable uncomplicated at this moment

## 2024-11-22 LAB
ANION GAP SERPL CALCULATED.3IONS-SCNC: 5 MMOL/L (ref 4–13)
BASOPHILS # BLD AUTO: 0.08 THOUSANDS/ÂΜL (ref 0–0.1)
BASOPHILS NFR BLD AUTO: 1 % (ref 0–1)
BUN SERPL-MCNC: 14 MG/DL (ref 5–25)
CALCIUM SERPL-MCNC: 8.7 MG/DL (ref 8.4–10.2)
CHLORIDE SERPL-SCNC: 105 MMOL/L (ref 96–108)
CO2 SERPL-SCNC: 28 MMOL/L (ref 21–32)
CREAT SERPL-MCNC: 0.73 MG/DL (ref 0.6–1.3)
EOSINOPHIL # BLD AUTO: 0.17 THOUSAND/ÂΜL (ref 0–0.61)
EOSINOPHIL NFR BLD AUTO: 2 % (ref 0–6)
ERYTHROCYTE [DISTWIDTH] IN BLOOD BY AUTOMATED COUNT: 14.2 % (ref 11.6–15.1)
FERRITIN SERPL-MCNC: 69 NG/ML (ref 11–307)
GFR SERPL CREATININE-BSD FRML MDRD: 98 ML/MIN/1.73SQ M
GLUCOSE P FAST SERPL-MCNC: 84 MG/DL (ref 65–99)
GLUCOSE SERPL-MCNC: 84 MG/DL (ref 65–140)
HCT VFR BLD AUTO: 41.7 % (ref 34.8–46.1)
HGB BLD-MCNC: 13.7 G/DL (ref 11.5–15.4)
IMM GRANULOCYTES # BLD AUTO: 0.03 THOUSAND/UL (ref 0–0.2)
IMM GRANULOCYTES NFR BLD AUTO: 0 % (ref 0–2)
IRON SATN MFR SERPL: 56 % (ref 15–50)
IRON SERPL-MCNC: 188 UG/DL (ref 50–212)
LYMPHOCYTES # BLD AUTO: 2.76 THOUSANDS/ÂΜL (ref 0.6–4.47)
LYMPHOCYTES NFR BLD AUTO: 35 % (ref 14–44)
MAGNESIUM SERPL-MCNC: 2.1 MG/DL (ref 1.9–2.7)
MCH RBC QN AUTO: 29.5 PG (ref 26.8–34.3)
MCHC RBC AUTO-ENTMCNC: 32.9 G/DL (ref 31.4–37.4)
MCV RBC AUTO: 90 FL (ref 82–98)
MONOCYTES # BLD AUTO: 0.5 THOUSAND/ÂΜL (ref 0.17–1.22)
MONOCYTES NFR BLD AUTO: 6 % (ref 4–12)
NEUTROPHILS # BLD AUTO: 4.45 THOUSANDS/ÂΜL (ref 1.85–7.62)
NEUTS SEG NFR BLD AUTO: 56 % (ref 43–75)
NRBC BLD AUTO-RTO: 0 /100 WBCS
PHOSPHATE SERPL-MCNC: 5 MG/DL (ref 2.7–4.5)
PLATELET # BLD AUTO: 333 THOUSANDS/UL (ref 149–390)
PMV BLD AUTO: 9.8 FL (ref 8.9–12.7)
POTASSIUM SERPL-SCNC: 5.1 MMOL/L (ref 3.5–5.3)
RBC # BLD AUTO: 4.65 MILLION/UL (ref 3.81–5.12)
SODIUM SERPL-SCNC: 138 MMOL/L (ref 135–147)
TIBC SERPL-MCNC: 335 UG/DL (ref 250–450)
UIBC SERPL-MCNC: 147 UG/DL (ref 155–355)
WBC # BLD AUTO: 7.99 THOUSAND/UL (ref 4.31–10.16)

## 2024-11-22 PROCEDURE — 84100 ASSAY OF PHOSPHORUS: CPT | Performed by: NURSE PRACTITIONER

## 2024-11-22 PROCEDURE — 99232 SBSQ HOSP IP/OBS MODERATE 35: CPT | Performed by: PSYCHIATRY & NEUROLOGY

## 2024-11-22 PROCEDURE — 83550 IRON BINDING TEST: CPT | Performed by: NURSE PRACTITIONER

## 2024-11-22 PROCEDURE — 85025 COMPLETE CBC W/AUTO DIFF WBC: CPT | Performed by: NURSE PRACTITIONER

## 2024-11-22 PROCEDURE — 83735 ASSAY OF MAGNESIUM: CPT | Performed by: NURSE PRACTITIONER

## 2024-11-22 PROCEDURE — 82728 ASSAY OF FERRITIN: CPT | Performed by: NURSE PRACTITIONER

## 2024-11-22 PROCEDURE — 80048 BASIC METABOLIC PNL TOTAL CA: CPT | Performed by: NURSE PRACTITIONER

## 2024-11-22 PROCEDURE — 83540 ASSAY OF IRON: CPT | Performed by: NURSE PRACTITIONER

## 2024-11-22 RX ADMIN — VENLAFAXINE HYDROCHLORIDE 150 MG: 150 CAPSULE, EXTENDED RELEASE ORAL at 08:24

## 2024-11-22 RX ADMIN — FLUTICASONE FUROATE AND VILANTEROL TRIFENATATE 1 PUFF: 200; 25 POWDER RESPIRATORY (INHALATION) at 08:27

## 2024-11-22 RX ADMIN — GABAPENTIN 300 MG: 300 CAPSULE ORAL at 16:47

## 2024-11-22 RX ADMIN — GABAPENTIN 300 MG: 300 CAPSULE ORAL at 08:24

## 2024-11-22 RX ADMIN — CYANOCOBALAMIN TAB 1000 MCG 500 MCG: 1000 TAB at 08:24

## 2024-11-22 RX ADMIN — PANTOPRAZOLE SODIUM 40 MG: 40 TABLET, DELAYED RELEASE ORAL at 06:12

## 2024-11-22 RX ADMIN — IBUPROFEN 600 MG: 600 TABLET, FILM COATED ORAL at 12:54

## 2024-11-22 RX ADMIN — FOLIC ACID 1 MG: 1 TABLET ORAL at 08:24

## 2024-11-22 RX ADMIN — GABAPENTIN 300 MG: 300 CAPSULE ORAL at 21:09

## 2024-11-22 RX ADMIN — FLUTICASONE PROPIONATE 1 SPRAY: 50 SPRAY, METERED NASAL at 08:28

## 2024-11-22 RX ADMIN — HYDROXYZINE HYDROCHLORIDE 100 MG: 50 TABLET, FILM COATED ORAL at 21:08

## 2024-11-22 RX ADMIN — SENNOSIDES 17.2 MG: 8.6 TABLET, FILM COATED ORAL at 17:21

## 2024-11-22 RX ADMIN — SENNOSIDES AND DOCUSATE SODIUM 1 TABLET: 50; 8.6 TABLET ORAL at 21:30

## 2024-11-22 RX ADMIN — METHOCARBAMOL TABLETS 500 MG: 500 TABLET, COATED ORAL at 21:08

## 2024-11-22 RX ADMIN — LAMOTRIGINE 25 MG: 25 TABLET ORAL at 08:24

## 2024-11-22 RX ADMIN — OLANZAPINE 15 MG: 5 TABLET, FILM COATED ORAL at 21:09

## 2024-11-22 RX ADMIN — TRAZODONE HYDROCHLORIDE 50 MG: 50 TABLET ORAL at 21:08

## 2024-11-22 RX ADMIN — Medication 400 MG: at 17:21

## 2024-11-22 RX ADMIN — SENNOSIDES 17.2 MG: 8.6 TABLET, FILM COATED ORAL at 08:24

## 2024-11-22 RX ADMIN — NICOTINE 1 PATCH: 14 PATCH, EXTENDED RELEASE TRANSDERMAL at 08:23

## 2024-11-22 RX ADMIN — OXYCODONE HYDROCHLORIDE AND ACETAMINOPHEN 500 MG: 500 TABLET ORAL at 08:24

## 2024-11-22 RX ADMIN — Medication 400 MG: at 08:24

## 2024-11-22 RX ADMIN — HYDROXYZINE HYDROCHLORIDE 100 MG: 50 TABLET, FILM COATED ORAL at 03:45

## 2024-11-22 RX ADMIN — MELATONIN TAB 3 MG 6 MG: 3 TAB at 21:09

## 2024-11-22 RX ADMIN — IBUPROFEN 600 MG: 600 TABLET, FILM COATED ORAL at 03:45

## 2024-11-22 RX ADMIN — AMOXICILLIN AND CLAVULANATE POTASSIUM 1 TABLET: 875; 125 TABLET, FILM COATED ORAL at 21:09

## 2024-11-22 RX ADMIN — AMOXICILLIN AND CLAVULANATE POTASSIUM 1 TABLET: 875; 125 TABLET, FILM COATED ORAL at 08:24

## 2024-11-22 RX ADMIN — OXYCODONE HYDROCHLORIDE AND ACETAMINOPHEN 500 MG: 500 TABLET ORAL at 17:21

## 2024-11-22 RX ADMIN — ATORVASTATIN CALCIUM 10 MG: 10 TABLET, FILM COATED ORAL at 17:21

## 2024-11-22 RX ADMIN — OXYCODONE HYDROCHLORIDE 5 MG: 5 TABLET ORAL at 16:47

## 2024-11-22 NOTE — NURSING NOTE
Patient cooperative, visible, and social with peers. Attending groups. Reports anxiety in the morning, received scheduled medication. Denied depression, SI, HI, and hallucinations of any kind.     Medication compliant.     Staff availability reinforced.

## 2024-11-22 NOTE — PROGRESS NOTES
Psychiatric Progress Note - Department of Behavioral Health   Michelle Matos 48 y.o. female MRN: [id number]  Unit/Bed#: [facility] 377-02 Encounter: [id number]    ASSESSMENT & PLAN     Assessment & Plan  Bipolar I disorder, most recent episode depressed, severe with psychotic features (HCC)  Continue previously prescribed psychotropic medications at present dosing. Continue Effexor  mg daily to address dysphoric mood including depression and anxiety in addition to post-traumatic stress disorder and chronic pain as off-label agent  No associated orders from this encounter found during lookback period of 72 hours.  ZACHARY (generalized anxiety disorder)  See above  No associated orders from this encounter found during lookback period of 72 hours.  Post-traumatic stress disorder, chronic  See above  No associated orders from this encounter found during lookback period of 72 hours.  S/P laparoscopic sleeve gastrectomy    No associated orders from this encounter found during lookback period of 72 hours.  Constipation    No associated orders from this encounter found during lookback period of 72 hours.  Tobacco dependence syndrome    No associated orders from this encounter found during lookback period of 72 hours.  Migraine    No associated orders from this encounter found during lookback period of 72 hours.  Asthma    No associated orders from this encounter found during lookback period of 72 hours.  Vitamin D deficiency    No associated orders from this encounter found during lookback period of 72 hours.  Medical clearance for psychiatric admission    Orders from past 72 hours:    Inpatient consult for Medical Clearance for  patient; Standing    Cocaine abuse, episodic (HCC)    No associated orders from this encounter found during lookback period of 72 hours.  Heartburn    No associated orders from this encounter found during lookback period of 72 hours.  Mixed hyperlipidemia    No associated orders from this encounter  "found during lookback period of 72 hours.  Tooth pain    No associated orders from this encounter found during lookback period of 72 hours.    Treatment Recommendations/Precautions:  Continue to promote patient participation in therapeutic milieu.  Continue medical management per medicine.  Continue previously prescribed psychotropic medication regimen; see below.  Continue behavioral health checks q.15 minutes.   Continue vitals per behavioral health unit protocol.  Discharge date per primary team; 201 commitment status.    SUBJECTIVE     Patient evaluated this a.m. for continuity of care. Patient was discussed at length with nursing and treatment team. Per nursing, calm, cooperative, present in the milieu, adhering to medications with no acute adverse effects, having received prn atarax, olanzapine, and trazodone for agitation, anxiety and insomnia. No acute distress, having had a bowel movement yesterday, is noted throughout evaluation. Michelle Matos denies suicidal/homicidal ideation in addition to thoughts of self-injury, receptive to crisis planning provided by this writer, contacting for safety in the inpatient setting, admitting to an ability to appropriately confide in staff including this writer. Michelle notes hearing voices, racing thoughts, seeing figures, anxiety, and insomnia.  Hearing voices: patient describes hearing a woman's voice, different from patient's voice, negatively speaking. Voice says, \"you will never get better, you are better off dead, you will never get out of here.\" Patient notes auditory hallucinations since first coming to unit that have decreased from a group of voices yelling at her to now this one singular voice with no yelling.  Racing thoughts and anxiety: patient describes feeling overwhelmed by anxious thoughts situational in nature. Thoughts are about taking care of housing and utilities, taking care of daughter, and other responsibilities.  Seeing figures: Patient describes " seeing a face on the wall in front of her. Pt pointed to water dripping stains on wall other wall marks that she describes as hair, 2 eyes, and mouth. Patient notes she woke up at 3am due to seeing the face. Patient notes history of seeing male figure in room on medicine floor, has not seen him since coming back to unit.  Patient has been bathing, eating, and attending group sessions, and cleaning room adequately. Patient would return to home with her 13 year old daughter.     PSYCHIATRIC REVIEW OF SYSTEMS     Behavior over the last 24 hours: slight improvement  Sleep: disrupted  Appetite: normal  Medication side effects: No    REVIEW OF SYSTEMS   Review of systems: no complaints    OBJECTIVE     Vital Signs in Past 24 Hours:  Temp:  [97.7 °F (36.5 °C)-97.8 °F (36.6 °C)] 97.8 °F (36.6 °C)  HR:  [80-92] 80  BP: (108-124)/(68-75) 124/75  Resp:  [16] 16  SpO2:  [98 %-99 %] 99 %  O2 Device: None (Room air)    Intake/Output in Past 24 hours:  No intake/output data recorded.  No intake/output data recorded.        Laboratory Results:  I have personally reviewed all pertinent laboratory/tests results.    Behavioral Health Medications: all current active meds have been reviewed and current meds:   Current Facility-Administered Medications:     acetaminophen (TYLENOL) tablet 650 mg, Q6H PRN    albuterol (PROVENTIL HFA,VENTOLIN HFA) inhaler 2 puff, Q6H PRN    aluminum-magnesium hydroxide-simethicone (MAALOX) oral suspension 30 mL, Q4H PRN    amoxicillin-clavulanate (AUGMENTIN) 875-125 mg per tablet 1 tablet, Q12H KIMBERLY    ascorbic acid (VITAMIN C) tablet 500 mg, BID    atorvastatin (LIPITOR) tablet 10 mg, Daily With Dinner    benztropine (COGENTIN) injection 1 mg, Q4H PRN Max 6/day    benztropine (COGENTIN) tablet 1 mg, Q4H PRN Max 6/day    bisacodyl (DULCOLAX) EC tablet 10 mg, Daily PRN    bisacodyl (DULCOLAX) rectal suppository 10 mg, Daily PRN    cyanocobalamin (VITAMIN B-12) tablet 500 mcg, Daily    hydrOXYzine HCL (ATARAX)  tablet 50 mg, Q6H PRN Max 4/day **OR** diphenhydrAMINE (BENADRYL) injection 50 mg, Q6H PRN    ergocalciferol (VITAMIN D2) capsule 50,000 Units, Weekly    ferrous sulfate tablet 325 mg, Every Other Day    fluticasone (FLONASE) 50 mcg/act nasal spray 1 spray, Daily    fluticasone-vilanterol 200-25 mcg/actuation 1 puff, Daily    folic acid (FOLVITE) tablet 1 mg, Daily    gabapentin (NEURONTIN) capsule 300 mg, TID    hydrOXYzine HCL (ATARAX) tablet 100 mg, Q6H PRN Max 4/day **OR** LORazepam (ATIVAN) injection 2 mg, Q6H PRN    hydrOXYzine HCL (ATARAX) tablet 25 mg, Q6H PRN Max 4/day    ibuprofen (MOTRIN) tablet 400 mg, Q6H PRN    ibuprofen (MOTRIN) tablet 600 mg, Q8H PRN    lamoTRIgine (LaMICtal) tablet 25 mg, Daily    magnesium Oxide (MAG-OX) tablet 400 mg, BID    melatonin tablet 6 mg, HS    methocarbamol (ROBAXIN) tablet 500 mg, Q8H PRN    morphine injection 2 mg, Q4H PRN    nicotine (NICODERM CQ) 14 mg/24hr TD 24 hr patch 1 patch, Daily    nicotine polacrilex (NICORETTE) gum 2 mg, Q2H PRN    OLANZapine (ZyPREXA) tablet 10 mg, Q3H PRN Max 3/day **OR** OLANZapine (ZyPREXA) IM injection 10 mg, Q3H PRN Max 3/day    OLANZapine (ZyPREXA) tablet 5 mg, Q4H PRN Max 3/day **OR** OLANZapine (ZyPREXA) IM injection 2.5 mg, Q4H PRN Max 3/day    OLANZapine (ZyPREXA) tablet 5 mg, Q3H PRN Max 3/day **OR** OLANZapine (ZyPREXA) IM injection 5 mg, Q3H PRN Max 3/day    OLANZapine (ZyPREXA) tablet 5 mg, Q3H PRN Max 6/day **OR** OLANZapine (ZyPREXA) IM injection 5 mg, Q3H PRN Max 6/day    OLANZapine (ZyPREXA) tablet 15 mg, HS    OLANZapine (ZyPREXA) tablet 2.5 mg, Q4H PRN Max 6/day    ondansetron (ZOFRAN-ODT) dispersible tablet 4 mg, Q6H PRN    oxyCODONE (ROXICODONE) IR tablet 5 mg, Q4H PRN    pantoprazole (PROTONIX) EC tablet 40 mg, Early Morning    propranolol (INDERAL) tablet 10 mg, Q8H PRN    senna (SENOKOT) tablet 17.2 mg, BID    senna-docusate sodium (SENOKOT S) 8.6-50 mg per tablet 1 tablet, Daily PRN    traZODone (DESYREL) tablet  50 mg, HS PRN    venlafaxine (EFFEXOR-XR) 24 hr capsule 150 mg, Daily.    Risks, benefits and possible side effects of Medications:   Risks, benefits, and possible side effects of medications explained to patient and patient verbalizes understanding.      Mental Status Evaluation:  Appearance:  casually dressed and older than stated age, adequate grooming/hygiene   Behavior:  Calm, cooperative, maintaining appropriate eye contact   Speech:  normal pitch and normal volume   Mood:  anxious   Affect:  mood-congruent and brightened at times appropriately, anxious   Language repeating phrases   Thought Process:  Goal directed, linear, logical   Thought Content:  Negative thinking   Perceptual Disturbances: Auditory hallucinations without commands and visual illusion   Risk Potential: Suicidal Ideations none, Homicidal Ideations none, and Potential for Aggression No   Sensorium:  person, place, and time/date   Cognition:  recent and remote memory grossly intact   Consciousness:  alert and awake    Attention: attention span and concentration were age appropriate   Insight:  limited   Judgment: limited   Intellect Not assessed   Gait/Station: normal gait/station and normal balance   Motor Activity: no abnormal movements     Memory: Short and long term memory  grossly intact     Progress Toward Goals: Progressing, as evidenced by their participation (or lack thereof) in individual, social and therapeutic milieu in addition to adherence to their medication regimen.    Recommended Treatment:   See above for assessment and plan.    Inpatient Psychiatric Certification: Based upon physical, mental and social evaluations, I certify that inpatient psychiatric services are medically necessary for this patient for a duration of greater than 2 midnights for the treatment of Bipolar I disorder, most recent episode depressed, severe with psychotic features (HCC) including psychotropic medication management, participation in the  therapeutic milieu and referrals as indicated. Available alternative community resources do not meet the patient's mental health care needs. I further attest that an established written individualized plan of care has been implemented and is outlined in the patient's medical records.    Counseling/Coordination of Care    I have expended greater than 15 minutes in which more than 50% of this time was expended in counseling/coordination of patient care relating to diagnostic results, prognosis, potential risks and benefits of management options, instructions for appropriate management, patient and/or collateral education, importance of adherence to management and/or risk factor reductions. Patient's rights, confidentiality, exceptions to confidentiality, use of electronic medical record including appropriate staff access to medical record regarding behavioral health services and consent to treatment were reviewed.    Note Share:     This note was not shared with the patient due to reasonable likelihood of causing patient harm     This note has been constructed using a voice recognition system. There may be translation, syntax,  or grammatical errors. If you have any questions, please contact the dictating provider.    Jordan Christopher Holter, DO 11/22/24

## 2024-11-22 NOTE — NURSING NOTE
"PT observed visible, cooperative and calm upon assessment, PT denies SI/HI/VH but reports AH of \"a man seeing on the wall in her room telling her, she is not worth it\".  Encouraged to thinking positively about herself and mentally refused those hopelessness words.  PT  appears clam, compliant with scheduled meds, added PRN Trazodone 50mg at 2127 for insomnia, no further concerns at this time       2227  re-assessment on PRN Trazodone 50mg given at 2127 was effective as PT observed sleeping at this time.   "

## 2024-11-22 NOTE — ASSESSMENT & PLAN NOTE
Continue previously prescribed psychotropic medications at present dosing. Continue Effexor  mg daily to address dysphoric mood including depression and anxiety in addition to post-traumatic stress disorder and chronic pain as off-label agent  No associated orders from this encounter found during lookback period of 72 hours.

## 2024-11-22 NOTE — PROGRESS NOTES
11/22/24 1400   Activity/Group Checklist   Group Other (Comment)  (Group Art Therapy/Psychodynamic, Creatively Explore Relationship with Affirmations followed by Process Discussion)   Attendance Attended   Attendance Duration (min) Greater than 60  (90 min group)   Interactions Interacted appropriately   Affect/Mood Appropriate   Goals Achieved Identified feelings;Discussed coping strategies;Able to listen to others;Able to engage in interactions;Able to reflect/comment on own behavior;Able to manage/cope with feelings;Able to recieve feedback;Able to give feedback to another;Increased hopefulness

## 2024-11-22 NOTE — PLAN OF CARE
Problem: Ineffective Coping  Goal: Identifies ineffective coping skills  Outcome: Progressing  Goal: Identifies healthy coping skills  Outcome: Progressing  Goal: Participates in unit activities  Description: Interventions:  - Provide therapeutic environment   - Provide required programming   - Redirect inappropriate behaviors   Outcome: Progressing  Goal: Patient/Family verbalizes awareness of resources  Outcome: Progressing     Problem: Depression  Goal: Treatment Goal: Demonstrate behavioral control of depressive symptoms, verbalize feelings of improved mood/affect, and adopt new coping skills prior to discharge  Outcome: Progressing  Goal: Verbalize thoughts and feelings  Description: Interventions:  - Assess and re-assess patient's level of risk   - Engage patient in 1:1 interactions, daily, for a minimum of 15 minutes   - Encourage patient to express feelings, fears, frustrations, hopes   Outcome: Progressing  Goal: Refrain from harming self  Description: Interventions:  - Monitor patient closely, per order   - Supervise medication ingestion, monitor effects and side effects   Outcome: Progressing  Goal: Attend and participate in unit activities, including therapeutic, recreational, and educational groups  Description: Interventions:  - Provide therapeutic and educational activities daily, encourage attendance and participation, and document same in the medical record   Outcome: Progressing     Problem: Anxiety  Goal: Anxiety is at manageable level  Description: Interventions:  - Assess and monitor patient's anxiety level.   - Monitor for signs and symptoms (heart palpitations, chest pain, shortness of breath, headaches, nausea, feeling jumpy, restlessness, irritable, apprehensive).   - Collaborate with interdisciplinary team and initiate plan and interventions as ordered.  - [place] patient to unit/surroundings  - Explain treatment plan  - Encourage participation in care  - Encourage verbalization of  concerns/fears  - Identify coping mechanisms  - Assist in developing anxiety-reducing skills  - Administer/offer alternative therapies  - Limit or eliminate stimulants  Outcome: Progressing     Problem: DISCHARGE PLANNING - CARE MANAGEMENT  Goal: Discharge to post-acute care or home with appropriate resources  Description: INTERVENTIONS:  - Conduct assessment to determine patient/family and health care team treatment goals, and need for post-acute services based on payer coverage, community resources, and patient preferences, and barriers to discharge  - Address psychosocial, clinical, and financial barriers to discharge as identified in assessment in conjunction with the patient/family and health care team  - Arrange appropriate level of post-acute services according to patient’s   needs and preference and payer coverage in collaboration with the physician and health care team  - Communicate with and update the patient/family, physician, and health care team regarding progress on the discharge plan  - Arrange appropriate transportation to post-acute venues  Outcome: Progressing

## 2024-11-22 NOTE — PLAN OF CARE
Problem: Ineffective Coping  Goal: Identifies ineffective coping skills  Outcome: Progressing  Goal: Identifies healthy coping skills  Outcome: Progressing  Goal: Participates in unit activities  Description: Interventions:  - Provide therapeutic environment   - Provide required programming   - Redirect inappropriate behaviors   Outcome: Progressing  Goal: Patient/Family verbalizes awareness of resources  Outcome: Progressing     Problem: Depression  Goal: Treatment Goal: Demonstrate behavioral control of depressive symptoms, verbalize feelings of improved mood/affect, and adopt new coping skills prior to discharge  Outcome: Progressing  Goal: Verbalize thoughts and feelings  Description: Interventions:  - Assess and re-assess patient's level of risk   - Engage patient in 1:1 interactions, daily, for a minimum of 15 minutes   - Encourage patient to express feelings, fears, frustrations, hopes   Outcome: Progressing  Goal: Refrain from harming self  Description: Interventions:  - Monitor patient closely, per order   - Supervise medication ingestion, monitor effects and side effects   Outcome: Progressing  Goal: Attend and participate in unit activities, including therapeutic, recreational, and educational groups  Description: Interventions:  - Provide therapeutic and educational activities daily, encourage attendance and participation, and document same in the medical record   Outcome: Progressing     Problem: Anxiety  Goal: Anxiety is at manageable level  Description: Interventions:  - Assess and monitor patient's anxiety level.   - Monitor for signs and symptoms (heart palpitations, chest pain, shortness of breath, headaches, nausea, feeling jumpy, restlessness, irritable, apprehensive).   - Collaborate with interdisciplinary team and initiate plan and interventions as ordered.  - [place] patient to unit/surroundings  - Explain treatment plan  - Encourage participation in care  - Encourage verbalization of  concerns/fears  - Identify coping mechanisms  - Assist in developing anxiety-reducing skills  - Administer/offer alternative therapies  - Limit or eliminate stimulants  Outcome: Progressing

## 2024-11-22 NOTE — DISCHARGE INSTR - APPOINTMENTS
Behavioral Health Nurse Navigator, Sherry or Maria M will be calling you after your discharge, on the phone number that you provided.  They will be available as an additional support, if needed.   If you wish to speak with Sherry, you may contact her at [phone].    [facility] accepts adults, 18 years and older, who identify themselves as homeless. Adults must be able to navigate multiple flights of stairs and independently care for themselves due to the construction of the facility in the [facility]. [facility] is not able to provide shelter to minors under 18 years of age. Individuals presenting at [facility] who are unable to meet the above criteria will be assisted by [facility] through contacting [facility] and [facility] authorities for assistance.  Shelter Guests are required to obtain a voucher from the [employer] located at   [address] [place] [address].  Shelter doors open at 5 PM and will close after 8 PM. Guests will not be allowed entrance AFTER 8 PM unless they have made prior arrangements with the Shelter Manager or have police escort. Guests are asked to not congregate outside the shelter before 5 PM.   Guest admittance to the shelter is on the [address] side of the building.   Dinner is provided every night from 5:30 PM to 7:30 PM.  Showers are available if volunteers are present. Partner service providers are accessible at the shelter during the weeknights. List of visiting partner service providers will be posted on the Service Provider Calendar in the shelter.  Guests are woken at 6 AM every day and are required to leave the premises by 6:45 AM.  [address] [place] [address]  [phone]     [facility]  The [facility] in the [facility] provides shelter, food, and a safe place for those experiencing homelessness in our community.  The [facility] is open to welcome guests from November 15 through April 15.  Our doors are open each night from 7:00PM-7:00AM (Intake hours: 7:00PM-9:00PM) to provide this critical service for our neighbors.  Our [facility] guests will always find themselves greeted with kindness and a safe place to sleep. The [facility]’s [facility] provides a safe place to sleep and a dignified sense of community for neighbors in need.  The [facility] also serves healthy, prepared dinner seven days a week to our [facility] guests.  [address] [place] [address]  [phone]

## 2024-11-22 NOTE — PROGRESS NOTES
11/22/24 0857   Team Meeting   Meeting Type Daily Rounds   Team Members Present   Team Members Present Physician;Nurse;   Physician Team Member Holter   Nursing Team Member [facility] Team Member Tre   Patient/Family Present   Patient Present No   Patient's Family Present No     201. Pt PRN Atarax and Zyprexa and were effective. Pt reported AH but denies all symptoms.  Pt is is visible and attends groups. Pt had vowel movement. Pt discharges next week.

## 2024-11-22 NOTE — NURSING NOTE
PT approach nurses' station requesting for PRN for tooth pain and anxiety, PRN Motrin 600mg for severe pain 10/10 given at 0345 and Atarax 100mg for severe anxiety at same time. Will monitor for effectiveness.      0445  Re-assessment, PT reports slightly effective 5/10 at this time with PRNs of Motrin 600mg and Atarax 100mg for tooth pain and severe anxiety concerns.

## 2024-11-22 NOTE — PROGRESS NOTES
Pt completed and signed Safety & Relapse Prevention Plan. Chart copy was labeled with Pt sticker and placed in RN scan bin. Pt copy placed in Pt folder and is to be received upon D/C.        11/22/24 2852   Activity/Group Checklist   Group Admission/Discharge   Attendance Attended   Attendance Duration (min) 0-15   Interactions Interacted appropriately   Affect/Mood Appropriate   Goals Achieved Identified feelings;Identified triggers;Identified relapse prevention strategies;Identified medication adherence strategies;Discussed safety plan;Discussed coping strategies;Discussed discharge plans
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
## 2024-11-22 NOTE — ASSESSMENT & PLAN NOTE
Orders from past 72 hours:    Inpatient consult for Medical Clearance for BH patient; Standing

## 2024-11-22 NOTE — PROGRESS NOTES
This therapist met with patient for individual session as per patient request. Patient presented as cooperative, attentive and focused. Patient reported she was able to redirect her disappointment of being denied discharge today and be more positive, which, she claimed was something that surprised her. Patient stated she is concerned about not being able to see her daughter, but understands she needs to go through this process. This therapist provided support and encouragement, inviting patient to engage the art materials to address any needs she may have. Patient was quick to identify she wanted to create two plushie creatures for her daughter. This therapist provided materials to accommodate patient goals. Patient process was focused, deliberate and involved, able to complete her objective in the time provided. Patient reported feeling very satisfied and plans on putting them with her personal belongings by the end of the day.

## 2024-11-23 PROCEDURE — 99232 SBSQ HOSP IP/OBS MODERATE 35: CPT | Performed by: STUDENT IN AN ORGANIZED HEALTH CARE EDUCATION/TRAINING PROGRAM

## 2024-11-23 RX ORDER — XYLITOL/YERBA SANTA
5 AEROSOL, SPRAY WITH PUMP (ML) MUCOUS MEMBRANE 4 TIMES DAILY PRN
Status: DISCONTINUED | OUTPATIENT
Start: 2024-11-23 | End: 2024-11-26 | Stop reason: HOSPADM

## 2024-11-23 RX ORDER — HYDROXYZINE HYDROCHLORIDE 25 MG/1
25 TABLET, FILM COATED ORAL 2 TIMES DAILY
Status: DISCONTINUED | OUTPATIENT
Start: 2024-11-23 | End: 2024-11-26 | Stop reason: HOSPADM

## 2024-11-23 RX ORDER — TRAZODONE HYDROCHLORIDE 50 MG/1
50 TABLET, FILM COATED ORAL
Status: DISCONTINUED | OUTPATIENT
Start: 2024-11-23 | End: 2024-11-25

## 2024-11-23 RX ORDER — AMOXICILLIN 250 MG
1 CAPSULE ORAL 2 TIMES DAILY
Status: DISCONTINUED | OUTPATIENT
Start: 2024-11-23 | End: 2024-11-26 | Stop reason: HOSPADM

## 2024-11-23 RX ADMIN — OXYCODONE HYDROCHLORIDE AND ACETAMINOPHEN 500 MG: 500 TABLET ORAL at 08:26

## 2024-11-23 RX ADMIN — Medication: at 21:34

## 2024-11-23 RX ADMIN — SENNOSIDES AND DOCUSATE SODIUM 1 TABLET: 50; 8.6 TABLET ORAL at 15:22

## 2024-11-23 RX ADMIN — FLUTICASONE PROPIONATE 1 SPRAY: 50 SPRAY, METERED NASAL at 08:31

## 2024-11-23 RX ADMIN — OXYCODONE HYDROCHLORIDE 5 MG: 5 TABLET ORAL at 08:24

## 2024-11-23 RX ADMIN — VENLAFAXINE HYDROCHLORIDE 150 MG: 150 CAPSULE, EXTENDED RELEASE ORAL at 08:25

## 2024-11-23 RX ADMIN — GABAPENTIN 300 MG: 300 CAPSULE ORAL at 21:32

## 2024-11-23 RX ADMIN — TRAZODONE HYDROCHLORIDE 50 MG: 50 TABLET ORAL at 21:32

## 2024-11-23 RX ADMIN — IBUPROFEN 600 MG: 600 TABLET, FILM COATED ORAL at 21:32

## 2024-11-23 RX ADMIN — Medication 5 SPRAY: at 21:30

## 2024-11-23 RX ADMIN — GABAPENTIN 300 MG: 300 CAPSULE ORAL at 17:05

## 2024-11-23 RX ADMIN — Medication 400 MG: at 08:25

## 2024-11-23 RX ADMIN — OLANZAPINE 15 MG: 5 TABLET, FILM COATED ORAL at 21:32

## 2024-11-23 RX ADMIN — NICOTINE 1 PATCH: 14 PATCH, EXTENDED RELEASE TRANSDERMAL at 08:25

## 2024-11-23 RX ADMIN — LAMOTRIGINE 25 MG: 25 TABLET ORAL at 08:26

## 2024-11-23 RX ADMIN — HYDROXYZINE HYDROCHLORIDE 100 MG: 50 TABLET, FILM COATED ORAL at 08:24

## 2024-11-23 RX ADMIN — FLUTICASONE FUROATE AND VILANTEROL TRIFENATATE 1 PUFF: 200; 25 POWDER RESPIRATORY (INHALATION) at 08:24

## 2024-11-23 RX ADMIN — AMOXICILLIN AND CLAVULANATE POTASSIUM 1 TABLET: 875; 125 TABLET, FILM COATED ORAL at 08:25

## 2024-11-23 RX ADMIN — FOLIC ACID 1 MG: 1 TABLET ORAL at 08:25

## 2024-11-23 RX ADMIN — HYDROXYZINE HYDROCHLORIDE 25 MG: 25 TABLET ORAL at 17:04

## 2024-11-23 RX ADMIN — AMOXICILLIN AND CLAVULANATE POTASSIUM 1 TABLET: 875; 125 TABLET, FILM COATED ORAL at 21:32

## 2024-11-23 RX ADMIN — Medication 400 MG: at 17:05

## 2024-11-23 RX ADMIN — HYDROXYZINE HYDROCHLORIDE 25 MG: 25 TABLET ORAL at 15:22

## 2024-11-23 RX ADMIN — SENNOSIDES AND DOCUSATE SODIUM 1 TABLET: 50; 8.6 TABLET ORAL at 17:04

## 2024-11-23 RX ADMIN — FERROUS SULFATE TAB 325 MG (65 MG ELEMENTAL FE) 325 MG: 325 (65 FE) TAB at 08:26

## 2024-11-23 RX ADMIN — GABAPENTIN 300 MG: 300 CAPSULE ORAL at 08:26

## 2024-11-23 RX ADMIN — OXYCODONE HYDROCHLORIDE AND ACETAMINOPHEN 500 MG: 500 TABLET ORAL at 17:05

## 2024-11-23 RX ADMIN — SENNOSIDES 17.2 MG: 8.6 TABLET, FILM COATED ORAL at 08:25

## 2024-11-23 RX ADMIN — CYANOCOBALAMIN TAB 1000 MCG 500 MCG: 1000 TAB at 08:25

## 2024-11-23 RX ADMIN — IBUPROFEN 600 MG: 600 TABLET, FILM COATED ORAL at 04:26

## 2024-11-23 RX ADMIN — ATORVASTATIN CALCIUM 10 MG: 10 TABLET, FILM COATED ORAL at 17:04

## 2024-11-23 NOTE — ASSESSMENT & PLAN NOTE
Continue previously prescribed psychotropic medications at present dosing. Continue Effexor  mg daily to address dysphoric mood including depression and anxiety in addition to post-traumatic stress disorder and chronic pain as off-label agent.    72 hour notice signed on 11/22/24  Discontinue Melatonin  Start Trazodone 50mg HS for insomnia.  Start Atarax 25mg BID for anxiety.  No associated orders from this encounter found during lookback period of 72 hours.

## 2024-11-23 NOTE — PROGRESS NOTES
Progress Note - Behavioral Health   Name: Michelle Matos 48 y.o. female I MRN: [id number]  Unit/Bed#: [facility] 377-02 I Date of Admission: 11/20/2024   Date of Service: 11/23/2024 I Hospital Day: 3        Assessment & Plan  Bipolar I disorder, most recent episode depressed, severe with psychotic features (HCC)  Continue previously prescribed psychotropic medications at present dosing. Continue Effexor  mg daily to address dysphoric mood including depression and anxiety in addition to post-traumatic stress disorder and chronic pain as off-label agent.    72 hour notice signed on 11/22/24  Discontinue Melatonin  Start Trazodone 50mg HS for insomnia.  Start Atarax 25mg BID for anxiety.  No associated orders from this encounter found during lookback period of 72 hours.  ZACHARY (generalized anxiety disorder)  See above  No associated orders from this encounter found during lookback period of 72 hours.  Post-traumatic stress disorder, chronic  See above  No associated orders from this encounter found during lookback period of 72 hours.  S/P laparoscopic sleeve gastrectomy    No associated orders from this encounter found during lookback period of 72 hours.  Constipation    No associated orders from this encounter found during lookback period of 72 hours.  Tobacco dependence syndrome    No associated orders from this encounter found during lookback period of 72 hours.  Migraine    No associated orders from this encounter found during lookback period of 72 hours.  Asthma    No associated orders from this encounter found during lookback period of 72 hours.  Vitamin D deficiency    No associated orders from this encounter found during lookback period of 72 hours.  Medical clearance for psychiatric admission    Orders from past 72 hours:    Inpatient consult for Medical Clearance for  patient; Standing    Cocaine abuse, episodic (HCC)    No associated orders from this encounter found during lookback period of 72  "hours.  Heartburn    No associated orders from this encounter found during lookback period of 72 hours.  Mixed hyperlipidemia    No associated orders from this encounter found during lookback period of 72 hours.  Tooth pain  Oral benzocaine added. Reports tolerating this well in past for tooth pain.  No associated orders from this encounter found during lookback period of 72 hours.       Additional Recommendations:  Behavioral Health checks for safety monitoring.  Continue treatment with group therapy, milieu therapy and occupational therapy.  Medical management per SLIM.  Discharge and disposition planning.    ----------------------------------------      Subjective: Per nursing report patient reported to be pleasant and appropriate in the milieu.  Interacting appropriately.  Attending group activities and social with peers.  Given Atarax is as needed yesterday for anxiety and Robaxin for muscle pain.  Complained of constipation and was given Senokot. Signed 72 hour notice.    She reports today she is feeling \"better\".  She reports preoccupied about wishing to leave the hospital, states that she is feeling fewer more future oriented and wanting to see family.  Reports that she is experiencing bitten anxiety, requested to have Atarax schedule during the day as she felt as though this is helpful when she takes it.  Reports that she did have some difficulty sleeping last night and is requesting to have trazodone scheduled instead of melatonin.  She also complains intermittently of toothache and mouth pain, feels that she tolerated Orajel in the past and is interested in trialing benzocaine.  She otherwise states that she is not experiencing any auditory or visual hallucinations today.  Does not appear to be responding to internal stimuli.  Denying any suicidal or homicidal ideations.      Behavior over the last 24 hours:  unchanged  Sleep: insomnia  Appetite: normal  Medication side effects: No  ROS: no complaints and " all other systems are negative    Mental Status Evaluation:  Appearance:  casually dressed   Behavior:  pleasant, cooperative   Speech:  normal rate and volume   Mood:  euthymic   Affect:  constricted   Thought Process:  organized, logical, coherent, goal directed   Associations: intact associations   Thought Content:  no overt delusions   Perceptual Disturbances: no auditory hallucinations, no visual hallucinations, does not appear responding to internal stimuli   Risk Potential: Suicidal ideation - None at present  Homicidal ideation - None at present  Potential for aggression - No   Sensorium:  oriented to person, place, and time/date   Memory:  recent and remote memory grossly intact   Consciousness:  alert and awake   Attention/Concentration: attention span and concentration appear shorter than expected for age   Insight:  fair   Judgment: fair   Gait/Station: normal gait/station   Motor Activity: no abnormal movements     Medications: all current active meds have been reviewed and continue current psychiatric medications.  Current Facility-Administered Medications   Medication Dose Route Frequency Provider Last Rate    acetaminophen  650 mg Oral Q6H PRN Jordan C Holter, DO      albuterol  2 puff Inhalation Q6H PRN Jordan C Holter, DO      aluminum-magnesium hydroxide-simethicone  30 mL Oral Q4H PRN Jordan C Holter, DO      amoxicillin-clavulanate  1 tablet Oral Q12H [facility] EDWIN Alanis      ascorbic acid  500 mg Oral BID Jordan C Holter, DO      atorvastatin  10 mg Oral Daily With Dinner Jordan C Holter, DO      benzocaine   Mucosal BID PRN Fady Atkinson MD      benztropine  1 mg Intramuscular Q4H PRN Max 6/day Jordan C Holter, DO      benztropine  1 mg Oral Q4H PRN Max 6/day Jordan C Holter, DO      bisacodyl  10 mg Oral Daily PRN Jordan C Holter, DO      bisacodyl  10 mg Rectal Daily PRN Jordan C Holter, DO      cyanocobalamin  500 mcg Oral Daily Jordan C Holter, DO      hydrOXYzine HCL  50 mg Oral  Q6H PRN Max 4/day [facility] Holter, DO      Or    diphenhydrAMINE  50 mg Intramuscular Q6H PRN [facility] Holter, DO      ergocalciferol  50,000 Units Oral Weekly [facility] Holter, DO      ferrous sulfate  325 mg Oral Every Other Day [facility] Holter, DO      fluticasone  1 spray Each Nare Daily [facility] Holter, DO      fluticasone-vilanterol  1 puff Inhalation Daily [facility] Holter, DO      folic acid  1 mg Oral Daily EDWIN Alanis      gabapentin  300 mg Oral TID [facility] Holter, DO      hydrOXYzine HCL  100 mg Oral Q6H PRN Max 4/day [facility] Holter, DO      Or    LORazepam  2 mg Intramuscular Q6H PRN [facility] Holter, DO      hydrOXYzine HCL  25 mg Oral Q6H PRN Max 4/day [facility] Holter, DO      hydrOXYzine HCL  25 mg Oral BID Fady Atkinson MD      ibuprofen  400 mg Oral Q6H PRN [facility] Holter, DO      ibuprofen  600 mg Oral Q8H PRN [facility] Holter, DO      lamoTRIgine  25 mg Oral Daily [facility] Holter, DO      magnesium Oxide  400 mg Oral BID [facility] Holter, DO      methocarbamol  500 mg Oral Q8H PRN [facility] Holter, DO      morphine injection  2 mg Intravenous Q4H PRN [facility] Holter, DO      nicotine  1 patch Transdermal Daily [facility] Holter, DO      nicotine polacrilex  2 mg Oral Q2H PRN [facility] Holter, DO      OLANZapine  10 mg Oral Q3H PRN Max 3/day [facility] Holter, DO      Or    OLANZapine  10 mg Intramuscular Q3H PRN Max 3/day [facility] Holter, DO      OLANZapine  5 mg Oral Q4H PRN Max 3/day [facility] Holter, DO      Or    OLANZapine  2.5 mg Intramuscular Q4H PRN Max 3/day [facility] Holter, DO      OLANZapine  5 mg Oral Q3H PRN Max 3/day [facility] Holter, DO      Or    OLANZapine  5 mg Intramuscular Q3H PRN Max 3/day [facility] Holter, DO      OLANZapine  5 mg Oral Q3H PRN Max 6/day [facility] Holter, DO      Or    OLANZapine  5 mg Intramuscular Q3H PRN Max 6/day [facility] Holter, DO      OLANZapine  15 mg Oral HS [facility] Holter, DO      OLANZapine  2.5 mg Oral Q4H PRN Max 6/day [facility] Holter, DO       ondansetron  4 mg Oral Q6H PRN [facility] Holter, DO      oxyCODONE  5 mg Oral Q4H PRN [facility] Holter, DO      pantoprazole  40 mg Oral Early Morning [facility] Holter, DO      propranolol  10 mg Oral Q8H PRN [facility] Holter, DO      saliva substitute  5 spray Mouth/Throat 4x Daily PRN EDWIN Alanis      senna  2 tablet Oral BID [facility] Holter, DO      senna-docusate sodium  1 tablet Oral Daily PRN [facility] Holter, DO      traZODone  50 mg Oral HS PRN [facility] Holter, DO      traZODone  50 mg Oral HS Fady Atkinson MD      venlafaxine  150 mg Oral Daily [facility] Holter, DO         Labs: I have personally reviewed all pertinent laboratory/tests results  Results from the past 24 hours: No results found for this or any previous visit (from the past 24 hours).    Progress Toward Goals: progressing    Risks / Benefits of Treatment:    Risks, benefits, and possible side effects of medications explained to patient and patient verbalizes understanding and agreement for treatment.    Counseling / Coordination of Care:    Total floor / unit time spent today 35 minutes. Greater than 50% of total time was spent with the patient and / or family counseling and / or coordination of care. A description of counseling / coordination of care:  Patient's progress discussed with staff in treatment team meeting.  Medications, treatment progress and treatment plan reviewed with patient.  Reassurance and supportive therapy provided.  Encouraged participation in milieu and group therapy on the unit.    Fady Atkinson MD 11/23/24

## 2024-11-23 NOTE — NURSING NOTE
Patient is calm and cooperative upon approach. Patient is visible on unit, socializing with peers and attending groups. Patient denies SI/HI/AH/VH. Patient is compliant with meds and meals.

## 2024-11-23 NOTE — PLAN OF CARE
Problem: Ineffective Coping  Goal: Identifies ineffective coping skills  Outcome: Progressing  Goal: Identifies healthy coping skills  Outcome: Progressing  Goal: Participates in unit activities  Description: Interventions:  - Provide therapeutic environment   - Provide required programming   - Redirect inappropriate behaviors   Outcome: Progressing  Goal: Patient/Family verbalizes awareness of resources  Outcome: Progressing     Problem: Depression  Goal: Treatment Goal: Demonstrate behavioral control of depressive symptoms, verbalize feelings of improved mood/affect, and adopt new coping skills prior to discharge  Outcome: Progressing  Goal: Verbalize thoughts and feelings  Description: Interventions:  - Assess and re-assess patient's level of risk   - Engage patient in 1:1 interactions, daily, for a minimum of 15 minutes   - Encourage patient to express feelings, fears, frustrations, hopes   Outcome: Progressing  Goal: Refrain from harming self  Description: Interventions:  - Monitor patient closely, per order   - Supervise medication ingestion, monitor effects and side effects   Outcome: Progressing  Goal: Attend and participate in unit activities, including therapeutic, recreational, and educational groups  Description: Interventions:  - Provide therapeutic and educational activities daily, encourage attendance and participation, and document same in the medical record   Outcome: Progressing     Problem: Anxiety  Goal: Anxiety is at manageable level  Description: Interventions:  - Assess and monitor patient's anxiety level.   - Monitor for signs and symptoms (heart palpitations, chest pain, shortness of breath, headaches, nausea, feeling jumpy, restlessness, irritable, apprehensive).   - Collaborate with interdisciplinary team and initiate plan and interventions as ordered.  - Tererro patient to unit/surroundings  - Explain treatment plan  - Encourage participation in care  - Encourage verbalization of  concerns/fears  - Identify coping mechanisms  - Assist in developing anxiety-reducing skills  - Administer/offer alternative therapies  - Limit or eliminate stimulants  Outcome: Progressing     Problem: DISCHARGE PLANNING - CARE MANAGEMENT  Goal: Discharge to post-acute care or home with appropriate resources  Description: INTERVENTIONS:  - Conduct assessment to determine patient/family and health care team treatment goals, and need for post-acute services based on payer coverage, community resources, and patient preferences, and barriers to discharge  - Address psychosocial, clinical, and financial barriers to discharge as identified in assessment in conjunction with the patient/family and health care team  - Arrange appropriate level of post-acute services according to patient’s   needs and preference and payer coverage in collaboration with the physician and health care team  - Communicate with and update the patient/family, physician, and health care team regarding progress on the discharge plan  - Arrange appropriate transportation to post-acute venues  Outcome: Progressing

## 2024-11-23 NOTE — NURSING NOTE
Patient remained visible and social on the unit throughout the evening. Cooperative with routine. Denied any unmet needs. HS medication compliant. At 2108, patient reported severe anxiety. Requested and received Atarax 100 mg po prn. At 2108, she requested and received Robaxin 500 mg for muscle spasms. At 2108, requested and received Trazodone 50 mg for insomnia.     At 2130, patient requested and received Senokot one tab for c/o constipation.     At 2210, patient resting in bed with her eyes closed. Trazodone 50 mg, Robaxin 500 mg, and Atarax 100 mg effective.    At this point, Senokot not effective.

## 2024-11-23 NOTE — ASSESSMENT & PLAN NOTE
Oral benzocaine added. Reports tolerating this well in past for tooth pain.  No associated orders from this encounter found during lookback period of 72 hours.

## 2024-11-23 NOTE — NURSING NOTE
Patient visible in dayroom, socializing with peers. No signs of anxiety or distress. PRN Atarax 100 mg PO for severe anxiety, effective at this time.

## 2024-11-23 NOTE — NURSING NOTE
Patient is calm and cooperative upon approach. Patient is visible on unit, socializing with peers. Patient attending groups. Patient denies SI/HI/AH/VH. Patient is compliant with meds and meals.

## 2024-11-24 PROCEDURE — 99232 SBSQ HOSP IP/OBS MODERATE 35: CPT | Performed by: STUDENT IN AN ORGANIZED HEALTH CARE EDUCATION/TRAINING PROGRAM

## 2024-11-24 RX ADMIN — LAMOTRIGINE 25 MG: 25 TABLET ORAL at 08:48

## 2024-11-24 RX ADMIN — CYANOCOBALAMIN TAB 1000 MCG 500 MCG: 1000 TAB at 08:48

## 2024-11-24 RX ADMIN — GABAPENTIN 300 MG: 300 CAPSULE ORAL at 08:48

## 2024-11-24 RX ADMIN — OXYCODONE HYDROCHLORIDE 5 MG: 5 TABLET ORAL at 05:45

## 2024-11-24 RX ADMIN — HYDROXYZINE HYDROCHLORIDE 25 MG: 25 TABLET ORAL at 17:20

## 2024-11-24 RX ADMIN — SENNOSIDES AND DOCUSATE SODIUM 1 TABLET: 50; 8.6 TABLET ORAL at 08:48

## 2024-11-24 RX ADMIN — OXYCODONE HYDROCHLORIDE AND ACETAMINOPHEN 500 MG: 500 TABLET ORAL at 08:48

## 2024-11-24 RX ADMIN — FLUTICASONE FUROATE AND VILANTEROL TRIFENATATE 1 PUFF: 200; 25 POWDER RESPIRATORY (INHALATION) at 08:53

## 2024-11-24 RX ADMIN — OXYCODONE HYDROCHLORIDE 5 MG: 5 TABLET ORAL at 16:43

## 2024-11-24 RX ADMIN — OLANZAPINE 15 MG: 5 TABLET, FILM COATED ORAL at 21:34

## 2024-11-24 RX ADMIN — Medication 1 APPLICATION: at 14:51

## 2024-11-24 RX ADMIN — TRAZODONE HYDROCHLORIDE 50 MG: 50 TABLET ORAL at 21:34

## 2024-11-24 RX ADMIN — PANTOPRAZOLE SODIUM 40 MG: 40 TABLET, DELAYED RELEASE ORAL at 05:45

## 2024-11-24 RX ADMIN — HYDROXYZINE HYDROCHLORIDE 25 MG: 25 TABLET ORAL at 08:48

## 2024-11-24 RX ADMIN — AMOXICILLIN AND CLAVULANATE POTASSIUM 1 TABLET: 875; 125 TABLET, FILM COATED ORAL at 08:48

## 2024-11-24 RX ADMIN — ATORVASTATIN CALCIUM 10 MG: 10 TABLET, FILM COATED ORAL at 17:19

## 2024-11-24 RX ADMIN — FOLIC ACID 1 MG: 1 TABLET ORAL at 08:48

## 2024-11-24 RX ADMIN — Medication 1 APPLICATION: at 21:36

## 2024-11-24 RX ADMIN — NICOTINE 1 PATCH: 14 PATCH, EXTENDED RELEASE TRANSDERMAL at 08:48

## 2024-11-24 RX ADMIN — Medication 5 SPRAY: at 08:54

## 2024-11-24 RX ADMIN — Medication 400 MG: at 08:48

## 2024-11-24 RX ADMIN — VENLAFAXINE HYDROCHLORIDE 150 MG: 150 CAPSULE, EXTENDED RELEASE ORAL at 08:48

## 2024-11-24 RX ADMIN — GABAPENTIN 300 MG: 300 CAPSULE ORAL at 21:34

## 2024-11-24 RX ADMIN — FLUTICASONE PROPIONATE 1 SPRAY: 50 SPRAY, METERED NASAL at 08:53

## 2024-11-24 RX ADMIN — Medication 400 MG: at 17:20

## 2024-11-24 RX ADMIN — SENNOSIDES AND DOCUSATE SODIUM 1 TABLET: 50; 8.6 TABLET ORAL at 17:20

## 2024-11-24 RX ADMIN — GABAPENTIN 300 MG: 300 CAPSULE ORAL at 16:42

## 2024-11-24 RX ADMIN — AMOXICILLIN AND CLAVULANATE POTASSIUM 1 TABLET: 875; 125 TABLET, FILM COATED ORAL at 21:34

## 2024-11-24 RX ADMIN — IBUPROFEN 600 MG: 600 TABLET, FILM COATED ORAL at 14:51

## 2024-11-24 RX ADMIN — OXYCODONE HYDROCHLORIDE AND ACETAMINOPHEN 500 MG: 500 TABLET ORAL at 17:19

## 2024-11-24 NOTE — NURSING NOTE
Patient visible laying down in bed,  no signs of pain or discomfort. PRN Benzocaine for tooth paid and mouth kote for dry mouth, effective.   None

## 2024-11-24 NOTE — NURSING NOTE
"Patient is visible for meals in the dayroom. Social with peers. Expresses feeling a bit down today due to today being their birthday. Patient observed straightening up their room and expresses doing so to help overcome their depression. Denies anxiety, SI, HI, and hallucinations of any kind.    Medication compliant. Expresses \"I hope that I do not start smoking again when I leave. I have not smoked since I got here. The smell gets in your clothes and stuff'.    Staff availability reinforced.  "

## 2024-11-24 NOTE — NURSING NOTE
Patient complained of dry mouth, requested and received PRN mouth kote spray for dry mouth at 0854.

## 2024-11-24 NOTE — NURSING NOTE
Patient requested spray for their mouth being dry. Received Prn mouth kote spray for dry mouth at 0854. At 0954 patient reports medication helped.

## 2024-11-24 NOTE — PROGRESS NOTES
Progress Note - Behavioral Health   Name: Michelle Matos 49 y.o. female I MRN: [id number]  Unit/Bed#: U 377-02 I Date of Admission: 11/20/2024   Date of Service: 11/24/2024 I Hospital Day: 4        Assessment & Plan  Bipolar I disorder, most recent episode depressed, severe with psychotic features (HCC)  Continue previously prescribed psychotropic medications at present dosing. Continue Effexor  mg daily to address dysphoric mood including depression and anxiety in addition to post-traumatic stress disorder and chronic pain as off-label agent.    72 hour notice rescinded on 11/24/24  No associated orders from this encounter found during lookback period of 72 hours.  ZACHARY (generalized anxiety disorder)  See above  No associated orders from this encounter found during lookback period of 72 hours.  Post-traumatic stress disorder, chronic  See above  No associated orders from this encounter found during lookback period of 72 hours.  S/P laparoscopic sleeve gastrectomy    No associated orders from this encounter found during lookback period of 72 hours.  Constipation    No associated orders from this encounter found during lookback period of 72 hours.  Tobacco dependence syndrome    No associated orders from this encounter found during lookback period of 72 hours.  Migraine    No associated orders from this encounter found during lookback period of 72 hours.  Asthma    No associated orders from this encounter found during lookback period of 72 hours.  Vitamin D deficiency    No associated orders from this encounter found during lookback period of 72 hours.  Medical clearance for psychiatric admission    No associated orders from this encounter found during lookback period of 72 hours.    Cocaine abuse, episodic (HCC)    No associated orders from this encounter found during lookback period of 72 hours.  Heartburn    No associated orders from this encounter found during lookback period of 72 hours.  Mixed  "hyperlipidemia    No associated orders from this encounter found during lookback period of 72 hours.  Tooth pain  Oral benzocaine added. Reports tolerating this well in past for tooth pain.  No associated orders from this encounter found during lookback period of 72 hours.       Additional Recommendations:  Behavioral Health checks for safety monitoring.  Continue treatment with group therapy, milieu therapy and occupational therapy.  Medical management per SLIM.  Discharge and disposition planning.    ----------------------------------------      Subjective: Patient reported to be calm and cooperative on the unit, noted to be socializing with peers.  Given benzocaine gel yesterday for tooth pain and mouth coat for dry mouth. Dr. Holter kindly saw her this morning and after discussion she rescinded her 72 hour notice.    She appears constricted in affect today, saddened about inpatient stay as it is her birthday and had some ruminating thoughts about her stressors (namely CYS case). Reports some difficulty sleeping due to this. She does state that today she has not had any auditory or visual hallucinations and was pleased about this.  She does report having a bowel movement yesterday evening which has helped relieve some constipation.  She reports today no suicidal or homicidal ideations.  Looking forward to discharge later this week - \"I have stuff I have to do\".    Behavior over the last 24 hours:  unchanged  Sleep: decreased  Appetite: normal  Medication side effects: No  ROS: no complaints and all other systems are negative    Mental Status Evaluation:  Appearance:  casually dressed   Behavior:  pleasant, cooperative   Speech:  normal rate and volume   Mood:  anxious   Affect:  constricted   Thought Process:  organized, logical, coherent, goal directed   Associations: intact associations   Thought Content:  no overt delusions, ruminating thoughts   Perceptual Disturbances: no auditory hallucinations, no visual " hallucinations, does not appear responding to internal stimuli   Risk Potential: Suicidal ideation - None at present  Homicidal ideation - None at present  Potential for aggression - No   Sensorium:  oriented to person, place, and time/date   Memory:  recent and remote memory grossly intact   Consciousness:  alert and awake   Attention/Concentration: attention span and concentration appear shorter than expected for age   Insight:  fair   Judgment: fair   Gait/Station: normal gait/station   Motor Activity: no abnormal movements     Medications: all current active meds have been reviewed and continue current psychiatric medications.  Current Facility-Administered Medications   Medication Dose Route Frequency Provider Last Rate    acetaminophen  650 mg Oral Q6H PRN Jordan C Holter, DO      albuterol  2 puff Inhalation Q6H PRN Jordan C Holter, DO      aluminum-magnesium hydroxide-simethicone  30 mL Oral Q4H PRN Jordan C Holter, DO      amoxicillin-clavulanate  1 tablet Oral Q12H EDWIN Mike      ascorbic acid  500 mg Oral BID Jordan C Holter, DO      atorvastatin  10 mg Oral Daily With Dinner Jordan C Holter, DO      benzocaine   Mucosal BID PRN Fady Atkinson MD      benztropine  1 mg Intramuscular Q4H PRN Max 6/day Jordan C Holter, DO      benztropine  1 mg Oral Q4H PRN Max 6/day Jordan C Holter, DO      bisacodyl  10 mg Oral Daily PRN Jordan C Holter, DO      bisacodyl  10 mg Rectal Daily PRN Jordan C Holter, DO      cyanocobalamin  500 mcg Oral Daily Jordan C Holter, DO      hydrOXYzine HCL  50 mg Oral Q6H PRN Max 4/day Jordan C Holter, DO      Or    diphenhydrAMINE  50 mg Intramuscular Q6H PRN Jordan C Holter, DO      ergocalciferol  50,000 Units Oral Weekly Jordan C Holter, DO      ferrous sulfate  325 mg Oral Every Other Day Jordan C Holter, DO      fluticasone  1 spray Each Nare Daily Jordan C Holter, DO      fluticasone-vilanterol  1 puff Inhalation Daily Jordan C Holter, DO      folic acid  1 mg  Oral Daily EDWIN Alanis      gabapentin  300 mg Oral TID [facility] Holter, DO      hydrOXYzine HCL  100 mg Oral Q6H PRN Max 4/day [facility] Holter, DO      Or    LORazepam  2 mg Intramuscular Q6H PRN [facility] Holter, DO      hydrOXYzine HCL  25 mg Oral Q6H PRN Max 4/day [facility] Holter, DO      hydrOXYzine HCL  25 mg Oral BID Fady Atkinson MD      ibuprofen  400 mg Oral Q6H PRN [facility] Holter, DO      ibuprofen  600 mg Oral Q8H PRN [facility] Holter, DO      lamoTRIgine  25 mg Oral Daily [facility] Holter, DO      magnesium Oxide  400 mg Oral BID [facility] Holter, DO      methocarbamol  500 mg Oral Q8H PRN [facility] Holter, DO      morphine injection  2 mg Intravenous Q4H PRN [facility] Holter, DO      nicotine  1 patch Transdermal Daily [facility] Holter, DO      nicotine polacrilex  2 mg Oral Q2H PRN [facility] Holter, DO      OLANZapine  10 mg Oral Q3H PRN Max 3/day [facility] Holter, DO      Or    OLANZapine  10 mg Intramuscular Q3H PRN Max 3/day [facility] Holter, DO      OLANZapine  5 mg Oral Q4H PRN Max 3/day [facility] Holter, DO      Or    OLANZapine  2.5 mg Intramuscular Q4H PRN Max 3/day [facility] Holter, DO      OLANZapine  5 mg Oral Q3H PRN Max 3/day [facility] Holter, DO      Or    OLANZapine  5 mg Intramuscular Q3H PRN Max 3/day [facility] Holter, DO      OLANZapine  5 mg Oral Q3H PRN Max 6/day [facility] Holter, DO      Or    OLANZapine  5 mg Intramuscular Q3H PRN Max 6/day [facility] Holter, DO      OLANZapine  15 mg Oral HS [facility] Holter, DO      OLANZapine  2.5 mg Oral Q4H PRN Max 6/day [facility] Holter, DO      ondansetron  4 mg Oral Q6H PRN [facility] Holter, DO      oxyCODONE  5 mg Oral Q4H PRN [facility] Holter, DO      pantoprazole  40 mg Oral Early Morning [facility] Holter, DO      propranolol  10 mg Oral Q8H PRN [facility] Holter, DO      saliva substitute  5 spray Mouth/Throat 4x Daily PRN EDWIN Alanis      senna-docusate sodium  1 tablet Oral Daily PRN Jordan C Holter, DO senna-docusate sodium   1 tablet Oral BID Fady Atkinson MD      traZODone  50 mg Oral HS PRN Jordan C Holter, DO      traZODone  50 mg Oral HS Fady Atkinson MD      venlafaxine  150 mg Oral Daily Jordan C Holter, DO         Labs: I have personally reviewed all pertinent laboratory/tests results  Most Recent Labs:   Lab Results   Component Value Date    WBC 7.99 11/22/2024    RBC 4.65 11/22/2024    HGB 13.7 11/22/2024    HCT 41.7 11/22/2024     11/22/2024    RDW 14.2 11/22/2024    NEUTROABS 4.45 11/22/2024    TOTANEUTABS 18.02 (H) 04/10/2023    SODIUM 138 11/22/2024    K 5.1 11/22/2024     11/22/2024    CO2 28 11/22/2024    BUN 14 11/22/2024    CREATININE 0.73 11/22/2024    GLUC 84 11/22/2024    CALCIUM 8.7 11/22/2024    AST 24 11/18/2024    ALT 22 11/18/2024    ALKPHOS 69 11/18/2024    TP 7.1 11/18/2024    ALB 4.3 11/18/2024    TBILI 0.20 11/18/2024    CHOLESTEROL 255 (H) 11/14/2024    HDL 67 11/14/2024    TRIG 109 11/14/2024    LDLCALC 166 (H) 11/14/2024    NONHDLC 188 11/14/2024    CARBAMAZEPIN <3.0 (L) 09/10/2019    PGN6WYPNITAG 1.668 11/14/2024    PREGUR negative 10/24/2019    PREGSERUM Negative 11/14/2024    HCG <2 09/14/2014    SYPHILISAB Non-reactive 11/14/2024    HGBA1C 5.7 (H) 11/14/2024     11/14/2024       Progress Toward Goals: progressing    Risks / Benefits of Treatment:    Risks, benefits, and possible side effects of medications explained to patient and patient verbalizes understanding and agreement for treatment.    Counseling / Coordination of Care:    Total floor / unit time spent today 25 minutes. Greater than 50% of total time was spent with the patient and / or family counseling and / or coordination of care. A description of counseling / coordination of care:  Patient's progress discussed with staff in treatment team meeting.  Medications, treatment progress and treatment plan reviewed with patient.  Reassurance and supportive therapy provided.  Encouraged participation in milieu and group therapy on  the unit.    Fady Atkinson MD 11/24/24

## 2024-11-24 NOTE — NURSING NOTE
2130- Patient requested mouth kote d/t dry mouth.     2134- Patient requested benzocaine for left side tooth pain.

## 2024-11-24 NOTE — ASSESSMENT & PLAN NOTE
Continue previously prescribed psychotropic medications at present dosing. Continue Effexor  mg daily to address dysphoric mood including depression and anxiety in addition to post-traumatic stress disorder and chronic pain as off-label agent.    72 hour notice rescinded on 11/24/24  No associated orders from this encounter found during lookback period of 72 hours.

## 2024-11-24 NOTE — NURSING NOTE
Patient complained of mouth pain and requested PRN gel. Received PRN hurricaine 20% gel for mucositis at 1451. Patient reports immediate relief upon application. Medication effective.

## 2024-11-24 NOTE — PLAN OF CARE
Problem: Ineffective Coping  Goal: Identifies ineffective coping skills  Outcome: Progressing  Goal: Identifies healthy coping skills  Outcome: Progressing  Goal: Participates in unit activities  Description: Interventions:  - Provide therapeutic environment   - Provide required programming   - Redirect inappropriate behaviors   Outcome: Progressing  Goal: Patient/Family verbalizes awareness of resources  Outcome: Progressing     Problem: Depression  Goal: Treatment Goal: Demonstrate behavioral control of depressive symptoms, verbalize feelings of improved mood/affect, and adopt new coping skills prior to discharge  Outcome: Progressing  Goal: Verbalize thoughts and feelings  Description: Interventions:  - Assess and re-assess patient's level of risk   - Engage patient in 1:1 interactions, daily, for a minimum of 15 minutes   - Encourage patient to express feelings, fears, frustrations, hopes   Outcome: Progressing  Goal: Refrain from harming self  Description: Interventions:  - Monitor patient closely, per order   - Supervise medication ingestion, monitor effects and side effects   Outcome: Progressing  Goal: Attend and participate in unit activities, including therapeutic, recreational, and educational groups  Description: Interventions:  - Provide therapeutic and educational activities daily, encourage attendance and participation, and document same in the medical record   Outcome: Progressing     Problem: Anxiety  Goal: Anxiety is at manageable level  Description: Interventions:  - Assess and monitor patient's anxiety level.   - Monitor for signs and symptoms (heart palpitations, chest pain, shortness of breath, headaches, nausea, feeling jumpy, restlessness, irritable, apprehensive).   - Collaborate with interdisciplinary team and initiate plan and interventions as ordered.  - Shannon patient to unit/surroundings  - Explain treatment plan  - Encourage participation in care  - Encourage verbalization of  concerns/fears  - Identify coping mechanisms  - Assist in developing anxiety-reducing skills  - Administer/offer alternative therapies  - Limit or eliminate stimulants  Outcome: Progressing     Problem: DISCHARGE PLANNING - CARE MANAGEMENT  Goal: Discharge to post-acute care or home with appropriate resources  Description: INTERVENTIONS:  - Conduct assessment to determine patient/family and health care team treatment goals, and need for post-acute services based on payer coverage, community resources, and patient preferences, and barriers to discharge  - Address psychosocial, clinical, and financial barriers to discharge as identified in assessment in conjunction with the patient/family and health care team  - Arrange appropriate level of post-acute services according to patient’s   needs and preference and payer coverage in collaboration with the physician and health care team  - Communicate with and update the patient/family, physician, and health care team regarding progress on the discharge plan  - Arrange appropriate transportation to post-acute venues  Outcome: Progressing

## 2024-11-25 PROCEDURE — 99232 SBSQ HOSP IP/OBS MODERATE 35: CPT | Performed by: PSYCHIATRY & NEUROLOGY

## 2024-11-25 RX ORDER — TRAZODONE HYDROCHLORIDE 100 MG/1
100 TABLET ORAL
Status: DISCONTINUED | OUTPATIENT
Start: 2024-11-25 | End: 2024-11-26 | Stop reason: HOSPADM

## 2024-11-25 RX ADMIN — SENNOSIDES AND DOCUSATE SODIUM 1 TABLET: 50; 8.6 TABLET ORAL at 17:26

## 2024-11-25 RX ADMIN — HYDROXYZINE HYDROCHLORIDE 25 MG: 25 TABLET ORAL at 08:25

## 2024-11-25 RX ADMIN — LAMOTRIGINE 25 MG: 25 TABLET ORAL at 08:25

## 2024-11-25 RX ADMIN — AMOXICILLIN AND CLAVULANATE POTASSIUM 1 TABLET: 875; 125 TABLET, FILM COATED ORAL at 21:13

## 2024-11-25 RX ADMIN — IBUPROFEN 600 MG: 600 TABLET, FILM COATED ORAL at 21:13

## 2024-11-25 RX ADMIN — BISACODYL 10 MG: 5 TABLET, COATED ORAL at 17:45

## 2024-11-25 RX ADMIN — OXYCODONE HYDROCHLORIDE 5 MG: 5 TABLET ORAL at 14:39

## 2024-11-25 RX ADMIN — FOLIC ACID 1 MG: 1 TABLET ORAL at 08:24

## 2024-11-25 RX ADMIN — IBUPROFEN 600 MG: 600 TABLET, FILM COATED ORAL at 13:13

## 2024-11-25 RX ADMIN — Medication 400 MG: at 17:27

## 2024-11-25 RX ADMIN — NICOTINE 1 PATCH: 14 PATCH, EXTENDED RELEASE TRANSDERMAL at 08:25

## 2024-11-25 RX ADMIN — FLUTICASONE PROPIONATE 1 SPRAY: 50 SPRAY, METERED NASAL at 08:25

## 2024-11-25 RX ADMIN — PANTOPRAZOLE SODIUM 40 MG: 40 TABLET, DELAYED RELEASE ORAL at 06:42

## 2024-11-25 RX ADMIN — VENLAFAXINE HYDROCHLORIDE 150 MG: 150 CAPSULE, EXTENDED RELEASE ORAL at 08:24

## 2024-11-25 RX ADMIN — FLUTICASONE FUROATE AND VILANTEROL TRIFENATATE 1 PUFF: 200; 25 POWDER RESPIRATORY (INHALATION) at 08:25

## 2024-11-25 RX ADMIN — FERROUS SULFATE TAB 325 MG (65 MG ELEMENTAL FE) 325 MG: 325 (65 FE) TAB at 08:25

## 2024-11-25 RX ADMIN — HYDROXYZINE HYDROCHLORIDE 100 MG: 50 TABLET, FILM COATED ORAL at 03:28

## 2024-11-25 RX ADMIN — GABAPENTIN 300 MG: 300 CAPSULE ORAL at 16:33

## 2024-11-25 RX ADMIN — OLANZAPINE 15 MG: 5 TABLET, FILM COATED ORAL at 21:13

## 2024-11-25 RX ADMIN — GABAPENTIN 300 MG: 300 CAPSULE ORAL at 21:12

## 2024-11-25 RX ADMIN — TRAZODONE HYDROCHLORIDE 100 MG: 100 TABLET ORAL at 21:14

## 2024-11-25 RX ADMIN — OXYCODONE HYDROCHLORIDE AND ACETAMINOPHEN 500 MG: 500 TABLET ORAL at 17:28

## 2024-11-25 RX ADMIN — HYDROXYZINE HYDROCHLORIDE 100 MG: 50 TABLET, FILM COATED ORAL at 13:13

## 2024-11-25 RX ADMIN — OXYCODONE HYDROCHLORIDE AND ACETAMINOPHEN 500 MG: 500 TABLET ORAL at 08:24

## 2024-11-25 RX ADMIN — ATORVASTATIN CALCIUM 10 MG: 10 TABLET, FILM COATED ORAL at 17:26

## 2024-11-25 RX ADMIN — IBUPROFEN 600 MG: 600 TABLET, FILM COATED ORAL at 03:28

## 2024-11-25 RX ADMIN — HYDROXYZINE HYDROCHLORIDE 25 MG: 25 TABLET ORAL at 17:27

## 2024-11-25 RX ADMIN — AMOXICILLIN AND CLAVULANATE POTASSIUM 1 TABLET: 875; 125 TABLET, FILM COATED ORAL at 08:24

## 2024-11-25 RX ADMIN — Medication 400 MG: at 08:24

## 2024-11-25 RX ADMIN — CYANOCOBALAMIN TAB 1000 MCG 500 MCG: 1000 TAB at 08:24

## 2024-11-25 RX ADMIN — SENNOSIDES AND DOCUSATE SODIUM 1 TABLET: 50; 8.6 TABLET ORAL at 08:24

## 2024-11-25 RX ADMIN — GABAPENTIN 300 MG: 300 CAPSULE ORAL at 08:24

## 2024-11-25 RX ADMIN — OXYCODONE HYDROCHLORIDE 5 MG: 5 TABLET ORAL at 06:52

## 2024-11-25 NOTE — NURSING NOTE
Patient is calm and cooperative. Visible and social with peers most of the evening. Expresses still feeling a bit depressed this evening due to being in the hospital on their birthday. Had a video call with family that went well. Denies all other psych symptoms.    Medication compliant.    Staff availability reinforced.

## 2024-11-25 NOTE — NURSING NOTE
Patient requested and received Motrin 600 mg po for jaw/ tooth pain and Atarax 100 mg po prn for anxiety.     At 0428, patient observed resting in bed with her eyes closed.

## 2024-11-25 NOTE — ASSESSMENT & PLAN NOTE
Attempted to contact patient to schedule initial visit, LVM to give the office a call back to schedule.    See above  No associated orders from this encounter found during lookback period of 72 hours.

## 2024-11-25 NOTE — PROGRESS NOTES
11/25/24 0909   Team Meeting   Meeting Type Daily Rounds   Team Members Present   Team Members Present Physician;Nurse;   Physician Team Member Chani   Nursing Team Member Ana   Care Management Team Member Tre   Patient/Family Present   Patient Present No   Patient's Family Present No     201. Pt is med/ meal complaint. Pt denies all symptoms but reports anxiety and depression. PRN Atarax was effective. Pt complaint about dry mouth and received mouthwash. Pt discharges tomorrow.

## 2024-11-25 NOTE — NURSING NOTE
Pt visible, social, denies HI/SI/AVH,  no longer reporting dental pain.  She is pleasant during interaction with staff and compliant with scheduled medications.  Pt Given PRN DULCOLAX 10  mg PO for constipation at 1745.

## 2024-11-25 NOTE — NURSING NOTE
1313 upon reassessment of Hydroxyzine Pt was observed sleeping in her room. No s/s of distress observed.

## 2024-11-25 NOTE — NURSING NOTE
Pt denies SI/AH/VH/HI. Present in dayroom and milieu. Social with select peers. Medication and meal compliant. Pt asked this writer to give her medication while she took a phone call. Pt then stated after medication that staff has been giving her medication in front of others which she doesn't like. Pt educated that she can ask staff to go to her room with her to give the medication. Pt is anxious. Pt attended group. Compliant with Lunch. No further concerns as of present. Plan of care ongoing.

## 2024-11-25 NOTE — PROGRESS NOTES
Progress Note - Behavioral Health   Name: Michelle Matos 49 y.o. female I MRN: [id number]  Unit/Bed#: U 377-02 I Date of Admission: 11/20/2024   Date of Service: 11/25/2024 I Hospital Day: 5     Assessment & Plan  Bipolar I disorder, most recent episode depressed, severe with psychotic features (HCC)  Continue previously prescribed psychotropic medications at present dosing including upward titration of Trazodone 50 mg to 100 mg nightly to address insomnia in addition to augmenting agent for Effexor XR to address depression. Continue Effexor  mg daily to address dysphoric mood including depression and anxiety in addition to post-traumatic stress disorder and chronic pain as off-label agent.    72 hour notice rescinded on 11/24/24  No associated orders from this encounter found during lookback period of 72 hours.  ZACHARY (generalized anxiety disorder)  See above  No associated orders from this encounter found during lookback period of 72 hours.  Post-traumatic stress disorder, chronic  See above  No associated orders from this encounter found during lookback period of 72 hours.  S/P laparoscopic sleeve gastrectomy    No associated orders from this encounter found during lookback period of 72 hours.  Constipation    No associated orders from this encounter found during lookback period of 72 hours.  Tobacco dependence syndrome    No associated orders from this encounter found during lookback period of 72 hours.  Migraine    No associated orders from this encounter found during lookback period of 72 hours.  Asthma    No associated orders from this encounter found during lookback period of 72 hours.  Vitamin D deficiency    No associated orders from this encounter found during lookback period of 72 hours.  Medical clearance for psychiatric admission    No associated orders from this encounter found during lookback period of 72 hours.    Cocaine abuse, episodic (HCC)    No associated orders from this encounter found  during lookback period of 72 hours.  Heartburn    No associated orders from this encounter found during lookback period of 72 hours.  Mixed hyperlipidemia    No associated orders from this encounter found during lookback period of 72 hours.  Tooth pain  Oral benzocaine added. Reports tolerating this well in past for tooth pain.  No associated orders from this encounter found during lookback period of 72 hours.    Continue to promote patient participation in therapeutic milieu.  Continue medical management per medicine.  Continue previously prescribed psychotropic medication regimen; see below.  Continue behavioral health checks q.15 minutes.   Continue vitals per behavioral health unit protocol.  Discharge date per primary team; 201 commitment status.    SUBJECTIVE     Patient evaluated this a.m. for continuity of care. Patient was discussed at length with nursing and treatment team. Per nursing, patient remains calm, cooperative, present and appropriately interactive in the milieu, adherent to her medications without any acute adverse effects. No acute distress is noted throughout evaluation. Michelle Matos denies suicidal/homicidal ideation in addition to thoughts of self-injury, receptive to crisis planning provided by this writer, contacting for safety in the inpatient setting, admitting to an ability to appropriately confide in staff including this writer.  Patient admits to depressed and anxious mood, although acknowledges improvement since admission, admitting to slightly problematic sleep overnight, requesting upward titration of trazodone, denying any additional psychiatric complaints/concerns, anticipating discharge disposition the a.m.    PSYCHIATRIC REVIEW OF SYSTEMS     Behavior over the last 24 hours:  unchanged  Sleep: diminished  Appetite: adequate  Medication side effects: No    REVIEW OF SYSTEMS   Review of systems: no complaints    OBJECTIVE     Vital Signs in Past 24 Hours:  Temp:  [97.3 °F (36.3  °C)-98.7 °F (37.1 °C)] 97.3 °F (36.3 °C)  HR:  [] 99  BP: (108-114)/(58-62) 114/58  Resp:  [16] 16  SpO2:  [98 %-100 %] 100 %  O2 Device: None (Room air)    Intake/Output in Past 24 hours:  No intake/output data recorded.  No intake/output data recorded.        Laboratory Results:  I have personally reviewed all pertinent laboratory/tests results.  Most Recent Labs:   Lab Results   Component Value Date    WBC 7.99 11/22/2024    RBC 4.65 11/22/2024    HGB 13.7 11/22/2024    HCT 41.7 11/22/2024     11/22/2024    RDW 14.2 11/22/2024    TOTANEUTABS 18.02 (H) 04/10/2023    NEUTROABS 4.45 11/22/2024    SODIUM 138 11/22/2024    K 5.1 11/22/2024     11/22/2024    CO2 28 11/22/2024    BUN 14 11/22/2024    CREATININE 0.73 11/22/2024    GLUC 84 11/22/2024    GLUF 84 11/22/2024    CALCIUM 8.7 11/22/2024    AST 24 11/18/2024    ALT 22 11/18/2024    ALKPHOS 69 11/18/2024    TP 7.1 11/18/2024    ALB 4.3 11/18/2024    TBILI 0.20 11/18/2024    CHOLESTEROL 255 (H) 11/14/2024    HDL 67 11/14/2024    TRIG 109 11/14/2024    LDLCALC 166 (H) 11/14/2024    NONHDLC 188 11/14/2024    CARBAMAZEPIN <3.0 (L) 09/10/2019    EOB3EBELVZWS 1.668 11/14/2024    PREGUR negative 10/24/2019    PREGSERUM Negative 11/14/2024    HCG <2 09/14/2014    RPR Non-Reactive 12/03/2018    HGBA1C 5.7 (H) 11/14/2024     11/14/2024       Behavioral Health Medications: all current active meds have been reviewed and current meds:   Current Facility-Administered Medications:     acetaminophen (TYLENOL) tablet 650 mg, Q6H PRN    albuterol (PROVENTIL HFA,VENTOLIN HFA) inhaler 2 puff, Q6H PRN    aluminum-magnesium hydroxide-simethicone (MAALOX) oral suspension 30 mL, Q4H PRN    amoxicillin-clavulanate (AUGMENTIN) 875-125 mg per tablet 1 tablet, Q12H KIMBERLY    ascorbic acid (VITAMIN C) tablet 500 mg, BID    atorvastatin (LIPITOR) tablet 10 mg, Daily With Dinner    benzocaine (HURRICAINE) 20 % mucosal gel, BID PRN    benztropine (COGENTIN) injection 1  mg, Q4H PRN Max 6/day    benztropine (COGENTIN) tablet 1 mg, Q4H PRN Max 6/day    bisacodyl (DULCOLAX) EC tablet 10 mg, Daily PRN    bisacodyl (DULCOLAX) rectal suppository 10 mg, Daily PRN    cyanocobalamin (VITAMIN B-12) tablet 500 mcg, Daily    hydrOXYzine HCL (ATARAX) tablet 50 mg, Q6H PRN Max 4/day **OR** diphenhydrAMINE (BENADRYL) injection 50 mg, Q6H PRN    ergocalciferol (VITAMIN D2) capsule 50,000 Units, Weekly    ferrous sulfate tablet 325 mg, Every Other Day    fluticasone (FLONASE) 50 mcg/act nasal spray 1 spray, Daily    fluticasone-vilanterol 200-25 mcg/actuation 1 puff, Daily    folic acid (FOLVITE) tablet 1 mg, Daily    gabapentin (NEURONTIN) capsule 300 mg, TID    hydrOXYzine HCL (ATARAX) tablet 100 mg, Q6H PRN Max 4/day **OR** LORazepam (ATIVAN) injection 2 mg, Q6H PRN    hydrOXYzine HCL (ATARAX) tablet 25 mg, Q6H PRN Max 4/day    hydrOXYzine HCL (ATARAX) tablet 25 mg, BID    ibuprofen (MOTRIN) tablet 400 mg, Q6H PRN    ibuprofen (MOTRIN) tablet 600 mg, Q8H PRN    lamoTRIgine (LaMICtal) tablet 25 mg, Daily    magnesium Oxide (MAG-OX) tablet 400 mg, BID    methocarbamol (ROBAXIN) tablet 500 mg, Q8H PRN    morphine injection 2 mg, Q4H PRN    nicotine (NICODERM CQ) 14 mg/24hr TD 24 hr patch 1 patch, Daily    nicotine polacrilex (NICORETTE) gum 2 mg, Q2H PRN    OLANZapine (ZyPREXA) tablet 10 mg, Q3H PRN Max 3/day **OR** OLANZapine (ZyPREXA) IM injection 10 mg, Q3H PRN Max 3/day    OLANZapine (ZyPREXA) tablet 5 mg, Q4H PRN Max 3/day **OR** OLANZapine (ZyPREXA) IM injection 2.5 mg, Q4H PRN Max 3/day    OLANZapine (ZyPREXA) tablet 5 mg, Q3H PRN Max 3/day **OR** OLANZapine (ZyPREXA) IM injection 5 mg, Q3H PRN Max 3/day    OLANZapine (ZyPREXA) tablet 5 mg, Q3H PRN Max 6/day **OR** OLANZapine (ZyPREXA) IM injection 5 mg, Q3H PRN Max 6/day    OLANZapine (ZyPREXA) tablet 15 mg, HS    OLANZapine (ZyPREXA) tablet 2.5 mg, Q4H PRN Max 6/day    ondansetron (ZOFRAN-ODT) dispersible tablet 4 mg, Q6H PRN    oxyCODONE  (ROXICODONE) IR tablet 5 mg, Q4H PRN    pantoprazole (PROTONIX) EC tablet 40 mg, Early Morning    propranolol (INDERAL) tablet 10 mg, Q8H PRN    saliva substitute (MOUTH KOTE) mucosal solution 5 spray, 4x Daily PRN    senna-docusate sodium (SENOKOT S) 8.6-50 mg per tablet 1 tablet, Daily PRN    senna-docusate sodium (SENOKOT S) 8.6-50 mg per tablet 1 tablet, BID    traZODone (DESYREL) tablet 100 mg, HS    traZODone (DESYREL) tablet 50 mg, HS PRN    venlafaxine (EFFEXOR-XR) 24 hr capsule 150 mg, Daily.    Risks, benefits and possible side effects of Medications:   Risks, benefits, and possible side effects of medications explained to patient and patient verbalizes understanding.      Mental Status Evaluation:  Appearance:  age appropriate and casually dressed   Behavior:  Calm and cooperative with appropriate eye contact   Speech:  normal pitch and normal volume   Mood:  anxious, depressed, and dysthymic   Affect:  constricted   Language naming objects and repeating phrases   Thought Process:  goal directed   Thought Content:  Negative thinking at times   Perceptual Disturbances: None   Risk Potential: Suicidal Ideations none, Homicidal Ideations none, and Potential for Aggression No   Sensorium:  person, place, and time/date   Cognition:  recent and remote memory grossly intact   Consciousness:  alert and awake    Attention: attention span appeared shorter than expected for age   Insight:  fair   Judgment: fair   Intellect Not assessed   Gait/Station: normal gait/station and normal balance   Motor Activity: no abnormal movements     Memory: Short and long term memory  fair     Progress Toward Goals: unchanged, as evidenced by their participation (or lack thereof) in individual, social and therapeutic milieu in addition to adherence to their medication regimen.    Recommended Treatment:   See above for assessment and plan.    Inpatient Psychiatric Certification: Based upon physical, mental and social evaluations, I  certify that inpatient psychiatric services are medically necessary for this patient for a duration of greater than 2 midnights for the treatment of Bipolar I disorder, most recent episode depressed, severe with psychotic features (HCC) including psychotropic medication management, participation in the therapeutic milieu and referrals as indicated. Available alternative community resources do not meet the patient's mental health care needs. I further attest that an established written individualized plan of care has been implemented and is outlined in the patient's medical records.    Counseling/Coordination of Care    I have expended greater than 15 minutes in which more than 50% of this time was expended in counseling/coordination of patient care relating to diagnostic results, prognosis, potential risks and benefits of management options, instructions for appropriate management, patient and/or collateral education, importance of adherence to management and/or risk factor reductions. Patient's rights, confidentiality, exceptions to confidentiality, use of electronic medical record including appropriate staff access to medical record regarding behavioral health services and consent to treatment were reviewed.    Note Share:     This note was not shared with the patient due to reasonable likelihood of causing patient harm     This note has been constructed using a voice recognition system. There may be translation, syntax,  or grammatical errors. If you have any questions, please contact the dictating provider.    Jordan Christopher Holter, DO 11/25/24

## 2024-11-25 NOTE — NURSING NOTE
Pt came up to nurses station stating she is very anxious from her tooth pain and loudness of the unit. Gave Pt PRN PO atarax 100 mg. Pt scored 25 on morrell scale.

## 2024-11-25 NOTE — NURSING NOTE
Patient complained of oral pain and received PRN hurricaine 20% gel for mucositisis at 2136 and reports immediate relief upon application.

## 2024-11-25 NOTE — ASSESSMENT & PLAN NOTE
Continue previously prescribed psychotropic medications at present dosing including upward titration of Trazodone 50 mg to 100 mg nightly to address insomnia in addition to augmenting agent for Effexor XR to address depression. Continue Effexor  mg daily to address dysphoric mood including depression and anxiety in addition to post-traumatic stress disorder and chronic pain as off-label agent.    72 hour notice rescinded on 11/24/24  No associated orders from this encounter found during lookback period of 72 hours.

## 2024-11-25 NOTE — PLAN OF CARE
Problem: Ineffective Coping  Goal: Identifies ineffective coping skills  Outcome: Progressing  Goal: Identifies healthy coping skills  Outcome: Progressing  Goal: Participates in unit activities  Description: Interventions:  - Provide therapeutic environment   - Provide required programming   - Redirect inappropriate behaviors   Outcome: Progressing  Goal: Patient/Family verbalizes awareness of resources  Outcome: Progressing     Problem: Depression  Goal: Treatment Goal: Demonstrate behavioral control of depressive symptoms, verbalize feelings of improved mood/affect, and adopt new coping skills prior to discharge  Outcome: Progressing  Goal: Verbalize thoughts and feelings  Description: Interventions:  - Assess and re-assess patient's level of risk   - Engage patient in 1:1 interactions, daily, for a minimum of 15 minutes   - Encourage patient to express feelings, fears, frustrations, hopes   Outcome: Progressing  Goal: Refrain from harming self  Description: Interventions:  - Monitor patient closely, per order   - Supervise medication ingestion, monitor effects and side effects   Outcome: Progressing  Goal: Attend and participate in unit activities, including therapeutic, recreational, and educational groups  Description: Interventions:  - Provide therapeutic and educational activities daily, encourage attendance and participation, and document same in the medical record   Outcome: Progressing

## 2024-11-26 VITALS
TEMPERATURE: 97.5 F | WEIGHT: 160.3 LBS | HEIGHT: 61 IN | SYSTOLIC BLOOD PRESSURE: 120 MMHG | BODY MASS INDEX: 30.26 KG/M2 | DIASTOLIC BLOOD PRESSURE: 77 MMHG | HEART RATE: 104 BPM | OXYGEN SATURATION: 98 % | RESPIRATION RATE: 16 BRPM

## 2024-11-26 PROBLEM — Z00.8 MEDICAL CLEARANCE FOR PSYCHIATRIC ADMISSION: Status: RESOLVED | Noted: 2024-11-14 | Resolved: 2024-11-26

## 2024-11-26 PROCEDURE — 99238 HOSP IP/OBS DSCHRG MGMT 30/<: CPT | Performed by: PSYCHIATRY & NEUROLOGY

## 2024-11-26 RX ORDER — OLANZAPINE 15 MG/1
15 TABLET ORAL
Qty: 30 TABLET | Refills: 0 | Status: SHIPPED | OUTPATIENT
Start: 2024-11-26

## 2024-11-26 RX ORDER — AMOXICILLIN 250 MG
1 CAPSULE ORAL 2 TIMES DAILY
Qty: 60 TABLET | Refills: 0 | Status: SHIPPED | OUTPATIENT
Start: 2024-11-26

## 2024-11-26 RX ORDER — FERROUS SULFATE 325(65) MG
325 TABLET ORAL EVERY OTHER DAY
Qty: 15 TABLET | Refills: 0 | Status: SHIPPED | OUTPATIENT
Start: 2024-11-27 | End: 2024-12-27

## 2024-11-26 RX ORDER — ATORVASTATIN CALCIUM 10 MG/1
10 TABLET, FILM COATED ORAL
Qty: 30 TABLET | Refills: 0 | Status: SHIPPED | OUTPATIENT
Start: 2024-11-26

## 2024-11-26 RX ORDER — VENLAFAXINE HYDROCHLORIDE 150 MG/1
150 CAPSULE, EXTENDED RELEASE ORAL DAILY
Qty: 30 CAPSULE | Refills: 0 | Status: SHIPPED | OUTPATIENT
Start: 2024-11-26

## 2024-11-26 RX ORDER — PANTOPRAZOLE SODIUM 40 MG/1
40 TABLET, DELAYED RELEASE ORAL
Qty: 30 TABLET | Refills: 0 | Status: SHIPPED | OUTPATIENT
Start: 2024-11-27 | End: 2024-12-27

## 2024-11-26 RX ORDER — FLUTICASONE FUROATE AND VILANTEROL 200; 25 UG/1; UG/1
1 POWDER RESPIRATORY (INHALATION)
Qty: 60 BLISTER | Refills: 0 | Status: SHIPPED | OUTPATIENT
Start: 2024-11-26 | End: 2024-12-26

## 2024-11-26 RX ORDER — LAMOTRIGINE 25 MG/1
25 TABLET ORAL DAILY
Qty: 30 TABLET | Refills: 0 | Status: SHIPPED | OUTPATIENT
Start: 2024-11-26

## 2024-11-26 RX ORDER — GABAPENTIN 300 MG/1
300 CAPSULE ORAL 3 TIMES DAILY
Qty: 90 CAPSULE | Refills: 0 | Status: SHIPPED | OUTPATIENT
Start: 2024-11-26

## 2024-11-26 RX ORDER — ASCORBIC ACID 500 MG
500 TABLET ORAL 2 TIMES DAILY
Qty: 60 TABLET | Refills: 0 | Status: SHIPPED | OUTPATIENT
Start: 2024-11-26

## 2024-11-26 RX ORDER — TRAZODONE HYDROCHLORIDE 100 MG/1
100 TABLET ORAL
Qty: 30 TABLET | Refills: 0 | Status: SHIPPED | OUTPATIENT
Start: 2024-11-26

## 2024-11-26 RX ORDER — ERGOCALCIFEROL 1.25 MG/1
50000 CAPSULE, LIQUID FILLED ORAL WEEKLY
Qty: 4 CAPSULE | Refills: 1 | Status: SHIPPED | OUTPATIENT
Start: 2024-11-28 | End: 2025-01-03

## 2024-11-26 RX ORDER — LANOLIN ALCOHOL/MO/W.PET/CERES
400 CREAM (GRAM) TOPICAL 2 TIMES DAILY
Qty: 60 TABLET | Refills: 0 | Status: SHIPPED | OUTPATIENT
Start: 2024-11-26

## 2024-11-26 RX ADMIN — CYANOCOBALAMIN TAB 1000 MCG 500 MCG: 1000 TAB at 08:05

## 2024-11-26 RX ADMIN — SENNOSIDES AND DOCUSATE SODIUM 1 TABLET: 50; 8.6 TABLET ORAL at 08:05

## 2024-11-26 RX ADMIN — VENLAFAXINE HYDROCHLORIDE 150 MG: 150 CAPSULE, EXTENDED RELEASE ORAL at 08:05

## 2024-11-26 RX ADMIN — OXYCODONE HYDROCHLORIDE AND ACETAMINOPHEN 500 MG: 500 TABLET ORAL at 08:06

## 2024-11-26 RX ADMIN — AMOXICILLIN AND CLAVULANATE POTASSIUM 1 TABLET: 875; 125 TABLET, FILM COATED ORAL at 08:05

## 2024-11-26 RX ADMIN — LAMOTRIGINE 25 MG: 25 TABLET ORAL at 08:05

## 2024-11-26 RX ADMIN — FOLIC ACID 1 MG: 1 TABLET ORAL at 08:05

## 2024-11-26 RX ADMIN — FLUTICASONE PROPIONATE 1 SPRAY: 50 SPRAY, METERED NASAL at 08:06

## 2024-11-26 RX ADMIN — PANTOPRAZOLE SODIUM 40 MG: 40 TABLET, DELAYED RELEASE ORAL at 06:14

## 2024-11-26 RX ADMIN — HYDROXYZINE HYDROCHLORIDE 25 MG: 25 TABLET ORAL at 08:05

## 2024-11-26 RX ADMIN — Medication 400 MG: at 08:05

## 2024-11-26 RX ADMIN — NICOTINE 1 PATCH: 14 PATCH, EXTENDED RELEASE TRANSDERMAL at 08:06

## 2024-11-26 RX ADMIN — OXYCODONE HYDROCHLORIDE 5 MG: 5 TABLET ORAL at 04:07

## 2024-11-26 RX ADMIN — PROPRANOLOL HYDROCHLORIDE 10 MG: 10 TABLET ORAL at 10:39

## 2024-11-26 RX ADMIN — HYDROXYZINE HYDROCHLORIDE 50 MG: 50 TABLET, FILM COATED ORAL at 06:23

## 2024-11-26 RX ADMIN — GABAPENTIN 300 MG: 300 CAPSULE ORAL at 08:06

## 2024-11-26 NOTE — ASSESSMENT & PLAN NOTE
Orders from past 72 hours:    magnesium Oxide (MAG-OX) 400 mg TABS; Take 1 tablet (400 mg total) by mouth 2 (two) times a day    senna-docusate sodium (SENOKOT S) 8.6-50 mg per tablet; Take 1 tablet by mouth 2 (two) times a day

## 2024-11-26 NOTE — ASSESSMENT & PLAN NOTE
Orders from past 72 hours:    pantoprazole (PROTONIX) 40 mg tablet; Take 1 tablet (40 mg total) by mouth daily in the early morning

## 2024-11-26 NOTE — NURSING NOTE
Pt has not yet reported a BM,  still pending effectiveness of PRN DULCOLAX given at 1745.  Pt visible, social with peers,  and pleasant during interactions with this writer.  Pt was unable to meet virtually with her  as requested this evening due to technical difficulties on the husbands end.  She was told that it would likely be possible to try again tomorrow.  Pt was mildly frustrated but able to de-escalate herself.  Pt prefers her meds be given in her room,   she fears the possibility of judgement from her peers should they overhear which medications she is prescribed.  Pt is compliant with her scheduled medications and denies HI/SI/AVH.

## 2024-11-26 NOTE — DISCHARGE SUMMARY
Discharge Summary - Behavioral Health   Name: Michelle Matos 49 y.o. female I MRN: [id number]  Unit/Bed#: U 377-02 I Date of Admission: 11/20/2024   Date of Service: 11/26/2024 I Hospital Day: 6    Admission Date: 11/20/2024  Discharge Date: No discharge date for patient encounter.    Attending Psychiatrist: Jordan C Holter, DO    History of Present Illness   Patient is a 49 y.o. female with a history of bipolar I disorder with psychotic features and previous suicide attempts who presents to the ER on 11/13/24 with worsening depression with passive SI, auditory and visual hallucinations, and poor self care. She admitted to using cocaine and alcohol to cope.  Patient was admitted to psychiatric unit on a voluntarily 201 commitment basis.    Primary complaints include: suicidal ideation, depression worse, difficulty sleeping, seeing shadows, and hearing voices.  Onset of symptoms was gradual starting 6 weeks ago when she ran out of medication/stopped taking it due to issues finding a new psychiatrist with gradually worsening course since that time. Psychosocial Stressors: pending divorce, custody issues with her 14yo granddaughter who she raises, financial problems including job finding difficulty and vehicle trouble.    Hospital Course: Hospital Course:   Throughout admission,  Effexor-XR 150mg, Trazodone 100mg, Olanzapine 15mg, Lamotrigine 25mg, and gabapentin 300mg TID was initiated. Medications were appropriately titrated including the medications listed above. The patient adhered to their medication regimen and denied any acute adverse effects. Their mood brightened throughout the course of psychiatric management and she was seen in [facility] interacting appropriately with peers. Michelle did not demonstrate dangerous behavior to self or their peers her inpatient stay. Michelle denied suicidal and homicidal ideations at the time of her discharge.    Mental Status at time of Discharge:   Appearance:  age appropriate and  "casually dressed, well groomed   Behavior:  normal with occasional anxious behaviors   Speech:  Normal rate, rhythm, and tone   Mood:  \"Im doing good. I'm ready to go!\"   Affect:  mood-congruent, appropriate, and full affect   Thought Process:  goal directed and logical   Associations: intact associations   Thought Content:  normal with some preservation on discharge   Perceptual Disturbances: Patient denied any auditory or visual disturbances   Risk Potential: Patient denied SI or HI.    Sensorium:  person, place, and situation   Cognition:  recent and remote memory grossly intact   Consciousness:  alert and awake    Attention: attention span appeared shorter than expected for age   Insight:  good   Judgment: good   Gait/Station: normal gait/station   Motor Activity: no abnormal movements     Discharge Diagnosis:   1. Bipolar I disorder, most recent episode depressed, severe with psychotic features (HCC)  gabapentin (NEURONTIN) 300 mg capsule    lamoTRIgine (LaMICtal) 25 mg tablet    OLANZapine (ZyPREXA) 15 mg tablet    traZODone (DESYREL) 100 mg tablet    venlafaxine (EFFEXOR-XR) 150 mg 24 hr capsule      2. Medical clearance for psychiatric admission  Inpatient consult for Medical Clearance for  patient    Inpatient consult for Medical Clearance for  patient    ferrous sulfate 325 (65 Fe) mg tablet    ascorbic acid (VITAMIN C) 500 MG tablet    cyanocobalamin (VITAMIN B-12) 500 MCG tablet      3. Mixed hyperlipidemia  atorvastatin (LIPITOR) 10 mg tablet      4. Heartburn  pantoprazole (PROTONIX) 40 mg tablet      5. Mixed urge and stress incontinence  fluticasone-vilanterol 200-25 mcg/actuation inhaler      6. Vitamin D deficiency  ergocalciferol (VITAMIN D2) 50,000 units      7. Tooth pain  amoxicillin-clavulanate (AUGMENTIN) 875-125 mg per tablet      8. Constipation, unspecified constipation type  magnesium Oxide (MAG-OX) 400 mg TABS    senna-docusate sodium (SENOKOT S) 8.6-50 mg per tablet         Medical " Problems       Resolved Problems  Date Reviewed: 11/26/2024          Resolved    Medical clearance for psychiatric admission 11/26/2024     Resolved by  Jordan C Holter, DO            Discharge Medications:  See after visit summary for reconciled discharge medications provided to patient and family.      Discharge instructions/Information to patient and family:   See after visit summary for information provided to patient and family.      Provisions for Follow-Up Care:  See after visit summary for information related to follow-up care and any pertinent home health orders.      Discharge Statement:  I have spent a total time of 30 minutes in caring for this patient on the day of the visit/encounter. >30 minutes of time was spent on: Instructions for management, Patient and family education, Importance of tx compliance, Risk factor reductions, Counseling / Coordination of care, and Documenting in the medical record.

## 2024-11-26 NOTE — PLAN OF CARE
Problem: Ineffective Coping  Goal: Identifies ineffective coping skills  Outcome: Adequate for Discharge  Goal: Identifies healthy coping skills  Outcome: Adequate for Discharge  Goal: Participates in unit activities  Description: Interventions:  - Provide therapeutic environment   - Provide required programming   - Redirect inappropriate behaviors   Outcome: Adequate for Discharge  Goal: Patient/Family verbalizes awareness of resources  Outcome: Adequate for Discharge     Problem: Depression  Goal: Treatment Goal: Demonstrate behavioral control of depressive symptoms, verbalize feelings of improved mood/affect, and adopt new coping skills prior to discharge  Outcome: Adequate for Discharge  Goal: Verbalize thoughts and feelings  Description: Interventions:  - Assess and re-assess patient's level of risk   - Engage patient in 1:1 interactions, daily, for a minimum of 15 minutes   - Encourage patient to express feelings, fears, frustrations, hopes   Outcome: Adequate for Discharge  Goal: Refrain from harming self  Description: Interventions:  - Monitor patient closely, per order   - Supervise medication ingestion, monitor effects and side effects   Outcome: Adequate for Discharge  Goal: Attend and participate in unit activities, including therapeutic, recreational, and educational groups  Description: Interventions:  - Provide therapeutic and educational activities daily, encourage attendance and participation, and document same in the medical record   Outcome: Adequate for Discharge     Problem: Anxiety  Goal: Anxiety is at manageable level  Description: Interventions:  - Assess and monitor patient's anxiety level.   - Monitor for signs and symptoms (heart palpitations, chest pain, shortness of breath, headaches, nausea, feeling jumpy, restlessness, irritable, apprehensive).   - Collaborate with interdisciplinary team and initiate plan and interventions as ordered.  - [place] patient to unit/surroundings  - Explain  treatment plan  - Encourage participation in care  - Encourage verbalization of concerns/fears  - Identify coping mechanisms  - Assist in developing anxiety-reducing skills  - Administer/offer alternative therapies  - Limit or eliminate stimulants  Outcome: Adequate for Discharge     Problem: DISCHARGE PLANNING - CARE MANAGEMENT  Goal: Discharge to post-acute care or home with appropriate resources  Description: INTERVENTIONS:  - Conduct assessment to determine patient/family and health care team treatment goals, and need for post-acute services based on payer coverage, community resources, and patient preferences, and barriers to discharge  - Address psychosocial, clinical, and financial barriers to discharge as identified in assessment in conjunction with the patient/family and health care team  - Arrange appropriate level of post-acute services according to patient’s   needs and preference and payer coverage in collaboration with the physician and health care team  - Communicate with and update the patient/family, physician, and health care team regarding progress on the discharge plan  - Arrange appropriate transportation to post-acute venues  Outcome: Adequate for Discharge

## 2024-11-26 NOTE — PLAN OF CARE
Problem: Ineffective Coping  Goal: Identifies ineffective coping skills  Outcome: Progressing  Goal: Identifies healthy coping skills  Outcome: Progressing  Goal: Participates in unit activities  Description: Interventions:  - Provide therapeutic environment   - Provide required programming   - Redirect inappropriate behaviors   Outcome: Progressing  Goal: Patient/Family verbalizes awareness of resources  Outcome: Progressing     Problem: Depression  Goal: Treatment Goal: Demonstrate behavioral control of depressive symptoms, verbalize feelings of improved mood/affect, and adopt new coping skills prior to discharge  Outcome: Progressing  Goal: Verbalize thoughts and feelings  Description: Interventions:  - Assess and re-assess patient's level of risk   - Engage patient in 1:1 interactions, daily, for a minimum of 15 minutes   - Encourage patient to express feelings, fears, frustrations, hopes   Outcome: Progressing  Goal: Refrain from harming self  Description: Interventions:  - Monitor patient closely, per order   - Supervise medication ingestion, monitor effects and side effects   Outcome: Progressing  Goal: Attend and participate in unit activities, including therapeutic, recreational, and educational groups  Description: Interventions:  - Provide therapeutic and educational activities daily, encourage attendance and participation, and document same in the medical record   Outcome: Progressing     Problem: Anxiety  Goal: Anxiety is at manageable level  Description: Interventions:  - Assess and monitor patient's anxiety level.   - Monitor for signs and symptoms (heart palpitations, chest pain, shortness of breath, headaches, nausea, feeling jumpy, restlessness, irritable, apprehensive).   - Collaborate with interdisciplinary team and initiate plan and interventions as ordered.  - [place] patient to unit/surroundings  - Explain treatment plan  - Encourage participation in care  - Encourage verbalization of  concerns/fears  - Identify coping mechanisms  - Assist in developing anxiety-reducing skills  - Administer/offer alternative therapies  - Limit or eliminate stimulants  Outcome: Progressing     Problem: DISCHARGE PLANNING - CARE MANAGEMENT  Goal: Discharge to post-acute care or home with appropriate resources  Description: INTERVENTIONS:  - Conduct assessment to determine patient/family and health care team treatment goals, and need for post-acute services based on payer coverage, community resources, and patient preferences, and barriers to discharge  - Address psychosocial, clinical, and financial barriers to discharge as identified in assessment in conjunction with the patient/family and health care team  - Arrange appropriate level of post-acute services according to patient’s   needs and preference and payer coverage in collaboration with the physician and health care team  - Communicate with and update the patient/family, physician, and health care team regarding progress on the discharge plan  - Arrange appropriate transportation to post-acute venues  Outcome: Not Progressing

## 2024-11-26 NOTE — PROGRESS NOTES
11/26/24 0852   Team Meeting   Meeting Type Daily Rounds   Team Members Present   Team Members Present Physician;Nurse;   Physician Team Member Chani   [facility]   Care Management Team Member Tre   Patient/Family Present   Patient Present No   Patient's Family Present No     201. Pt is discharging today at 11 AM.

## 2024-11-26 NOTE — ASSESSMENT & PLAN NOTE
Orders from past 72 hours:    ergocalciferol (VITAMIN D2) 50,000 units; Take 1 capsule (50,000 Units total) by mouth once a week for 6 doses

## 2024-11-26 NOTE — NURSING NOTE
0723 After taking Atarax pt reports she still has anxiety. Pt educated to work on coping mechanisms until taking scheduled medication at 0800. Pt denies SI/HI/AH/VH. Present in dayroom and milieu. Social with peers. Medication ( refused inhaler) and meal compliant.  Pt looking forward to going home today. Pt is pleasant. Pt later felt anxious waiting to be discharged. Pt requested PRN. Pt was tense/restless/anxious. 1039 PRN Inderal 10 mg Po given. No further concerns at this time. Plan of care ongoing.

## 2024-11-26 NOTE — NURSING NOTE
"Pt approached nurse c/o 10/10 mouth and tooth pain, PRN PO oxycodone 5mg given @ 0407 for severe pain unrelieved by Motrin.    0507: Slightly effective. Pt states \"My pain is down to around a 6 now\".   "

## 2024-11-26 NOTE — NURSING NOTE
Pt endorsed anxiety related to discharge during AM med pass, HAM score 21, PRN PO atarax 50mg given @ 0623.

## 2024-11-26 NOTE — NURSING NOTE
Pt awoke and completed morning routines. AVS printed and reviewed with Pt. Pt verbalized understanding of discharge instructions and agreed to be compliant with medication regimen. While discussing smoking cessation Pt states that she is not ready to quit smoking but that she is aware of the resources available to her if she should choose to do so. Pt discharged with all of her belongings at time of discharge. Pt discharged to her  to return home at 1100.

## 2024-11-26 NOTE — ASSESSMENT & PLAN NOTE
Oral benzocaine added. Reports tolerating this well in past for tooth pain.  Orders from past 72 hours:    amoxicillin-clavulanate (AUGMENTIN) 875-125 mg per tablet; Take 1 tablet by mouth every 12 (twelve) hours for 4 doses

## 2024-11-26 NOTE — BH TRANSITION RECORD
Contact Information: If you have any questions, concerns, pended studies, tests and/or procedures, or emergencies regarding your inpatient behavioral health visit. Please contact [place] [facility] [phone] and ask to speak to a , nurse or physician. A contact is available 24 hours/ 7 days a week at this number.     Summary of Procedures Performed During your Stay:  Below is a list of major procedures performed during your hospital stay and a summary of results:  CT abdomen/pelvis: Distended fluid-filled ascending colon with mild gaseous distention of the transverse colon with small fluid levels. Correlate clinically for diarrhea.  No obstruction, bowel wall thickening or other inflammatory process    EKG: Normal sinus rhythm  Normal ECG      Pending Studies (From admission, onward)      None          Please follow up on the above pending studies with your PCP and/or referring provider.    Jordan Christopher Holter, DO

## 2024-11-26 NOTE — ASSESSMENT & PLAN NOTE
Orders from past 72 hours:    atorvastatin (LIPITOR) 10 mg tablet; Take 1 tablet (10 mg total) by mouth daily with dinner

## 2025-04-27 ENCOUNTER — HOSPITAL ENCOUNTER (EMERGENCY)
Facility: HOSPITAL | Age: 50
Discharge: HOME | End: 2025-04-27
Attending: EMERGENCY MEDICINE
Payer: MEDICARE

## 2025-04-27 ENCOUNTER — APPOINTMENT (EMERGENCY)
Dept: RADIOLOGY | Facility: HOSPITAL | Age: 50
End: 2025-04-27
Payer: MEDICARE

## 2025-04-27 VITALS
DIASTOLIC BLOOD PRESSURE: 58 MMHG | TEMPERATURE: 98.3 F | OXYGEN SATURATION: 97 % | SYSTOLIC BLOOD PRESSURE: 108 MMHG | RESPIRATION RATE: 18 BRPM | HEART RATE: 85 BPM

## 2025-04-27 DIAGNOSIS — S62.306A UNSPECIFIED FRACTURE OF FIFTH METACARPAL BONE, RIGHT HAND, INITIAL ENCOUNTER FOR CLOSED FRACTURE: Primary | ICD-10-CM

## 2025-04-27 PROCEDURE — 29125 APPL SHORT ARM SPLINT STATIC: CPT | Performed by: EMERGENCY MEDICINE

## 2025-04-27 PROCEDURE — 99284 EMERGENCY DEPT VISIT MOD MDM: CPT | Performed by: EMERGENCY MEDICINE

## 2025-04-27 PROCEDURE — 73130 X-RAY EXAM OF HAND: CPT

## 2025-04-27 PROCEDURE — 99283 EMERGENCY DEPT VISIT LOW MDM: CPT

## 2025-04-27 PROCEDURE — 96372 THER/PROPH/DIAG INJ SC/IM: CPT

## 2025-04-27 RX ORDER — KETOROLAC TROMETHAMINE 30 MG/ML
15 INJECTION, SOLUTION INTRAMUSCULAR; INTRAVENOUS ONCE
Status: COMPLETED | OUTPATIENT
Start: 2025-04-27 | End: 2025-04-27

## 2025-04-27 RX ADMIN — KETOROLAC TROMETHAMINE 15 MG: 30 INJECTION, SOLUTION INTRAMUSCULAR; INTRAVENOUS at 18:43

## 2025-04-27 NOTE — DISCHARGE INSTRUCTIONS
Use the phone number provided to follow-up with hand surgery regarding your fracture.  If you notice any numbness, tingling, finger discoloration please return to the emergency department immediately.  Also follow-up with your primary care doctor within 2 to 3 days regarding your emergency department visit.  Use Tylenol Motrin and ice as needed for pain.
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
## 2025-04-28 ENCOUNTER — APPOINTMENT (EMERGENCY)
Dept: RADIOLOGY | Facility: HOSPITAL | Age: 50
End: 2025-04-28
Payer: MEDICARE

## 2025-04-28 ENCOUNTER — HOSPITAL ENCOUNTER (EMERGENCY)
Facility: HOSPITAL | Age: 50
Discharge: HOME | End: 2025-04-28
Attending: EMERGENCY MEDICINE
Payer: MEDICARE

## 2025-04-28 VITALS
OXYGEN SATURATION: 99 % | TEMPERATURE: 99.3 F | HEART RATE: 74 BPM | DIASTOLIC BLOOD PRESSURE: 53 MMHG | RESPIRATION RATE: 20 BRPM | BODY MASS INDEX: 28.32 KG/M2 | HEIGHT: 61 IN | WEIGHT: 150 LBS | SYSTOLIC BLOOD PRESSURE: 101 MMHG

## 2025-04-28 DIAGNOSIS — R55 SYNCOPE: Primary | ICD-10-CM

## 2025-04-28 LAB
ANION GAP SERPL CALCULATED.3IONS-SCNC: 4 MMOL/L (ref 4–13)
ATRIAL RATE: 72 BPM
BASOPHILS # BLD AUTO: 0.12 THOUSANDS/ÂΜL (ref 0–0.1)
BASOPHILS NFR BLD AUTO: 1 % (ref 0–1)
BUN SERPL-MCNC: 14 MG/DL (ref 5–25)
CALCIUM SERPL-MCNC: 8.3 MG/DL (ref 8.4–10.2)
CHLORIDE SERPL-SCNC: 106 MMOL/L (ref 96–108)
CO2 SERPL-SCNC: 29 MMOL/L (ref 21–32)
CREAT SERPL-MCNC: 0.62 MG/DL (ref 0.6–1.3)
EOSINOPHIL # BLD AUTO: 0.3 THOUSAND/ÂΜL (ref 0–0.61)
EOSINOPHIL NFR BLD AUTO: 3 % (ref 0–6)
ERYTHROCYTE [DISTWIDTH] IN BLOOD BY AUTOMATED COUNT: 13.2 % (ref 11.6–15.1)
GFR SERPL CREATININE-BSD FRML MDRD: 106 ML/MIN/1.73SQ M
GLUCOSE SERPL-MCNC: 93 MG/DL (ref 65–140)
HCT VFR BLD AUTO: 39.3 % (ref 34.8–46.1)
HGB BLD-MCNC: 12.9 G/DL (ref 11.5–15.4)
IMM GRANULOCYTES # BLD AUTO: 0.03 THOUSAND/UL (ref 0–0.2)
IMM GRANULOCYTES NFR BLD AUTO: 0 % (ref 0–2)
LYMPHOCYTES # BLD AUTO: 3.39 THOUSANDS/ÂΜL (ref 0.6–4.47)
LYMPHOCYTES NFR BLD AUTO: 37 % (ref 14–44)
MCH RBC QN AUTO: 28.8 PG (ref 26.8–34.3)
MCHC RBC AUTO-ENTMCNC: 32.8 G/DL (ref 31.4–37.4)
MCV RBC AUTO: 88 FL (ref 82–98)
MONOCYTES # BLD AUTO: 0.49 THOUSAND/ÂΜL (ref 0.17–1.22)
MONOCYTES NFR BLD AUTO: 5 % (ref 4–12)
NEUTROPHILS # BLD AUTO: 4.79 THOUSANDS/ÂΜL (ref 1.85–7.62)
NEUTS SEG NFR BLD AUTO: 54 % (ref 43–75)
NRBC BLD AUTO-RTO: 0 /100 WBCS
P AXIS: 66 DEGREES
PLATELET # BLD AUTO: 347 THOUSANDS/UL (ref 149–390)
PMV BLD AUTO: 9.8 FL (ref 8.9–12.7)
POTASSIUM SERPL-SCNC: 4.2 MMOL/L (ref 3.5–5.3)
PR INTERVAL: 154 MS
QRS AXIS: 81 DEGREES
QRSD INTERVAL: 84 MS
QT INTERVAL: 380 MS
QTC INTERVAL: 416 MS
RBC # BLD AUTO: 4.48 MILLION/UL (ref 3.81–5.12)
SODIUM SERPL-SCNC: 139 MMOL/L (ref 135–147)
T WAVE AXIS: 67 DEGREES
VENTRICULAR RATE: 72 BPM
WBC # BLD AUTO: 9.12 THOUSAND/UL (ref 4.31–10.16)

## 2025-04-28 PROCEDURE — 93005 ELECTROCARDIOGRAM TRACING: CPT

## 2025-04-28 PROCEDURE — 85025 COMPLETE CBC W/AUTO DIFF WBC: CPT

## 2025-04-28 PROCEDURE — 99284 EMERGENCY DEPT VISIT MOD MDM: CPT

## 2025-04-28 PROCEDURE — 96372 THER/PROPH/DIAG INJ SC/IM: CPT

## 2025-04-28 PROCEDURE — 99285 EMERGENCY DEPT VISIT HI MDM: CPT | Performed by: EMERGENCY MEDICINE

## 2025-04-28 PROCEDURE — 80048 BASIC METABOLIC PNL TOTAL CA: CPT

## 2025-04-28 PROCEDURE — 93010 ELECTROCARDIOGRAM REPORT: CPT | Performed by: INTERNAL MEDICINE

## 2025-04-28 PROCEDURE — 36415 COLL VENOUS BLD VENIPUNCTURE: CPT

## 2025-04-28 PROCEDURE — 71046 X-RAY EXAM CHEST 2 VIEWS: CPT

## 2025-04-28 RX ORDER — KETOROLAC TROMETHAMINE 30 MG/ML
15 INJECTION, SOLUTION INTRAMUSCULAR; INTRAVENOUS ONCE
Status: COMPLETED | OUTPATIENT
Start: 2025-04-28 | End: 2025-04-28

## 2025-04-28 RX ADMIN — KETOROLAC TROMETHAMINE 15 MG: 30 INJECTION, SOLUTION INTRAMUSCULAR; INTRAVENOUS at 12:41

## 2025-04-28 NOTE — ED ATTENDING ATTESTATION
4/28/2025  I, Brinda Jade MD, saw and evaluated the patient. I have discussed the patient with the resident/non-physician practitioner and agree with the resident's/non-physician practitioner's findings, Plan of Care, and MDM as documented in the resident's/non-physician practitioner's note, except where noted. All available labs and Radiology studies were reviewed.  I was present for key portions of any procedure(s) performed by the resident/non-physician practitioner and I was immediately available to provide assistance.       At this point I agree with the current assessment done in the Emergency Department.  I have conducted an independent evaluation of this patient a history and physical is as follows:  Patient here with blurry vision, lightheadedness, and then a syncopal event that was witnessed.  No seizure activity.  Patient knew that she was going to go out.  Has not had similar symptoms.  Patient had not eaten yet today.  No chest pain, shortness of breath, fevers, chills.  No PE risk factors.  Does not have vaginal bleeding anymore.  No blood in her stool.  No history of symptomatic anemia.  No trauma.  No history of cardiomyopathy or underlying cardiac issues.  Review of systems otherwise -12 systems reviewed.  On exam vital signs were reviewed.  Patient is awake, alert, interactive.  The patient's pupils are equally round reactive to light.  Oropharynx is clear with moist mucous membranes.  Neck is supple and nontender with no adenopathy or JVD.  Heart is regular with no murmurs, rubs, or gallops.  Lungs are clear and equal with no wheezes, rales, or rhonchi.  Abdomen is soft and nontender with no masses, rebound, or guarding. There is no CVA tenderness.  The patient was completely exposed.  There is no skin breakdown.  There are no rashes or skin changes.  Extremities are warm and well perfused with good pulses. The patient has normal strength, sensation, and cranial nerves.  Patient's right  upper extremity is in a splint from a boxer's fracture that she was seen for yesterday. MEDICAL DECISION MAKING    Number and Complexity of Problems  Differential diagnosis: Vasovagal syncope versus orthostasis, doubt cardiogenic syncope    Medical Decision Making Data  External documents reviewed: Prior chest x-ray from 2023 reviewed, patient with normal cardiomediastinal silhouette  My EKG interpretation: Sinus, narrow complex, normal axis, no ischemia, no ectopy, all intervals are normal  My CT interpretation:   My X-ray interpretation: nl cardiomediastinal silouette  My ultrasound interpretation:     XR chest 2 views   Final Result      No acute cardiopulmonary disease.            Workstation performed: [id number]             Labs Reviewed   CBC AND DIFFERENTIAL - Abnormal       Result Value Ref Range Status    WBC 9.12  4.31 - 10.16 Thousand/uL Final    RBC 4.48  3.81 - 5.12 Million/uL Final    Hemoglobin 12.9  11.5 - 15.4 g/dL Final    Hematocrit 39.3  34.8 - 46.1 % Final    MCV 88  82 - 98 fL Final    MCH 28.8  26.8 - 34.3 pg Final    MCHC 32.8  31.4 - 37.4 g/dL Final    RDW 13.2  11.6 - 15.1 % Final    MPV 9.8  8.9 - 12.7 fL Final    Platelets 347  149 - 390 Thousands/uL Final    nRBC 0  /100 WBCs Final    Segmented % 54  43 - 75 % Final    Immature Grans % 0  0 - 2 % Final    Lymphocytes % 37  14 - 44 % Final    Monocytes % 5  4 - 12 % Final    Eosinophils Relative 3  0 - 6 % Final    Basophils Relative 1  0 - 1 % Final    Absolute Neutrophils 4.79  1.85 - 7.62 Thousands/µL Final    Absolute Immature Grans 0.03  0.00 - 0.20 Thousand/uL Final    Absolute Lymphocytes 3.39  0.60 - 4.47 Thousands/µL Final    Absolute Monocytes 0.49  0.17 - 1.22 Thousand/µL Final    Eosinophils Absolute 0.30  0.00 - 0.61 Thousand/µL Final    Basophils Absolute 0.12 (*) 0.00 - 0.10 Thousands/µL Final   BASIC METABOLIC PANEL - Abnormal    Sodium 139  135 - 147 mmol/L Final    Potassium 4.2  3.5 - 5.3 mmol/L Final    Chloride 106   96 - 108 mmol/L Final    CO2 29  21 - 32 mmol/L Final    ANION GAP 4  4 - 13 mmol/L Final    BUN 14  5 - 25 mg/dL Final    Creatinine 0.62  0.60 - 1.30 mg/dL Final    Comment: Standardized to IDMS reference method    Glucose 93  65 - 140 mg/dL Final    Comment: If the patient is fasting, the ADA then defines impaired fasting glucose as > 100 mg/dL and diabetes as > or equal to 123 mg/dL.    Calcium 8.3 (*) 8.4 - 10.2 mg/dL Final    eGFR 106  ml/min/1.73sq m Final    Narrative:     National Kidney Disease Foundation guidelines for Chronic Kidney Disease (CKD):     Stage 1 with normal or high GFR (GFR > 90 mL/min/1.73 square meters)    Stage 2 Mild CKD (GFR = 60-89 mL/min/1.73 square meters)    Stage 3A Moderate CKD (GFR = 45-59 mL/min/1.73 square meters)    Stage 3B Moderate CKD (GFR = 30-44 mL/min/1.73 square meters)    Stage 4 Severe CKD (GFR = 15-29 mL/min/1.73 square meters)    Stage 5 End Stage CKD (GFR <15 mL/min/1.73 square meters)  Note: GFR calculation is accurate only with a steady state creatinine       Labs reviewed by me are significant for:     Clinical decision rules/scores are significant for:     Discussed case with:   Considered admission for:     Treatment and Disposition  ED course: Patient seen and examined.  Patient has overall low risk syncope.  Will plan to check electrolytes due to patient's underlying psychiatric history and presents in a detox center, will do chest x-ray to make sure the patient has no evidence of cardiomegaly, will give IV fluid bolus given slightly soft blood pressure, anticipate discharge back to facility  Shared decision making:   Code status:     ED Course         Critical Care Time  Procedures

## 2025-04-28 NOTE — DISCHARGE INSTRUCTIONS
You were seen in the emergency department for syncope.  We performed lab work and imaging and feel that you are safe for discharge at this time.  Please follow-up with your primary care in the next few days for reevaluation.  Please return the emergency room should you experience any chest pain, shortness of breath, or any other concerning symptoms that you like evaluated.  Otherwise please follow with your primary care.
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
## 2025-04-28 NOTE — ED PROVIDER NOTES
"Time reflects when diagnosis was documented in both MDM as applicable and the Disposition within this note       Time User Action Codes Description Comment    4/28/2025  2:14 PM Leonardo Hunt [R55] Syncope           ED Disposition       ED Disposition   Discharge    Condition   Stable    Date/Time   Mon Apr 28, 2025  2:14 PM    Comment   Michelle Matos discharge to home/self care.                   Assessment & Plan       Medical Decision Making  Patient is a 49 y.o. female with PMH of bipolar 1 who presents to the ED with syncope.    Vital signs stable.  Physical exam as above.    History and physical exam most consistent with vasovagal. However, differential diagnosis included but not limited to orthostatic, arrhythmia, electrolyte abnormality, neurogenic syncope unlikely.     Plan: We will order CBC, BMP, EKG, and chest x-ray.  Patient will be given p.o. challenge and ambulatory challenge.    View ED course above for further discussion on patient workup.     On review of previous records, patient was seen yesterday for fracture.  Visit note reviewed.    All labs reviewed and utilized in the medical decision making process  All radiology studies independently viewed by me and interpreted by the radiologist.  I reviewed all testing with the patient.     Upon re-evaluation, patient noted no recurrence of symptoms.  Patient ambulating without difficulties.  Patient tolerating p.o. intake.    Disposition: Patient discharged in stable condition.  Patient given strict return precautions.  Patient will follow-up with PCP in the next few days for reevaluation.    Portions of the record may have been created with voice recognition software. Occasional wrong word or \"sound a like\" substitutions may have occurred due to the inherent limitations of voice recognition software. Read the chart carefully and recognize, using context, where substitutions have occurred.     Amount and/or Complexity of Data Reviewed  Labs: " ordered. Decision-making details documented in ED Course.  Radiology: ordered. Decision-making details documented in ED Course.  ECG/medicine tests:  Decision-making details documented in ED Course.    Risk  Prescription drug management.        ED Course as of 04/28/25 2203   Mon Apr 28, 2025   1205 ECG 12 lead  Procedure Note: EKG  Date/Time: 04/28/25 12:05 PM   Interpreted by: JULITA STACY D.O.  Indications / Diagnosis: Syncope  ECG reviewed by me, the ED Provider: yes   The EKG demonstrates: unchanged from previous tracings, normal sinus rhythm, inverted T waves in V1-V2 which are present on previous EKG  Rhythm: normal sinus  Intervals: normal intervals  Axis: normal axis  QRS/Blocks: normal QRS  ST Changes: No acute ST Changes, no STD/ARUN.    1250 CBC and differential(!)  Reassuring, not anemic   1250 XR chest 2 views  No acute cardiopulmonary disease, similar to previous cxr   1414 Basic metabolic panel(!)  Reassuring   1414 Patient ambulating appropriately without any recurrence of symptoms.       Medications   ketorolac (TORADOL) injection 15 mg (15 mg Intramuscular Given 4/28/25 1241)       ED Risk Strat Scores                    No data recorded        SBIRT 22yo+      Flowsheet Row Most Recent Value   Initial Alcohol Screen: US AUDIT-C     1. How often do you have a drink containing alcohol? 0 Filed at: 04/28/2025 1137   2. How many drinks containing alcohol do you have on a typical day you are drinking?  0 Filed at: 04/28/2025 1137   3b. FEMALE Any Age, or MALE 65+: How often do you have 4 or more drinks on one occassion? 0 Filed at: 04/28/2025 1137   Audit-C Score 0 Filed at: 04/28/2025 1137   JOSE: How many times in the past year have you...    Used an illegal drug or used a prescription medication for non-medical reasons? Never Filed at: 04/28/2025 1137                            History of Present Illness       Chief Complaint   Patient presents with    Syncope     Per ems pt is from detox facility  x10 days for crack, she was found by facility unconscious when ems arrived on scene pt was awake and wheel chaired out to ems truck. Pt c/o lightheadedness and blurry vision at this time.       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     Neuralgia     OAB (overactive bladder)     Obesity     Postsurgical malabsorption     PTSD (post-traumatic stress disorder)     Seizures (HCC)     Spinal stenosis of lumbar region     Tarsal tunnel syndrome     Tendinitis of right rotator cuff     Vitamin D deficiency       Past Surgical History:   Procedure Laterality Date    APPENDECTOMY      BLADDER SURGERY      CHOLECYSTECTOMY      GASTRECTOMY      gastric sleeve    GASTRIC RESTRICTION SURGERY      gastric sleeve in 2018    HEMORROIDECTOMY      HYSTERECTOMY      TUBAL LIGATION        Family History   Problem Relation Age of Onset    Diabetes Mother         MELLITUS    Diabetes Father         MELLITUS    Diabetes Other       Social History     Tobacco Use    Smoking status: Former     Current packs/day: 0.00     Types: Cigarettes     Quit date: 2023     Years since quittin.0     Passive exposure: Past    Smokeless tobacco: Never   Vaping Use    Vaping status: Never Used   Substance Use Topics    Alcohol use: Yes     Comment: ocassionally    Drug use: Not Currently     Types: Cocaine     Comment: valente      E-Cigarette/Vaping    E-Cigarette Use Never User       E-Cigarette/Vaping Substances    Nicotine No     THC No     CBD No     Flavoring No     Other No     Unknown No       I have reviewed and agree with  the history as documented.     Patient is a 49-year-old female who presents today with episode of syncope.  Patient notably was seen yesterday in the ED for fracture of right hand.  Patient states that before breakfast today she was sitting in her chair, when she had an episode of syncope.  This was witnessed by staff who states the patient was out for a couple of seconds, and then regained consciousness quickly.  Patient states that at that time she was feeling diaphoretic, flushed, and felt her vision go black.  Patient denies any other symptoms today including chest pain, shortness of breath, abdominal pain, nausea, vomiting, diarrhea, constipation, headache, vision changes, fevers, chills, extremity weakness.        Review of Systems   Constitutional:  Negative for chills and fever.   HENT:  Negative for ear pain and sore throat.    Eyes:  Negative for pain and visual disturbance.   Respiratory:  Negative for cough and shortness of breath.    Cardiovascular:  Negative for chest pain and palpitations.   Gastrointestinal:  Negative for abdominal pain and vomiting.   Genitourinary:  Negative for dysuria and hematuria.   Musculoskeletal:  Negative for arthralgias and back pain.   Skin:  Negative for color change and rash.   Neurological:  Positive for syncope. Negative for seizures.   All other systems reviewed and are negative.          Objective       ED Triage Vitals [04/28/25 1135]   Temperature Pulse Blood Pressure Respirations SpO2 Patient Position - Orthostatic VS   99.3 °F (37.4 °C) 75 91/53 20 97 % Lying      Temp Source Heart Rate Source BP Location FiO2 (%) Pain Score    Oral Monitor Left arm -- 8      Vitals      Date and Time Temp Pulse SpO2 Resp BP Pain Score FACES Pain Rating User   04/28/25 1400 -- 74 99 % 20 101/53 -- -- AM   04/28/25 1241 -- -- -- -- -- 8 -- AM   04/28/25 1200 -- 65 96 % 20 96/60 -- -- AM   04/28/25 1137 -- -- 99 % -- -- -- -- AM   04/28/25 1135 99.3 °F (37.4 °C) 75 97 % 20 91/53 8  -- AM            Physical Exam  Vitals and nursing note reviewed.   Constitutional:       General: She is not in acute distress.     Appearance: Normal appearance. She is well-developed. She is not ill-appearing.   HENT:      Head: Normocephalic and atraumatic.      Nose: Nose normal.      Mouth/Throat:      Mouth: Mucous membranes are moist.      Pharynx: Oropharynx is clear.   Eyes:      Extraocular Movements: Extraocular movements intact.      Conjunctiva/sclera: Conjunctivae normal.      Pupils: Pupils are equal, round, and reactive to light.   Cardiovascular:      Rate and Rhythm: Normal rate and regular rhythm.      Pulses: Normal pulses.      Heart sounds: Normal heart sounds. No murmur heard.     No friction rub. No gallop.   Pulmonary:      Effort: Pulmonary effort is normal. No respiratory distress.      Breath sounds: Normal breath sounds. No stridor. No wheezing, rhonchi or rales.   Abdominal:      General: Abdomen is flat. Bowel sounds are normal. There is no distension.      Palpations: Abdomen is soft. There is no mass.      Tenderness: There is no abdominal tenderness. There is no guarding or rebound.   Musculoskeletal:         General: No swelling. Normal range of motion.      Cervical back: Normal range of motion and neck supple.   Skin:     General: Skin is warm and dry.      Capillary Refill: Capillary refill takes less than 2 seconds.   Neurological:      General: No focal deficit present.      Mental Status: She is alert and oriented to person, place, and time.   Psychiatric:         Mood and Affect: Mood normal.         Results Reviewed       Procedure Component Value Units Date/Time    Basic metabolic panel [[id number]]  (Abnormal) Collected: 04/28/25 1341    Lab Status: Final result Specimen: Blood from Line, Venous Updated: 04/28/25 1409     Sodium 139 mmol/L      Potassium 4.2 mmol/L      Chloride 106 mmol/L      CO2 29 mmol/L      ANION GAP 4 mmol/L      BUN 14 mg/dL      Creatinine 0.62  mg/dL      Glucose 93 mg/dL      Calcium 8.3 mg/dL      eGFR 106 ml/min/1.73sq m     Narrative:      [employer] guidelines for Chronic Kidney Disease (CKD):     Stage 1 with normal or high GFR (GFR > 90 mL/min/1.73 square meters)    Stage 2 Mild CKD (GFR = 60-89 mL/min/1.73 square meters)    Stage 3A Moderate CKD (GFR = 45-59 mL/min/1.73 square meters)    Stage 3B Moderate CKD (GFR = 30-44 mL/min/1.73 square meters)    Stage 4 Severe CKD (GFR = 15-29 mL/min/1.73 square meters)    Stage 5 End Stage CKD (GFR <15 mL/min/1.73 square meters)  Note: GFR calculation is accurate only with a steady state creatinine    CBC and differential [[id number]]  (Abnormal) Collected: 04/28/25 1227    Lab Status: Final result Specimen: Blood from Arm, Right Updated: 04/28/25 1245     WBC 9.12 Thousand/uL      RBC 4.48 Million/uL      Hemoglobin 12.9 g/dL      Hematocrit 39.3 %      MCV 88 fL      MCH 28.8 pg      MCHC 32.8 g/dL      RDW 13.2 %      MPV 9.8 fL      Platelets 347 Thousands/uL      nRBC 0 /100 WBCs      Segmented % 54 %      Immature Grans % 0 %      Lymphocytes % 37 %      Monocytes % 5 %      Eosinophils Relative 3 %      Basophils Relative 1 %      Absolute Neutrophils 4.79 Thousands/µL      Absolute Immature Grans 0.03 Thousand/uL      Absolute Lymphocytes 3.39 Thousands/µL      Absolute Monocytes 0.49 Thousand/µL      Eosinophils Absolute 0.30 Thousand/µL      Basophils Absolute 0.12 Thousands/µL             XR chest 2 views   Final Interpretation by Gunnar Lopez MD (04/28 0427)      No acute cardiopulmonary disease.            Workstation performed: [id number]             Procedures    ED Medication and Procedure Management   Prior to Admission Medications   Prescriptions Last Dose Informant Patient Reported? Taking?   OLANZapine (ZyPREXA) 15 mg tablet   No No   Sig: Take 1 tablet (15 mg total) by mouth daily at bedtime   ascorbic acid (VITAMIN C) 500 MG tablet   No No   Sig: Take 1 tablet  (500 mg total) by mouth 2 (two) times a day   atorvastatin (LIPITOR) 10 mg tablet   No No   Sig: Take 1 tablet (10 mg total) by mouth daily with dinner   cyanocobalamin (VITAMIN B-12) 500 MCG tablet   No No   Sig: Take 1 tablet (500 mcg total) by mouth daily   ergocalciferol (VITAMIN D2) 50,000 units   No No   Sig: Take 1 capsule (50,000 Units total) by mouth once a week for 6 doses   ferrous sulfate 325 (65 Fe) mg tablet   No No   Sig: Take 1 tablet (325 mg total) by mouth every other day   fluticasone-vilanterol 200-25 mcg/actuation inhaler   No No   Sig: Inhale 1 puff once daily Rinse mouth after use.   gabapentin (NEURONTIN) 300 mg capsule   No No   Sig: Take 1 capsule (300 mg total) by mouth 3 (three) times a day   lamoTRIgine (LaMICtal) 25 mg tablet   No No   Sig: Take 1 tablet (25 mg total) by mouth daily   magnesium Oxide (MAG-OX) 400 mg TABS   No No   Sig: Take 1 tablet (400 mg total) by mouth 2 (two) times a day   pantoprazole (PROTONIX) 40 mg tablet   No No   Sig: Take 1 tablet (40 mg total) by mouth daily in the early morning   senna-docusate sodium (SENOKOT S) 8.6-50 mg per tablet   No No   Sig: Take 1 tablet by mouth 2 (two) times a day   traZODone (DESYREL) 100 mg tablet   No No   Sig: Take 1 tablet (100 mg total) by mouth daily at bedtime   venlafaxine (EFFEXOR-XR) 150 mg 24 hr capsule   No No   Sig: Take 1 capsule (150 mg total) by mouth daily      Facility-Administered Medications: None     Discharge Medication List as of 4/28/2025  2:17 PM        CONTINUE these medications which have NOT CHANGED    Details   ascorbic acid (VITAMIN C) 500 MG tablet Take 1 tablet (500 mg total) by mouth 2 (two) times a day, Starting Tue 11/26/2024, Normal      atorvastatin (LIPITOR) 10 mg tablet Take 1 tablet (10 mg total) by mouth daily with dinner, Starting Tue 11/26/2024, Normal      cyanocobalamin (VITAMIN B-12) 500 MCG tablet Take 1 tablet (500 mcg total) by mouth daily, Starting Tue 11/26/2024, Normal       ergocalciferol (VITAMIN D2) 50,000 units Take 1 capsule (50,000 Units total) by mouth once a week for 6 doses, Starting Thu 11/28/2024, Until Fri 1/3/2025, Normal      ferrous sulfate 325 (65 Fe) mg tablet Take 1 tablet (325 mg total) by mouth every other day, Starting Wed 11/27/2024, Until Fri 12/27/2024, Normal      fluticasone-vilanterol 200-25 mcg/actuation inhaler Inhale 1 puff once daily Rinse mouth after use., Starting Tue 11/26/2024, Until Thu 12/26/2024, Normal      gabapentin (NEURONTIN) 300 mg capsule Take 1 capsule (300 mg total) by mouth 3 (three) times a day, Starting Tue 11/26/2024, Normal      lamoTRIgine (LaMICtal) 25 mg tablet Take 1 tablet (25 mg total) by mouth daily, Starting Tue 11/26/2024, Normal      magnesium Oxide (MAG-OX) 400 mg TABS Take 1 tablet (400 mg total) by mouth 2 (two) times a day, Starting Tue 11/26/2024, Normal      OLANZapine (ZyPREXA) 15 mg tablet Take 1 tablet (15 mg total) by mouth daily at bedtime, Starting Tue 11/26/2024, Normal      pantoprazole (PROTONIX) 40 mg tablet Take 1 tablet (40 mg total) by mouth daily in the early morning, Starting Wed 11/27/2024, Until Fri 12/27/2024, Normal      senna-docusate sodium (SENOKOT S) 8.6-50 mg per tablet Take 1 tablet by mouth 2 (two) times a day, Starting Tue 11/26/2024, Normal      traZODone (DESYREL) 100 mg tablet Take 1 tablet (100 mg total) by mouth daily at bedtime, Starting Tue 11/26/2024, Normal      venlafaxine (EFFEXOR-XR) 150 mg 24 hr capsule Take 1 capsule (150 mg total) by mouth daily, Starting Tue 11/26/2024, Normal           No discharge procedures on file.  ED SEPSIS DOCUMENTATION   Time reflects when diagnosis was documented in both MDM as applicable and the Disposition within this note       Time User Action Codes Description Comment    4/28/2025  2:14 PM Leonardo Hunt Add [R55] Syncope                  Leonardo Hunt,   04/28/25 7520

## 2025-05-01 ENCOUNTER — OFFICE VISIT (OUTPATIENT)
Dept: OBGYN CLINIC | Facility: HOSPITAL | Age: 50
End: 2025-05-01
Attending: EMERGENCY MEDICINE
Payer: MEDICARE

## 2025-05-01 VITALS — WEIGHT: 150 LBS | BODY MASS INDEX: 28.32 KG/M2 | HEIGHT: 61 IN

## 2025-05-01 DIAGNOSIS — Z01.812 PRE-PROCEDURE LAB EXAM: Primary | ICD-10-CM

## 2025-05-01 DIAGNOSIS — S62.336A CLOSED DISPLACED FRACTURE OF NECK OF FIFTH METACARPAL BONE OF RIGHT HAND, INITIAL ENCOUNTER: ICD-10-CM

## 2025-05-01 PROCEDURE — 99204 OFFICE O/P NEW MOD 45 MIN: CPT | Performed by: SURGERY

## 2025-05-01 RX ORDER — SENNOSIDES 8.6 MG
650 CAPSULE ORAL 3 TIMES DAILY
Status: ON HOLD | COMMUNITY
End: 2025-05-02

## 2025-05-01 RX ORDER — CEFAZOLIN SODIUM 2 G/50ML
2000 SOLUTION INTRAVENOUS ONCE
Status: CANCELLED | OUTPATIENT
Start: 2025-05-01 | End: 2025-05-01

## 2025-05-01 RX ORDER — BUSPIRONE HYDROCHLORIDE 10 MG/1
10 TABLET ORAL 3 TIMES DAILY
COMMUNITY

## 2025-05-01 RX ORDER — HYDROXYZINE PAMOATE 50 MG/1
50 CAPSULE ORAL 3 TIMES DAILY PRN
COMMUNITY

## 2025-05-01 RX ORDER — SODIUM CHLORIDE 9 MG/ML
75 INJECTION, SOLUTION INTRAVENOUS CONTINUOUS
Status: CANCELLED | OUTPATIENT
Start: 2025-05-01

## 2025-05-01 RX ORDER — IBUPROFEN 400 MG/1
400 TABLET, FILM COATED ORAL EVERY 4 HOURS PRN
COMMUNITY
End: 2025-05-02

## 2025-05-01 RX ORDER — SERTRALINE HYDROCHLORIDE 100 MG/1
100 TABLET, FILM COATED ORAL DAILY
COMMUNITY

## 2025-05-01 NOTE — H&P
Alfonso Monahan M.D.  Attending, Orthopaedic Surgery  Hand, Wrist, and Elbow Surgery  [facility]      ORTHOPAEDIC HAND, WRIST, AND ELBOW OFFICE  VISIT       ASSESSMENT/PLAN:        Assessment & Plan  Closed displaced fracture of neck of fifth metacarpal bone of right hand, initial encounter  X-rays were reviewed demonstrating a right 5th metacarpal neck fracture. No obvious 4th metacarpal fracture. Discussed the possibility of a nondisplaced 4th metacarpal fracture that is not evident on her current x-ray. We discussed this may also be a bone contusion.     It was discussed with the patient that her fracture is in acceptable alignment for non operative management.   We discussed once the fracture is healed, she may notice a drop knuckle appearance, extensor lag if she was to independently raise the small finger as well as possible metacarpal head prominence in the palm with tight . We discussed this will likely not cause any functional issues.  We discussed immobilization in a hand based ulnar gutter splint up to the PIP joint, IP's free and starting OT for ROM exercises of the IP joints and other fingers due to stiffness.    Patient is concerned for cosmetic reasons and does not wish to have a drop knuckle appearance. Patient is adamant she would like surgery even at the cost of potential decreased hand function secondary to stiffness as patient is already stiff and delaying OT.   Surgery was discussed in the form of a right 5th metacarpal closed reduction percutaneous pinning VS open reduction internal fixation. If pins are used, they will be pulled in the office approx. 4 weeks post op.   Common risks include bleeding, infection, injury to nearby structures (vessels, nerves, tendons, ligaments), blood clots (deep vein thrombosis / pulmonary embolus), and wound healing problems. Infection may result in an infection of the blood stream and/or the need for oral or intravenous  antibiotics. Additional risks include impaired limb function, loss of limb and/or failure to achieve desired results. With bone work, there is a risk of delayed or lack of bone healing, bones healing in a wrong position, resulting in malunion or non-union of the bone, joint dislocation, or limb length discrepancy. There is a risk for unacceptable cosmetic results (such as scarring). Surgery of the hand is associated with bleeding, infection, scar, pain, wound healing problems, damage to nerves, arteries, tendons, ligaments, failure to give desired result, instability, nonunion, malunion hardware issues, and need for more surgery. We discussed main risk is stiffness.   She elected to proceed with surgery and informed electronic surgical consent was signed.   She was placed into a ulnar gutter splint, to be kept clean, dry and intact.   She will be immobilized in a post op dressing/splint, which will  be removed 10-14 days post op.   I will see her back in the office 10-14 days post op for suture removal and right hand x-rays.    Orders:    Ambulatory Referral to Orthopedic Surgery    Case request operating room: Right 5th metacarpal closed reduction percutaneous pinning vs open reduction internal fixation; Standing    Basic metabolic panel; Future    EKG 12 lead; Future    Pre-procedure lab exam    Orders:    Basic metabolic panel; Future    EKG 12 lead; Future      The patient verbalized understanding of exam findings and treatment plan. We engaged in the shared decision-making process and treatment options were discussed at length with the patient. Surgical and conservative management discussed today along with risks and benefits.    Diagnoses and all orders for this visit:    Closed nondisplaced fracture of neck of fifth metacarpal bone of right hand, initial encounter  -     Ambulatory Referral to Orthopedic Surgery      Follow Up:  Return for 10-14 days, post op.    To Do Next Visit:  X-rays of the  right  hand,  Cast/splint off prior to x-ray, and Sutures out      General Discussions:  Fracture - Nonoperative Care: The physiology of a fractured bone was discussed with the patient today.  With non-displaced or minimally displaced fractures, conservative treatment such as casting or splinting often results in a functional recovery.  Typically, these fractures are immobilized in either a cast or splint depending on the pattern.  Radiographs are typically taken at intervals throughout the fracture healing to ensure that reduction or alignment is not lost.  If the fracture loses its alignment, surgical intervention may be required to stabilize it.  Medical conditions such as diabetes, osteoporosis, vitamin D deficiency, and a history of or exposure to smoking may delay or prevent fracture healing. Options between cast/splint immobilization and surgical treatment were offered and the risks and benefits of both were discussed.     Operative Discussions:  Fracture Operative Treatment: The physiology of a fractured bone was discussed with the patient today.  With displaced fractures, operative treatment often results in a functional recovery.  Typically, these fractures undergo reduction either through percutaneous or open methods depending on the location and fracture pattern.  Radiographs are typically taken at intervals throughout the fracture healing ensure maintenance of reduction and alignment.  If the fracture loses its alignment, revision surgery may be required.  Medical conditions such as diabetes, osteoporosis, vitamin D deficiency, and a history of or exposure to smoking may delay or prevent fracture healing.  The risks and benefits of the procedure were explained to the patient, which include, but are not limited to: Bleeding, infection, recurrence, pain, scar, malunion, nonunion, damage to tendons, damage to nerves, and damage to blood vessels, and complications related to anesthesia, failure to give desire result,  need for more surgery.  These risks, along with alternative conservative treatment options, and postoperative protocols were voiced back and understood by the patient.  All questions were answered to the patient's satisfaction.  The patient agrees to comply with a standard postoperative protocol, and is willing to proceed.  Education was provided via written and auditory forms.  There were no barriers to learning.  Written handouts regarding wound care, incision and scar care, and general preoperative information was provided to the patient.  Prior to surgery, the patient may be requested to stop all anti-inflammatory medications.  Prophylactic aspirin, Plavix, and Coumadin may be allowed to be continued.  Medications including vitamin E., ginkgo, and fish oil are requested to be stopped approximately one week prior to surgery.  Hypertensive medications and beta blockers, if taken, should be continued.      ____________________________________________________________________________________________________________________________________________      CHIEF COMPLAINT:  Chief Complaint   Patient presents with    Right Hand - Pain, Fracture     In a splint. Happened on the 27th. Punched a wall.        SUBJECTIVE:  Michelle Matos is a 49 y.o. year old RHD female who presents to the office for a right hand injury. Patient punched a wall on 4/27/25, injuring her right hand. She presented to the ED after injury, at which time x-rays were performed and she was found to have a 5th metacarpal fracture. She was placed into a ulnar gutter splint. She tolerated splinting well. She notes pain to her small and ring fingers. She notes limited ROM. She has a history of a wrist fracture which resulted in a ulnar resection. Patient is currently residing at [facility].       Pain/symptom timing:  Worse during the day when active  Pain/symptom context:  Worse with activites and work  Pain/symptom modifying factors:   Rest makes better, activities make worse  Pain/symptom associated signs/symptoms: none    Prior treatment   NSAIDsNo   Injections No   Bracing/Orthotics No    Physical Therapy No     I have personally reviewed all the relevant PMH, PSH, SH, FH, Medications and allergies      PAST MEDICAL HISTORY:  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     Neuralgia     OAB (overactive bladder)     Obesity     Postsurgical malabsorption     PTSD (post-traumatic stress disorder)     Seizures (HCC)     Spinal stenosis of lumbar region     Tarsal tunnel syndrome     Tendinitis of right rotator cuff     Vitamin D deficiency        PAST SURGICAL HISTORY:  Past Surgical History:   Procedure Laterality Date    APPENDECTOMY      BLADDER SURGERY      CHOLECYSTECTOMY      GASTRECTOMY      gastric sleeve    GASTRIC RESTRICTION SURGERY      gastric sleeve in 2018    HEMORROIDECTOMY      HYSTERECTOMY      TUBAL LIGATION         FAMILY HISTORY:  Family History   Problem Relation Age of Onset    Diabetes Mother         MELLITUS    Diabetes Father         MELLITUS    Diabetes Other        SOCIAL HISTORY:  Social History     Tobacco Use    Smoking status: Former     Current packs/day: 0.00     Types: Cigarettes     Quit date: 2023     Years since quittin.0     Passive exposure: Past    Smokeless tobacco: Never   Vaping Use    Vaping status: Never Used   Substance Use Topics    Alcohol use: Yes     Comment: ocassionally    Drug use: Not Currently     Types: Cocaine     Comment: valente       MEDICATIONS:    Current Outpatient  Medications:     ascorbic acid (VITAMIN C) 500 MG tablet, Take 1 tablet (500 mg total) by mouth 2 (two) times a day, Disp: 60 tablet, Rfl: 0    atorvastatin (LIPITOR) 10 mg tablet, Take 1 tablet (10 mg total) by mouth daily with dinner, Disp: 30 tablet, Rfl: 0    cyanocobalamin (VITAMIN B-12) 500 MCG tablet, Take 1 tablet (500 mcg total) by mouth daily, Disp: 30 tablet, Rfl: 0    gabapentin (NEURONTIN) 300 mg capsule, Take 1 capsule (300 mg total) by mouth 3 (three) times a day, Disp: 90 capsule, Rfl: 0    lamoTRIgine (LaMICtal) 25 mg tablet, Take 1 tablet (25 mg total) by mouth daily, Disp: 30 tablet, Rfl: 0    magnesium Oxide (MAG-OX) 400 mg TABS, Take 1 tablet (400 mg total) by mouth 2 (two) times a day, Disp: 60 tablet, Rfl: 0    OLANZapine (ZyPREXA) 15 mg tablet, Take 1 tablet (15 mg total) by mouth daily at bedtime, Disp: 30 tablet, Rfl: 0    senna-docusate sodium (SENOKOT S) 8.6-50 mg per tablet, Take 1 tablet by mouth 2 (two) times a day, Disp: 60 tablet, Rfl: 0    traZODone (DESYREL) 100 mg tablet, Take 1 tablet (100 mg total) by mouth daily at bedtime, Disp: 30 tablet, Rfl: 0    venlafaxine (EFFEXOR-XR) 150 mg 24 hr capsule, Take 1 capsule (150 mg total) by mouth daily, Disp: 30 capsule, Rfl: 0    ergocalciferol (VITAMIN D2) 50,000 units, Take 1 capsule (50,000 Units total) by mouth once a week for 6 doses, Disp: 4 capsule, Rfl: 1    ferrous sulfate 325 (65 Fe) mg tablet, Take 1 tablet (325 mg total) by mouth every other day, Disp: 15 tablet, Rfl: 0    fluticasone-vilanterol 200-25 mcg/actuation inhaler, Inhale 1 puff once daily Rinse mouth after use., Disp: 60 blister, Rfl: 0    pantoprazole (PROTONIX) 40 mg tablet, Take 1 tablet (40 mg total) by mouth daily in the early morning, Disp: 30 tablet, Rfl: 0    ALLERGIES:  Allergies   Allergen Reactions    Polyethylene Glycol Vomiting    Golytely [Peg 3350-Kcl-Nabcb-Nacl-Nasulf] Vomiting    Risperidone     Depakote [Valproic Acid] Rash           REVIEW OF  SYSTEMS:  Review of Systems   Constitutional:  Negative for chills, fever and unexpected weight change.   HENT:  Negative for hearing loss, nosebleeds and sore throat.    Eyes:  Negative for pain, redness and visual disturbance.   Respiratory:  Negative for cough, shortness of breath and wheezing.    Cardiovascular:  Negative for chest pain, palpitations and leg swelling.   Gastrointestinal:  Negative for abdominal pain, nausea and vomiting.   Endocrine: Negative for polydipsia and polyuria.   Genitourinary:  Negative for difficulty urinating and hematuria.   Musculoskeletal:  Negative for arthralgias, joint swelling and myalgias.   Skin:  Negative for rash and wound.   Neurological:  Negative for dizziness, numbness and headaches.   Psychiatric/Behavioral:  Negative for decreased concentration, dysphoric mood and suicidal ideas. The patient is not nervous/anxious.        VITALS:  There were no vitals filed for this visit.    LABS:      _____________________________________________________  PHYSICAL EXAMINATION:  General: well developed and well nourished, alert, oriented times 3, and appears comfortable  Psychiatric: Normal  HEENT: Normocephalic, Atraumatic Trachea Midline, No torticollis  Pulmonary: No audible wheezing or respiratory distress   Abdomen/GI: Non tender, non distended   Cardiovascular: No pitting edema, 2+ radial pulse   Skin: No masses, erythema, lacerations, fluctation, ulcerations  Neurovascular: Sensation Intact to the Median, Ulnar, Radial Nerve, Motor Intact to the Median, Ulnar, Radial Nerve, and Pulses Intact  Musculoskeletal: Normal, except as noted in detailed exam and in HPI.      MUSCULOSKELETAL EXAMINATION:    Right hand:     No erythema   Ecchymosis noted   Mild edema noted   Tenderness over fracture site   No obvious mal rotation   Limited flexion and extension secondary to pain and stiffness   ___________________________________________________  STUDIES REVIEWED:  I have personally  reviewed and interpreted  AP lateral and oblique radiographs of right hand  which demonstrate fifth metacarpal neck fracture, with flexion deformity       LABS REVIEWED:    HgA1c:   Lab Results   Component Value Date    HGBA1C 5.7 (H) 11/14/2024     BMP:   Lab Results   Component Value Date    GLUCOSE 113 07/11/2015    CALCIUM 8.3 (L) 04/28/2025     07/11/2015    K 4.2 04/28/2025    CO2 29 04/28/2025     04/28/2025    BUN 14 04/28/2025    CREATININE 0.62 04/28/2025               PROCEDURES PERFORMED:  Procedures  -    _____________________________________________________      Scribe Attestation      I,:  Linda Howell MA am acting as a scribe while in the presence of the attending physician.:       I,:  Alfonso Monahan MD personally performed the services described in this documentation    as scribed in my presence.:

## 2025-05-01 NOTE — H&P (VIEW-ONLY)
Alfonso Monahan M.D.  Attending, Orthopaedic Surgery  Hand, Wrist, and Elbow Surgery  [facility]      ORTHOPAEDIC HAND, WRIST, AND ELBOW OFFICE  VISIT       ASSESSMENT/PLAN:        Assessment & Plan  Closed displaced fracture of neck of fifth metacarpal bone of right hand, initial encounter  X-rays were reviewed demonstrating a right 5th metacarpal neck fracture. No obvious 4th metacarpal fracture. Discussed the possibility of a nondisplaced 4th metacarpal fracture that is not evident on her current x-ray. We discussed this may also be a bone contusion.     It was discussed with the patient that her fracture is in acceptable alignment for non operative management.   We discussed once the fracture is healed, she may notice a drop knuckle appearance, extensor lag if she was to independently raise the small finger as well as possible metacarpal head prominence in the palm with tight . We discussed this will likely not cause any functional issues.  We discussed immobilization in a hand based ulnar gutter splint up to the PIP joint, IP's free and starting OT for ROM exercises of the IP joints and other fingers due to stiffness.    Patient is concerned for cosmetic reasons and does not wish to have a drop knuckle appearance. Patient is adamant she would like surgery even at the cost of potential decreased hand function secondary to stiffness as patient is already stiff and delaying OT.   Surgery was discussed in the form of a right 5th metacarpal closed reduction percutaneous pinning VS open reduction internal fixation. If pins are used, they will be pulled in the office approx. 4 weeks post op.   Common risks include bleeding, infection, injury to nearby structures (vessels, nerves, tendons, ligaments), blood clots (deep vein thrombosis / pulmonary embolus), and wound healing problems. Infection may result in an infection of the blood stream and/or the need for oral or intravenous  antibiotics. Additional risks include impaired limb function, loss of limb and/or failure to achieve desired results. With bone work, there is a risk of delayed or lack of bone healing, bones healing in a wrong position, resulting in malunion or non-union of the bone, joint dislocation, or limb length discrepancy. There is a risk for unacceptable cosmetic results (such as scarring). Surgery of the hand is associated with bleeding, infection, scar, pain, wound healing problems, damage to nerves, arteries, tendons, ligaments, failure to give desired result, instability, nonunion, malunion hardware issues, and need for more surgery. We discussed main risk is stiffness.   She elected to proceed with surgery and informed electronic surgical consent was signed.   She was placed into a ulnar gutter splint, to be kept clean, dry and intact.   She will be immobilized in a post op dressing/splint, which will  be removed 10-14 days post op.   I will see her back in the office 10-14 days post op for suture removal and right hand x-rays.    Orders:    Ambulatory Referral to Orthopedic Surgery    Case request operating room: Right 5th metacarpal closed reduction percutaneous pinning vs open reduction internal fixation; Standing    Basic metabolic panel; Future    EKG 12 lead; Future    Pre-procedure lab exam    Orders:    Basic metabolic panel; Future    EKG 12 lead; Future      The patient verbalized understanding of exam findings and treatment plan. We engaged in the shared decision-making process and treatment options were discussed at length with the patient. Surgical and conservative management discussed today along with risks and benefits.    Diagnoses and all orders for this visit:    Closed nondisplaced fracture of neck of fifth metacarpal bone of right hand, initial encounter  -     Ambulatory Referral to Orthopedic Surgery      Follow Up:  Return for 10-14 days, post op.    To Do Next Visit:  X-rays of the  right  hand,  Cast/splint off prior to x-ray, and Sutures out      General Discussions:  Fracture - Nonoperative Care: The physiology of a fractured bone was discussed with the patient today.  With non-displaced or minimally displaced fractures, conservative treatment such as casting or splinting often results in a functional recovery.  Typically, these fractures are immobilized in either a cast or splint depending on the pattern.  Radiographs are typically taken at intervals throughout the fracture healing to ensure that reduction or alignment is not lost.  If the fracture loses its alignment, surgical intervention may be required to stabilize it.  Medical conditions such as diabetes, osteoporosis, vitamin D deficiency, and a history of or exposure to smoking may delay or prevent fracture healing. Options between cast/splint immobilization and surgical treatment were offered and the risks and benefits of both were discussed.     Operative Discussions:  Fracture Operative Treatment: The physiology of a fractured bone was discussed with the patient today.  With displaced fractures, operative treatment often results in a functional recovery.  Typically, these fractures undergo reduction either through percutaneous or open methods depending on the location and fracture pattern.  Radiographs are typically taken at intervals throughout the fracture healing ensure maintenance of reduction and alignment.  If the fracture loses its alignment, revision surgery may be required.  Medical conditions such as diabetes, osteoporosis, vitamin D deficiency, and a history of or exposure to smoking may delay or prevent fracture healing.  The risks and benefits of the procedure were explained to the patient, which include, but are not limited to: Bleeding, infection, recurrence, pain, scar, malunion, nonunion, damage to tendons, damage to nerves, and damage to blood vessels, and complications related to anesthesia, failure to give desire result,  need for more surgery.  These risks, along with alternative conservative treatment options, and postoperative protocols were voiced back and understood by the patient.  All questions were answered to the patient's satisfaction.  The patient agrees to comply with a standard postoperative protocol, and is willing to proceed.  Education was provided via written and auditory forms.  There were no barriers to learning.  Written handouts regarding wound care, incision and scar care, and general preoperative information was provided to the patient.  Prior to surgery, the patient may be requested to stop all anti-inflammatory medications.  Prophylactic aspirin, Plavix, and Coumadin may be allowed to be continued.  Medications including vitamin E., ginkgo, and fish oil are requested to be stopped approximately one week prior to surgery.  Hypertensive medications and beta blockers, if taken, should be continued.      ____________________________________________________________________________________________________________________________________________      CHIEF COMPLAINT:  Chief Complaint   Patient presents with    Right Hand - Pain, Fracture     In a splint. Happened on the 27th. Punched a wall.        SUBJECTIVE:  Michelle Matos is a 49 y.o. year old RHD female who presents to the office for a right hand injury. Patient punched a wall on 4/27/25, injuring her right hand. She presented to the ED after injury, at which time x-rays were performed and she was found to have a 5th metacarpal fracture. She was placed into a ulnar gutter splint. She tolerated splinting well. She notes pain to her small and ring fingers. She notes limited ROM. She has a history of a wrist fracture which resulted in a ulnar resection. Patient is currently residing at [facility].       Pain/symptom timing:  Worse during the day when active  Pain/symptom context:  Worse with activites and work  Pain/symptom modifying factors:   Rest makes better, activities make worse  Pain/symptom associated signs/symptoms: none    Prior treatment   NSAIDsNo   Injections No   Bracing/Orthotics No    Physical Therapy No     I have personally reviewed all the relevant PMH, PSH, SH, FH, Medications and allergies      PAST MEDICAL HISTORY:  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     Neuralgia     OAB (overactive bladder)     Obesity     Postsurgical malabsorption     PTSD (post-traumatic stress disorder)     Seizures (HCC)     Spinal stenosis of lumbar region     Tarsal tunnel syndrome     Tendinitis of right rotator cuff     Vitamin D deficiency        PAST SURGICAL HISTORY:  Past Surgical History:   Procedure Laterality Date    APPENDECTOMY      BLADDER SURGERY      CHOLECYSTECTOMY      GASTRECTOMY      gastric sleeve    GASTRIC RESTRICTION SURGERY      gastric sleeve in 2018    HEMORROIDECTOMY      HYSTERECTOMY      TUBAL LIGATION         FAMILY HISTORY:  Family History   Problem Relation Age of Onset    Diabetes Mother         MELLITUS    Diabetes Father         MELLITUS    Diabetes Other        SOCIAL HISTORY:  Social History     Tobacco Use    Smoking status: Former     Current packs/day: 0.00     Types: Cigarettes     Quit date: 2023     Years since quittin.0     Passive exposure: Past    Smokeless tobacco: Never   Vaping Use    Vaping status: Never Used   Substance Use Topics    Alcohol use: Yes     Comment: ocassionally    Drug use: Not Currently     Types: Cocaine     Comment: valente       MEDICATIONS:    Current Outpatient  Medications:     ascorbic acid (VITAMIN C) 500 MG tablet, Take 1 tablet (500 mg total) by mouth 2 (two) times a day, Disp: 60 tablet, Rfl: 0    atorvastatin (LIPITOR) 10 mg tablet, Take 1 tablet (10 mg total) by mouth daily with dinner, Disp: 30 tablet, Rfl: 0    cyanocobalamin (VITAMIN B-12) 500 MCG tablet, Take 1 tablet (500 mcg total) by mouth daily, Disp: 30 tablet, Rfl: 0    gabapentin (NEURONTIN) 300 mg capsule, Take 1 capsule (300 mg total) by mouth 3 (three) times a day, Disp: 90 capsule, Rfl: 0    lamoTRIgine (LaMICtal) 25 mg tablet, Take 1 tablet (25 mg total) by mouth daily, Disp: 30 tablet, Rfl: 0    magnesium Oxide (MAG-OX) 400 mg TABS, Take 1 tablet (400 mg total) by mouth 2 (two) times a day, Disp: 60 tablet, Rfl: 0    OLANZapine (ZyPREXA) 15 mg tablet, Take 1 tablet (15 mg total) by mouth daily at bedtime, Disp: 30 tablet, Rfl: 0    senna-docusate sodium (SENOKOT S) 8.6-50 mg per tablet, Take 1 tablet by mouth 2 (two) times a day, Disp: 60 tablet, Rfl: 0    traZODone (DESYREL) 100 mg tablet, Take 1 tablet (100 mg total) by mouth daily at bedtime, Disp: 30 tablet, Rfl: 0    venlafaxine (EFFEXOR-XR) 150 mg 24 hr capsule, Take 1 capsule (150 mg total) by mouth daily, Disp: 30 capsule, Rfl: 0    ergocalciferol (VITAMIN D2) 50,000 units, Take 1 capsule (50,000 Units total) by mouth once a week for 6 doses, Disp: 4 capsule, Rfl: 1    ferrous sulfate 325 (65 Fe) mg tablet, Take 1 tablet (325 mg total) by mouth every other day, Disp: 15 tablet, Rfl: 0    fluticasone-vilanterol 200-25 mcg/actuation inhaler, Inhale 1 puff once daily Rinse mouth after use., Disp: 60 blister, Rfl: 0    pantoprazole (PROTONIX) 40 mg tablet, Take 1 tablet (40 mg total) by mouth daily in the early morning, Disp: 30 tablet, Rfl: 0    ALLERGIES:  Allergies   Allergen Reactions    Polyethylene Glycol Vomiting    Golytely [Peg 3350-Kcl-Nabcb-Nacl-Nasulf] Vomiting    Risperidone     Depakote [Valproic Acid] Rash           REVIEW OF  SYSTEMS:  Review of Systems   Constitutional:  Negative for chills, fever and unexpected weight change.   HENT:  Negative for hearing loss, nosebleeds and sore throat.    Eyes:  Negative for pain, redness and visual disturbance.   Respiratory:  Negative for cough, shortness of breath and wheezing.    Cardiovascular:  Negative for chest pain, palpitations and leg swelling.   Gastrointestinal:  Negative for abdominal pain, nausea and vomiting.   Endocrine: Negative for polydipsia and polyuria.   Genitourinary:  Negative for difficulty urinating and hematuria.   Musculoskeletal:  Negative for arthralgias, joint swelling and myalgias.   Skin:  Negative for rash and wound.   Neurological:  Negative for dizziness, numbness and headaches.   Psychiatric/Behavioral:  Negative for decreased concentration, dysphoric mood and suicidal ideas. The patient is not nervous/anxious.        VITALS:  There were no vitals filed for this visit.    LABS:      _____________________________________________________  PHYSICAL EXAMINATION:  General: well developed and well nourished, alert, oriented times 3, and appears comfortable  Psychiatric: Normal  HEENT: Normocephalic, Atraumatic Trachea Midline, No torticollis  Pulmonary: No audible wheezing or respiratory distress   Abdomen/GI: Non tender, non distended   Cardiovascular: No pitting edema, 2+ radial pulse   Skin: No masses, erythema, lacerations, fluctation, ulcerations  Neurovascular: Sensation Intact to the Median, Ulnar, Radial Nerve, Motor Intact to the Median, Ulnar, Radial Nerve, and Pulses Intact  Musculoskeletal: Normal, except as noted in detailed exam and in HPI.      MUSCULOSKELETAL EXAMINATION:    Right hand:     No erythema   Ecchymosis noted   Mild edema noted   Tenderness over fracture site   No obvious mal rotation   Limited flexion and extension secondary to pain and stiffness   ___________________________________________________  STUDIES REVIEWED:  I have personally  reviewed and interpreted  AP lateral and oblique radiographs of right hand  which demonstrate fifth metacarpal neck fracture, with flexion deformity       LABS REVIEWED:    HgA1c:   Lab Results   Component Value Date    HGBA1C 5.7 (H) 11/14/2024     BMP:   Lab Results   Component Value Date    GLUCOSE 113 07/11/2015    CALCIUM 8.3 (L) 04/28/2025     07/11/2015    K 4.2 04/28/2025    CO2 29 04/28/2025     04/28/2025    BUN 14 04/28/2025    CREATININE 0.62 04/28/2025               PROCEDURES PERFORMED:  Procedures  -    _____________________________________________________      Scribe Attestation      I,:  Linda Howell MA am acting as a scribe while in the presence of the attending physician.:       I,:  Alfonso Monahan MD personally performed the services described in this documentation    as scribed in my presence.:

## 2025-05-01 NOTE — PROGRESS NOTES
Alfonso Monahan M.D.  Attending, Orthopaedic Surgery  Hand, Wrist, and Elbow Surgery  [facility]      ORTHOPAEDIC HAND, WRIST, AND ELBOW OFFICE  VISIT       ASSESSMENT/PLAN:        Assessment & Plan  Closed displaced fracture of neck of fifth metacarpal bone of right hand, initial encounter  X-rays were reviewed demonstrating a right 5th metacarpal neck fracture. No obvious 4th metacarpal fracture. Discussed the possibility of a nondisplaced 4th metacarpal fracture that is not evident on her current x-ray. We discussed this may also be a bone contusion.     It was discussed with the patient that her fracture is in acceptable alignment for non operative management.   We discussed once the fracture is healed, she may notice a drop knuckle appearance, extensor lag if she was to independently raise the small finger as well as possible metacarpal head prominence in the palm with tight . We discussed this will likely not cause any functional issues.  We discussed immobilization in a hand based ulnar gutter splint up to the PIP joint, IP's free and starting OT for ROM exercises of the IP joints and other fingers due to stiffness.    Patient is concerned for cosmetic reasons and does not wish to have a drop knuckle appearance. Patient is adamant she would like surgery even at the cost of potential decreased hand function secondary to stiffness as patient is already stiff and delaying OT.   Surgery was discussed in the form of a right 5th metacarpal closed reduction percutaneous pinning VS open reduction internal fixation. If pins are used, they will be pulled in the office approx. 4 weeks post op.   Common risks include bleeding, infection, injury to nearby structures (vessels, nerves, tendons, ligaments), blood clots (deep vein thrombosis / pulmonary embolus), and wound healing problems. Infection may result in an infection of the blood stream and/or the need for oral or intravenous  antibiotics. Additional risks include impaired limb function, loss of limb and/or failure to achieve desired results. With bone work, there is a risk of delayed or lack of bone healing, bones healing in a wrong position, resulting in malunion or non-union of the bone, joint dislocation, or limb length discrepancy. There is a risk for unacceptable cosmetic results (such as scarring). Surgery of the hand is associated with bleeding, infection, scar, pain, wound healing problems, damage to nerves, arteries, tendons, ligaments, failure to give desired result, instability, nonunion, malunion hardware issues, and need for more surgery. We discussed main risk is stiffness.   She elected to proceed with surgery and informed electronic surgical consent was signed.   She was placed into a ulnar gutter splint, to be kept clean, dry and intact.   She will be immobilized in a post op dressing/splint, which will  be removed 10-14 days post op.   I will see her back in the office 10-14 days post op for suture removal and right hand x-rays.    Orders:    Ambulatory Referral to Orthopedic Surgery    Case request operating room: Right 5th metacarpal closed reduction percutaneous pinning vs open reduction internal fixation; Standing    Basic metabolic panel; Future    EKG 12 lead; Future    Pre-procedure lab exam    Orders:    Basic metabolic panel; Future    EKG 12 lead; Future      The patient verbalized understanding of exam findings and treatment plan. We engaged in the shared decision-making process and treatment options were discussed at length with the patient. Surgical and conservative management discussed today along with risks and benefits.    Diagnoses and all orders for this visit:    Closed nondisplaced fracture of neck of fifth metacarpal bone of right hand, initial encounter  -     Ambulatory Referral to Orthopedic Surgery      Follow Up:  Return for 10-14 days, post op.    To Do Next Visit:  X-rays of the  right  hand,  Cast/splint off prior to x-ray, and Sutures out      General Discussions:  Fracture - Nonoperative Care: The physiology of a fractured bone was discussed with the patient today.  With non-displaced or minimally displaced fractures, conservative treatment such as casting or splinting often results in a functional recovery.  Typically, these fractures are immobilized in either a cast or splint depending on the pattern.  Radiographs are typically taken at intervals throughout the fracture healing to ensure that reduction or alignment is not lost.  If the fracture loses its alignment, surgical intervention may be required to stabilize it.  Medical conditions such as diabetes, osteoporosis, vitamin D deficiency, and a history of or exposure to smoking may delay or prevent fracture healing. Options between cast/splint immobilization and surgical treatment were offered and the risks and benefits of both were discussed.     Operative Discussions:  Fracture Operative Treatment: The physiology of a fractured bone was discussed with the patient today.  With displaced fractures, operative treatment often results in a functional recovery.  Typically, these fractures undergo reduction either through percutaneous or open methods depending on the location and fracture pattern.  Radiographs are typically taken at intervals throughout the fracture healing ensure maintenance of reduction and alignment.  If the fracture loses its alignment, revision surgery may be required.  Medical conditions such as diabetes, osteoporosis, vitamin D deficiency, and a history of or exposure to smoking may delay or prevent fracture healing.  The risks and benefits of the procedure were explained to the patient, which include, but are not limited to: Bleeding, infection, recurrence, pain, scar, malunion, nonunion, damage to tendons, damage to nerves, and damage to blood vessels, and complications related to anesthesia, failure to give desire result,  need for more surgery.  These risks, along with alternative conservative treatment options, and postoperative protocols were voiced back and understood by the patient.  All questions were answered to the patient's satisfaction.  The patient agrees to comply with a standard postoperative protocol, and is willing to proceed.  Education was provided via written and auditory forms.  There were no barriers to learning.  Written handouts regarding wound care, incision and scar care, and general preoperative information was provided to the patient.  Prior to surgery, the patient may be requested to stop all anti-inflammatory medications.  Prophylactic aspirin, Plavix, and Coumadin may be allowed to be continued.  Medications including vitamin E., ginkgo, and fish oil are requested to be stopped approximately one week prior to surgery.  Hypertensive medications and beta blockers, if taken, should be continued.      ____________________________________________________________________________________________________________________________________________      CHIEF COMPLAINT:  Chief Complaint   Patient presents with    Right Hand - Pain, Fracture     In a splint. Happened on the 27th. Punched a wall.        SUBJECTIVE:  Michelle Matos is a 49 y.o. year old RHD female who presents to the office for a right hand injury. Patient punched a wall on 4/27/25, injuring her right hand. She presented to the ED after injury, at which time x-rays were performed and she was found to have a 5th metacarpal fracture. She was placed into a ulnar gutter splint. She tolerated splinting well. She notes pain to her small and ring fingers. She notes limited ROM. She has a history of a wrist fracture which resulted in a ulnar resection. Patient is currently residing at [facility].       Pain/symptom timing:  Worse during the day when active  Pain/symptom context:  Worse with activites and work  Pain/symptom modifying factors:   Rest makes better, activities make worse  Pain/symptom associated signs/symptoms: none    Prior treatment   NSAIDsNo   Injections No   Bracing/Orthotics No    Physical Therapy No     I have personally reviewed all the relevant PMH, PSH, SH, FH, Medications and allergies      PAST MEDICAL HISTORY:  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     Neuralgia     OAB (overactive bladder)     Obesity     Postsurgical malabsorption     PTSD (post-traumatic stress disorder)     Seizures (HCC)     Spinal stenosis of lumbar region     Tarsal tunnel syndrome     Tendinitis of right rotator cuff     Vitamin D deficiency        PAST SURGICAL HISTORY:  Past Surgical History:   Procedure Laterality Date    APPENDECTOMY      BLADDER SURGERY      CHOLECYSTECTOMY      GASTRECTOMY      gastric sleeve    GASTRIC RESTRICTION SURGERY      gastric sleeve in 2018    HEMORROIDECTOMY      HYSTERECTOMY      TUBAL LIGATION         FAMILY HISTORY:  Family History   Problem Relation Age of Onset    Diabetes Mother         MELLITUS    Diabetes Father         MELLITUS    Diabetes Other        SOCIAL HISTORY:  Social History     Tobacco Use    Smoking status: Former     Current packs/day: 0.00     Types: Cigarettes     Quit date: 2023     Years since quittin.0     Passive exposure: Past    Smokeless tobacco: Never   Vaping Use    Vaping status: Never Used   Substance Use Topics    Alcohol use: Yes     Comment: ocassionally    Drug use: Not Currently     Types: Cocaine     Comment: valente       MEDICATIONS:    Current Outpatient  Medications:     ascorbic acid (VITAMIN C) 500 MG tablet, Take 1 tablet (500 mg total) by mouth 2 (two) times a day, Disp: 60 tablet, Rfl: 0    atorvastatin (LIPITOR) 10 mg tablet, Take 1 tablet (10 mg total) by mouth daily with dinner, Disp: 30 tablet, Rfl: 0    cyanocobalamin (VITAMIN B-12) 500 MCG tablet, Take 1 tablet (500 mcg total) by mouth daily, Disp: 30 tablet, Rfl: 0    gabapentin (NEURONTIN) 300 mg capsule, Take 1 capsule (300 mg total) by mouth 3 (three) times a day, Disp: 90 capsule, Rfl: 0    lamoTRIgine (LaMICtal) 25 mg tablet, Take 1 tablet (25 mg total) by mouth daily, Disp: 30 tablet, Rfl: 0    magnesium Oxide (MAG-OX) 400 mg TABS, Take 1 tablet (400 mg total) by mouth 2 (two) times a day, Disp: 60 tablet, Rfl: 0    OLANZapine (ZyPREXA) 15 mg tablet, Take 1 tablet (15 mg total) by mouth daily at bedtime, Disp: 30 tablet, Rfl: 0    senna-docusate sodium (SENOKOT S) 8.6-50 mg per tablet, Take 1 tablet by mouth 2 (two) times a day, Disp: 60 tablet, Rfl: 0    traZODone (DESYREL) 100 mg tablet, Take 1 tablet (100 mg total) by mouth daily at bedtime, Disp: 30 tablet, Rfl: 0    venlafaxine (EFFEXOR-XR) 150 mg 24 hr capsule, Take 1 capsule (150 mg total) by mouth daily, Disp: 30 capsule, Rfl: 0    ergocalciferol (VITAMIN D2) 50,000 units, Take 1 capsule (50,000 Units total) by mouth once a week for 6 doses, Disp: 4 capsule, Rfl: 1    ferrous sulfate 325 (65 Fe) mg tablet, Take 1 tablet (325 mg total) by mouth every other day, Disp: 15 tablet, Rfl: 0    fluticasone-vilanterol 200-25 mcg/actuation inhaler, Inhale 1 puff once daily Rinse mouth after use., Disp: 60 blister, Rfl: 0    pantoprazole (PROTONIX) 40 mg tablet, Take 1 tablet (40 mg total) by mouth daily in the early morning, Disp: 30 tablet, Rfl: 0    ALLERGIES:  Allergies   Allergen Reactions    Polyethylene Glycol Vomiting    Golytely [Peg 3350-Kcl-Nabcb-Nacl-Nasulf] Vomiting    Risperidone     Depakote [Valproic Acid] Rash           REVIEW OF  SYSTEMS:  Review of Systems   Constitutional:  Negative for chills, fever and unexpected weight change.   HENT:  Negative for hearing loss, nosebleeds and sore throat.    Eyes:  Negative for pain, redness and visual disturbance.   Respiratory:  Negative for cough, shortness of breath and wheezing.    Cardiovascular:  Negative for chest pain, palpitations and leg swelling.   Gastrointestinal:  Negative for abdominal pain, nausea and vomiting.   Endocrine: Negative for polydipsia and polyuria.   Genitourinary:  Negative for difficulty urinating and hematuria.   Musculoskeletal:  Negative for arthralgias, joint swelling and myalgias.   Skin:  Negative for rash and wound.   Neurological:  Negative for dizziness, numbness and headaches.   Psychiatric/Behavioral:  Negative for decreased concentration, dysphoric mood and suicidal ideas. The patient is not nervous/anxious.        VITALS:  There were no vitals filed for this visit.    LABS:      _____________________________________________________  PHYSICAL EXAMINATION:  General: well developed and well nourished, alert, oriented times 3, and appears comfortable  Psychiatric: Normal  HEENT: Normocephalic, Atraumatic Trachea Midline, No torticollis  Pulmonary: No audible wheezing or respiratory distress   Abdomen/GI: Non tender, non distended   Cardiovascular: No pitting edema, 2+ radial pulse   Skin: No masses, erythema, lacerations, fluctation, ulcerations  Neurovascular: Sensation Intact to the Median, Ulnar, Radial Nerve, Motor Intact to the Median, Ulnar, Radial Nerve, and Pulses Intact  Musculoskeletal: Normal, except as noted in detailed exam and in HPI.      MUSCULOSKELETAL EXAMINATION:    Right hand:     No erythema   Ecchymosis noted   Mild edema noted   Tenderness over fracture site   No obvious mal rotation   Limited flexion and extension secondary to pain and stiffness   ___________________________________________________  STUDIES REVIEWED:  I have personally  reviewed and interpreted  AP lateral and oblique radiographs of right hand  which demonstrate fifth metacarpal neck fracture, with flexion deformity       LABS REVIEWED:    HgA1c:   Lab Results   Component Value Date    HGBA1C 5.7 (H) 11/14/2024     BMP:   Lab Results   Component Value Date    GLUCOSE 113 07/11/2015    CALCIUM 8.3 (L) 04/28/2025     07/11/2015    K 4.2 04/28/2025    CO2 29 04/28/2025     04/28/2025    BUN 14 04/28/2025    CREATININE 0.62 04/28/2025               PROCEDURES PERFORMED:  Procedures  -    _____________________________________________________      Scribe Attestation      I,:  Linda Howell MA am acting as a scribe while in the presence of the attending physician.:       I,:  Alfonso Monahan MD personally performed the services described in this documentation    as scribed in my presence.:

## 2025-05-02 ENCOUNTER — ANESTHESIA (OUTPATIENT)
Age: 50
End: 2025-05-02
Payer: MEDICARE

## 2025-05-02 ENCOUNTER — HOSPITAL ENCOUNTER (OUTPATIENT)
Age: 50
Discharge: HOME | End: 2025-05-02
Payer: MEDICARE

## 2025-05-02 ENCOUNTER — ANESTHESIA EVENT (OUTPATIENT)
Age: 50
End: 2025-05-02
Payer: MEDICARE

## 2025-05-02 ENCOUNTER — HOSPITAL ENCOUNTER (OUTPATIENT)
Age: 50
Setting detail: OUTPATIENT SURGERY
Discharge: HOME | End: 2025-05-02
Attending: SURGERY | Admitting: SURGERY
Payer: MEDICARE

## 2025-05-02 VITALS
SYSTOLIC BLOOD PRESSURE: 114 MMHG | BODY MASS INDEX: 30.73 KG/M2 | DIASTOLIC BLOOD PRESSURE: 68 MMHG | OXYGEN SATURATION: 94 % | TEMPERATURE: 98 F | HEART RATE: 65 BPM | WEIGHT: 162.8 LBS | RESPIRATION RATE: 18 BRPM | HEIGHT: 61 IN

## 2025-05-02 DIAGNOSIS — S62.366D CLOSED NONDISPLACED FRACTURE OF NECK OF FIFTH METACARPAL BONE OF RIGHT HAND WITH ROUTINE HEALING, SUBSEQUENT ENCOUNTER: Primary | ICD-10-CM

## 2025-05-02 DIAGNOSIS — F31.5 BIPOLAR I DISORDER, MOST RECENT EPISODE DEPRESSED, SEVERE WITH PSYCHOTIC FEATURES (HCC): Chronic | ICD-10-CM

## 2025-05-02 DIAGNOSIS — S62.366A CLOSED NONDISPLACED FRACTURE OF NECK OF FIFTH METACARPAL BONE OF RIGHT HAND, INITIAL ENCOUNTER: ICD-10-CM

## 2025-05-02 PROCEDURE — 26608 TREAT METACARPAL FRACTURE: CPT | Performed by: SURGERY

## 2025-05-02 PROCEDURE — 73130 X-RAY EXAM OF HAND: CPT

## 2025-05-02 PROCEDURE — 26608 TREAT METACARPAL FRACTURE: CPT

## 2025-05-02 PROCEDURE — C1713 ANCHOR/SCREW BN/BN,TIS/BN: HCPCS | Performed by: SURGERY

## 2025-05-02 DEVICE — C-WIRE PAK DOUBLE ENDED ORTHOPAEDIC WIRE, SPADE, .045" (1.14 MM)
Type: IMPLANTABLE DEVICE | Site: HAND | Status: FUNCTIONAL
Brand: C-WIRE

## 2025-05-02 RX ORDER — FENTANYL CITRATE 50 UG/ML
INJECTION, SOLUTION INTRAMUSCULAR; INTRAVENOUS
Status: COMPLETED | OUTPATIENT
Start: 2025-05-02 | End: 2025-05-02

## 2025-05-02 RX ORDER — SENNOSIDES 8.6 MG
650 CAPSULE ORAL 3 TIMES DAILY
Qty: 42 TABLET | Refills: 0 | Status: CANCELLED | OUTPATIENT
Start: 2025-05-02 | End: 2025-05-16

## 2025-05-02 RX ORDER — MIDAZOLAM HYDROCHLORIDE 2 MG/2ML
INJECTION, SOLUTION INTRAMUSCULAR; INTRAVENOUS
Status: COMPLETED | OUTPATIENT
Start: 2025-05-02 | End: 2025-05-02

## 2025-05-02 RX ORDER — PROPOFOL 10 MG/ML
INJECTION, EMULSION INTRAVENOUS AS NEEDED
Status: DISCONTINUED | OUTPATIENT
Start: 2025-05-02 | End: 2025-05-02

## 2025-05-02 RX ORDER — HYDROCODONE BITARTRATE AND ACETAMINOPHEN 5; 325 MG/1; MG/1
1 TABLET ORAL EVERY 6 HOURS PRN
Qty: 8 TABLET | Refills: 0 | Status: SHIPPED | OUTPATIENT
Start: 2025-05-02 | End: 2025-05-02

## 2025-05-02 RX ORDER — SENNOSIDES 8.6 MG
650 CAPSULE ORAL EVERY 8 HOURS PRN
Qty: 30 TABLET | Refills: 0 | Status: SHIPPED | OUTPATIENT
Start: 2025-05-02

## 2025-05-02 RX ORDER — BUPIVACAINE HYDROCHLORIDE 5 MG/ML
INJECTION, SOLUTION EPIDURAL; INTRACAUDAL; PERINEURAL
Status: COMPLETED | OUTPATIENT
Start: 2025-05-02 | End: 2025-05-02

## 2025-05-02 RX ORDER — BUPRENORPHINE AND NALOXONE 8; 2 MG/1; MG/1
FILM, SOLUBLE BUCCAL; SUBLINGUAL 2 TIMES DAILY
COMMUNITY

## 2025-05-02 RX ORDER — ONDANSETRON 2 MG/ML
4 INJECTION INTRAMUSCULAR; INTRAVENOUS ONCE AS NEEDED
Status: DISCONTINUED | OUTPATIENT
Start: 2025-05-02 | End: 2025-05-02 | Stop reason: HOSPADM

## 2025-05-02 RX ORDER — CEFAZOLIN SODIUM 2 G/50ML
2000 SOLUTION INTRAVENOUS ONCE
Status: COMPLETED | OUTPATIENT
Start: 2025-05-02 | End: 2025-05-02

## 2025-05-02 RX ORDER — NAPROXEN SODIUM 220 MG/1
220 TABLET, FILM COATED ORAL EVERY 12 HOURS PRN
Qty: 10 TABLET | Refills: 0 | Status: SHIPPED | OUTPATIENT
Start: 2025-05-02 | End: 2025-05-07

## 2025-05-02 RX ORDER — HYDROCODONE BITARTRATE AND ACETAMINOPHEN 5; 325 MG/1; MG/1
1 TABLET ORAL EVERY 6 HOURS PRN
Qty: 8 TABLET | Refills: 0 | Status: SHIPPED | OUTPATIENT
Start: 2025-05-02 | End: 2025-05-12

## 2025-05-02 RX ORDER — SODIUM CHLORIDE 9 MG/ML
75 INJECTION, SOLUTION INTRAVENOUS CONTINUOUS
Status: DISCONTINUED | OUTPATIENT
Start: 2025-05-02 | End: 2025-05-02 | Stop reason: HOSPADM

## 2025-05-02 RX ADMIN — MIDAZOLAM 2 MG: 1 INJECTION INTRAMUSCULAR; INTRAVENOUS at 13:05

## 2025-05-02 RX ADMIN — BUPIVACAINE HYDROCHLORIDE 15 ML: 5 INJECTION, SOLUTION EPIDURAL; INTRACAUDAL; PERINEURAL at 13:15

## 2025-05-02 RX ADMIN — CEFAZOLIN SODIUM 2000 MG: 2 SOLUTION INTRAVENOUS at 13:52

## 2025-05-02 RX ADMIN — PROPOFOL 130 MCG/KG/MIN: 10 INJECTION, EMULSION INTRAVENOUS at 13:53

## 2025-05-02 RX ADMIN — FENTANYL CITRATE 100 MCG: 50 INJECTION INTRAMUSCULAR; INTRAVENOUS at 13:10

## 2025-05-02 RX ADMIN — BUPIVACAINE 10 ML: 13.3 INJECTION, SUSPENSION, LIPOSOMAL INFILTRATION at 13:15

## 2025-05-02 RX ADMIN — PROPOFOL 30 MG: 10 INJECTION, EMULSION INTRAVENOUS at 13:54

## 2025-05-02 RX ADMIN — SODIUM CHLORIDE 75 ML/HR: 0.9 INJECTION, SOLUTION INTRAVENOUS at 11:49

## 2025-05-02 RX ADMIN — PROPOFOL 50 MG: 10 INJECTION, EMULSION INTRAVENOUS at 13:52

## 2025-05-02 RX ADMIN — PROPOFOL 20 MG: 10 INJECTION, EMULSION INTRAVENOUS at 14:02

## 2025-05-02 NOTE — OP NOTE
OPERATIVE REPORT  PATIENT NAME: Michelle Matos  :  1975  MRN: [id number]  Pt Location: [facility] OR    SURGERY DATE: 25    Surgeons and Role:     * Alfonso Monahan MD - Primary     * Rafael Rutherford PA-C - Assisting    Pre-Op Diagnosis:  Closed nondisplaced fracture of neck of fifth metacarpal bone of right hand, initial encounter [S62.366A]    Post-Op Diagnosis Codes:     * Closed nondisplaced fracture of neck of fifth metacarpal bone of right hand, initial encounter [S62.366A]    Procedure(s):  Right 5th metacarpal closed reduction percutaneous pinning (Right)    Specimen(s):  No specimens collected during this procedure.    Estimated Blood Loss:   Minimal    Anesthesia Type:   Regional with Sedation    IMPLANTS:  Implant Name Type Inv. Item Serial No.  Lot No. LRB No. Used Action   LOG [id number] - TRIMED WRIST FIXATION SYSTEM - 1           C-WIRE GREEN .045 - [id number]  C-WIRE GREEN .045  [employer] [id number] Right 1 Implanted   C-WIRE GREEN .045 - [id number]  C-WIRE GREEN .045  [employer] [id number] Right 1 Implanted       PERIOPERATIVE ANTIBIOTICS:    cefazolin, 2 grams    Tourniquet Time: 17 min  at 250 mmHg          Operative Indications:  The patient has a history of fifth metacarpal neck fracture with flexion deformity that was recalcitrant to conservative management.  The decision was made to bring the patient to the operating room for fifth metacarpal closed versus open reduction total fixation.  Risks of the procedure were explained which include, but are not limited to bleeding; infection; damage to nerves, arteries,veins, tendons; scar; pain; need for reoperation; failure to give desired result; and risks of anaesthesia.  All questions were answered to satisfaction and they were willing to proceed.         Operative Findings:  Fifth metacarpal neck fracture with ulnar-sided comminution    Complications:   None    Procedure and Technique:  After the patient, site, and procedure were  identified, the patient was brought into the operating room in a supine position.  Regional anaesthesia and sedation were provided.  A well padded tourniquet was applied to the extremity, set at 250 mmHg.  The  right upper extremity was then prepped and drapped in a normal, sterile, orthopedic fashion.    Orthogonal fluoroscopic films were obtained which demonstrated flexion deformity at the fifth metacarpal neck fracture.  Using a dorsally directed force the metacarpal neck was reduced and demonstrated near anatomic alignment.  A 0.045 K wire was introduced from the ulnar aspect of the collateral recess and advanced on oscillate under live fluoroscopic guidance down the metacarpal shaft.  A second 0.045 K wire was then introduced from the radial aspect of the collateral recess, and while maintaining reduction was advanced on oscillate in a retrograde fashion to cross pin the fracture site.  Diagonal fluoroscopic films demonstrated anatomic alignment with appropriately positioned K wires.  Stress testing did not demonstrate any significant motion at the fracture site.  The K wires were bent and cut.   At the completion of the procedure, hemostasis was obtained with cautery and direct pressure.  The wounds were copiously irrigated with sterile solution. .  Sterile dressings were applied, including Xeroform, gauze, tweeners, webril, ACE, Sling, and ulnar gutter splint.  Please note, all sponge, needle, and instrument counts were correct prior to closure.  Loupe magnification was utilized.  The patient tolerated the procedure well.     I was present for the entire procedure., A qualified resident physician was not available., and A physician assistant was required during the procedure for retraction, tissue handling, dissection and suturing.    Patient Disposition:  PACU     SIGNATURE: Alfonso Monahan MD  DATE: 05/02/25  TIME: 2:35 PM

## 2025-05-02 NOTE — DISCHARGE INSTR - AVS FIRST PAGE
Post Operative Instructions    You have had surgery on your arm today, please read and follow the information below:  Elevate your hand above your elbow during the next 24-48 hours to help with swelling.  Place your hand and arm over your head with motion at your shoulder three times a day.  Do not apply any cream/ointment/oil to your incisions including antibiotics.  Do not soak your hands in standing water (dishwater, tubs, Jacuzzi's, pools, etc.) until given permission (typically 2-3 weeks after injury)    Call the office if you notice any:  Increased numbness or tingling of your hand or fingers that is not relieved with elevation.  Increasing pain that is not controlled with medication.  Difficulty chewing, breathing, swallowing.  Chest pains or shortness of breath.  Fever over 101.4 degrees.    Bandage: Please keep bandages clean and dry. Do NOT remove bandage until follow-up appointment.    Please do NOT put any topical agents on the surgical wound including neosporin, peroxide, tea tree oil, vitamin E, etc. as these can delay wound healing.    Motion: Move fingers lightly 5 times a day, DO NOT move any splinted fingers.    Weight bearing status: The operated extremity should be non-weight bearing until further notice.    Ice: Ice for 10 minutes every hour as needed for swelling x 24 hours.    Sling: Please use your sling while your arm is numb from the block. When your arm is FULLY awake again, you no longer need this and may use your sling as needed for comfort. While using the sling, make sure to move your shoulder throughout the day to prevent stiffness here.     Pain medication:   Naproxen 220 mg two times a day (this is over the counter!)  *do not take this medication if you were told by your doctor that you cannot take anti-inflammatories or NSAIDS  Tylenol Extended Release 650 mg every 8 hours (this is over the counter!)   Norco/Hydrocodone one tab every 6 hours ONLY AS NEEDED for severe pain         Follow-up Appointment: 10-14 days with Dr Monahan        Please call the office if you have any questions or concerns regarding your post-operative care.

## 2025-05-02 NOTE — ANESTHESIA POSTPROCEDURE EVALUATION
Post-Op Assessment Note    CV Status:  Stable  Pain Score: 0    Pain management: adequate       Mental Status:  Arousable   Hydration Status:  Euvolemic   PONV Controlled:  Controlled   Airway Patency:  Patent     Post Op Vitals Reviewed: Yes    No anethesia notable event occurred.    Staff: CRNA           Last Filed PACU Vitals:  Vitals Value Taken Time   Temp 98.1 °F (36.7 °C) 05/02/25 1427   Pulse 69 05/02/25 1427   BP 93/51 05/02/25 1427   Resp 28 05/02/25 1427   SpO2 95 % 05/02/25 1427

## 2025-05-02 NOTE — ANESTHESIA PROCEDURE NOTES
Peripheral Block    Patient location during procedure: holding area  Start time: 5/2/2025 1:00 PM  Reason for block: at surgeon's request and post-op pain management  Staffing  Performed by: Yunior Melendez MD  Authorized by: Yunior Melendez MD    Preanesthetic Checklist  Completed: patient identified, IV checked, site marked, risks and benefits discussed, surgical consent, monitors and equipment checked, pre-op evaluation and timeout performed  Peripheral Block  Patient position: sitting  Prep: ChloraPrep  Patient monitoring: frequent blood pressure checks, continuous pulse oximetry and heart rate  Block type: Supraclavicular  Laterality: right  Injection technique: single-shot  Procedures: ultrasound guided, Ultrasound guidance required for the procedure to increase accuracy and safety of medication placement and decrease risk of complications.  Ultrasound permanent image saved  midazolam (VERSED) injection 0.5 mg - Intravenous   2 mg - 5/2/2025 1:05:00 PM  fentanyl citrate (PF) 100 MCG/2ML 50 mcg - Intravenous   100 mcg - 5/2/2025 1:10:00 PM  bupivacaine (PF) (MARCAINE) 0.5 % injection 20 mL - Perineural   15 mL - 5/2/2025 1:15:00 PM  bupivacaine liposomal (EXPAREL) 1.3 % injection 20 mL - Perineural   10 mL - 5/2/2025 1:15:00 PM  Needle  Needle type: Stimuplex   Needle gauge: 22 G  Needle length: 2 in  Needle localization: anatomical landmarks and ultrasound guidance  Assessment  Injection assessment: incremental injection, frequent aspiration, injected with ease, negative aspiration, negative for heart rate change, no paresthesia on injection, no symptoms of intraneural/intravenous injection and needle tip visualized at all times  Paresthesia pain: none  Post-procedure:  site cleaned  patient tolerated the procedure well with no immediate complications

## 2025-05-02 NOTE — ANESTHESIA PREPROCEDURE EVALUATION
Procedure:  Right 5th metacarpal closed reduction percutaneous pinning vs open reduction internal fixation (Right: Hand)    H/o fentanyl abuse in rehab, recently started suboxone bid but states that she did not take it this morning   H/o seizure disorder, well controlled on gabapentin which she took morning dose   H/o PONV       Relevant Problems   CARDIO   (+) Migraine   (+) Mixed hyperlipidemia      NEURO/PSYCH   (+) ZACHARY (generalized anxiety disorder)   (+) Migraine   (+) Post-traumatic stress disorder, chronic   (+) Seizures (HCC)      PULMONARY   (+) Asthma        Physical Exam    Airway    Mallampati score: III  TM Distance: >3 FB  Neck ROM: full     Dental   Comment: Poor dental hygiene, denies any loose teeth      Cardiovascular  Cardiovascular exam normal    Pulmonary  Pulmonary exam normal     Other Findings  post-pubertal.    Anesthesia Plan  ASA Score- 3     Anesthesia Type- regional with ASA Monitors.         Additional Monitors:     Airway Plan:            Plan Factors-Exercise tolerance (METS): >4 METS.    Chart reviewed. EKG reviewed.  Existing labs reviewed. Patient summary reviewed.    Patient is a current smoker.  Patient instructed to abstain from smoking on day of procedure. Patient smoked on day of surgery.            Induction-     Postoperative Plan-         Informed Consent- Anesthetic plan and risks discussed with patient.  I personally reviewed this patient with the CRNA. Discussed and agreed on the Anesthesia Plan with the CRNA..    NPO Status:  Vitals Value Taken Time   Date of last liquid 05/02/25 05/02/25 1137   Time of last liquid 0630 05/02/25 1137   Date of last solid 05/01/25 05/02/25 1137   Time of last solid 2045 05/02/25 1137

## 2025-05-02 NOTE — ANESTHESIA POSTPROCEDURE EVALUATION
Post-Op Assessment Note    CV Status:  Stable    Pain management: adequate       Mental Status:  Alert and awake   Hydration Status:  Euvolemic   PONV Controlled:  Controlled   Airway Patency:  Patent     Post Op Vitals Reviewed: Yes    No anethesia notable event occurred.    Staff: Anesthesiologist       Last Filed PACU Vitals:  Vitals Value Taken Time   Temp 98 °F (36.7 °C) 05/02/25 1500   Pulse 81 05/02/25 1509   /68 05/02/25 1500   Resp 37 05/02/25 1509   SpO2 94 % 05/02/25 1509   Vitals shown include unfiled device data.    Modified Delfina:     Vitals Value Taken Time   Activity 2 05/02/25 1511   Respiration 2 05/02/25 1511   Circulation 2 05/02/25 1511   Consciousness 2 05/02/25 1511   Oxygen Saturation 2 05/02/25 1511     Modified Delfina Score: 10

## 2025-05-12 ENCOUNTER — HOSPITAL ENCOUNTER (OUTPATIENT)
Dept: RADIOLOGY | Facility: HOSPITAL | Age: 50
Discharge: HOME | End: 2025-05-12
Attending: SURGERY
Payer: MEDICARE

## 2025-05-12 ENCOUNTER — OFFICE VISIT (OUTPATIENT)
Dept: OBGYN CLINIC | Facility: HOSPITAL | Age: 50
End: 2025-05-12

## 2025-05-12 VITALS — WEIGHT: 162 LBS | HEIGHT: 61 IN | BODY MASS INDEX: 30.58 KG/M2

## 2025-05-12 DIAGNOSIS — S62.336A CLOSED DISPLACED FRACTURE OF NECK OF FIFTH METACARPAL BONE OF RIGHT HAND, INITIAL ENCOUNTER: ICD-10-CM

## 2025-05-12 DIAGNOSIS — S62.336D CLOSED DISPLACED FRACTURE OF NECK OF FIFTH METACARPAL BONE OF RIGHT HAND WITH ROUTINE HEALING, SUBSEQUENT ENCOUNTER: Primary | ICD-10-CM

## 2025-05-12 DIAGNOSIS — R12 HEARTBURN: Chronic | ICD-10-CM

## 2025-05-12 PROCEDURE — 73130 X-RAY EXAM OF HAND: CPT

## 2025-05-12 PROCEDURE — 99024 POSTOP FOLLOW-UP VISIT: CPT | Performed by: SURGERY

## 2025-05-12 RX ORDER — SENNOSIDES 8.6 MG
650 CAPSULE ORAL EVERY 8 HOURS PRN
Qty: 30 TABLET | Refills: 0 | Status: SHIPPED | OUTPATIENT
Start: 2025-05-12

## 2025-05-12 RX ORDER — OMEPRAZOLE 40 MG/1
40 CAPSULE, DELAYED RELEASE ORAL DAILY
Qty: 30 CAPSULE | Refills: 1 | Status: SHIPPED | OUTPATIENT
Start: 2025-05-12

## 2025-05-12 RX ORDER — NAPROXEN SODIUM 220 MG/1
220 TABLET, FILM COATED ORAL EVERY 12 HOURS PRN
Qty: 30 TABLET | Refills: 0 | Status: SHIPPED | OUTPATIENT
Start: 2025-05-12

## 2025-05-12 NOTE — PROGRESS NOTES
Assessment/Plan:  Patient ID: Michelle Matos 49 y.o. female   Surgery: Right 5th metacarpal closed reduction percutaneous pinning - Right  Date of Surgery: 5/2/2025      Assessment & Plan  Closed displaced fracture of neck of fifth metacarpal bone of right hand with routine healing, subsequent encounter  Splint removed today and xrays show a well reduced and fixated fracture   One of the pins was rotated and causing some skin damage at the tip of the pin, this was rotated to a better position and padded   Cast placed today to be worn for the next 2.5-3 weeks  NWB to the operative hand   Refills of tylenol and aleve were provided along with omeprazole per patient request   Follow up in 2.5-3 weeks for cast and pin removal and new xrays     Orders:    XR hand 3+ vw right; Future    acetaminophen (TYLENOL) 650 mg CR tablet; Take 1 tablet (650 mg total) by mouth every 8 (eight) hours as needed for mild pain    naproxen sodium (ALEVE) 220 MG tablet; Take 1 tablet (220 mg total) by mouth every 12 (twelve) hours as needed for mild pain    Heartburn  Refill requested of omeprazole     Orders:    omeprazole (PriLOSEC) 40 MG capsule; Take 1 capsule (40 mg total) by mouth daily        Follow Up:  3  week(s)    To Do Next Visit:  X-rays of the  right  hand, Cast off, and Pins out      CHIEF COMPLAINT:  No chief complaint on file.        SUBJECTIVE:  Michelle Matos is a 49 y.o. year old female who presents for follow up after Right 5th metacarpal closed reduction percutaneous pinning - Right. Today patient has some pain in the hand that she describes at throbbing. She is in a rehab and can only take tylenol and aleve.       PHYSICAL EXAMINATION:  General: well developed and well nourished, alert, oriented times 3, and appears comfortable  Psychiatric: Normal    MUSCULOSKELETAL EXAMINATION:  Incision: No incision  Surgery Site: normal, no evidence of infection   Range of Motion: As expected  Neurovascular status: Neuro intact, good  cap refill  Activity Restrictions: Cast/splint restrictions  Done today: splint removed, cast placed        STUDIES REVIEWED:  I have personally reviewed and interpreted  AP lateral and oblique radiographs of the right hand   which demonstrate well reduced 5th MC fracture s/p pin fixation     LABS REVIEWED:    HgA1c:   Lab Results   Component Value Date    HGBA1C 5.7 (H) 11/14/2024     BMP:   Lab Results   Component Value Date    GLUCOSE 113 07/11/2015    CALCIUM 8.3 (L) 04/28/2025     07/11/2015    K 4.2 04/28/2025    CO2 29 04/28/2025     04/28/2025    BUN 14 04/28/2025    CREATININE 0.62 04/28/2025           PROCEDURES PERFORMED:  Procedures  Cast placed today to the right hand

## 2025-05-12 NOTE — ASSESSMENT & PLAN NOTE
Refill requested of omeprazole     Orders:    omeprazole (PriLOSEC) 40 MG capsule; Take 1 capsule (40 mg total) by mouth daily

## (undated) DEVICE — GLOVE INDICATOR PI UNDERGLOVE SZ 7 BLUE

## (undated) DEVICE — GLOVE INDICATOR PI UNDERGLOVE SZ 6.5 BLUE

## (undated) DEVICE — C-ARM: Brand: UNBRANDED

## (undated) DEVICE — STERILE BETHLEHEM PLASTIC HAND: Brand: CARDINAL HEALTH

## (undated) DEVICE — GLOVE SRG BIOGEL 7

## (undated) DEVICE — GLOVE SRG BIOGEL 6.5

## (undated) DEVICE — BLADE MINI RND TIP ONE SIDE SHARP

## (undated) DEVICE — CUFF TOURNIQUET 18 X 4 IN QUICK CONNECT DISP 1 BLADDER

## (undated) DEVICE — INTENDED FOR TISSUE SEPARATION, AND OTHER PROCEDURES THAT REQUIRE A SHARP SURGICAL BLADE TO PUNCTURE OR CUT.: Brand: BARD-PARKER ® CARBON RIB-BACK BLADES

## (undated) DEVICE — ARM SLING: Brand: DEROYAL

## (undated) DEVICE — SKN PRP WNG SPNGE PVP SCRB STR: Brand: MEDLINE INDUSTRIES, INC.